# Patient Record
Sex: MALE | Race: WHITE | NOT HISPANIC OR LATINO | Employment: OTHER | URBAN - METROPOLITAN AREA
[De-identification: names, ages, dates, MRNs, and addresses within clinical notes are randomized per-mention and may not be internally consistent; named-entity substitution may affect disease eponyms.]

---

## 2017-03-21 DIAGNOSIS — G40.909 SEIZURE DISORDER: ICD-10-CM

## 2017-03-21 RX ORDER — DIVALPROEX SODIUM 500 MG/1
TABLET, DELAYED RELEASE ORAL
Qty: 60 TABLET | Refills: 0 | Status: SHIPPED | OUTPATIENT
Start: 2017-03-21 | End: 2017-03-30 | Stop reason: SDUPTHER

## 2017-03-25 DIAGNOSIS — D53.9 ANEMIA ASSOCIATED WITH NUTRITIONAL DEFICIENCY: Primary | ICD-10-CM

## 2017-03-25 DIAGNOSIS — Z13.6 ENCOUNTER FOR LIPID SCREENING FOR CARDIOVASCULAR DISEASE: ICD-10-CM

## 2017-03-25 DIAGNOSIS — Z13.220 ENCOUNTER FOR LIPID SCREENING FOR CARDIOVASCULAR DISEASE: ICD-10-CM

## 2017-03-25 DIAGNOSIS — M19.90 ARTHRITIS: ICD-10-CM

## 2017-03-25 RX ORDER — CELECOXIB 200 MG/1
CAPSULE ORAL
Qty: 56 CAPSULE | OUTPATIENT
Start: 2017-03-25

## 2017-03-27 ENCOUNTER — TELEPHONE (OUTPATIENT)
Dept: FAMILY MEDICINE | Facility: CLINIC | Age: 37
End: 2017-03-27

## 2017-03-27 NOTE — TELEPHONE ENCOUNTER
Spoke with Shalonda Dorman patient's caregiver. Informed Dr. Foley does not have any time available today but can book with extended hours this evening. Shalonda refused appt for this evening and requested appt for tomorrow with Dr. Foley. Appt scheduled for 11am tomorrow. Shalonda voices understanding.

## 2017-03-27 NOTE — TELEPHONE ENCOUNTER
----- Message from Enma Mar sent at 3/27/2017 12:00 PM CDT -----  Patient's guardian, Shalonda Dorman, called.   No. [phone]   Patient has a rash on his left side at his waist.  It could be shingles.   He needs an appointment today.    Please call.

## 2017-03-28 ENCOUNTER — OFFICE VISIT (OUTPATIENT)
Dept: INTERNAL MEDICINE | Facility: CLINIC | Age: 37
End: 2017-03-28
Payer: MEDICARE

## 2017-03-28 ENCOUNTER — LAB VISIT (OUTPATIENT)
Dept: LAB | Facility: HOSPITAL | Age: 37
End: 2017-03-28
Attending: INTERNAL MEDICINE
Payer: MEDICARE

## 2017-03-28 VITALS — DIASTOLIC BLOOD PRESSURE: 78 MMHG | HEIGHT: 73 IN | SYSTOLIC BLOOD PRESSURE: 122 MMHG | HEART RATE: 80 BPM

## 2017-03-28 DIAGNOSIS — G40.909 SEIZURE DISORDER: ICD-10-CM

## 2017-03-28 DIAGNOSIS — Z23 NEED FOR VARICELLA VACCINE: ICD-10-CM

## 2017-03-28 DIAGNOSIS — R21 RASH OF BACK: ICD-10-CM

## 2017-03-28 DIAGNOSIS — G80.9 CEREBRAL PALSY, UNSPECIFIED TYPE: ICD-10-CM

## 2017-03-28 DIAGNOSIS — Z13.6 ENCOUNTER FOR LIPID SCREENING FOR CARDIOVASCULAR DISEASE: ICD-10-CM

## 2017-03-28 DIAGNOSIS — L73.9 FOLLICULITIS: Primary | ICD-10-CM

## 2017-03-28 DIAGNOSIS — Z13.220 ENCOUNTER FOR LIPID SCREENING FOR CARDIOVASCULAR DISEASE: ICD-10-CM

## 2017-03-28 LAB
ALBUMIN SERPL BCP-MCNC: 3.8 G/DL
ALP SERPL-CCNC: 57 U/L
ALT SERPL W/O P-5'-P-CCNC: 45 U/L
ANION GAP SERPL CALC-SCNC: 8 MMOL/L
AST SERPL-CCNC: 25 U/L
BASOPHILS # BLD AUTO: 0.03 K/UL
BASOPHILS NFR BLD: 0.5 %
BILIRUB SERPL-MCNC: 0.4 MG/DL
BUN SERPL-MCNC: 12 MG/DL
CALCIUM SERPL-MCNC: 9.8 MG/DL
CHLORIDE SERPL-SCNC: 105 MMOL/L
CO2 SERPL-SCNC: 26 MMOL/L
CREAT SERPL-MCNC: 0.8 MG/DL
DIFFERENTIAL METHOD: ABNORMAL
EOSINOPHIL # BLD AUTO: 0.3 K/UL
EOSINOPHIL NFR BLD: 4 %
ERYTHROCYTE [DISTWIDTH] IN BLOOD BY AUTOMATED COUNT: 13.2 %
EST. GFR  (AFRICAN AMERICAN): >60 ML/MIN/1.73 M^2
EST. GFR  (NON AFRICAN AMERICAN): >60 ML/MIN/1.73 M^2
GLUCOSE SERPL-MCNC: 76 MG/DL
HCT VFR BLD AUTO: 43.6 %
HDLC SERPL-MCNC: 164 MG/DL
HDLC SERPL-MCNC: 41 MG/DL
HGB BLD-MCNC: 15.6 G/DL
LYMPHOCYTES # BLD AUTO: 2.7 K/UL
LYMPHOCYTES NFR BLD: 43.6 %
MCH RBC QN AUTO: 30.2 PG
MCHC RBC AUTO-ENTMCNC: 35.8 %
MCV RBC AUTO: 85 FL
MONOCYTES # BLD AUTO: 0.6 K/UL
MONOCYTES NFR BLD: 9.2 %
NEUTROPHILS # BLD AUTO: 2.6 K/UL
NEUTROPHILS NFR BLD: 42.2 %
PLATELET # BLD AUTO: 122 K/UL
PMV BLD AUTO: 10.8 FL
POTASSIUM SERPL-SCNC: 4.8 MMOL/L
PROT SERPL-MCNC: 7.1 G/DL
RBC # BLD AUTO: 5.16 M/UL
SODIUM SERPL-SCNC: 139 MMOL/L
WBC # BLD AUTO: 6.22 K/UL

## 2017-03-28 PROCEDURE — 36415 COLL VENOUS BLD VENIPUNCTURE: CPT | Mod: PO

## 2017-03-28 PROCEDURE — 83718 ASSAY OF LIPOPROTEIN: CPT

## 2017-03-28 PROCEDURE — 86787 VARICELLA-ZOSTER ANTIBODY: CPT

## 2017-03-28 PROCEDURE — 82465 ASSAY BLD/SERUM CHOLESTEROL: CPT

## 2017-03-28 PROCEDURE — 85025 COMPLETE CBC W/AUTO DIFF WBC: CPT

## 2017-03-28 PROCEDURE — 99999 PR PBB SHADOW E&M-EST. PATIENT-LVL III: CPT | Mod: PBBFAC,,, | Performed by: INTERNAL MEDICINE

## 2017-03-28 PROCEDURE — 99499 UNLISTED E&M SERVICE: CPT | Mod: S$PBB,,, | Performed by: INTERNAL MEDICINE

## 2017-03-28 PROCEDURE — 80053 COMPREHEN METABOLIC PANEL: CPT

## 2017-03-28 PROCEDURE — 3078F DIAST BP <80 MM HG: CPT | Mod: S$GLB,,, | Performed by: INTERNAL MEDICINE

## 2017-03-28 PROCEDURE — 3074F SYST BP LT 130 MM HG: CPT | Mod: S$GLB,,, | Performed by: INTERNAL MEDICINE

## 2017-03-28 PROCEDURE — 99395 PREV VISIT EST AGE 18-39: CPT | Mod: S$GLB,,, | Performed by: INTERNAL MEDICINE

## 2017-03-28 PROCEDURE — 83701 LIPOPROTEIN BLD HR FRACTION: CPT

## 2017-03-28 RX ORDER — SULFAMETHOXAZOLE AND TRIMETHOPRIM 800; 160 MG/1; MG/1
1 TABLET ORAL 2 TIMES DAILY
Qty: 10 TABLET | Refills: 0 | Status: SHIPPED | OUTPATIENT
Start: 2017-03-28 | End: 2017-04-02

## 2017-03-28 NOTE — MR AVS SNAPSHOT
[facility] Internal Medicine   [place]  [place] [address]  Phone: [phone]  Fax: [phone]                  Leonid Cox   3/28/2017 11:00 AM   Office Visit    Description:  Male : 1980   Provider:  Jamel Foley MD   [facility]           Reason for Visit     Rash           Diagnoses this Visit        Comments    Seizure disorder    -  Primary     Cerebral palsy, unspecified type         Encounter for lipid screening for cardiovascular disease         Rash of back         Folliculitis                To Do List           Future Appointments        Provider Department Dept Phone    3/28/2017 11:45 AM [facility] [phone]    3/30/2017 11:20 AM Jimi Piña III, MD [facility] - Neurology [phone]    2017 10:00 AM Jamel Foley MD [facility] [phone]      Goals (5 Years of Data)     None       These Medications        Disp Refills Start End    sulfamethoxazole-trimethoprim 800-160mg (BACTRIM DS) 800-160 mg Tab 10 tablet 0 3/28/2017 2017    Take 1 tablet by mouth 2 (two) times daily. - Oral    Pharmacy: [facility] [place] Ph #: [phone]         [facility]     [facility] -  Assistance  Registered nurses in the [facility] provide clinical advisement, health education, appointment booking, and other advisory services.  Call for this free service at [phone].             Medications           Message regarding Medications     Verify the changes and/or additions to your medication regime listed below are the same as discussed with your clinician today.  If any of these changes or additions are incorrect, please notify your healthcare provider.        START taking these NEW medications        Refills    sulfamethoxazole-trimethoprim 800-160mg (BACTRIM DS) 800-160 mg  "Tab 0    Sig: Take 1 tablet by mouth 2 (two) times daily.    Class: Normal    Route: Oral           Verify that the below list of medications is an accurate representation of the medications you are currently taking.  If none reported, the list may be blank. If incorrect, please contact your healthcare provider. Carry this list with you in case of emergency.           Current Medications     celecoxib (CELEBREX) 200 MG capsule TAKE 1 CAPSULE BY MOUTH 2 TIMES DAILY    diazepam (VALIUM) 5 MG tablet Take 1 tablet (5 mg total) by mouth every 6 (six) hours as needed (muscle spasm).    divalproex (DEPAKOTE) 250 MG EC tablet Take 1 tablet (250 mg total) by mouth every evening.    divalproex (DEPAKOTE) 500 MG TbEC TAKE 1 TABLET BY MOUTH EVERY 12 HOURS    hydrocodone-acetaminophen 5-325mg (NORCO) 5-325 mg per tablet Take 1 tablet by mouth every 6 (six) hours as needed for Pain.    lisinopril (PRINIVIL,ZESTRIL) 5 MG tablet TAKE 1 TABLET BY MOUTH 2 TIMES DAILY    multivitamin (THERAGRAN) per tablet Take 1 tablet by mouth once daily.    sulfamethoxazole-trimethoprim 800-160mg (BACTRIM DS) 800-160 mg Tab Take 1 tablet by mouth 2 (two) times daily.           Clinical Reference Information           Your Vitals Were     BP Pulse Height             122/78 (BP Location: Right arm, Patient Position: Sitting, BP Method: Manual) 80 6' 1" (1.854 m)         Blood Pressure          Most Recent Value    BP  122/78      Allergies as of 3/28/2017     Adhesive Tape-silicones    Codeine    Morphine      Immunizations Administered on Date of Encounter - 3/28/2017     None      Orders Placed During Today's Visit     Future Labs/Procedures Expected by Expires    CBC auto differential  3/28/2017 5/27/2018    Cholesterol, total  3/28/2017 5/27/2018    Comprehensive metabolic panel  3/28/2017 5/27/2018    HDL CHOLESTEROL  3/28/2017 5/27/2018    LDL CHOLESTEROL, DIRECT  3/28/2017 5/27/2018    Varicella zoster antibody, IgG  3/28/2017 5/27/2018    "   Instructions    Recommendations for today     Rash on the skin seems more likely to be folliculitis and does not seem compatible with shingles.  Please follow the recommendations listed below regarding management of folliculitis.  In addition we recommend anti-biotic Bactrim.  If mild side effects develop on Bactrim simply continued the medication.  If significant side effects develop stop the medication and contact the clinic.      You should also contact the clinic if symptoms recur or simply fail to improve despite anti-biotic and home care measures.        Sulfamethoxazole; Trimethoprim, SMX-TMP tablets  What is this medicine?  SULFAMETHOXAZOLE; TRIMETHOPRIM or SMX-TMP (suhl fuh meth OK radha zohl; trye METH oh prim) is a combination of a sulfonamide antibiotic and a second antibiotic, trimethoprim. It is used to treat or prevent certain kinds of bacterial infections. It will not work for colds, flu, or other viral infections.  How should I use this medicine?  Take this medicine by mouth with a full glass of water. Follow the directions on the prescription label. Take your medicine at regular intervals. Do not take it more often than directed. Do not skip doses or stop your medicine early.  Talk to your pediatrician regarding the use of this medicine in children. Special care may be needed. This medicine has been used in children as young as 2 months of age.  What side effects may I notice from receiving this medicine?  Side effects that you should report to your doctor or health care professional as soon as possible:  · allergic reactions like skin rash or hives, swelling of the face, lips, or tongue  · breathing problems  · fever or chills, sore throat  · irregular heartbeat, chest pain  · joint or muscle pain  · pain or difficulty passing urine  · red pinpoint spots on skin  · redness, blistering, peeling or loosening of the skin, including inside the mouth  · unusual bleeding or bruising  · unusually weak or  tired  · yellowing of the eyes or skin  Side effects that usually do not require medical attention (report to your doctor or health care professional if they continue or are bothersome):  · diarrhea  · dizziness  · headache  · loss of appetite  · nausea, vomiting  · nervousness  What may interact with this medicine?  Do not take this medicine with any of the following medications:  · aminobenzoate potassium  · dofetilide  · metronidazole  This medicine may also interact with the following medications:  · ACE inhibitors like benazepril, enalapril, lisinopril, and ramipril  · birth control pills  · cyclosporine  · digoxin  · diuretics  · indomethacin  · medicines for diabetes  · methenamine  · methotrexate  · phenytoin  · potassium supplements  · pyrimethamine  · sulfinpyrazone  · tricyclic antidepressants  · warfarin  What if I miss a dose?  If you miss a dose, take it as soon as you can. If it is almost time for your next dose, take only that dose. Do not take double or extra doses.  Where should I keep my medicine?  Keep out of the reach of children.  Store at room temperature between 20 to 25 degrees C (68 to 77 degrees F). Protect from light. Throw away any unused medicine after the expiration date.  What should I tell my health care provider before I take this medicine?  They need to know if you have any of these conditions:  · anemia  · asthma  · being treated with anticonvulsants  · if you frequently drink alcohol containing drinks  · kidney disease  · liver disease  · low level of folic acid or glucose-6-phosphate dehydrogenase  · poor nutrition or malabsorption  · porphyria  · severe allergies  · thyroid disorder  · an unusual or allergic reaction to sulfamethoxazole, trimethoprim, sulfa drugs, other medicines, foods, dyes, or preservatives  · pregnant or trying to get pregnant  · breast-feeding  What should I watch for while using this medicine?  Tell your doctor or health care professional if your symptoms  do not improve. Drink several glasses of water a day to reduce the risk of kidney problems.  Do not treat diarrhea with over the counter products. Contact your doctor if you have diarrhea that lasts more than 2 days or if it is severe and watery.  This medicine can make you more sensitive to the sun. Keep out of the sun. If you cannot avoid being in the sun, wear protective clothing and use a sunscreen. Do not use sun lamps or tanning beds/booths.  Date Last Reviewed:   NOTE:This sheet is a summary. It may not cover all possible information. If you have questions about this medicine, talk to your doctor, pharmacist, or health care provider. Copyright© 2016 [employer]        Understanding Folliculitis  Folliculitis is when hair follicles become inflamed. Follicles are the tiny holes from which hair grows out of your skin. This skin condition can occur any place on the body where hair grows. But its often found on the neck, face, and scalp.  How to say it  [id number]   What causes folliculitis?  An infection or irritation can cause this skin condition. It may be from bacteria or a fungus. The condition can also happen from a wound or irritation of the skin. Shaving is a common cause. Some cases may come from taking certain medicines, such as those that treat acne.  Symptoms of folliculitis  This skin condition tends to develop quickly. It looks like little pimples on a base of a red, inflamed hair follicles. These bumps may ooze pus. They may also be:  · Itchy  · Painful  · Red  · Swollen  Treatment for folliculitis  Treatment depends on the cause of the inflammation. In some cases, this skin condition will go away on its own in a few days. But it may return. Treatment options include:  · Warm compress. Putting a warm, wet washcloth on the inflamed skin may help.  · Medicine. Many skin (topical) and oral medicines are available. Antibiotics are used for bacterial infections. Antifungal medicines are best  for fungal infections.  · Good hygiene. Keeping the skin clean can help. Use a clean razor when shaving. Prevent ingrown hairs after shaving. This can reduce folliculitis in the beard area. Avoid any substances that bother your skin.  When to call your healthcare provider  Call your healthcare provider right away if you have any of these:  · Fever of 100.4°F (38°C) or higher, or as directed  · Pain that gets worse  · Symptoms that dont get better, or get worse  · New symptoms   Date Last Reviewed: 5/1/2016  © 1971-4917 [employer]. [address] [place] [address]. All rights reserved. This information is not intended as a substitute for professional medical care. Always follow your healthcare professional's instructions.             Language Assistance Services     ATTENTION: Language assistance services are available, free of charge. Please call [phone].      ATENCIÓN: Si habla ellyn, tiene a daniels disposición servicios gratuitos de asistencia lingüística. Llame al [phone].     ANNE MARIE Ý: N?u b?n nói Ti?ng Vi?t, có các d?ch v? h? tr? ngôn ng? mi?n phí dành cho b?n. G?i s? [phone].         [facility] complies with applicable Federal civil rights laws and does not discriminate on the basis of race, color, national origin, age, disability, or sex.

## 2017-03-28 NOTE — PATIENT INSTRUCTIONS
Recommendations for today     Rash on the skin seems more likely to be folliculitis and does not seem compatible with shingles.  Please follow the recommendations listed below regarding management of folliculitis.  In addition we recommend anti-biotic Bactrim.  If mild side effects develop on Bactrim simply continued the medication.  If significant side effects develop stop the medication and contact the clinic.      You should also contact the clinic if symptoms recur or simply fail to improve despite anti-biotic and home care measures.        Sulfamethoxazole; Trimethoprim, SMX-TMP tablets  What is this medicine?  SULFAMETHOXAZOLE; TRIMETHOPRIM or SMX-TMP (suhl fuh meth OK radha zohl; trye METH oh prim) is a combination of a sulfonamide antibiotic and a second antibiotic, trimethoprim. It is used to treat or prevent certain kinds of bacterial infections. It will not work for colds, flu, or other viral infections.  How should I use this medicine?  Take this medicine by mouth with a full glass of water. Follow the directions on the prescription label. Take your medicine at regular intervals. Do not take it more often than directed. Do not skip doses or stop your medicine early.  Talk to your pediatrician regarding the use of this medicine in children. Special care may be needed. This medicine has been used in children as young as 2 months of age.  What side effects may I notice from receiving this medicine?  Side effects that you should report to your doctor or health care professional as soon as possible:  · allergic reactions like skin rash or hives, swelling of the face, lips, or tongue  · breathing problems  · fever or chills, sore throat  · irregular heartbeat, chest pain  · joint or muscle pain  · pain or difficulty passing urine  · red pinpoint spots on skin  · redness, blistering, peeling or loosening of the skin, including inside the mouth  · unusual bleeding or bruising  · unusually weak or  tired  · yellowing of the eyes or skin  Side effects that usually do not require medical attention (report to your doctor or health care professional if they continue or are bothersome):  · diarrhea  · dizziness  · headache  · loss of appetite  · nausea, vomiting  · nervousness  What may interact with this medicine?  Do not take this medicine with any of the following medications:  · aminobenzoate potassium  · dofetilide  · metronidazole  This medicine may also interact with the following medications:  · ACE inhibitors like benazepril, enalapril, lisinopril, and ramipril  · birth control pills  · cyclosporine  · digoxin  · diuretics  · indomethacin  · medicines for diabetes  · methenamine  · methotrexate  · phenytoin  · potassium supplements  · pyrimethamine  · sulfinpyrazone  · tricyclic antidepressants  · warfarin  What if I miss a dose?  If you miss a dose, take it as soon as you can. If it is almost time for your next dose, take only that dose. Do not take double or extra doses.  Where should I keep my medicine?  Keep out of the reach of children.  Store at room temperature between 20 to 25 degrees C (68 to 77 degrees F). Protect from light. Throw away any unused medicine after the expiration date.  What should I tell my health care provider before I take this medicine?  They need to know if you have any of these conditions:  · anemia  · asthma  · being treated with anticonvulsants  · if you frequently drink alcohol containing drinks  · kidney disease  · liver disease  · low level of folic acid or glucose-6-phosphate dehydrogenase  · poor nutrition or malabsorption  · porphyria  · severe allergies  · thyroid disorder  · an unusual or allergic reaction to sulfamethoxazole, trimethoprim, sulfa drugs, other medicines, foods, dyes, or preservatives  · pregnant or trying to get pregnant  · breast-feeding  What should I watch for while using this medicine?  Tell your doctor or health care professional if your symptoms  do not improve. Drink several glasses of water a day to reduce the risk of kidney problems.  Do not treat diarrhea with over the counter products. Contact your doctor if you have diarrhea that lasts more than 2 days or if it is severe and watery.  This medicine can make you more sensitive to the sun. Keep out of the sun. If you cannot avoid being in the sun, wear protective clothing and use a sunscreen. Do not use sun lamps or tanning beds/booths.  Date Last Reviewed:   NOTE:This sheet is a summary. It may not cover all possible information. If you have questions about this medicine, talk to your doctor, pharmacist, or health care provider. Copyright© 2016 [employer] Standard        Understanding Folliculitis  Folliculitis is when hair follicles become inflamed. Follicles are the tiny holes from which hair grows out of your skin. This skin condition can occur any place on the body where hair grows. But its often found on the neck, face, and scalp.  How to say it  [id number]   What causes folliculitis?  An infection or irritation can cause this skin condition. It may be from bacteria or a fungus. The condition can also happen from a wound or irritation of the skin. Shaving is a common cause. Some cases may come from taking certain medicines, such as those that treat acne.  Symptoms of folliculitis  This skin condition tends to develop quickly. It looks like little pimples on a base of a red, inflamed hair follicles. These bumps may ooze pus. They may also be:  · Itchy  · Painful  · Red  · Swollen  Treatment for folliculitis  Treatment depends on the cause of the inflammation. In some cases, this skin condition will go away on its own in a few days. But it may return. Treatment options include:  · Warm compress. Putting a warm, wet washcloth on the inflamed skin may help.  · Medicine. Many skin (topical) and oral medicines are available. Antibiotics are used for bacterial infections. Antifungal medicines are best  for fungal infections.  · Good hygiene. Keeping the skin clean can help. Use a clean razor when shaving. Prevent ingrown hairs after shaving. This can reduce folliculitis in the beard area. Avoid any substances that bother your skin.  When to call your healthcare provider  Call your healthcare provider right away if you have any of these:  · Fever of 100.4°F (38°C) or higher, or as directed  · Pain that gets worse  · Symptoms that dont get better, or get worse  · New symptoms   Date Last Reviewed: 5/1/2016  © 1336-4549 [employer]. [address] [place] [address]. All rights reserved. This information is not intended as a substitute for professional medical care. Always follow your healthcare professional's instructions.

## 2017-03-28 NOTE — PROGRESS NOTES
Portions of this note are generated with voice recognition software. Typographical errors may exist.     SUBJECTIVE:    This is a/an 37 y.o. male here for primary care visit for  Chief Complaint   Patient presents with    Rash     Poss. Shingles      Rash developed over the last 2 weeks.  Caretaker today states that the rash was papular in nature.  Unclear if the rash cause significant pain or itching.  Patient with minimal verbal communication.  Patient did not seem to have problems with excoriation.  He did not seem to be in significant distress relating to the rash.  No obvious constitutional symptoms.  Mar[place] vaccination status assumed by the caretaker.  No documented cases of shingles in this patient.  No immunosuppressive medication.    Seizure disorder.  Patient continues to take anti-epileptic medication.  Followed by subspecialist.      Medications Reviewed and Updated    Past medical, family, and social histories were reviewed and updated.    Review of Systems negative unless noted otherwise in history of present illness-  History obtained from unobtainable from patient due to language barrier    Allergic:    Review of patient's allergies indicates:   Allergen Reactions    Adhesive tape-silicones      Other reaction(s): blisters    Codeine      Other reaction(s): Nausea    Morphine Itching       OBJECTIVE:  BP: 122/78 Pulse: 80    Wt Readings from Last 3 Encounters:   09/22/16 94.6 kg (208 lb 8.9 oz)   06/23/16 95.9 kg (211 lb 6.7 oz)   02/16/16 95.5 kg (210 lb 8.6 oz)    There is no height or weight on file to calculate BMI.  Previous Blood Pressure Readings :   BP Readings from Last 3 Encounters:   03/28/17 122/78   09/22/16 124/88   06/23/16 130/60       GEN: No apparent distress  HEENT: sclera non-icteric, conjunctiva clear  CV: no peripheral edema  PULM: breathing non-labored  ABD: Obese, protuberant abdomen.  PSYCH: appropriate affect  MSK: able to rise from chair without assistance  SKIN:  normal skin turgor.    Pertinent Labs Reviewed       ASSESSMENT/PLAN:    Folliculitis  -     sulfamethoxazole-trimethoprim 800-160mg (BACTRIM DS) 800-160 mg Tab; Take 1 tablet by mouth 2 (two) times daily.  Dispense: 10 tablet; Refill: 0    Seizure disorder  -     CBC auto differential; Future; Expected date: 3/28/17    Cerebral palsy, unspecified type  -     Comprehensive metabolic panel; Future; Expected date: 3/28/17    Encounter for lipid screening for cardiovascular disease  -     Cholesterol, total; Future; Expected date: 3/28/17  -     HDL CHOLESTEROL; Future; Expected date: 3/28/17  -     LDL CHOLESTEROL, DIRECT; Future; Expected date: 3/28/17    Rash of back  -     Varicella zoster antibody, IgG; Future; Expected date: 3/28/17          Future Appointments  Date Time Provider Department Center   3/30/2017 11:20 AM Jimi Piña III, MD [facility] NEURO [facility]   6/28/2017 10:00 AM Jamel Foley MD [facility]       Jamel Foley  3/28/2017  12:20 PM

## 2017-03-30 ENCOUNTER — OFFICE VISIT (OUTPATIENT)
Dept: NEUROLOGY | Facility: CLINIC | Age: 37
End: 2017-03-30
Payer: MEDICARE

## 2017-03-30 VITALS
SYSTOLIC BLOOD PRESSURE: 131 MMHG | DIASTOLIC BLOOD PRESSURE: 85 MMHG | BODY MASS INDEX: 27.49 KG/M2 | WEIGHT: 207.44 LBS | HEART RATE: 83 BPM | HEIGHT: 73 IN

## 2017-03-30 DIAGNOSIS — D69.6 THROMBOCYTOPENIA: ICD-10-CM

## 2017-03-30 DIAGNOSIS — G91.9 HYDROCEPHALUS: ICD-10-CM

## 2017-03-30 DIAGNOSIS — G40.909 SEIZURE DISORDER: Primary | ICD-10-CM

## 2017-03-30 DIAGNOSIS — G80.0 SPASTIC QUADRIPLEGIC CEREBRAL PALSY: ICD-10-CM

## 2017-03-30 PROCEDURE — 1160F RVW MEDS BY RX/DR IN RCRD: CPT | Mod: S$GLB,,,

## 2017-03-30 PROCEDURE — 99214 OFFICE O/P EST MOD 30 MIN: CPT | Mod: S$GLB,,,

## 2017-03-30 PROCEDURE — 3079F DIAST BP 80-89 MM HG: CPT | Mod: S$GLB,,,

## 2017-03-30 PROCEDURE — 3075F SYST BP GE 130 - 139MM HG: CPT | Mod: S$GLB,,,

## 2017-03-30 PROCEDURE — 99499 UNLISTED E&M SERVICE: CPT | Mod: S$PBB,,,

## 2017-03-30 PROCEDURE — 99999 PR PBB SHADOW E&M-EST. PATIENT-LVL III: CPT | Mod: PBBFAC,,,

## 2017-03-30 RX ORDER — DIVALPROEX SODIUM 250 MG/1
250 TABLET, DELAYED RELEASE ORAL NIGHTLY
Qty: 30 TABLET | Refills: 5 | Status: SHIPPED | OUTPATIENT
Start: 2017-03-30 | End: 2017-04-18 | Stop reason: SDUPTHER

## 2017-03-30 RX ORDER — DIVALPROEX SODIUM 500 MG/1
500 TABLET, DELAYED RELEASE ORAL EVERY 12 HOURS
Qty: 60 TABLET | Refills: 5 | Status: SHIPPED | OUTPATIENT
Start: 2017-03-30 | End: 2017-04-18 | Stop reason: SDUPTHER

## 2017-03-30 NOTE — PATIENT INSTRUCTIONS
See the copy of this report that has been scanned into patient's Epic Chart.     MARYJANE MURPHY III, MD

## 2017-03-30 NOTE — LETTER
March 30, 2017      Jamel Foley MD  [address] [place] [address]           [facility] Neurology  [address]  [facility] [address]  Phone: [phone]  Fax: [phone]          Patient: Leonid Cox   MR Number: [id number]   YOB: 1980   Date of Visit: 3/30/2017       Dear Dr. Jamel Foley:    Thank you for referring Leonid Cox to me for evaluation. Attached you will find relevant portions of my assessment and plan of care.    If you have questions, please do not hesitate to call me. I look forward to following Leonid Cox along with you.    Sincerely,    Jimi Piña III, MD    Enclosure  CC:  No Recipients    If you would like to receive this communication electronically, please contact [email or web address] or [phone] to request more information on [employer] access.    For providers and/or their staff who would like to refer a patient to [facility], please contact us through our one-stop-shop provider referral line, [facility], at [phone].    If you feel you have received this communication in error or would no longer like to receive these types of communications, please e-mail [email or web address]

## 2017-03-30 NOTE — PROGRESS NOTES
This office note has been dictated.  HISTORY OF PRESENT ILLNESS:  [facility] for   medical management of his seizure disorder.  He is accompanied by his morning   sitter.  No recent seizure activity is reported.  I am prescribing Depakote 500   mg twice a day and 250 mg at bedtime.  The medication has been well tolerated.    Leonid does not report any new problems today.  He has hydrocephalus and a   shunt for which he is followed by Dr. Das in Neurosurgery.    NEUROLOGIC REVIEW OF SYSTEMS:  He has recently developed a rash on his hip for   which he saw his primary care and treatment has been initiated.  He has the old   vision problem and is confined to the wheelchair because of back pain.  He has   been evaluated in Orthopedics and is treated conservatively with analgesic   medication.  He denies fever, chills, sweats, headaches, dizziness, speech or   swallowing difficulty, chest pain, cough, shortness breath, nausea, vomiting,   diarrhea and incontinence.    SOCIAL HISTORY:  Abi is his morning sitter for Monday through Friday, and it   is she who accompanies him today.      I reviewed his list of medicines and edited   the electronic record.    PHYSICAL EXAMINATION:  GENERAL:  Leonid is alert and attentive, but poorly oriented.  He has   spontaneous occasionally appropriate and somewhat nonsensical speech.  This is   typical for him.  VITAL SIGNS:  Reviewed and included in this note.  He is casually dressed and   seated in a wheelchair and in no acute distress.  NEUROLOGIC:  On cranial nerve examination, they are equal, reactive pupils and   spontaneous horizontal nystagmus.  He is able to find my hand in space.  He is   again noted to have left facial weakness without sensory deficit.  The tongue   and palate are in the midline.  Again, noted is gingival hypertrophy.  He hears   approximately equally in the two ears.  The neck is supple, the pulses are equal   and no bruits  are heard.  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  There are no gross sensory deficits.  The deep tendon reflexes may   be a bit brisker on the left upper extremity than the right.  His left knee   jerk appears to be depressed compared to the right and the ankle jerks are   somewhat diminished as well.    I reviewed the clinic record including his recent laboratory on the computer   console.  I discussed the situation with Leonid and his attendant.  He is doing   well from the standpoint of his seizure disorder and his medication will be   continued.  His platelet count, however, is now only 122.  It has been normal in   the past.  My concern is that that may be related to his Depakote.  He has been   doing well on this for a long time and his anticonvulsant levels have been in   the mid therapeutic range.  I will continue the medication for now, but also   schedule him to have another CBC performed in approximately one month at the   [facility].  I will forward the results to the patient when the testing   is completed.  If his platelet count is persistently low, I will likely refer   him to Hematology for further evaluation prior to making any changes in his   anticonvulsant medication.  His attendants may call if there are any questions   in the interim.      KELLI/JUAN CARLOS  dd: 03/30/2017 12:12:37 (CDT)  td: 03/31/2017 02:25:58 (CDT)  Doc ID   #[id number]  Job ID #[id number]    CC:

## 2017-03-31 LAB
LDLC SERPL-MCNC: 98 MG/DL
VARICELLA INTERPRETATION: NEGATIVE
VARICELLA ZOSTER IGG: 0.37 ISR

## 2017-04-03 ENCOUNTER — TELEPHONE (OUTPATIENT)
Dept: INTERNAL MEDICINE | Facility: CLINIC | Age: 37
End: 2017-04-03

## 2017-04-03 NOTE — TELEPHONE ENCOUNTER
Spoke with Mrs. Dorman. She will call me back when she can get the CNA to bring patient in for chicken pox vaccine.

## 2017-04-03 NOTE — TELEPHONE ENCOUNTER
----- Message from Jamel Foley MD sent at 4/2/2017  9:20 AM CDT -----  Contact mother regarding need to start chickenpox vaccination.  Blood work indicates patient does not have adequate immunity against chickenpox.  Rash that has developed is not chickenpox.  Schedule vaccination first dose as soon as possible and second dose should be within 4-8 weeks of first injection.

## 2017-04-04 ENCOUNTER — CLINICAL SUPPORT (OUTPATIENT)
Dept: FAMILY MEDICINE | Facility: CLINIC | Age: 37
End: 2017-04-04
Payer: MEDICARE

## 2017-04-04 DIAGNOSIS — Z23 VARICELLA VACCINE: Primary | ICD-10-CM

## 2017-04-04 PROCEDURE — 90471 IMMUNIZATION ADMIN: CPT | Mod: S$GLB,,, | Performed by: INTERNAL MEDICINE

## 2017-04-04 PROCEDURE — 90716 VAR VACCINE LIVE SUBQ: CPT | Mod: S$GLB,,, | Performed by: INTERNAL MEDICINE

## 2017-04-04 NOTE — TELEPHONE ENCOUNTER
----- Message from Seamus Bhat sent at 4/3/2017  5:04 PM CDT -----  Contact: Vin Askew//[phone]  You received a letter concerning celebrex and to his knowledge he is not on celebrex; he is concerned one of his caretakers may be filling this medication on his name.

## 2017-04-04 NOTE — TELEPHONE ENCOUNTER
Informed Kyle Azar Celebrex was prescribe for jamison only when needed for arthritic pain but has not been refilled since 10/2016.

## 2017-04-18 DIAGNOSIS — G40.909 SEIZURE DISORDER: ICD-10-CM

## 2017-04-18 RX ORDER — DIVALPROEX SODIUM 500 MG/1
TABLET, DELAYED RELEASE ORAL
Qty: 56 TABLET | OUTPATIENT
Start: 2017-04-18

## 2017-04-18 RX ORDER — DIVALPROEX SODIUM 250 MG/1
250 TABLET, DELAYED RELEASE ORAL NIGHTLY
Qty: 30 TABLET | Refills: 5 | Status: SHIPPED | OUTPATIENT
Start: 2017-04-18 | End: 2018-07-11 | Stop reason: SDUPTHER

## 2017-04-18 RX ORDER — DIVALPROEX SODIUM 500 MG/1
500 TABLET, DELAYED RELEASE ORAL EVERY 12 HOURS
Qty: 60 TABLET | Refills: 5 | Status: SHIPPED | OUTPATIENT
Start: 2017-04-18 | End: 2018-07-11 | Stop reason: SDUPTHER

## 2017-04-18 NOTE — TELEPHONE ENCOUNTER
----- Message from Sharon Mayes MA sent at 4/18/2017  4:28 PM CDT -----  Contact: [facility]    [phone]      ----- Message -----     From: Madelin Kerns     Sent: 4/18/2017   4:21 PM       To: Wang Krause S III Staff    Calling to request pts prescription for divalproex (DEPAKOTE) 500 MG TbEC be faxed to us at [phone].  (she does not think the e-scribe is working)

## 2017-04-19 ENCOUNTER — TELEPHONE (OUTPATIENT)
Dept: NEUROLOGY | Facility: CLINIC | Age: 37
End: 2017-04-19

## 2017-04-19 NOTE — TELEPHONE ENCOUNTER
----- Message from Madelin Kerns sent at 4/18/2017  4:21 PM CDT -----  Contact: [facility]    [phone]  Calling to request pts prescription for divalproex (DEPAKOTE) 500 MG TbEC be faxed to us at [phone].  (she does not think the e-scribe is working)

## 2017-05-01 ENCOUNTER — LAB VISIT (OUTPATIENT)
Dept: LAB | Facility: HOSPITAL | Age: 37
End: 2017-05-01
Payer: MEDICARE

## 2017-05-01 DIAGNOSIS — D69.6 THROMBOCYTOPENIA: ICD-10-CM

## 2017-05-01 DIAGNOSIS — G40.909 SEIZURE DISORDER: ICD-10-CM

## 2017-05-01 DIAGNOSIS — G91.9 HYDROCEPHALUS: ICD-10-CM

## 2017-05-01 DIAGNOSIS — G80.0 SPASTIC QUADRIPLEGIC CEREBRAL PALSY: ICD-10-CM

## 2017-05-01 LAB
BASOPHILS # BLD AUTO: 0.02 K/UL
BASOPHILS NFR BLD: 0.3 %
DIFFERENTIAL METHOD: NORMAL
EOSINOPHIL # BLD AUTO: 0.1 K/UL
EOSINOPHIL NFR BLD: 2.1 %
ERYTHROCYTE [DISTWIDTH] IN BLOOD BY AUTOMATED COUNT: 13.5 %
HCT VFR BLD AUTO: 43.3 %
HGB BLD-MCNC: 14.9 G/DL
LYMPHOCYTES # BLD AUTO: 2.5 K/UL
LYMPHOCYTES NFR BLD: 40.4 %
MCH RBC QN AUTO: 30.3 PG
MCHC RBC AUTO-ENTMCNC: 34.4 %
MCV RBC AUTO: 88 FL
MONOCYTES # BLD AUTO: 0.5 K/UL
MONOCYTES NFR BLD: 8.3 %
NEUTROPHILS # BLD AUTO: 3 K/UL
NEUTROPHILS NFR BLD: 48.4 %
PLATELET # BLD AUTO: 186 K/UL
PMV BLD AUTO: 9.8 FL
RBC # BLD AUTO: 4.92 M/UL
WBC # BLD AUTO: 6.27 K/UL

## 2017-05-01 PROCEDURE — 85025 COMPLETE CBC W/AUTO DIFF WBC: CPT

## 2017-05-01 PROCEDURE — 36415 COLL VENOUS BLD VENIPUNCTURE: CPT | Mod: PO

## 2017-05-02 RX ORDER — CELECOXIB 200 MG/1
CAPSULE ORAL
Qty: 56 CAPSULE | Refills: 1 | Status: SHIPPED | OUTPATIENT
Start: 2017-05-02 | End: 2017-08-02 | Stop reason: SDUPTHER

## 2017-05-02 RX ORDER — CELECOXIB 200 MG/1
CAPSULE ORAL
Qty: 56 CAPSULE | OUTPATIENT
Start: 2017-05-02

## 2017-06-06 ENCOUNTER — OFFICE VISIT (OUTPATIENT)
Dept: INTERNAL MEDICINE | Facility: CLINIC | Age: 37
End: 2017-06-06
Payer: MEDICARE

## 2017-06-06 ENCOUNTER — PATIENT MESSAGE (OUTPATIENT)
Dept: INTERNAL MEDICINE | Facility: CLINIC | Age: 37
End: 2017-06-06

## 2017-06-06 ENCOUNTER — TELEPHONE (OUTPATIENT)
Dept: INTERNAL MEDICINE | Facility: CLINIC | Age: 37
End: 2017-06-06

## 2017-06-06 VITALS
OXYGEN SATURATION: 98 % | SYSTOLIC BLOOD PRESSURE: 128 MMHG | WEIGHT: 207.44 LBS | HEART RATE: 65 BPM | BODY MASS INDEX: 27.49 KG/M2 | HEIGHT: 73 IN | DIASTOLIC BLOOD PRESSURE: 78 MMHG

## 2017-06-06 DIAGNOSIS — R46.89 SELF NEGLECT: Primary | ICD-10-CM

## 2017-06-06 DIAGNOSIS — L30.8 VESICULAR DERMATITIS: Primary | ICD-10-CM

## 2017-06-06 DIAGNOSIS — G80.0 SPASTIC QUADRIPLEGIC CEREBRAL PALSY: ICD-10-CM

## 2017-06-06 PROCEDURE — 99999 PR PBB SHADOW E&M-EST. PATIENT-LVL III: CPT | Mod: PBBFAC,,, | Performed by: INTERNAL MEDICINE

## 2017-06-06 PROCEDURE — 99212 OFFICE O/P EST SF 10 MIN: CPT | Mod: S$GLB,,, | Performed by: INTERNAL MEDICINE

## 2017-06-06 RX ORDER — TRIAMCINOLONE ACETONIDE 5 MG/G
CREAM TOPICAL 2 TIMES DAILY PRN
Qty: 15 G | Refills: 0 | Status: SHIPPED | OUTPATIENT
Start: 2017-06-06 | End: 2017-06-06 | Stop reason: SDUPTHER

## 2017-06-06 RX ORDER — TRIAMCINOLONE ACETONIDE 5 MG/G
CREAM TOPICAL
Qty: 15 G | Refills: 0 | Status: SHIPPED | OUTPATIENT
Start: 2017-06-06 | End: 2017-06-28 | Stop reason: SDUPTHER

## 2017-06-06 NOTE — TELEPHONE ENCOUNTER
Spoke with Shalonda who states that pt has a rash and would like for pt to be seen today. Shalonda was offered an urgent care appointment for 11:20am. Shalonda states that she will see if someone can bring pt in. Understanding voiced.

## 2017-06-06 NOTE — PROGRESS NOTES
Similar distribution.  Resolving vesicular appearing rash with surrounding erythema.    Portions of this note are generated with voice recognition software. Typographical errors may exist.     SUBJECTIVE:    This is a/an 37 y.o. male here for primary care visit for  Chief Complaint   Patient presents with    Rash     Patient has had recurrence of vesicular rash for the last week.  Caretaker states that rash completely resolved for the interval between his last visit and today.  The lesions are pruritic.  The patient itches one area of his body.  Caretaker has not sure that there has been an evaluation at his adult  program to see if there is an arthropod infestation.  Nobody else in the patient's home is complaining about pruritic rash or bug bites.  The patient lives in a house with at least 4 staff that come over to help with activities of daily living.  None have expressed having problems with bug bites or rashes.  Patient does not have a history of herpes simplex labialis.  Self-care measures have been limited.  There are no topical remedies that are being utilized for the recurrence of rash.          Medications Reviewed and Updated    Past medical, family, and social histories were reviewed and updated.    Review of Systems negative unless noted otherwise in history of present illness-  History obtained from personal aide    Allergic:    Review of patient's allergies indicates:   Allergen Reactions    Adhesive tape-silicones      Other reaction(s): blisters    Codeine      Other reaction(s): Nausea    Morphine Itching       OBJECTIVE:  BP: 128/78 Pulse: 65    Wt Readings from Last 3 Encounters:   06/06/17 94.1 kg (207 lb 7.3 oz)   03/30/17 94.1 kg (207 lb 7.3 oz)   09/22/16 94.6 kg (208 lb 8.9 oz)    Body mass index is 27.37 kg/m².  Previous Blood Pressure Readings :   BP Readings from Last 3 Encounters:   06/06/17 128/78   03/30/17 131/85   03/28/17 122/78       GEN: No apparent distress  HEENT:  sclera non-icteric, conjunctiva clear  CV: no peripheral edema  PULM: breathing non-labored  ABD: Obese, protuberant abdomen.  PSYCH: appropriate affect  MSK: able to rise from chair without assistance  SKIN: normal skin turgor    Pertinent Labs Reviewed       ASSESSMENT/PLAN:    Vesicular dermatitis.  Recurrent.  Etiology unclear.  Could represent arthropod bites.  Alternatively could be herpes simplex.  Counseling on self-care measures.  Return to clinic if fails to improve  -     triamcinolone acetonide 0.5% (KENALOG) 0.5 % Crea; Apply topically 2 (two) times daily as needed. For itching  Dispense: 15 g; Refill: 0          Future Appointments  Date Time Provider Department Center   6/28/2017 10:00 AM Jamel Foley MD [facility]       Jamel Foley  6/6/2017  11:25 AM

## 2017-06-06 NOTE — PATIENT INSTRUCTIONS
Recommendations for today    Rash symptoms may be related to bug bites.  We recommend that you pursue the following home care measures to prevent this from becoming a chronic problem.    Triamcinolone is a steroids cream that we are prescribing the help with itching symptoms.  This should not be used on Procan or open skin because this can prevent healing.  If there is broken or open skin you should use something like calamine lotion to help with itching until broken skin has healed over.    If rash comes back again you should contact pest-control service to evaluate the home environment to make sure that there is not an infestation of bedbugs that needs to be treated and removed.      Bedbug Bites  Bedbugs are tiny insects, about the size of an apple seed. They are reddish-brown and slightly flattened and oval. They feed on human blood, usually at night while people are sleeping. Bedbugs are attracted to the warmth of your body, and also to your breath. Unlike some other parasites, they can live up to a year without eating. They dont have wings and dont jump, but they are fast crawlers.  Bedbugs are not dangerous and dont usually spread disease.  Bite symptoms  The symptoms of bedbug bites can be different for different people. Bites can be found anywhere on your body, but they are more common on skin that is exposed. Look for these symptoms:  · Itching  · Red rash, which can start small and get larger  · Hives, red swollen marks (welts), or raised red itchy areas (wheals). These may be in spots or cover a large area.  · Small, firm, flat bumps  · Blisters  · Cluster of bites in a line, or in a curved or zigzag row  · Allergic reaction  · Skin infection from scratching the bites  Where bedbugs hide out  Bedbugs can be found in almost any place you spend time, both at home and away from home. This includes hotels, buses, trains, ships, nursing homes, and apartments.  Bedbugs can be in clean or dirty places.  Because of their size and shape, bedbugs can get into small places, where you wouldnt expect to look. They tend to be found mostly in furniture, furnishings around the home, clothing, and cracks and crevices. Here are specific areas where they can be found:  · Beds and mattresses, especially in the seams  · Joints of bed frames, or the headboard  · Sheets and blankets  · Couches, fabric-covered chairs, and other furniture with fabric  · Rugs, especially along the edges  · Luggage or boxes  · Clothing  · Behind wall decorations, pictures, mirrors, and smoke alarms, and in electric outlets  How to find them  Bedbugs are big enough to be seen. But they also may leave some traces:  · Black spots (feces) on a bed mattress, especially around the seams  · Blood spots on the sheets  · Shells they may have shed  Home care  · Bite symptoms usually go away on their own in 1 to 2 weeks.  · To help prevent infection, avoid scratching the bites as much as possible.  · To relieve itching and swelling, use an over-the-counter (OTC) hydrocortisone ointment or cream  · If you need more relief, put an ice pack on the bites. Use the ice pack for up to 20 minutes at a time. To make an ice pack, put ice cubes in a plastic bag that seals at the top. Wrap the bag in a clean, thin towel or cloth. Never put ice or an ice pack directly on the skin.  · If you have a large number of bites or severe itching, take an OTC oral antihistamine. Follow the directions on the package.  · If a bite becomes infected, your health care provider can prescribe an antibiotic. This may be a pill you take by mouth. Or it may be a cream you put on your skin.  · If you were bitten in your home, talk with a licensed pest-control company. Bedbugs do not live on you. They live in cracks in your house. The company can inspect your home and help you get rid of the bugs safely.  Follow-up care  Follow up with your healthcare provider, or as advised.  When to seek  medical advice  Call your healthcare provider right away if any of these occur:  · Fever of 100.4°F (38.0°C), or higher  · Signs of infection in the bites, such as swelling and pain that gets worse, warmth in the area, or drainage from the bites  · Signs of allergic reaction, such as hives or a spreading rash  Call 911  Call 911 if any of the following occur:  · Throat itching or swelling  · Wheezing  Date Last Reviewed: 8/1/2016 © 2000-2016 [employer]. [address] [place], [place] [address]. All rights reserved. This information is not intended as a substitute for professional medical care. Always follow your healthcare professional's instructions.

## 2017-06-06 NOTE — TELEPHONE ENCOUNTER
Spoke with Shalonda who states that pt will be able to come in on 6/6/2017 at 11:20am. Understanding voiced.

## 2017-06-09 ENCOUNTER — TELEPHONE (OUTPATIENT)
Dept: INTERNAL MEDICINE | Facility: CLINIC | Age: 37
End: 2017-06-09

## 2017-06-12 ENCOUNTER — TELEPHONE (OUTPATIENT)
Dept: INTERNAL MEDICINE | Facility: CLINIC | Age: 37
End: 2017-06-12

## 2017-06-12 NOTE — TELEPHONE ENCOUNTER
----- Message from Vanessa Sandoval sent at 6/12/2017  2:34 PM CDT -----  Contact: Cynthia with [employer]/[phone]  She would like to speak with Dr. Foley about a note he sent the patient with about staying staying away from a facility for possible bugs.  Please advise

## 2017-06-17 ENCOUNTER — OUTPATIENT CASE MANAGEMENT (OUTPATIENT)
Dept: ADMINISTRATIVE | Facility: OTHER | Age: 37
End: 2017-06-17

## 2017-06-17 NOTE — PROGRESS NOTES
Please note the following patient's information has been forwarded to [facility] for case mgmt or  by Outpatient Case Management.    Please see the media section of patient's chart for additional details.    Please contact Ext. [phone] with any questions.    Thank you,  Iris Julian

## 2017-06-26 ENCOUNTER — PATIENT MESSAGE (OUTPATIENT)
Dept: INTERNAL MEDICINE | Facility: CLINIC | Age: 37
End: 2017-06-26

## 2017-06-28 ENCOUNTER — OFFICE VISIT (OUTPATIENT)
Dept: INTERNAL MEDICINE | Facility: CLINIC | Age: 37
End: 2017-06-28
Payer: MEDICARE

## 2017-06-28 ENCOUNTER — LAB VISIT (OUTPATIENT)
Dept: LAB | Facility: HOSPITAL | Age: 37
End: 2017-06-28
Attending: INTERNAL MEDICINE
Payer: MEDICARE

## 2017-06-28 VITALS
SYSTOLIC BLOOD PRESSURE: 122 MMHG | OXYGEN SATURATION: 96 % | HEIGHT: 73 IN | DIASTOLIC BLOOD PRESSURE: 80 MMHG | BODY MASS INDEX: 27.35 KG/M2 | HEART RATE: 70 BPM | WEIGHT: 206.38 LBS

## 2017-06-28 DIAGNOSIS — L98.9 SKIN LESIONS: Primary | ICD-10-CM

## 2017-06-28 DIAGNOSIS — L98.9 SKIN LESIONS: ICD-10-CM

## 2017-06-28 DIAGNOSIS — Z23 NEED FOR VARICELLA VACCINE: ICD-10-CM

## 2017-06-28 PROCEDURE — 99213 OFFICE O/P EST LOW 20 MIN: CPT | Mod: 25,S$GLB,, | Performed by: INTERNAL MEDICINE

## 2017-06-28 PROCEDURE — 99499 UNLISTED E&M SERVICE: CPT | Mod: S$PBB,,, | Performed by: INTERNAL MEDICINE

## 2017-06-28 PROCEDURE — 90716 VAR VACCINE LIVE SUBQ: CPT | Mod: S$GLB,,, | Performed by: INTERNAL MEDICINE

## 2017-06-28 PROCEDURE — 36415 COLL VENOUS BLD VENIPUNCTURE: CPT | Mod: PO

## 2017-06-28 PROCEDURE — 99999 PR PBB SHADOW E&M-EST. PATIENT-LVL III: CPT | Mod: PBBFAC,,, | Performed by: INTERNAL MEDICINE

## 2017-06-28 PROCEDURE — 90471 IMMUNIZATION ADMIN: CPT | Mod: S$GLB,,, | Performed by: INTERNAL MEDICINE

## 2017-06-28 PROCEDURE — 86695 HERPES SIMPLEX TYPE 1 TEST: CPT

## 2017-06-28 RX ORDER — TRIAMCINOLONE ACETONIDE 5 MG/G
CREAM TOPICAL
Qty: 15 G | Refills: 0 | Status: CANCELLED | OUTPATIENT
Start: 2017-06-28

## 2017-06-28 RX ORDER — TRIAMCINOLONE ACETONIDE 5 MG/G
CREAM TOPICAL 3 TIMES DAILY PRN
Qty: 30 G | Refills: 1 | Status: SHIPPED | OUTPATIENT
Start: 2017-06-28 | End: 2017-08-30 | Stop reason: SDUPTHER

## 2017-06-28 NOTE — PATIENT INSTRUCTIONS
Recommendations for today    Based on additional information about the home environment in the school environment bedbug diagnosis is much less likely.  Alternative diagnosis is more likely.  Patient does appear to have recurring source of skin irritation.  It clearly seems to respond to anti-inflammatory steroids cream.  Therefore we recommend continuing that treatment as necessary to help resolve the most recent recurrence.    Should the rash failed to respond to the steroids cream for the rash return contact the clinic.  The next step would be consultation with dermatology.          Folliculitis  Folliculitis is an infection of a hair follicle. A hair follicle is the little pocket where a hair grows out of the skin. Bacteria normally live on the skin. But sometimes bacteria can get trapped in a follicle and cause inflammation. This causes a bumpy rash. The area over the follicles is red and raised. It may itch or be painful. The bumps may have fluid (pus) inside. The pus may leak and then form crusts. Sores can spread to other areas of the body. Once it goes away, folliculitis can come back at any time. Severe cases may cause permanent hair loss and scarring.  Folliculitis can happen anywhere on the body that hair grows. It can be caused by rubbing from tight clothing. Ingrown hairs can cause it. Soaking in a hot tub or swimming pool that has bacteria in the water can cause it. It may also occur if a hair follicle is blocked by a bandage.  Sores often go away in a few days with no treatment. In some cases, medication may be given. A small piece of skin or pus may be taken to find the type of bacteria causing the infection.  Home care  The health care provider may prescribe an antibiotic cream or ointment.  Oral antibiotics may also be prescribed. Or you may be told to use an over-the-counter antibiotic cream. Follow all instructions when using any of these medications.  General care:  · Apply warm, moist  compresses to the sores for 20 minutes up to 3 times a day. You can make a compress by soaking a cloth in warm water. Squeeze out excess water.  · Dont cut, poke, or squeeze the sores. This can be painful and spread infection.  · Dont scratch the affected area. Scratching can delay healing.  · Dont shave the areas affected by folliculitis.  · If the sores leak fluid, cover the area with a nonstick gauze bandage. Use as little tape as possible. Carefully discard all soiled bandages.  · Dress in loose cotton clothing.  · Change sheets and blankets if they are soiled by pus. Wash all clothes, towels, sheets, and cloth diapers in soap and hot water. Do not share clothes, towels, or sheets with other family members.  · Do not soak the sores in bath water. This can spread infection. Instead, keep the area clean by gently washing sores with soap and warm water.  · Wash your hands or use antibacterial gels often to prevent spreading the bacteria.  Follow-up care  Follow up with your health care provider.  When to seek medical advice  Call your health care provider right away if any of these occur:  · Fever of 100.4°F (38°C) or higher, rectal  · Spreading of the rash  · Rash does not get better with treatment  · Redness or swelling that gets worse  · Rash becomes more painful  · Foul-smelling fluid leaking from the skin  · Rash improves, but then comes back   Date Last Reviewed: 7/23/2014  © 2929-1757 [employer]. [address] [place] [address], [place] [address]. All rights reserved. This information is not intended as a substitute for professional medical care. Always follow your healthcare professional's instructions.

## 2017-06-28 NOTE — PROGRESS NOTES
Portions of this note are generated with voice recognition software. Typographical errors may exist.     SUBJECTIVE:    This is a/an 37 y.o. male here for primary care visit for  Chief Complaint   Patient presents with    Follow-up     3 mos  90 L    Abrasion     lower back       Recurrence of follicular rash started over the weekend.  Caretaker states that the rash did seem to improve to the Kenalog cream.  Recurrent seem to coincide with the completion of Kenalog.  Caretaker states that excoriation did not persist after the rash went away.  She does not feel that the rash is precipitated by neurotic excoriation.  Caretaker states that there is no definitive evidence of bedbug infestation at the patient's domicile.     Medications Reviewed and Updated    Past medical, family, and social histories were reviewed and updated.    Review of Systems negative unless noted otherwise in history of present illness-  History obtained from unobtainable from patient due to lack of cooperation    Allergic:    Review of patient's allergies indicates:   Allergen Reactions    Adhesive tape-silicones      Other reaction(s): blisters    Codeine      Other reaction(s): Nausea    Morphine Itching       OBJECTIVE:  BP: 122/80 Pulse: 70    Wt Readings from Last 3 Encounters:   06/28/17 93.6 kg (206 lb 5.6 oz)   06/06/17 94.1 kg (207 lb 7.3 oz)   03/30/17 94.1 kg (207 lb 7.3 oz)    Body mass index is 27.22 kg/m².  Previous Blood Pressure Readings :   BP Readings from Last 3 Encounters:   06/28/17 122/80   06/06/17 128/78   03/30/17 131/85       GEN: No apparent distress  HEENT: sclera non-icteric, conjunctiva clear  CV: no peripheral edema  PULM: breathing non-labored  ABD: Obese, protuberant abdomen.  PSYCH: appropriate affect  MSK: unable to rise from chair without assistance  SKIN: normal skin turgor.  Follicular rash at the height of the lumbosacral region.    Pertinent Labs Reviewed       ASSESSMENT/PLAN:    Skin lesions.   Etiology unclear.  Underlying irritant unclear.  Rule out herpetic lesions.  Treat empirically as ALLERGIC dermatitis for now  -     Herpes simplex type 1&2 IgG,Herpes titer; Future; Expected date: 06/28/2017  -     triamcinolone acetonide 0.5% (KENALOG) 0.5 % Crea; Apply topically 3 (three) times daily as needed. Use until rash resolves. Then as needed  Dispense: 30 g; Refill: 1    Need for varicella vaccine  -     (In Office Administered) Varicella Vaccine (SQ)    Other orders  -     Cancel: triamcinolone acetonide 0.5% (KENALOG) 0.5 % Crea; APPLY TO THE AFFECTED AREA TWICE DAILY FOR ITCHING  Dispense: 15 g; Refill: 0          Future Appointments  Date Time Provider Department Center   10/2/2017 10:20 AM Jamel Foley MD [facility]       Jamel Foley  6/28/2017  12:21 PM

## 2017-06-30 LAB
HSV1 IGG SERPL QL IA: POSITIVE
HSV2 IGG SERPL QL IA: NEGATIVE

## 2017-07-06 DIAGNOSIS — I10 ESSENTIAL HYPERTENSION: ICD-10-CM

## 2017-07-06 RX ORDER — LISINOPRIL 5 MG/1
TABLET ORAL
Qty: 180 TABLET | Refills: 0 | Status: SHIPPED | OUTPATIENT
Start: 2017-07-06 | End: 2017-09-26 | Stop reason: SDUPTHER

## 2017-07-10 ENCOUNTER — PATIENT MESSAGE (OUTPATIENT)
Dept: INTERNAL MEDICINE | Facility: CLINIC | Age: 37
End: 2017-07-10

## 2017-08-03 RX ORDER — CELECOXIB 200 MG/1
CAPSULE ORAL
Qty: 56 CAPSULE | Refills: 0 | Status: SHIPPED | OUTPATIENT
Start: 2017-08-03 | End: 2017-09-26 | Stop reason: SDUPTHER

## 2017-08-21 ENCOUNTER — PATIENT MESSAGE (OUTPATIENT)
Dept: INTERNAL MEDICINE | Facility: CLINIC | Age: 37
End: 2017-08-21

## 2017-08-22 ENCOUNTER — PATIENT MESSAGE (OUTPATIENT)
Dept: INTERNAL MEDICINE | Facility: CLINIC | Age: 37
End: 2017-08-22

## 2017-08-23 DIAGNOSIS — L30.9 DERMATITIS DUE TO UNKNOWN CAUSE: Primary | ICD-10-CM

## 2017-08-30 ENCOUNTER — PATIENT MESSAGE (OUTPATIENT)
Dept: INTERNAL MEDICINE | Facility: CLINIC | Age: 37
End: 2017-08-30

## 2017-08-30 ENCOUNTER — TELEPHONE (OUTPATIENT)
Dept: DERMATOLOGY | Facility: CLINIC | Age: 37
End: 2017-08-30

## 2017-08-30 RX ORDER — TRIAMCINOLONE ACETONIDE 5 MG/G
CREAM TOPICAL
Qty: 30 G | Refills: 1 | Status: SHIPPED | OUTPATIENT
Start: 2017-08-30 | End: 2017-08-31 | Stop reason: SDUPTHER

## 2017-08-30 NOTE — TELEPHONE ENCOUNTER
----- Message from Yuridia Monroe sent at 8/30/2017  8:22 AM CDT -----  Contact: Shalonda Roberts   JGD-pt- pts mom is calling to speak with the nurse Shalonda roberts  said that she has been communicating with Carlota on my [facility] pts mom said Carlota asked if the pt can come tomorrow to be seen at 10:50 tomorrow pts mom hasn't heard back from the nurse can you please call please call pt [phone].     CARLEE

## 2017-08-31 RX ORDER — TRIAMCINOLONE ACETONIDE 5 MG/G
CREAM TOPICAL 4 TIMES DAILY
Qty: 30 G | Refills: 1 | Status: SHIPPED | OUTPATIENT
Start: 2017-08-31 | End: 2018-02-10

## 2017-09-06 ENCOUNTER — OFFICE VISIT (OUTPATIENT)
Dept: DERMATOLOGY | Facility: CLINIC | Age: 37
End: 2017-09-06
Payer: MEDICARE

## 2017-09-06 DIAGNOSIS — L73.9 FOLLICULITIS: Primary | ICD-10-CM

## 2017-09-06 DIAGNOSIS — L30.9 DERMATITIS: ICD-10-CM

## 2017-09-06 DIAGNOSIS — L82.1 SK (SEBORRHEIC KERATOSIS): ICD-10-CM

## 2017-09-06 DIAGNOSIS — D18.00 ANGIOMA: ICD-10-CM

## 2017-09-06 DIAGNOSIS — D22.9 NEVUS: ICD-10-CM

## 2017-09-06 PROCEDURE — 99203 OFFICE O/P NEW LOW 30 MIN: CPT | Mod: S$GLB,,, | Performed by: DERMATOLOGY

## 2017-09-06 PROCEDURE — 99999 PR PBB SHADOW E&M-EST. PATIENT-LVL I: CPT | Mod: PBBFAC,,, | Performed by: DERMATOLOGY

## 2017-09-06 PROCEDURE — 3008F BODY MASS INDEX DOCD: CPT | Mod: S$GLB,,, | Performed by: DERMATOLOGY

## 2017-09-06 RX ORDER — CLINDAMYCIN PHOSPHATE 11.9 MG/ML
SOLUTION TOPICAL
Qty: 60 ML | Refills: 3 | Status: SHIPPED | OUTPATIENT
Start: 2017-09-06 | End: 2018-02-10

## 2017-09-06 NOTE — PROGRESS NOTES
Subjective:       Patient ID:  Leonid Cox is a 37 y.o. male who presents for   Chief Complaint   Patient presents with    Rash     Pt c/o rash on back, arms and hands x a few weeks. Tx with TAC 0.5% cream. Tx helps  And is now resolved. Was on lower back. Also had rash on hands and arms. This has resolved as well  Pt also concerned about lesion on face for a few years.  Not changing or symptomatic. Have been told in the past it was ok  Caretaker requests skin check of moles and lesions       Rash         Review of Systems   Constitutional: Negative for fever and chills.   Skin: Positive for rash and dry skin.   Hematologic/Lymphatic: Does not bruise/bleed easily.        Objective:    Physical Exam   Constitutional: He appears well-developed and well-nourished. No distress.   Neurological: He is alert. He is disoriented.   Psychiatric: He has a normal mood and affect.   Skin:   Areas Examined (abnormalities noted in diagram):   Scalp / Hair Palpated and Inspected  Head / Face Inspection Performed  Neck Inspection Performed  Chest / Axilla Inspection Performed  Abdomen Inspection Performed  Back Inspection Performed  RUE Inspected  LUE Inspection Performed  RLE Inspected  LLE Inspection Performed                   Diagram Legend     Erythematous scaling macule/papule c/w actinic keratosis       Vascular papule c/w angioma      Pigmented verrucoid papule/plaque c/w seborrheic keratosis      Yellow umbilicated papule c/w sebaceous hyperplasia      Irregularly shaped tan macule c/w lentigo     1-2 mm smooth white papules consistent with Milia      Movable subcutaneous cyst with punctum c/w epidermal inclusion cyst      Subcutaneous movable cyst c/w pilar cyst      Firm pink to brown papule c/w dermatofibroma      Pedunculated fleshy papule(s) c/w skin tag(s)      Evenly pigmented macule c/w junctional nevus     Mildly variegated pigmented, slightly irregular-bordered macule c/w mildly atypical nevus      Flesh  colored to evenly pigmented papule c/w intradermal nevus       Pink pearly papule/plaque c/w basal cell carcinoma      Erythematous hyperkeratotic cursted plaque c/w SCC      Surgical scar with no sign of skin cancer recurrence      Open and closed comedones      Inflammatory papules and pustules      Verrucoid papule consistent consistent with wart     Erythematous eczematous patches and plaques     Dystrophic onycholytic nail with subungual debris c/w onychomycosis     Umbilicated papule    Erythematous-base heme-crusted tan verrucoid plaque consistent with inflamed seborrheic keratosis     Erythematous Silvery Scaling Plaque c/w Psoriasis     See annotation      Assessment / Plan:        Folliculitis  -     clindamycin (CLEOCIN T) 1 % external solution; AAA bid for inflamed follicles , pustules  Dispense: 60 mL; Refill: 3    Dermatitis  Good skin care regimen discussed including limiting to one bath or shower/day, using lukewarm water with mild soap and moisturizing cream to skin 1 - 2x/day. Brochure was provided and reviewed with patient.  Tac 0.5% cream bid prn flare  Will see pt that week if new flare occurs  Pt with no rash today     SK (seborrheic keratosis)  These are benign inherited growths without a malignant potential. Reassurance given to patient. No treatment is necessary.     Angioma  These are benign vascular lesions that are inherited.  Treatment is not necessary.    Nevus  Discussed ABCDE's of nevi.  Monitor for new mole or moles that are becoming bigger, darker, irritated, or developing irregular borders. Brochure provided.        Total body skin examination performed today including at least 12 points as noted in physical examination. No lesions suspicious for malignancy noted.             Return if symptoms worsen or fail to improve.

## 2017-09-08 ENCOUNTER — PATIENT MESSAGE (OUTPATIENT)
Dept: DERMATOLOGY | Facility: CLINIC | Age: 37
End: 2017-09-08

## 2017-09-19 ENCOUNTER — PATIENT MESSAGE (OUTPATIENT)
Dept: INTERNAL MEDICINE | Facility: CLINIC | Age: 37
End: 2017-09-19

## 2017-09-19 ENCOUNTER — TELEPHONE (OUTPATIENT)
Dept: INTERNAL MEDICINE | Facility: CLINIC | Age: 37
End: 2017-09-19

## 2017-09-19 NOTE — TELEPHONE ENCOUNTER
----- Message from Seamus Bhat sent at 9/19/2017  3:19 PM CDT -----  Contact: Shalonda Dorman/demetrius/[phone]  She sent a message and a picture through [facility] that she wants Dr. Foley to take a look at.

## 2017-09-26 DIAGNOSIS — I10 ESSENTIAL HYPERTENSION: ICD-10-CM

## 2017-09-26 RX ORDER — CELECOXIB 200 MG/1
CAPSULE ORAL
Qty: 56 CAPSULE | Refills: 0 | Status: SHIPPED | OUTPATIENT
Start: 2017-09-26 | End: 2017-10-26 | Stop reason: SDUPTHER

## 2017-09-26 RX ORDER — LISINOPRIL 5 MG/1
TABLET ORAL
Qty: 56 TABLET | Refills: 0 | Status: SHIPPED | OUTPATIENT
Start: 2017-09-26 | End: 2017-10-26 | Stop reason: SDUPTHER

## 2017-10-02 ENCOUNTER — LAB VISIT (OUTPATIENT)
Dept: LAB | Facility: HOSPITAL | Age: 37
End: 2017-10-02
Attending: INTERNAL MEDICINE
Payer: MEDICARE

## 2017-10-02 ENCOUNTER — OFFICE VISIT (OUTPATIENT)
Dept: INTERNAL MEDICINE | Facility: CLINIC | Age: 37
End: 2017-10-02
Payer: MEDICARE

## 2017-10-02 VITALS
DIASTOLIC BLOOD PRESSURE: 80 MMHG | HEART RATE: 82 BPM | WEIGHT: 206.38 LBS | OXYGEN SATURATION: 99 % | HEIGHT: 73 IN | SYSTOLIC BLOOD PRESSURE: 136 MMHG | BODY MASS INDEX: 27.35 KG/M2

## 2017-10-02 DIAGNOSIS — Z23 NEED FOR IMMUNIZATION AGAINST INFLUENZA: ICD-10-CM

## 2017-10-02 DIAGNOSIS — Z78.9 VARICELLA VACCINATION STATUS UNKNOWN: ICD-10-CM

## 2017-10-02 DIAGNOSIS — L73.9 FOLLICULITIS: Primary | ICD-10-CM

## 2017-10-02 PROCEDURE — G0008 ADMIN INFLUENZA VIRUS VAC: HCPCS | Mod: S$GLB,,, | Performed by: INTERNAL MEDICINE

## 2017-10-02 PROCEDURE — 90688 IIV4 VACCINE SPLT 0.5 ML IM: CPT | Mod: S$GLB,,, | Performed by: INTERNAL MEDICINE

## 2017-10-02 PROCEDURE — 86787 VARICELLA-ZOSTER ANTIBODY: CPT

## 2017-10-02 PROCEDURE — 36415 COLL VENOUS BLD VENIPUNCTURE: CPT | Mod: PO

## 2017-10-02 PROCEDURE — 99999 PR PBB SHADOW E&M-EST. PATIENT-LVL III: CPT | Mod: PBBFAC,,, | Performed by: INTERNAL MEDICINE

## 2017-10-02 PROCEDURE — 99213 OFFICE O/P EST LOW 20 MIN: CPT | Mod: S$GLB,,, | Performed by: INTERNAL MEDICINE

## 2017-10-02 NOTE — PROGRESS NOTES
Portions of this note are generated with voice recognition software. Typographical errors may exist.     SUBJECTIVE:    This is a/an 37 y.o. male here for primary care visit for  Chief Complaint   Patient presents with    Follow-up     3 month      Patient comes with no attendant named Ekta.  States that his dermatologic condition has improved significantly since starting clindamycin topical lotion.  No orthopedic complaints.  No digestive complaints.  Patient is still able to transfer from wheelchair on his own but sometimes needs prompting.  Patient is amenable to vaccinations that are required today.    Medications Reviewed and Updated    Past medical, family, and social histories were reviewed and updated.    Review of Systems negative unless otherwise noted in history of present illness-  Review of Systems   HENT: Negative for hearing loss.    Eyes: Negative for discharge.   Respiratory: Negative for wheezing.    Cardiovascular: Negative for chest pain and palpitations.   Gastrointestinal: Negative for blood in stool, constipation, diarrhea and vomiting.   Genitourinary: Negative for hematuria and urgency.   Musculoskeletal: Negative for neck pain.   Neurological: Negative for weakness and headaches.   Endo/Heme/Allergies: Negative for polydipsia.       Answers for HPI/ROS submitted by the patient on 9/30/2017   activity change: No  unexpected weight change: No  rhinorrhea: No  trouble swallowing: No  visual disturbance: No  chest tightness: No  polyuria: No  difficulty urinating: No  joint swelling: No  arthralgias: No  confusion: No  dysphoric mood: No      Allergic:    Review of patient's allergies indicates:   Allergen Reactions    Adhesive tape-silicones      Other reaction(s): blisters    Codeine      Other reaction(s): Nausea    Morphine Itching       OBJECTIVE:  BP: 136/80 Pulse: 82    Wt Readings from Last 3 Encounters:   10/02/17 93.6 kg (206 lb 5.6 oz)   06/28/17 93.6 kg (206 lb 5.6 oz)    06/06/17 94.1 kg (207 lb 7.3 oz)    Body mass index is 27.22 kg/m².  Previous Blood Pressure Readings :   BP Readings from Last 3 Encounters:   10/02/17 136/80   06/28/17 122/80   06/06/17 128/78       Physical Exam    GEN: No apparent distress  HEENT: sclera non-icteric, conjunctiva clear  CV: no peripheral edema, RRR   PULM: breathing non-labored  ABD: Obese, protuberant abdomen.  PSYCH: appropriate affect  MSK: able to rise from chair without assistance  SKIN: normal skin turgor    Pertinent Labs Reviewed       ASSESSMENT/PLAN:    Folliculitis.Condition stable.  Counseling on self-care measures. Plan to monitor clinically. Continue current medical plan.     Need for immunization against influenza  -     Influenza - Quadrivalent (3 years & older)    Varicella vaccination status unknown  -     Varicella zoster antibody, IgG; Future; Expected date: 10/02/2017          Future Appointments  Date Time Provider Department Center   2/2/2018 8:40 AM Jamel Foley MD [facility]       Jamel Foley  10/2/2017  10:42 AM

## 2017-10-03 DIAGNOSIS — G40.909 SEIZURE DISORDER: ICD-10-CM

## 2017-10-03 RX ORDER — DIVALPROEX SODIUM 500 MG/1
500 TABLET, DELAYED RELEASE ORAL EVERY 12 HOURS
Qty: 60 TABLET | Refills: 5 | Status: CANCELLED | OUTPATIENT
Start: 2017-10-03

## 2017-10-03 NOTE — TELEPHONE ENCOUNTER
Trevor Lomeli ask me to send it to the provider the patient will be seeing. This is one of Dr. Piña's patients.

## 2017-10-04 LAB
VARICELLA INTERPRETATION: NEGATIVE
VARICELLA ZOSTER IGG: 0.5 ISR

## 2017-10-05 ENCOUNTER — TELEPHONE (OUTPATIENT)
Dept: NEUROLOGY | Facility: CLINIC | Age: 37
End: 2017-10-05

## 2017-10-05 NOTE — TELEPHONE ENCOUNTER
----- Message from Shira Ortiz sent at 10/4/2017  3:35 PM CDT -----  Contact: Shalonda (Guardian) [phone]  Shalonda called to speak to someone regarding the patient's Depakote prescriptions. Please contact Shalonda to discuss further.

## 2017-10-06 ENCOUNTER — TELEPHONE (OUTPATIENT)
Dept: NEUROLOGY | Facility: CLINIC | Age: 37
End: 2017-10-06

## 2017-10-06 NOTE — TELEPHONE ENCOUNTER
I called in depakote 500mg BID w/no refills per request of family. Pt is coming in this month and will get refills for all his seizure meds

## 2017-10-12 ENCOUNTER — HOSPITAL ENCOUNTER (EMERGENCY)
Facility: HOSPITAL | Age: 37
Discharge: HOME | End: 2017-10-12
Attending: EMERGENCY MEDICINE
Payer: MEDICARE

## 2017-10-12 ENCOUNTER — OFFICE VISIT (OUTPATIENT)
Dept: NEUROLOGY | Facility: CLINIC | Age: 37
End: 2017-10-12
Payer: MEDICARE

## 2017-10-12 VITALS
HEIGHT: 73 IN | HEART RATE: 98 BPM | SYSTOLIC BLOOD PRESSURE: 128 MMHG | DIASTOLIC BLOOD PRESSURE: 88 MMHG | WEIGHT: 206 LBS | BODY MASS INDEX: 27.3 KG/M2

## 2017-10-12 VITALS
OXYGEN SATURATION: 98 % | DIASTOLIC BLOOD PRESSURE: 93 MMHG | RESPIRATION RATE: 16 BRPM | TEMPERATURE: 97 F | BODY MASS INDEX: 27.18 KG/M2 | HEART RATE: 98 BPM | WEIGHT: 206 LBS | SYSTOLIC BLOOD PRESSURE: 141 MMHG

## 2017-10-12 DIAGNOSIS — M79.672 LEFT FOOT PAIN: Primary | ICD-10-CM

## 2017-10-12 DIAGNOSIS — G80.9 CEREBRAL PALSY, UNSPECIFIED TYPE: ICD-10-CM

## 2017-10-12 DIAGNOSIS — H54.7 BLIND: ICD-10-CM

## 2017-10-12 DIAGNOSIS — S99.192A OTHER PHYSEAL FRACTURE OF LEFT METATARSAL, INITIAL ENCOUNTER FOR CLOSED FRACTURE: ICD-10-CM

## 2017-10-12 DIAGNOSIS — R46.89 SELF NEGLECT: ICD-10-CM

## 2017-10-12 DIAGNOSIS — G80.0 SPASTIC QUADRIPLEGIC CEREBRAL PALSY: ICD-10-CM

## 2017-10-12 DIAGNOSIS — G40.909 SEIZURE DISORDER: Primary | ICD-10-CM

## 2017-10-12 DIAGNOSIS — G91.9 HYDROCEPHALUS: ICD-10-CM

## 2017-10-12 PROCEDURE — 99283 EMERGENCY DEPT VISIT LOW MDM: CPT | Mod: ,,, | Performed by: PHYSICIAN ASSISTANT

## 2017-10-12 PROCEDURE — 99999 PR PBB SHADOW E&M-EST. PATIENT-LVL III: CPT | Mod: PBBFAC,,, | Performed by: PSYCHIATRY & NEUROLOGY

## 2017-10-12 PROCEDURE — 3008F BODY MASS INDEX DOCD: CPT | Mod: S$GLB,,, | Performed by: PSYCHIATRY & NEUROLOGY

## 2017-10-12 PROCEDURE — 99215 OFFICE O/P EST HI 40 MIN: CPT | Mod: S$GLB,,, | Performed by: PSYCHIATRY & NEUROLOGY

## 2017-10-12 PROCEDURE — 99499 UNLISTED E&M SERVICE: CPT | Mod: S$PBB,,, | Performed by: PSYCHIATRY & NEUROLOGY

## 2017-10-12 PROCEDURE — 99283 EMERGENCY DEPT VISIT LOW MDM: CPT

## 2017-10-12 NOTE — ED TRIAGE NOTES
Leonid Cox, a 37 y.o. male presents to the ED c/o L foot swellig and bruising since Wednesday. Pt unable to tell us how it happenend due to CP.       Chief Complaint   Patient presents with    Foot Injury     sent from neurology clinic for swelling and bruising of left foot     Review of patient's allergies indicates:   Allergen Reactions    Adhesive tape-silicones      Other reaction(s): blisters    Codeine      Other reaction(s): Nausea    Morphine Itching     Past Medical History:   Diagnosis Date    Blind     Cerebral palsy     Hydrocephalus     Hypertension     Seizure disorder     Urinary reflux      LOC: Patient name and date of birth verified. The patient is awake, alert and aware of environment with an appropriate affect, the patient is oriented x 3 and speaking appropriately.   APPEARANCE: Patient resting comfortably, patient is clean and well groomed, patient's clothing is properly fastened.  SKIN: The skin is warm and dry, color consistent with ethnicity, patient has normal skin turgor and moist mucus membranes, skin intact, no breakdown or bruising noted.  MUSCULOSKELETAL: Patient moving all extremities well, no obvious swelling or deformities noted. L foot swollen and purple.  RESPIRATORY: Respirations are spontaneous, patient has a normal effort and rate, no accessory muscle use noted.  CARDIAC: Patient has a normal rate and rhythm, no periphreal edema noted, capillary refill < 3 seconds.  ABDOMEN: Soft and non tender to palpation, no distention noted. Bowel sounds present in all four quadrants.  NEUROLOGIC: Eyes open spontaneously, behavior appropriate to situation, follows commands, facial expression symmetrical, bilateral hand grasp equal and even, purposeful motor response noted, normal sensation in all extremities when touched with a finger.

## 2017-10-12 NOTE — LETTER
October 12, 2017      Jimi Piña III, MD  [address]  [facility] [address]           [facility] - Neurology Epilepsy  [address]  [facility] [address]  Phone: [phone]  Fax: [phone]          Patient: Leonid Cox   MR Number: [id number]   YOB: 1980   Date of Visit: 10/12/2017       Dear Dr. Jimi Piña III:    Thank you for referring Leonid Cox to me for evaluation. Attached you will find relevant portions of my assessment and plan of care.    If you have questions, please do not hesitate to call me. I look forward to following Leonid Cox along with you.    Sincerely,    Galilea Diehl MD    Enclosure  CC:  No Recipients    If you would like to receive this communication electronically, please contact [email or web address] or [phone] to request more information on [employer] access.    For providers and/or their staff who would like to refer a patient to [facility], please contact us through our one-stop-shop provider referral line, [facility], at [phone].    If you feel you have received this communication in error or would no longer like to receive these types of communications, please e-mail [email or web address]

## 2017-10-12 NOTE — ED PROVIDER NOTES
Encounter Date: 10/12/2017       History     Chief Complaint   Patient presents with    Foot Injury     sent from neurology clinic for swelling and bruising of left foot     Patient is 37 year old male with PMHx of cerebral palsy, hydrocephalus, seizures, and HTN. He presents to the ED for left foot pain. He was sent from neurology clinic this AM for imaging. His guardian and caregiver report unknown trauma to left foot. Reports bruising and swelling over third metatarsal since yesterday evening. Patient lives alone in his own apartment with 24 hour care. They deny any fever,chills, nausea, vomiting, sob,chest pain, abd pain, dysuria, diarrhea, or constipation. He is a nonsmoker and denies alcohol use.       The history is provided by a parent and a caregiver. The history is limited by the condition of the patient. No  was used.     Review of patient's allergies indicates:   Allergen Reactions    Adhesive tape-silicones      Other reaction(s): blisters    Codeine      Other reaction(s): Nausea    Morphine Itching     Past Medical History:   Diagnosis Date    Blind     Cerebral palsy     Hydrocephalus     Hypertension     Seizure disorder     Urinary reflux      Past Surgical History:   Procedure Laterality Date    FOOT SURGERY      SHUNT REVISION      TENDON RELEASE      TOTAL HIP ARTHROPLASTY       Family History   Problem Relation Age of Onset    Cancer Mother     Cancer Father      Social History   Substance Use Topics    Smoking status: Never Smoker    Smokeless tobacco: Never Used    Alcohol use No     Review of Systems   Constitutional: Negative for fever.   HENT: Negative for sore throat.    Respiratory: Negative for shortness of breath.    Cardiovascular: Negative for chest pain.   Gastrointestinal: Negative for nausea.   Genitourinary: Negative for dysuria.   Musculoskeletal: Negative for back pain.        Foot pain.    Skin: Negative for rash.   Neurological: Negative  for weakness.   Hematological: Does not bruise/bleed easily.       Physical Exam     Initial Vitals [10/12/17 1122]   BP Pulse Resp Temp SpO2   (!) 141/93 98 16 97.2 °F (36.2 °C) 98 %      MAP       109         Physical Exam    Vitals reviewed.  Constitutional: He appears well-developed and well-nourished.   Patient resting comfortably in NAD on RA.   HENT:   Head: Normocephalic and atraumatic.   Nose: Nose normal.   Eyes: Conjunctivae and EOM are normal.   Blind since birth   Neck: Normal range of motion.   Cardiovascular: Normal rate and regular rhythm. Exam reveals no friction rub.    No murmur heard.  Pulses:       Dorsalis pedis pulses are 2+ on the right side, and 2+ on the left side.   Pulmonary/Chest: Breath sounds normal. No respiratory distress. He has no wheezes. He has no rales.   Abdominal: Soft. Bowel sounds are normal. He exhibits no distension. There is no tenderness.   Musculoskeletal: Normal range of motion.        Left ankle: He exhibits swelling and ecchymosis.   Swelling and ecchymosis noted over third metatarsal.    Neurological: He is alert. No sensory deficit.   Motor strength of R UE/LE 4/5; L UE/LE 3/5  Contracted on left side-deficit of cerebral palsy.    Skin: Skin is warm and dry. No erythema.   Psychiatric: He has a normal mood and affect. Thought content normal.         ED Course   Procedures  Labs Reviewed - No data to display     Imaging Results          X-Ray Foot Complete Left (Final result)  Result time 10/12/17 13:53:45    Final result by Unique Farooq MD (10/12/17 13:53:45)                 Impression:        Dorsal right foot nonspecific soft tissue swelling and prior hind foot ORIF, without acute displaced fracture or dislocation identified.      Electronically signed by: UNIQUE FAROOQ MD, MD  Date:     10/12/17  Time:    13:53              Narrative:    COMPARISON: Bilateral foot series 4/3/07    FINDINGS: 3 views left foot.        Prior ORIF again noted at the left hindfoot  without evidence of failure or loosening. Hallux valgus configuration of the great toe with interval slight increase mild degenerative change of the first MTP joint.  No acute displaced fracture, dislocation, or destructive osseous process identified.  Prominence of the dorsal soft tissues overlying the midfoot and forefoot suggesting nonspecific swelling from contusion/localized edema in the setting of trauma versus cellulitis. No subcutaneous emphysema or radiodense retained foreign body.                                       APC / Resident Notes:   Patient is 37 year old male with PMHx of cerebral palsy, hydrocephalus, seizures, and HTN. He presents to the ED for left foot pain. Patient is in no acute distress. Vitals stable. Alert and responsive. RRR. Lungs CTA. Abdomen is soft, non tender, non distended. Skin is normal appearance without rashes. Swelling and ecchymosis noted over third metatarsal.      Will order imaging. Pain medication offered, but guardian declined.      Differential diagnoses include, but are not limited to: fracture, dislocation, contusion, arthritis, and hay's neuroma. I considered but do not suspect acute compartment syndrome.     Patient reassessed, informed guardian of no evidence of fractures or dislocations on xray.   Will discharge patient home with F/U with PCP as needed.     I have discussed and reviewed with my supervising physician.             Attending Attestation:     Physician Attestation Statement for NP/PA:   I discussed this assessment and plan of this patient with the NP/PA, but I did not personally examine the patient. The face to face encounter was performed by the NP/PA.                  ED Course      Clinical Impression:   The encounter diagnosis was Left foot pain.                           Shaylee Adrian PA-C  10/12/17 2200       Camacho Harkins MD  10/13/17 [id number]

## 2017-10-12 NOTE — ASSESSMENT & PLAN NOTE
38yo M with hx of CP and hydrocephalus, with seizures since birth  - maintained on -641-265by with no seizures > 20 years    Plan:  - continue same regimen  - check CBC, CMP, VPA levels yearly prior clinic visit  - DEXA scan - to be done and reviewed by PCP    Seizure/fall precautions    Plan of care was discussed with guardian and caregiver.

## 2017-10-12 NOTE — PROGRESS NOTES
[facility]:   FOLLOW UP VISIT    Name: Leonid Cox  MRN:[id number]   [facility]: [id number]  Date of service: 10/12/2017    Last clinic visit: 3/3017 ()    Age:37 y.o.   Gender:male     The patient is here today with his guardian, Shalonda, and caregiver, Ekta  History obtained from the patient's guardian and caregiver    CHIEF COMPLAINT:  - follow-up visit for seizures    INTERVAL HISTORY (Since last visit):      This is a 37 y.o. right handed male with hx of seizures since birth  - maintained on [phone] x 20 years, with no seizures since that time     No hx of prior admissions due to seizures or SE  Requires assistance for almost all ADL; has 24/7 care - lives in his own home  Mother passed away 12/2013    * injured his 3rd-4th toes L foot ?yesterday - denies any pain but appears swollen with discoloration     SEIZURE HISTORY: Last clinic note, 3/30/17 ():  Leonid Cox returns to Neurology Clinic for medical management of his seizure disorder.  He is accompanied by his morning sitter.    No recent seizure activity is reported.  I am prescribing Depakote 500mg twice a day and 250 mg at bedtime.  The medication has been well tolerated.    Leonid does not report any new problems today.  He has hydrocephalus and a  shunt for which he is followed by Dr. Das in Neurosurgery.      PHYSICAL EXAMINATION:  GENERAL:  Leonid is alert and attentive, but poorly oriented.  He has spontaneous occasionally appropriate and somewhat nonsensical speech.  This is typical for him.  VITAL SIGNS:  Reviewed and included in this note.  He is casually dressed and seated in a wheelchair and in no acute distress.  NEUROLOGIC:  On cranial nerve examination, they are equal, reactive pupils and spontaneous horizontal nystagmus.  He is able to find my hand in space.    He is again noted to have left facial weakness without sensory deficit.    The tongue and palate are in the midline.  Again, noted is gingival hypertrophy.     He hears approximately equally in the two ears.    The neck is supple, the pulses are equal and no bruits are heard.    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    There are no gross sensory deficits.    The deep tendon reflexes may be a bit brisker on the left upper extremity than the right.  His left knee jerk appears to be depressed compared to the right and the ankle jerks are somewhat diminished as well.     I reviewed the clinic record including his recent laboratory on the computer console.  I discussed the situation with Juan and his attendant.    He is doing well from the standpoint of his seizure disorder and his medication will be continued.  His platelet count, however, is now only 122.  It has been normal in the past.  My concern is that that may be related to his Depakote.    He has been doing well on this for a long time and his anticonvulsant levels have been in the mid therapeutic range.  I will continue the medication for now, but also schedule him to have another CBC performed in approximately one month at the [facility].  I will forward the results to the patient when the testing is completed.  If his platelet count is persistently low, I will likely refer him to Hematology for further evaluation prior to making any changes in his anticonvulsant medication.    His attendants may call if there are any questions in the interim.     RELEVANT LABS AND TESTS SINCE LAST VISIT:   Results for JUAN MARTÍNEZ (MRN [id number]) as of 10/12/2017 09:59   Ref. Range 2/6/2013 05:22 2/7/2013 04:00 6/2/2014 11:36 2/16/2016 14:35 3/28/2017 12:03 5/1/2017 10:08   Platelets Latest Ref Range: 150 - 350 K/uL 216 210 192 185 122 (L) 186       Results for JUAN MARTÍNEZ (MRN [id number]) as of 10/12/2017 09:59   Ref. Range 2/20/2013 14:45 6/2/2014 11:36 12/14/2015 14:33 3/28/2017 12:03   AST Latest Ref Range: 10 - 40 U/L 20 15 19 25   ALT Latest Ref Range: 10 - 44 U/L 24 24 31 45 (H)       CT  Head:  Results for orders placed or performed during the hospital encounter of 06/26/13   CT Head Without Contrast    Narrative    CT brain without contrast.    Comparison: 3/19/13     Technique: Multiple 5 mm axial images of the head were obtained without intravenous contrast.    Findings: Postoperative change with bilateral parietal ventriculostomy catheters coursing configuration of which are stable the right of which extends into the region of the right temporal horn of the lateral ventricle possibly extraventricular the rest   crest is midline the tip of which is likely embedded in the parenchyma and unchanged.  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  The degree of ventricular dilatation/ventriculomegaly is stable.  No evidence for acute intracranial hemorrhage or new abnormal parenchymal attenuation.  The study is limited by motion with probable rotation of C1 on C2   partially included.  Small opacity left maxillary antra, remainder of the Visualized paranasal sinuses and mastoid air cells are clear.    Impression     No significant interval change from prior.  Stable postoperative change with biparietal ventriculostomy catheters.  Essentially stable configuration of the ventricles which are moderately dilated with superimposed porencephaly with resulting enlargement   of bilateral parietal and right frontal and temporal lobes also unchanged.    No evidence for acute intracranial hemorrhage or definite new abnormal parenchymal attenuation allowing for motion limitation.  Clinical correlation and continued follow-up advised.      Electronically signed by: AVA MORALES DO  Date:     06/26/13  Time:    14:59    Results for orders placed or performed during the hospital encounter of 10/15/12   MRI Brain Without Contrast    Narrative    DATE OF EXAM: Oct 22 2012       MRI   0002  -  MRI BRAIN WO CONTRAST:   \  [id number]     CLINICAL HISTORY:   \AMS WITH SEDATION     PROCEDURE COMMENT:   \     ICD 9 CODE(S):   (\)     CPT 4 CODE(S)/MODIFIER(S):   (\)     Procedure: MRI the brain without contrast.     Technique: Sagittal and axial T1, axial T2, axial FLAIR, axial gradient,   and axial diffusion imaging of the whole brain.     Comparison: CT 10/20/12     Results: Continued postoperative change a prior ventriculostomy catheter   placement.  Compared to CT the catheter tips are unchanged and in the   region of the left posterior fossa and right lateral aspect of the middle   cranial fossa unchanged.     There is continuing encephalomalacia bilateral parietal, occipital and   right temporal and frontal lobes with porencephalic cyst formation.     Septated fluid signal focus extending from the right posterior aspect of   the temporal horn of the right lateral ventricle medially inferiorly into   the right quadrigeminal plate cistern.  There is mass effect on the   underlying brain stem and distortion of the cerebral aqueduct which may   be in part resulting in component the obstructive hydrocephalus with   dilatation of the lateral and third ventricles.     No diffusion signal abnormality to suggest acute infarction.     Inferior extension of the cerebral tonsils approximately 5 mm below the   foramen magnum concern for component of cerebellar tonsillar herniation.   Flair hyperintensity subarachnoid space at the vertex likely artifactual   from pulsation and crowding of the sulci related to the prominent   ventricles this limits evaluation for pernicious mature hemorrhage in the   subarachnoid space.  Clinical correlation and further evaluation advised  partial fluid opacification mastoid air cells bilaterally greater on the   right.        Impression: Encephalomalacia bilateral parietal occipital and right   frontal lobes with additional involvement of the right temporal lobe.     There is resultant porencephalic cyst formation in these regions with   septated cystic focus within the right quadrigeminal plate cistern likely   extending from the right posterior medial aspect of the dilated temporal   horn of the right lateral ventricle.  There is mass effect on the   underlying brain stem and cerebral aqueduct and cannot exclude component   of obstructive hydrocephalus.     5 mm of inferior extension of the cerebral tonsils concern for component   of tonsillar herniation.     Subtle flair hyperintensity subarachnoid space at the vertex likely   artifactual from sulcal crowding and pulsation artifact, this limits   evaluation for proteinaceous material or hemorrhage.     Compared to CT there are stable bilateral parietal ventriculostomy   catheters with stable moderate prominence of the lateral and third   ventricles          No evidence for acute infarction, otherwise limited study by lack of   contrast.  ______________________________________      Electronically signed by: AVA MORALES DO  Date:     10/22/12  Time:    14:56            : JOSE  Transcribe Date/Time: Oct 22 2012  2:56P  Dictated by : AVA MORALES DO  Read On:   \  Images were reviewed, findings were verified and document was   electronically  SIGNED BY: AVA MORALES DO On: Oct 22 2012  2:56P         Results for orders placed or performed during the hospital encounter of 06/26/13   CT Head Without Contrast    Narrative    CT brain without contrast.    Comparison: 3/19/13     Technique: Multiple 5 mm axial images of the head were obtained without intravenous contrast.    Findings: Postoperative change with bilateral parietal ventriculostomy catheters coursing configuration of which are stable the right of which extends into the region of the right temporal horn of the lateral ventricle possibly extraventricular the rest   crest is midline the tip of which is likely embedded in the parenchyma and unchanged.   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  The degree of ventricular dilatation/ventriculomegaly is stable.  No evidence for acute intracranial hemorrhage or new abnormal parenchymal attenuation.  The study is limited by motion with probable rotation of C1 on C2   partially included.  Small opacity left maxillary antra, remainder of the Visualized paranasal sinuses and mastoid air cells are clear.    Impression     No significant interval change from prior.  Stable postoperative change with biparietal ventriculostomy catheters.  Essentially stable configuration of the ventricles which are moderately dilated with superimposed porencephaly with resulting enlargement   of bilateral parietal and right frontal and temporal lobes also unchanged.    No evidence for acute intracranial hemorrhage or definite new abnormal parenchymal attenuation allowing for motion limitation.  Clinical correlation and continued follow-up advised.      Electronically signed by: AVA MORALES DO  Date:     06/26/13  Time:    14:59    Results for orders placed or performed during the hospital encounter of 10/15/12   MRI Brain Without Contrast    Narrative    DATE OF EXAM: Oct 22 2012      MRI   0002  -  MRI BRAIN WO CONTRAST:   \  [id number]     CLINICAL HISTORY:   \AMS WITH SEDATION     PROCEDURE COMMENT:   \     ICD 9 CODE(S):   (\)     CPT 4 CODE(S)/MODIFIER(S):   (\)     Procedure: MRI the brain without contrast.     Technique: Sagittal and axial T1, axial T2, axial FLAIR, axial gradient,   and axial diffusion imaging of the whole brain.     Comparison: CT 10/20/12     Results: Continued postoperative change a prior ventriculostomy catheter   placement.  Compared to CT the catheter tips are unchanged and in the   region of the left posterior fossa and right lateral aspect of the middle   cranial  fossa unchanged.     There is continuing encephalomalacia bilateral parietal, occipital and   right temporal and frontal lobes with porencephalic cyst formation.     Septated fluid signal focus extending from the right posterior aspect of   the temporal horn of the right lateral ventricle medially inferiorly into   the right quadrigeminal plate cistern.  There is mass effect on the   underlying brain stem and distortion of the cerebral aqueduct which may   be in part resulting in component the obstructive hydrocephalus with   dilatation of the lateral and third ventricles.     No diffusion signal abnormality to suggest acute infarction.     Inferior extension of the cerebral tonsils approximately 5 mm below the   foramen magnum concern for component of cerebellar tonsillar herniation.   Flair hyperintensity subarachnoid space at the vertex likely artifactual   from pulsation and crowding of the sulci related to the prominent   ventricles this limits evaluation for pernicious mature hemorrhage in the   subarachnoid space.  Clinical correlation and further evaluation advised  partial fluid opacification mastoid air cells bilaterally greater on the   right.        Impression: Encephalomalacia bilateral parietal occipital and right   frontal lobes with additional involvement of the right temporal lobe.    There is resultant porencephalic cyst formation in these regions with   septated cystic focus within the right quadrigeminal plate cistern likely   extending from the right posterior medial aspect of the dilated temporal   horn of the right lateral ventricle.  There is mass effect on the   underlying brain stem and cerebral aqueduct and cannot exclude component   of obstructive hydrocephalus.     5 mm of inferior extension of the cerebral tonsils concern for component   of tonsillar herniation.     Subtle flair hyperintensity subarachnoid space at the vertex likely   artifactual from sulcal crowding and pulsation  artifact, this limits   evaluation for proteinaceous material or hemorrhage.     Compared to CT there are stable bilateral parietal ventriculostomy   catheters with stable moderate prominence of the lateral and third   ventricles          No evidence for acute infarction, otherwise limited study by lack of   contrast.  ______________________________________      Electronically signed by: AVA MORALES DO  Date:     10/22/12  Time:    14:56            : JOSE  Transcribe Date/Time: Oct 22 2012  2:56P  Dictated by : AVA MORALES DO  Read On:   \  Images were reviewed, findings were verified and document was   electronically  SIGNED BY: AVA MORALES DO On: Oct 22 2012  2:56P           Additional tests:  1)CT Scan: yes  2) EEG\Video Monitoring: oo  3) PET Scan: no  4) Neuropsychological evaluation: no  5) DEXA Scan: no  6) Others: no    RISK FACTORS FOR SEIZURES:    1. Head Trauma: n/a  2. CNS Infections: n/a  3. CNS Tumors:  n/a     4. CNS Vascular Disease: n/a  5. Febrile Seizures: n/a   6. Developmental Delay: Yes       7. Family History of Seizures: No    8. Birth history: maternal strep infection prior delivery; hydrocephalus at birth and CP diagnosed at birth    Pregnancy/Labor/Delivery: n/a    CURRENT MEDICATIONS:   Current Outpatient Prescriptions   Medication Sig Dispense Refill    celecoxib (CELEBREX) 200 MG capsule TAKE 1 CAPSULE BY MOUTH 2 TIMES DAILY 56 capsule 0    clindamycin (CLEOCIN T) 1 % external solution AAA bid for inflamed follicles , pustules 60 mL 3    diazepam (VALIUM) 5 MG tablet Take 1 tablet (5 mg total) by mouth every 6 (six) hours as needed (muscle spasm). 15 tablet 0    divalproex (DEPAKOTE) 250 MG EC tablet Take 1 tablet (250 mg total) by mouth every evening. 30 tablet 5    divalproex (DEPAKOTE) 500 MG TbEC Take 1 tablet (500 mg total) by mouth every 12 (twelve) hours. 60 tablet 5    hydrocodone-acetaminophen 5-325mg (NORCO) 5-325 mg per tablet Take 1 tablet by mouth  every 6 (six) hours as needed for Pain. 60 tablet 0    lisinopril (PRINIVIL,ZESTRIL) 5 MG tablet TAKE 1 TABLET BY MOUTH 2 TIMES DAILY 56 tablet 0    multivitamin (THERAGRAN) per tablet Take 1 tablet by mouth once daily.      triamcinolone acetonide 0.5% (KENALOG) 0.5 % Crea Apply topically 4 (four) times daily. 30 g 1     No current facility-administered medications for this visit.         CURRENT ANTI EPILEPTIC MEDICATIONS:   Valproate 500-500-250 mg x at least 20 years - no side-effects    Results for JUAN MARTÍNEZ (MRN [id number]) as of 10/12/2017 09:59   Ref. Range 1/25/2007 14:15 9/16/2009 14:23 5/12/2010 17:08 11/11/2010 16:05 8/15/2011 11:29 12/1/2011 18:58   Valproic Acid Lvl Latest Ref Range: 50.0 - 100.0 ug/ml 106.3 (H) 90.6 80.3 70.1 72.5 74.8       VAGAL NERVE STIMULATOR: n/a    PRIOR ANTICONVULSANT HISTORY: information not available      PAST MEDICAL HISTORY:   Active Ambulatory Problems     Diagnosis Date Noted    Cerebral palsy     Hydrocephalus     Blind     Seizure disorder     HTN (hypertension) 12/31/2012    Shunt malfunction 01/26/2013    Bilateral impacted cerumen 10/09/2013    Back pain 06/04/2014    Constipation 09/11/2014    Urinary reflux     Peripheral edema 12/11/2015    Obesity (BMI 35.0-39.9 without comorbidity) 12/11/2015    Pigmented skin lesion 12/11/2015    Blood glucose elevated 12/11/2015    Bilateral knee contractures 03/08/2016    Acquired pes planovalgus of left foot 03/08/2016    Chronic prescription opiate use 06/26/2016    Dependence for activities of daily living 06/26/2016     Resolved Ambulatory Problems     Diagnosis Date Noted    Health care maintenance 12/11/2015     Past Medical History:   Diagnosis Date    Blind     Cerebral palsy     Hydrocephalus     Hypertension     Seizure disorder     Urinary reflux       PAST PSYCHIATRIC HISTORY:  no    PAST SURGICAL HISTORY including Epilepsy surgery:   Past Surgical History:   Procedure Laterality  Date    FOOT SURGERY      SHUNT REVISION      TENDON RELEASE      TOTAL HIP ARTHROPLASTY          FAMILY HISTORY:   Family History   Problem Relation Age of Onset    Cancer Mother     Cancer Father          SOCIAL HISTORY:   Social History     Social History    Marital status: Single     Spouse name: N/A    Number of children: N/A    Years of education: N/A     Occupational History    Not on file.     Social History Main Topics    Smoking status: Never Smoker    Smokeless tobacco: Never Used    Alcohol use No    Drug use: No    Sexual activity: Not on file     Other Topics Concern    Not on file     Social History Narrative    Lives in property owned by grandmother. Disabled with [employer] caretakers 24hrs to patient. Primary caretaker Sherri takes care of him. Shalonda is the active caretaker, but there is no official legal power of .       a) Marital status: Single                                                    b) Living situation: patient lives alone  c) Employed/Unemployed/Other: Disabled    DRIVING HISTORY:  Currently driving: No      LEVEL OF EDUCATION: n/a    SUBSTANCE USE: no  ALLERGIES: Adhesive tape-silicones; Codeine; and Morphine     REVIEW OF SYSTEMS:  Review of Systems   Constitutional: Negative for appetite change and fatigue.   HENT: Negative for dental problem and sore throat.    Eyes: Negative for photophobia and visual disturbance.   Respiratory: Negative for cough and shortness of breath.    Cardiovascular: Negative for chest pain and palpitations.   Gastrointestinal: Negative for nausea and vomiting.   Endocrine: Negative for polydipsia and polyuria.   Genitourinary: Negative for frequency and urgency.   Musculoskeletal: Negative for arthralgias and joint swelling.   Skin: Negative for color change and rash.   Allergic/Immunologic: Negative for immunocompromised state.   All other systems reviewed and are negative.       GENERAL EXAMINATION:  There were no vitals  taken for this visit.     GENERAL EXAMINATION: limited, as patient is in a wheel-chair  General Appearance:    This is an average built male who appears well.  HEENT: There are dysmorphic features  Skin: There are no obvious stigmata for neurocutaneous disorders.  Neck: supple   Cardiovascular: regular rate and rhythm  Lungs: clear  Abdomen: deferred  The spine is non-tender.  Kyphosis:  NoScoliosis: No   Extremities: Warm and well perfused    NEUROLOGICAL EXAMINATION: limited, as patient is in a wheel-chair  Mental status: Alert and responsive; pleasant and cooperative with exam  Memory: able to recall remote factors  Dysarthria: yes   Eyes: PERRL; EOM intact; No nystagmus.The visual pursuits were not smooth   Fundoscopic Exam: deferred at this time  L facial asymmetry.   Hearing was intact bilaterally to finger rub  Tongue, palate, SCM and trapezii - not assessed     Motor examination:  Generalized decreased bulk   Power: R UE/LE 4/5; L UE/LE 3/5  Abnormal movements: none  Deep tendon reflexes: 2+ bilaterally symmetric UE/LE with flexor plantars  Dysmetria: No     Sensory examination:   Unable to assess reliably    Gait:  - in wheel-chair    IMPRESSION:  The patient's history is consistent with   Seizure disorder  36yo M with hx of CP and hydrocephalus, with seizures since birth  - maintained on -[phone] with no seizures > 20 years    Plan:  - continue same regimen  - check CBC, CMP, VPA levels yearly prior clinic visit  - DEXA scan - to be done and reviewed by PCP    Seizure/fall precautions    Plan of care was discussed with guardian and caregiver.    Other physeal fracture of left metatarsal, initial encounter for closed fracture  - new onset of likely fracture of 3rd metatarsal L foot  - advised to be evaluated in ED stat    Blind  - since birth    Dependence for activities of daily living  - requires assistance for almost all ADLs: able to feed himself if prepared food is made available  - has 24/7  care    Hydrocephalus  - since birth  -  shunt in-situ: followed by NSG ()    The patient was asked to call me/the clinic 1 week after the test(s) are done to obtain results.    More than 50% of the time with the patient (as well as family/caregiver(s) was spent on face-to-face counseling about:  1. Diagnosis, plans, prognosis, medications and possible side-effects, risks and benefits of treatment, other alternatives to AEDs.  2. Risks related to continued seizures, status epilepticus, SUDEP, driving restrictions and seizure precautions ( no baths but showers are ok, no swimming unsupervised, no use of heavy machinery, no use of sharp moving objects, avoid heights).   3. Issues related to pregnancy, OCP and breast feeding as it relates to epilepsy (in female patients).  4. The potential of teratogenicity (for female patients) and suicidal risks of anti-epileptic medications.  5.Avoid any activity that compromise patient safety related to seizures.    Questions and concerns raised by the patient and family/care-giver(s) were addressed and they indicated understanding of everything discussed and agreed to plans as above.    Return in 1 year or earlier neil Diehl MD, MO(), FACNS.  Neurology-Epilepsy.

## 2017-10-12 NOTE — ASSESSMENT & PLAN NOTE
- requires assistance for almost all ADLs: able to feed himself if prepared food is made available  - has 24/7 care

## 2017-10-13 DIAGNOSIS — G40.909 SEIZURE DISORDER: ICD-10-CM

## 2017-10-13 RX ORDER — DIVALPROEX SODIUM 500 MG/1
500 TABLET, DELAYED RELEASE ORAL EVERY 12 HOURS
Qty: 60 TABLET | Refills: 5 | OUTPATIENT
Start: 2017-10-13

## 2017-10-13 RX ORDER — DIVALPROEX SODIUM 250 MG/1
250 TABLET, DELAYED RELEASE ORAL NIGHTLY
Qty: 30 TABLET | Refills: 5 | OUTPATIENT
Start: 2017-10-13

## 2017-10-13 NOTE — TELEPHONE ENCOUNTER
----- Message from Deena Hurtado sent at 10/12/2017  4:02 PM CDT -----  Contact: Shalonda Guardian [phone]  Shalonda is calling to request a refill on his divalproex (DEPAKOTE) 250 MG EC tablet and divalproex (DEPAKOTE) 500 MG TbEC.        [facility] - [address]  [address]  [facility]  [place] [address]  Phone: [phone] Fax: [phone]

## 2017-10-13 NOTE — TELEPHONE ENCOUNTER
Spoke to caregiver and verified dosages of his Depakote.They requested [facility] and to have medication delivered to Patient's home.Called in medication per clinic notes of Dr Diehl.

## 2017-10-26 DIAGNOSIS — I10 ESSENTIAL HYPERTENSION: ICD-10-CM

## 2017-10-27 RX ORDER — CELECOXIB 200 MG/1
CAPSULE ORAL
Qty: 56 CAPSULE | Refills: 2 | Status: SHIPPED | OUTPATIENT
Start: 2017-10-27 | End: 2018-01-18 | Stop reason: SDUPTHER

## 2017-10-27 RX ORDER — LISINOPRIL 5 MG/1
TABLET ORAL
Qty: 56 TABLET | Refills: 0 | Status: SHIPPED | OUTPATIENT
Start: 2017-10-27 | End: 2017-11-20 | Stop reason: SDUPTHER

## 2017-11-02 ENCOUNTER — TELEPHONE (OUTPATIENT)
Dept: NEUROLOGY | Facility: CLINIC | Age: 37
End: 2017-11-02

## 2017-11-20 DIAGNOSIS — I10 ESSENTIAL HYPERTENSION: ICD-10-CM

## 2017-11-20 RX ORDER — LISINOPRIL 5 MG/1
TABLET ORAL
Qty: 56 TABLET | Refills: 1 | Status: SHIPPED | OUTPATIENT
Start: 2017-11-20 | End: 2018-02-15 | Stop reason: SDUPTHER

## 2018-01-19 RX ORDER — CELECOXIB 200 MG/1
CAPSULE ORAL
Qty: 56 CAPSULE | Refills: 1 | Status: SHIPPED | OUTPATIENT
Start: 2018-01-19 | End: 2018-04-11 | Stop reason: SDUPTHER

## 2018-02-10 ENCOUNTER — HOSPITAL ENCOUNTER (EMERGENCY)
Facility: HOSPITAL | Age: 38
Discharge: HOME | End: 2018-02-10
Attending: EMERGENCY MEDICINE
Payer: MEDICARE

## 2018-02-10 VITALS
RESPIRATION RATE: 20 BRPM | TEMPERATURE: 98 F | WEIGHT: 200 LBS | BODY MASS INDEX: 26.39 KG/M2 | OXYGEN SATURATION: 95 % | SYSTOLIC BLOOD PRESSURE: 142 MMHG | HEART RATE: 93 BPM | DIASTOLIC BLOOD PRESSURE: 96 MMHG

## 2018-02-10 DIAGNOSIS — M79.89 FOOT SWELLING: Primary | ICD-10-CM

## 2018-02-10 PROCEDURE — 99283 EMERGENCY DEPT VISIT LOW MDM: CPT

## 2018-02-11 NOTE — ED PROVIDER NOTES
Encounter Date: 2/10/2018    SCRIBE #1 NOTE: I, Jess Welsh, am scribing for, and in the presence of,  Channing Kenny III, MD. I have scribed the following portions of the note - Other sections scribed: HPI/ROS.       History     Chief Complaint   Patient presents with    Foot Pain     left foot pain and swelling x 1wk     CC: Foot Swelling     HPI: This 37 y.o. male with a medical history of blindness, cerebral palsy, hydrocephalus, HTN, seizure disorder, and urinary reflux presents to the ED accompanied by a caregiver for an evaluation of constant L foot swelling for the past 1 week or so. Patient states he does not endorse any foot pain. Caregiver reports a prior episode of foot swelling (unsure which foot) a few months ago noting his foot was blueish/purple. No modifying factors. Otherwise, patient denies fever, chills, numbness, weakness, N/V/D, and recent traumas/falls/injuries.      The history is provided by the patient and a caregiver. No  was used.     Review of patient's allergies indicates:   Allergen Reactions    Adhesive tape-silicones      Other reaction(s): blisters    Codeine      Other reaction(s): Nausea    Morphine Itching     Past Medical History:   Diagnosis Date    Blind     Cerebral palsy     Hydrocephalus     Hypertension     Seizure disorder     Urinary reflux      Past Surgical History:   Procedure Laterality Date    FOOT SURGERY      SHUNT REVISION      TENDON RELEASE      TOTAL HIP ARTHROPLASTY       Family History   Problem Relation Age of Onset    Cancer Mother     Cancer Father      Social History   Substance Use Topics    Smoking status: Never Smoker    Smokeless tobacco: Never Used    Alcohol use No     Review of Systems   Constitutional: Negative for chills and fever.   HENT: Negative for congestion, ear pain, rhinorrhea and sore throat.    Eyes: Negative for pain and visual disturbance.   Respiratory: Negative for cough and shortness of  breath.    Cardiovascular: Negative for chest pain.   Gastrointestinal: Negative for abdominal pain, diarrhea, nausea and vomiting.   Genitourinary: Negative for dysuria.   Musculoskeletal: Negative for back pain and neck pain.        (+) L foot swelling   Skin: Negative for rash.   Neurological: Negative for weakness, numbness and headaches.       Physical Exam     Initial Vitals [02/10/18 1655]   BP Pulse Resp Temp SpO2   (!) 142/96 93 20 98.1 °F (36.7 °C) 95 %      MAP       111.33         Physical Exam    ED Course   Procedures  Labs Reviewed - No data to display          Medical Decision Making:   Initial Assessment:   37-year-old male history of cerebral palsy presents for evaluation of swelling of the left foot.  The patient has no complaints, stating that he has no pain in the foot.  On exam he does have bilateral foot swelling, left slightly greater than right.  There is no pitting.  The patient is also noted to have chronic deformity and muscle atrophy.  There is no erythema, warmth, drainage, skin defect, foot tenderness, calf tenderness, or other notable acute abnormality.  Pulses could not be palpated but were confirmed with Doppler and biphasic bilaterally.  No evidence of trauma, infection, acute neurovascular compromise.  Also low likelihood of DVT.  Strongly suspect patient's bilateral lower extremity swelling as a result of chronic disability.  Will recommend follow-up with primary care.             Scribe Attestation:   Scribe #1: I performed the above scribed service and the documentation accurately describes the services I performed. I attest to the accuracy of the note.    Attending Attestation:           Physician Attestation for Scribe:  Physician Attestation Statement for Scribe #1: I, Channing Kenny III, MD, reviewed documentation, as scribed by Jess Welsh in my presence, and it is both accurate and complete.                 ED Course      Clinical Impression:   The encounter diagnosis  was Foot swelling.                           Channing Kenny III, MD  02/10/18 9062

## 2018-02-11 NOTE — DISCHARGE INSTRUCTIONS
Return to the emergency department if you develop severe pain in your foot, numbness of your foot, redness of the foot traveling up the leg, or for any new or worsening medical concerns.

## 2018-02-15 DIAGNOSIS — I10 ESSENTIAL HYPERTENSION: ICD-10-CM

## 2018-02-16 RX ORDER — LISINOPRIL 5 MG/1
5 TABLET ORAL 2 TIMES DAILY
Qty: 56 TABLET | Refills: 0 | Status: SHIPPED | OUTPATIENT
Start: 2018-02-16 | End: 2018-03-15 | Stop reason: SDUPTHER

## 2018-03-15 DIAGNOSIS — I10 ESSENTIAL HYPERTENSION: ICD-10-CM

## 2018-03-16 DIAGNOSIS — I10 ESSENTIAL HYPERTENSION: ICD-10-CM

## 2018-03-16 DIAGNOSIS — Z51.81 MEDICATION MONITORING ENCOUNTER: ICD-10-CM

## 2018-03-16 DIAGNOSIS — R73.9 BLOOD GLUCOSE ELEVATED: Primary | ICD-10-CM

## 2018-03-16 DIAGNOSIS — R60.0 PEDAL EDEMA: ICD-10-CM

## 2018-03-16 RX ORDER — LISINOPRIL 5 MG/1
TABLET ORAL
Qty: 56 TABLET | Refills: 1 | Status: SHIPPED | OUTPATIENT
Start: 2018-03-16 | End: 2018-05-09 | Stop reason: SDUPTHER

## 2018-03-20 ENCOUNTER — TELEPHONE (OUTPATIENT)
Dept: ADMINISTRATIVE | Facility: HOSPITAL | Age: 38
End: 2018-03-20

## 2018-03-20 ENCOUNTER — PATIENT MESSAGE (OUTPATIENT)
Dept: INTERNAL MEDICINE | Facility: CLINIC | Age: 38
End: 2018-03-20

## 2018-03-20 NOTE — TELEPHONE ENCOUNTER
----- Message from Jamel Foley MD sent at 3/16/2018  3:13 PM CDT -----  Patient overdue for hypertension follow-up appointment and also follow up after ER visit.  Please contact patient to schedule labs and follow-up appointment as soon as possible. Thanks

## 2018-03-28 ENCOUNTER — LAB VISIT (OUTPATIENT)
Dept: LAB | Facility: HOSPITAL | Age: 38
End: 2018-03-28
Attending: PSYCHIATRY & NEUROLOGY
Payer: MEDICARE

## 2018-03-28 ENCOUNTER — PATIENT MESSAGE (OUTPATIENT)
Dept: INTERNAL MEDICINE | Facility: CLINIC | Age: 38
End: 2018-03-28

## 2018-03-28 ENCOUNTER — PATIENT MESSAGE (OUTPATIENT)
Dept: FAMILY MEDICINE | Facility: CLINIC | Age: 38
End: 2018-03-28

## 2018-03-28 ENCOUNTER — OFFICE VISIT (OUTPATIENT)
Dept: INTERNAL MEDICINE | Facility: CLINIC | Age: 38
End: 2018-03-28
Payer: MEDICARE

## 2018-03-28 VITALS
HEART RATE: 78 BPM | BODY MASS INDEX: 27.08 KG/M2 | WEIGHT: 199.94 LBS | HEIGHT: 72 IN | OXYGEN SATURATION: 96 % | TEMPERATURE: 98 F | DIASTOLIC BLOOD PRESSURE: 74 MMHG | SYSTOLIC BLOOD PRESSURE: 116 MMHG

## 2018-03-28 DIAGNOSIS — R73.9 BLOOD GLUCOSE ELEVATED: ICD-10-CM

## 2018-03-28 DIAGNOSIS — Z51.81 MEDICATION MONITORING ENCOUNTER: ICD-10-CM

## 2018-03-28 DIAGNOSIS — R60.0 PEDAL EDEMA: ICD-10-CM

## 2018-03-28 DIAGNOSIS — G40.909 SEIZURE DISORDER: ICD-10-CM

## 2018-03-28 DIAGNOSIS — G80.0 SPASTIC QUADRIPLEGIC CEREBRAL PALSY: ICD-10-CM

## 2018-03-28 DIAGNOSIS — R60.0 PEDAL EDEMA: Primary | ICD-10-CM

## 2018-03-28 DIAGNOSIS — I10 ESSENTIAL HYPERTENSION: ICD-10-CM

## 2018-03-28 DIAGNOSIS — R23.3 EASY BRUISING: ICD-10-CM

## 2018-03-28 LAB
ALBUMIN SERPL BCP-MCNC: 3.5 G/DL
ALP SERPL-CCNC: 68 U/L
ALT SERPL W/O P-5'-P-CCNC: 34 U/L
ANION GAP SERPL CALC-SCNC: 5 MMOL/L
AST SERPL-CCNC: 21 U/L
BASOPHILS # BLD AUTO: 0.03 K/UL
BASOPHILS NFR BLD: 0.5 %
BILIRUB SERPL-MCNC: 0.4 MG/DL
BNP SERPL-MCNC: 16 PG/ML
BUN SERPL-MCNC: 13 MG/DL
CALCIUM SERPL-MCNC: 9.1 MG/DL
CHLORIDE SERPL-SCNC: 107 MMOL/L
CHOLEST SERPL-MCNC: 141 MG/DL
CHOLEST/HDLC SERPL: 3.6 {RATIO}
CO2 SERPL-SCNC: 28 MMOL/L
CREAT SERPL-MCNC: 0.9 MG/DL
D DIMER PPP IA.FEU-MCNC: 0.27 MG/L FEU
DIFFERENTIAL METHOD: ABNORMAL
EOSINOPHIL # BLD AUTO: 0.1 K/UL
EOSINOPHIL NFR BLD: 1.8 %
ERYTHROCYTE [DISTWIDTH] IN BLOOD BY AUTOMATED COUNT: 13.1 %
EST. GFR  (AFRICAN AMERICAN): >60 ML/MIN/1.73 M^2
EST. GFR  (NON AFRICAN AMERICAN): >60 ML/MIN/1.73 M^2
GLUCOSE SERPL-MCNC: 84 MG/DL
HCT VFR BLD AUTO: 44 %
HDLC SERPL-MCNC: 39 MG/DL
HDLC SERPL: 27.7 %
HGB BLD-MCNC: 14.4 G/DL
IMM GRANULOCYTES # BLD AUTO: 0.07 K/UL
IMM GRANULOCYTES NFR BLD AUTO: 1.2 %
LDLC SERPL CALC-MCNC: 84.6 MG/DL
LYMPHOCYTES # BLD AUTO: 2.6 K/UL
LYMPHOCYTES NFR BLD: 42.8 %
MCH RBC QN AUTO: 29.3 PG
MCHC RBC AUTO-ENTMCNC: 32.7 G/DL
MCV RBC AUTO: 90 FL
MONOCYTES # BLD AUTO: 0.5 K/UL
MONOCYTES NFR BLD: 8.8 %
NEUTROPHILS # BLD AUTO: 2.7 K/UL
NEUTROPHILS NFR BLD: 44.9 %
NONHDLC SERPL-MCNC: 102 MG/DL
NRBC BLD-RTO: 0 /100 WBC
PLATELET # BLD AUTO: 234 K/UL
PMV BLD AUTO: 10.4 FL
POTASSIUM SERPL-SCNC: 4.4 MMOL/L
PROT SERPL-MCNC: 6.6 G/DL
RBC # BLD AUTO: 4.91 M/UL
SODIUM SERPL-SCNC: 140 MMOL/L
TRIGL SERPL-MCNC: 87 MG/DL
VALPROATE SERPL-MCNC: 76.8 UG/ML
WBC # BLD AUTO: 6.01 K/UL

## 2018-03-28 PROCEDURE — 80053 COMPREHEN METABOLIC PANEL: CPT

## 2018-03-28 PROCEDURE — 85025 COMPLETE CBC W/AUTO DIFF WBC: CPT

## 2018-03-28 PROCEDURE — 83880 ASSAY OF NATRIURETIC PEPTIDE: CPT

## 2018-03-28 PROCEDURE — 85379 FIBRIN DEGRADATION QUANT: CPT

## 2018-03-28 PROCEDURE — 80061 LIPID PANEL: CPT

## 2018-03-28 PROCEDURE — 99499 UNLISTED E&M SERVICE: CPT | Mod: S$GLB,,, | Performed by: INTERNAL MEDICINE

## 2018-03-28 PROCEDURE — 99214 OFFICE O/P EST MOD 30 MIN: CPT | Mod: S$GLB,,, | Performed by: INTERNAL MEDICINE

## 2018-03-28 PROCEDURE — 36415 COLL VENOUS BLD VENIPUNCTURE: CPT | Mod: PO

## 2018-03-28 PROCEDURE — 80164 ASSAY DIPROPYLACETIC ACD TOT: CPT

## 2018-03-28 PROCEDURE — 99999 PR PBB SHADOW E&M-EST. PATIENT-LVL IV: CPT | Mod: PBBFAC,,, | Performed by: INTERNAL MEDICINE

## 2018-03-28 PROCEDURE — 3074F SYST BP LT 130 MM HG: CPT | Mod: CPTII,S$GLB,, | Performed by: INTERNAL MEDICINE

## 2018-03-28 PROCEDURE — 3078F DIAST BP <80 MM HG: CPT | Mod: CPTII,S$GLB,, | Performed by: INTERNAL MEDICINE

## 2018-03-28 NOTE — PROGRESS NOTES
Portions of this note are generated with voice recognition software. Typographical errors may exist.     SUBJECTIVE:    This is a/an 38 y.o. male here for primary care visit for  Chief Complaint   Patient presents with    Follow-up     Patient has been having painless pedal edema.  This is a new issue for the patient.  Mother also notes that there has been easy bruising without obvious trauma to precipitate bruising.  She has photographed a bruise from the 17th in the right groin region and then others on the hands.  All of them resolving spontaneously.  Patient without other signs of bleeding such as spontaneous epistaxis.    Pedal edema began around February 10.  Left ankle and foot more prominent than the right side.  Patient has not been complaining about uncharacteristic dyspnea on exertion.  No new problems with coughing.  Patient has been using Celebrex long-term and has never experienced edema associated with this.    Patient has never had a history of kidney dysfunction.  Patient chronically has low platelets.  Has never been diagnosed with a significant diagnosis of platelet dysfunction.  He does not have a diagnosis of cirrhosis.      Medications Reviewed and Updated    Past medical, family, and social histories were reviewed and updated.    Review of Systems negative unless otherwise noted in history of present illness-  Review of Systems   Unable to perform ROS: Medical condition       Allergic:    Review of patient's allergies indicates:   Allergen Reactions    Adhesive tape-silicones      Other reaction(s): blisters    Codeine      Other reaction(s): Nausea    Morphine Itching       OBJECTIVE:  BP: 116/74 Pulse: 78 Temp: 97.6 °F (36.4 °C)  Wt Readings from Last 3 Encounters:   03/28/18 90.7 kg (199 lb 15.3 oz)   02/10/18 90.7 kg (200 lb)   10/12/17 93.4 kg (206 lb)    Body mass index is 27.12 kg/m².  Previous Blood Pressure Readings :   BP Readings from Last 3 Encounters:   03/28/18 116/74    02/10/18 (!) 142/96   10/12/17 (!) 141/93       Physical Exam    GEN: No apparent distress  HEENT: sclera non-icteric, conjunctiva clear  CV: 1+ ankle edema left lower extremity.  Regular rate and rhythm.  PULM: breathing non-labored  ABD: Obese, protuberant abdomen.  PSYCH: appropriate affect  MSK: able to rise from chair without assistance  SKIN: normal skin turgor    Pertinent Labs Reviewed       ASSESSMENT/PLAN:    Pedal edema.This is a New problem. The etiology is unknown. The problem is not adequately controlled. The risk of medical complications is moderate. Treatment/diagnostic recommendations are to modify the diagnostic/treatment plan as follows in addition to instructions noted on the After Visit Summary. The patient advised if symptoms change or intensify to seek medical care.   -     COMPRESSION STOCKINGS  -     Sedimentation rate, manual; Future; Expected date: 03/31/2018  -     C-reactive protein; Future; Expected date: 03/31/2018  -     US Abdomen Complete; Future; Expected date: 03/31/2018    Seizure disorder.Condition stable.  Counseling on self-care measures. Plan to monitor clinically. Continue current medical plan.     Spastic quadriplegic cerebral palsy.Condition stable.  Counseling on self-care measures. Plan to monitor clinically. Continue current medical plan.     Easy bruising.Further evaluation warranted.  Recommendations as above       No future appointments.    Jamel Foley  3/31/2018  12:46 PM

## 2018-03-28 NOTE — PATIENT INSTRUCTIONS
Recommendations for today    Cause of lower extremity swelling is unclear at this time.  For the time being we recommend stopping the medication Celebrex until we have results back and better understand the cause of swelling.    We recommend using compression stockings during the daytime and removing them during the nighttime.  This will help to limit skin complications caused by persistent lower extremity swelling.  If any difficulties occur with applying the compression stockings please contact my clinic for additional recommendations.

## 2018-04-03 ENCOUNTER — TELEPHONE (OUTPATIENT)
Dept: FAMILY MEDICINE | Facility: CLINIC | Age: 38
End: 2018-04-03

## 2018-04-03 NOTE — TELEPHONE ENCOUNTER
----- Message from Jamel Foley MD sent at 3/31/2018 11:31 AM CDT -----  Please contact mother regarding need to do additional studies to understand swelling in the legs.  Initial studies inconclusive.  We recommend liver ultrasound and blood work done together and then an appointment in clinic to discuss the results.

## 2018-04-11 ENCOUNTER — HOSPITAL ENCOUNTER (OUTPATIENT)
Dept: RADIOLOGY | Facility: HOSPITAL | Age: 38
Discharge: HOME | End: 2018-04-11
Attending: INTERNAL MEDICINE
Payer: MEDICARE

## 2018-04-11 DIAGNOSIS — R60.0 PEDAL EDEMA: ICD-10-CM

## 2018-04-11 DIAGNOSIS — G40.909 SEIZURE DISORDER: ICD-10-CM

## 2018-04-11 PROCEDURE — 76700 US EXAM ABDOM COMPLETE: CPT | Mod: TC

## 2018-04-11 PROCEDURE — 76700 US EXAM ABDOM COMPLETE: CPT | Mod: 26,,, | Performed by: RADIOLOGY

## 2018-04-11 RX ORDER — CELECOXIB 200 MG/1
200 CAPSULE ORAL 2 TIMES DAILY PRN
Qty: 56 CAPSULE | Refills: 1 | Status: SHIPPED | OUTPATIENT
Start: 2018-04-11 | End: 2018-04-12 | Stop reason: SDUPTHER

## 2018-04-11 RX ORDER — DIVALPROEX SODIUM 500 MG/1
TABLET, DELAYED RELEASE ORAL
Qty: 56 TABLET | OUTPATIENT
Start: 2018-04-11

## 2018-04-13 RX ORDER — CELECOXIB 200 MG/1
CAPSULE ORAL
Qty: 56 CAPSULE | Refills: 1 | Status: SHIPPED | OUTPATIENT
Start: 2018-04-13 | End: 2018-07-19 | Stop reason: SDUPTHER

## 2018-04-18 ENCOUNTER — OFFICE VISIT (OUTPATIENT)
Dept: INTERNAL MEDICINE | Facility: CLINIC | Age: 38
End: 2018-04-18
Payer: MEDICARE

## 2018-04-18 VITALS
SYSTOLIC BLOOD PRESSURE: 124 MMHG | DIASTOLIC BLOOD PRESSURE: 78 MMHG | HEIGHT: 72 IN | OXYGEN SATURATION: 97 % | HEART RATE: 84 BPM

## 2018-04-18 DIAGNOSIS — G91.9 HYDROCEPHALUS: ICD-10-CM

## 2018-04-18 DIAGNOSIS — R60.0 EDEMA, PERIPHERAL: Primary | ICD-10-CM

## 2018-04-18 PROCEDURE — 3074F SYST BP LT 130 MM HG: CPT | Mod: CPTII,S$GLB,, | Performed by: INTERNAL MEDICINE

## 2018-04-18 PROCEDURE — 99999 PR PBB SHADOW E&M-EST. PATIENT-LVL III: CPT | Mod: PBBFAC,,, | Performed by: INTERNAL MEDICINE

## 2018-04-18 PROCEDURE — 3078F DIAST BP <80 MM HG: CPT | Mod: CPTII,S$GLB,, | Performed by: INTERNAL MEDICINE

## 2018-04-18 PROCEDURE — 99499 UNLISTED E&M SERVICE: CPT | Mod: S$GLB,,, | Performed by: INTERNAL MEDICINE

## 2018-04-18 PROCEDURE — 99214 OFFICE O/P EST MOD 30 MIN: CPT | Mod: S$GLB,,, | Performed by: INTERNAL MEDICINE

## 2018-04-18 NOTE — PROGRESS NOTES
Portions of this note are generated with voice recognition software. Typographical errors may exist.     SUBJECTIVE:    This is a/an 38 y.o. male here for primary care visit for  Chief Complaint   Patient presents with    Results     Bipedal edema remains stable.  Left side continues to be more prominent than the right side.  No new symptoms.  Patient has refrained from getting Celebrex but edema has not improved despite suspending this medication.  Patient has been on NSAID medication on a recurring basis for many years.  Now documented previous intolerance.    Insurance recently approved compression stockings.  Patient should be able to get assistance to have these applied every day.    There has been some difficulty getting urine specimen from the patient.  He does not have documented problems in the past with proteinuria.  There've been no documented instances of gross hematuria.    Patient has not been complaining of headaches or uncharacteristic dyspnea.  Patient had revision of  shunt around 2013 and it drains into the pleural space.      Medications Reviewed and Updated    Past medical, family, and social histories were reviewed and updated.    Review of Systems negative unless otherwise noted in history of present illness-  Review of Systems   Unable to perform ROS: Psychiatric disorder         Allergic:    Review of patient's allergies indicates:   Allergen Reactions    Adhesive tape-silicones      Other reaction(s): blisters    Codeine      Other reaction(s): Nausea    Morphine Itching       OBJECTIVE:  BP: 124/78 Pulse: 84    Wt Readings from Last 3 Encounters:   03/28/18 90.7 kg (199 lb 15.3 oz)   02/10/18 90.7 kg (200 lb)   10/12/17 93.4 kg (206 lb)    There is no height or weight on file to calculate BMI.  Previous Blood Pressure Readings :   BP Readings from Last 3 Encounters:   04/18/18 124/78   03/28/18 116/74   02/10/18 (!) 142/96       Physical Exam    GEN: No apparent distress  HEENT:  sclera non-icteric, conjunctiva clear  CV: 1+ pitting edema to the level of the ankle left lower extremity.  Regular rate and rhythm no murmurs.  PULM: breathing non-labored  ABD: Obese, protuberant abdomen.  PSYCH: appropriate affect  MSK: able to rise from chair without assistance  SKIN: normal skin turgor    Pertinent Labs Reviewed       ASSESSMENT/PLAN:    Edema, peripheral.Condition stable.  Counseling on self-care measures. Plan to monitor clinically. Continue current medical plan.   -     URINALYSIS; Future; Expected date: 04/18/2018  -     MICROALBUMIN / CREATININE RATIO URINE; Future; Expected date: 04/18/2018    Hydrocephalus.Condition stable.  Counseling on self-care measures. Plan to monitor clinically. Continue current medical plan.         Future Appointments  Date Time Provider Department Center   7/19/2018 10:20 AM Jamel Foley MD [facility]       Jamel Foley  4/22/2018  9:50 AM

## 2018-04-19 ENCOUNTER — LAB VISIT (OUTPATIENT)
Dept: LAB | Facility: HOSPITAL | Age: 38
End: 2018-04-19
Attending: INTERNAL MEDICINE
Payer: MEDICARE

## 2018-04-19 DIAGNOSIS — R60.0 EDEMA, PERIPHERAL: ICD-10-CM

## 2018-04-19 LAB
BILIRUB UR QL STRIP: NEGATIVE
CLARITY UR REFRACT.AUTO: CLEAR
COLOR UR AUTO: YELLOW
CREAT UR-MCNC: 119 MG/DL
GLUCOSE UR QL STRIP: NEGATIVE
HGB UR QL STRIP: NEGATIVE
KETONES UR QL STRIP: NEGATIVE
LEUKOCYTE ESTERASE UR QL STRIP: NEGATIVE
MICROALBUMIN UR DL<=1MG/L-MCNC: 39 UG/ML
MICROALBUMIN/CREATININE RATIO: 32.8 UG/MG
NITRITE UR QL STRIP: NEGATIVE
PH UR STRIP: 6 [PH] (ref 5–8)
PROT UR QL STRIP: NEGATIVE
SP GR UR STRIP: 1.01 (ref 1–1.03)
URN SPEC COLLECT METH UR: NORMAL
UROBILINOGEN UR STRIP-ACNC: NEGATIVE EU/DL

## 2018-04-19 PROCEDURE — 81003 URINALYSIS AUTO W/O SCOPE: CPT

## 2018-04-19 PROCEDURE — 82043 UR ALBUMIN QUANTITATIVE: CPT

## 2018-04-23 ENCOUNTER — TELEPHONE (OUTPATIENT)
Dept: INTERNAL MEDICINE | Facility: CLINIC | Age: 38
End: 2018-04-23

## 2018-04-23 NOTE — TELEPHONE ENCOUNTER
Spoke with Shalonda to inform that papers are ready for  at the 2nd floor . Understanding voiced.

## 2018-05-09 DIAGNOSIS — I10 ESSENTIAL HYPERTENSION: ICD-10-CM

## 2018-05-11 RX ORDER — LISINOPRIL 5 MG/1
TABLET ORAL
Qty: 56 TABLET | Refills: 3 | Status: SHIPPED | OUTPATIENT
Start: 2018-05-11 | End: 2018-08-13 | Stop reason: SDUPTHER

## 2018-05-15 DIAGNOSIS — G40.909 SEIZURE DISORDER: ICD-10-CM

## 2018-05-15 RX ORDER — DIVALPROEX SODIUM 500 MG/1
TABLET, DELAYED RELEASE ORAL
Qty: 56 TABLET | OUTPATIENT
Start: 2018-05-15

## 2018-05-16 ENCOUNTER — PATIENT MESSAGE (OUTPATIENT)
Dept: NEUROLOGY | Facility: CLINIC | Age: 38
End: 2018-05-16

## 2018-07-09 DIAGNOSIS — G40.909 SEIZURE DISORDER: ICD-10-CM

## 2018-07-09 RX ORDER — DIVALPROEX SODIUM 250 MG/1
TABLET, DELAYED RELEASE ORAL
Qty: 28 TABLET | OUTPATIENT
Start: 2018-07-09

## 2018-07-09 RX ORDER — DIVALPROEX SODIUM 500 MG/1
TABLET, DELAYED RELEASE ORAL
Qty: 56 TABLET | OUTPATIENT
Start: 2018-07-09

## 2018-07-11 DIAGNOSIS — G40.909 SEIZURE DISORDER: ICD-10-CM

## 2018-07-11 NOTE — TELEPHONE ENCOUNTER
----- Message from Sandhya Newman sent at 7/11/2018 12:17 PM CDT -----  Contact: Nubia/ from [facility] at [phone]  Patient is requesting a refill on his medication(s).      divalproex (DEPAKOTE) 250 MG EC tablet    [facility] - [place], [place] - [address]

## 2018-07-13 RX ORDER — DIVALPROEX SODIUM 500 MG/1
500 TABLET, DELAYED RELEASE ORAL EVERY 12 HOURS
Qty: 60 TABLET | Refills: 0 | Status: SHIPPED | OUTPATIENT
Start: 2018-07-13 | End: 2018-09-17 | Stop reason: SDUPTHER

## 2018-07-13 RX ORDER — DIVALPROEX SODIUM 250 MG/1
250 TABLET, DELAYED RELEASE ORAL NIGHTLY
Qty: 30 TABLET | Refills: 0 | Status: SHIPPED | OUTPATIENT
Start: 2018-07-13 | End: 2018-08-13 | Stop reason: SDUPTHER

## 2018-07-19 RX ORDER — CELECOXIB 200 MG/1
CAPSULE ORAL
Qty: 56 CAPSULE | Refills: 1 | Status: SHIPPED | OUTPATIENT
Start: 2018-07-19 | End: 2020-06-29 | Stop reason: SDUPTHER

## 2018-08-13 DIAGNOSIS — G40.909 SEIZURE DISORDER: ICD-10-CM

## 2018-08-13 DIAGNOSIS — I10 ESSENTIAL HYPERTENSION: ICD-10-CM

## 2018-08-13 RX ORDER — LISINOPRIL 5 MG/1
TABLET ORAL
Qty: 58 TABLET | Refills: 0 | Status: SHIPPED | OUTPATIENT
Start: 2018-08-13 | End: 2018-09-24 | Stop reason: SDUPTHER

## 2018-08-13 RX ORDER — DIVALPROEX SODIUM 250 MG/1
TABLET, DELAYED RELEASE ORAL
Qty: 28 TABLET | Refills: 0 | Status: SHIPPED | OUTPATIENT
Start: 2018-08-13 | End: 2018-09-17 | Stop reason: SDUPTHER

## 2018-09-10 ENCOUNTER — PATIENT MESSAGE (OUTPATIENT)
Dept: INTERNAL MEDICINE | Facility: CLINIC | Age: 38
End: 2018-09-10

## 2018-09-10 DIAGNOSIS — G40.909 SEIZURE DISORDER: ICD-10-CM

## 2018-09-10 RX ORDER — DIVALPROEX SODIUM 250 MG/1
TABLET, DELAYED RELEASE ORAL
Qty: 30 TABLET | Status: CANCELLED | OUTPATIENT
Start: 2018-09-10

## 2018-09-11 ENCOUNTER — PATIENT MESSAGE (OUTPATIENT)
Dept: INTERNAL MEDICINE | Facility: CLINIC | Age: 38
End: 2018-09-11

## 2018-09-11 ENCOUNTER — HOSPITAL ENCOUNTER (EMERGENCY)
Facility: HOSPITAL | Age: 38
Discharge: HOME | End: 2018-09-11
Attending: EMERGENCY MEDICINE
Payer: MEDICARE

## 2018-09-11 VITALS
RESPIRATION RATE: 16 BRPM | WEIGHT: 200.63 LBS | TEMPERATURE: 98 F | DIASTOLIC BLOOD PRESSURE: 89 MMHG | SYSTOLIC BLOOD PRESSURE: 140 MMHG | BODY MASS INDEX: 27.17 KG/M2 | OXYGEN SATURATION: 97 % | HEIGHT: 72 IN

## 2018-09-11 DIAGNOSIS — S42.035A CLOSED NONDISPLACED FRACTURE OF ACROMIAL END OF LEFT CLAVICLE, INITIAL ENCOUNTER: Primary | ICD-10-CM

## 2018-09-11 DIAGNOSIS — R93.89 ABNORMAL CXR: ICD-10-CM
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
## 2018-09-11 PROCEDURE — 99284 EMERGENCY DEPT VISIT MOD MDM: CPT | Mod: 25

## 2018-09-11 PROCEDURE — 99284 EMERGENCY DEPT VISIT MOD MDM: CPT | Mod: ,,, | Performed by: EMERGENCY MEDICINE

## 2018-09-11 PROCEDURE — 29240 STRAPPING OF SHOULDER: CPT

## 2018-09-11 NOTE — ED TRIAGE NOTES
Pt has cerebral palsy and lives at home with 24/7 care givers . Pt  Was noted 3 days ago  to have bruises on left shoulder and left axillary left chest wall bruise and bruise under left eye. Referred to ER  for eval. Pt is a poor historian and  Pt has mental retardation and is unable state how he fell.  Normally is confused.

## 2018-09-11 NOTE — ED PROVIDER NOTES
Encounter Date: 9/11/2018       History     Chief Complaint   Patient presents with    Shoulder Pain     Left shoulder pain since awakening this morning, denies fall or other trauma     38-year-old male with cerebral palsy and seizures presents for bruising to the left shoulder x2 days.  Patient's caretaker noted yesterday that he had bruising on his shoulder and under his left eye.  Patient states that he fell, he is unable to provide details of the fall.  Patient has limited motion and sensation in the arm at baseline.  Patient denies headache, neck pain or pain at other sites.  Per caretaker, patient is acting normally at his baseline.          Review of patient's allergies indicates:   Allergen Reactions    Adhesive tape-silicones      Other reaction(s): blisters    Codeine      Other reaction(s): Nausea    Morphine Itching     Past Medical History:   Diagnosis Date    Blind     Cerebral palsy     Hydrocephalus     Hypertension     Seizure disorder     Seizures     Urinary reflux      Past Surgical History:   Procedure Laterality Date    EXPLORATION, SHUNT, VENTRICULOPERITONEAL Bilateral 1/28/2013    Performed by [facility] at [facility] 2ND [facility]    FOOT SURGERY      INSERTION, SHUNT, VENTRICULOPERITONEAL, ENDOSCOPIC Left 2/5/2013    Performed by Hung [facility] at [facility]ND [facility]    SHUNT REVISION      TENDON RELEASE      TOTAL HIP ARTHROPLASTY       Family History   Problem Relation Age of Onset    Cancer Mother     Cancer Father      Social History     Tobacco Use    Smoking status: Never Smoker    Smokeless tobacco: Never Used   Substance Use Topics    Alcohol use: No    Drug use: No     Review of Systems   Constitutional: Negative for fatigue and fever.   HENT: Positive for facial swelling.    Respiratory: Negative for cough and shortness of breath.    Cardiovascular: Negative for chest pain and palpitations.   Gastrointestinal: Negative for abdominal pain, nausea and vomiting.    Endocrine: Negative for polyuria.   Genitourinary: Negative for difficulty urinating, dysuria, frequency and hematuria.   Musculoskeletal: Positive for arthralgias and joint swelling. Negative for back pain, neck pain and neck stiffness.   Skin: Positive for color change. Negative for wound.   Neurological: Negative for weakness, light-headedness, numbness and headaches.       Physical Exam     Initial Vitals [09/11/18 1357]   BP Pulse Resp Temp SpO2   (!) 140/89 -- 16 98.3 °F (36.8 °C) 97 %      MAP       --         Physical Exam    Nursing note and vitals reviewed.  Constitutional: He appears well-developed and well-nourished. He is not diaphoretic. No distress.   In wheelchair.  Family and caretaker at bedside   HENT:   Head: Normocephalic. Head is with contusion. Head is without raccoon's eyes and without Walter's sign.       Nose: No sinus tenderness or nasal deformity.   Mouth/Throat: Oropharynx is clear and moist.   Ecchymosis under the left eye.  No tenderness, crepitus or bony abnormalities appreciated.   Eyes: Pupils are equal, round, and reactive to light.   Patient blind, unable to follow directions for EOMs   Neck: Normal range of motion. Neck supple.   No posterior midline tenderness. No step-offs or bony abnormalities.   Cardiovascular: Normal rate, regular rhythm, normal heart sounds and intact distal pulses.   Pulmonary/Chest: Breath sounds normal. No respiratory distress. He has no wheezes. He has no rhonchi. He has no rales. He exhibits no tenderness.   Abdominal: Soft. Bowel sounds are normal. He exhibits no distension. There is no tenderness. There is no rebound and no guarding.   Musculoskeletal: He exhibits no edema or tenderness.   Left arm contracted, patient only able to make spastic movements.   Neurological: He is alert and oriented to person, place, and time. A sensory deficit is present.   Skin: Skin is warm and dry.   Psychiatric: He has a normal mood and affect.         ED Course    Procedures  Labs Reviewed - No data to display       Imaging Results          X-Ray Chest PA And Lateral (In process)  Result time 09/11/18 17:53:04               X-Ray Clavicle Left (Final result)     Abnormal  Result time 09/11/18 16:31:25    Final result by Rohit Mcdonald Jr., MD (09/11/18 16:31:25)                 Impression:      Fractured distal clavicle with minimal displacement.  Fracture line appears to extend into the AC joint.    This report was flagged in Epic as abnormal.      Electronically signed by: Rohit Mcdonald MD  Date:    09/11/2018  Time:    16:31             Narrative:    EXAMINATION:  XR CLAVICLE LEFT    TECHNIQUE:  Left clavicle two views.    COMPARISON:  None    FINDINGS:  There is a fracture of the distal aspect of the clavicle with minimal displacement.  Fracture line appears to extend into the AC joint.                               X-Ray Shoulder 2 or More Views Left (Final result)  Result time 09/11/18 16:29:38   Procedure changed from X-Ray Shoulder Trauma Left     Final result by Rohit Mcdonald Jr., MD (09/11/18 16:29:38)                 Impression:      See above      Electronically signed by: Rohit Mcdonald MD  Date:    09/11/2018  Time:    16:29             Narrative:    EXAMINATION:  XR SHOULDER COMPLETE 2 OR MORE VIEWS LEFT    CLINICAL HISTORY:  fall;    TECHNIQUE:  Two or three views of the left shoulder were performed.    COMPARISON:  None    FINDINGS:   shunt tubing noted.  No fracture or subluxation on these two views.                               X-Ray Humerus 2 View Left (Final result)  Result time 09/11/18 16:32:44    Final result by Rohit Mcdonald Jr., MD (09/11/18 16:32:44)                 Impression:      Left humerus is intact.  Fracture distal clavicle noted.      Electronically signed by: Rohit Mcdonald MD  Date:    09/11/2018  Time:    16:32             Narrative:    EXAMINATION:  XR HUMERUS 2 VIEW LEFT    TECHNIQUE:  AP lateral left  humerus.    COMPARISON:  None    FINDINGS:  Left humerus as visualized is intact.  Fracture distal and a left clavicle noted.                               X-Ray Chest 1 View (Final result)  Result time 09/11/18 16:35:23   Procedure changed from X-Ray Chest PA And Lateral     Final result by Rohit Mcdonald Jr., MD (09/11/18 16:35:23)                 Impression:      There is some vague increased ground-glass opacity overlying the left hemithorax.  This may represent some layering of pleural fluid.  Erect or lateral decubitus radiograph may be of benefit.      Electronically signed by: Rohit Mcdonald MD  Date:    09/11/2018  Time:    16:35             Narrative:    EXAMINATION:  XR CHEST 1 VIEW    CLINICAL HISTORY:  fall; Unspecified fall, initial encounter    TECHNIQUE:  Single frontal view of the chest was performed.    COMPARISON:  February 2013.    FINDINGS:  Multiple  shunt tubings noted.  Heart size and pulmonary vessels are similar.  There is some vague increased opacity overlying the left hemithorax of uncertain significance.  Right lung is clear.                               CT Head Without Contrast (Final result)  Result time 09/11/18 15:59:22    Final result by Bi Posadas MD (09/11/18 15:59:22)                 Impression:      Patient has 2 ventriculostomy catheters as detailed above.  Extensive zones of encephalomalacia are seen involving much of the right hemisphere and the left occipital lobe.  The ventricular system is largely decompressed, significantly improved from the prior remote study available for comparison      Electronically signed by: Bi Posadas MD  Date:    09/11/2018  Time:    15:59             Narrative:    EXAMINATION:  CT HEAD WITHOUT CONTRAST    CLINICAL HISTORY:  fall, poor historian;    TECHNIQUE:  Low dose axial images were obtained through the head.  Coronal and sagittal reformations were also performed. Contrast was not  administered.    COMPARISON:  06/26/2013    FINDINGS:  CT of the head once again demonstrates postoperative changes bilateral shunt catheter placements, one entering from left temporoparietal approach extending past the midline across posterior aspects of the lateral ventricles to terminate within right hemisphere parenchyma, as before.  The other catheter enters from a posterior approach in the right occipital lobe extending into extra-axial space lateral to the right hemisphere.  Position of the catheters is unchanged.    The ventricular size has decreased significantly since the prior study.  They frontal horns of the lateral ventricles are decompressed.  There is enlargement of posterior body and atrium of the right lateral ventricle, but much improved since the prior study.  The left atrium enlarges into a massively dilated left occipital horn with surrounding volume loss.  There is encephalomalacia in the right hemisphere with cystic areas involving right temporal, frontal, parietal and occipital lobes.  No acute hemorrhage is seen.  No definite mass effect is identified.  Posterior fossa brain volume appears preserved, and there is some extension of tonsils below the foramen magnum identified, as before.                                 Medical Decision Making:   History:   Old Medical Records: I decided to obtain old medical records.  Clinical Tests:   Radiological Study: Ordered and Reviewed       APC / Resident Notes:   38 year old male presenting with ecchymosis to left shoulder and left eye after a fall. Patient unable to give clear history of events surrounds fall. DDx includes shoulder fracture or dislocation, clavicular fracture, intracranial hemmorhage. Will do x-rays and CT head an re-assess.    X-rays show fractured distal clavicle with minimal displacement. Left hemithorax appears opacified on one view chest, cannot rule out pleural effusion. Patient not reporting and pulmonary complaints, no  respiratory signs on exam. With repeat x-ray with AP and lateral views.    Repeat x-ray shows potential pleural thickening but no effusion. Discussed this result with patient and caretaker and instructed to follow up with primary care physician regarding this result. Caretaker voiced understanding. Will place patient in sling and give ortho follow up in clinic. Discussed the importance of follow up, strict ED return precautions given. Care taker and patient voice understanding and are comfortable with discharge. I discussed this patient with my supervising physician.    Donna Walton PA-C                    Clinical Impression:   The primary encounter diagnosis was Closed nondisplaced fracture of acromial end of left clavicle, initial encounter. Diagnoses of Fall and Abnormal CXR were also pertinent to this visit.      Disposition:   Disposition: Discharged  Condition: Stable                        Donna Walton PA-C  09/12/18 0290

## 2018-09-11 NOTE — ED NOTES
LOC: The patient is awake and alert; oriented x person but has mental retardation and speaking appropriately.  APPEARANCE: Patient resting comfortably, patient is clean and well groomed  SKIN: warm and dry, normal skin turgor & moist mucus membranes, skin intact, no breakdown noted.Bruising to  left shoulder/upper left chest and under left eye.   MUSCULOSKELETAL: Patient moving all extremities well, no obvious swelling or deformities noted. Lmited use to left arm and leg  Chronically due to cerebal palsy  RESPIRATORY: Airway is open and patent, ; respirations are spontaneous, normal effort and rate  CARDIAC: Patient has a normal rate, no peripheral edema noted, capillary refill < 3 seconds; No complaints of chest pain   ABDOMEN: Soft and non tender to palpation, no distention noted. Bowel sounds present x 4

## 2018-09-12 ENCOUNTER — PATIENT MESSAGE (OUTPATIENT)
Dept: INTERNAL MEDICINE | Facility: CLINIC | Age: 38
End: 2018-09-12

## 2018-09-12 ENCOUNTER — TELEPHONE (OUTPATIENT)
Dept: INTERNAL MEDICINE | Facility: CLINIC | Age: 38
End: 2018-09-12

## 2018-09-12 DIAGNOSIS — I10 ESSENTIAL HYPERTENSION: ICD-10-CM

## 2018-09-12 DIAGNOSIS — G80.0 SPASTIC QUADRIPLEGIC CEREBRAL PALSY: ICD-10-CM

## 2018-09-12 DIAGNOSIS — M89.9 DISORDER OF BONE: ICD-10-CM

## 2018-09-12 DIAGNOSIS — S42.035A CLOSED NONDISPLACED FRACTURE OF ACROMIAL END OF LEFT CLAVICLE, INITIAL ENCOUNTER: Primary | ICD-10-CM

## 2018-09-12 DIAGNOSIS — G40.909 SEIZURE DISORDER: ICD-10-CM

## 2018-09-12 DIAGNOSIS — R60.0 PERIPHERAL EDEMA: Primary | ICD-10-CM

## 2018-09-12 DIAGNOSIS — R73.9 BLOOD GLUCOSE ELEVATED: ICD-10-CM

## 2018-09-12 NOTE — TELEPHONE ENCOUNTER
----- Message from Jamel Foley MD sent at 9/12/2018 12:46 PM CDT -----  This patient was recently discharged from the emergency room and told to get orthopedic follow-up as soon as possible.  Unfortunately I do not see that anything is scheduled with Orthopedics for his clavicle fracture.  I have signed orders for Orthopedics.  Please help to get this individual scheduled the soon as possible.

## 2018-09-13 ENCOUNTER — PATIENT MESSAGE (OUTPATIENT)
Dept: INTERNAL MEDICINE | Facility: CLINIC | Age: 38
End: 2018-09-13

## 2018-09-17 ENCOUNTER — TELEPHONE (OUTPATIENT)
Dept: INTERNAL MEDICINE | Facility: CLINIC | Age: 38
End: 2018-09-17

## 2018-09-17 DIAGNOSIS — G40.909 SEIZURE DISORDER: ICD-10-CM

## 2018-09-17 RX ORDER — DIVALPROEX SODIUM 250 MG/1
TABLET, DELAYED RELEASE ORAL
Qty: 30 TABLET | Refills: 0 | OUTPATIENT
Start: 2018-09-17

## 2018-09-17 RX ORDER — DIVALPROEX SODIUM 500 MG/1
500 TABLET, DELAYED RELEASE ORAL EVERY 12 HOURS
Qty: 180 TABLET | Refills: 0 | Status: SHIPPED | OUTPATIENT
Start: 2018-09-17 | End: 2018-11-19 | Stop reason: SDUPTHER

## 2018-09-17 RX ORDER — DIVALPROEX SODIUM 250 MG/1
250 TABLET, DELAYED RELEASE ORAL NIGHTLY
Qty: 90 TABLET | Refills: 0 | Status: SHIPPED | OUTPATIENT
Start: 2018-09-17 | End: 2018-11-19 | Stop reason: SDUPTHER

## 2018-09-17 NOTE — TELEPHONE ENCOUNTER
----- Message from Jamel Foley MD sent at 9/17/2018  6:18 PM CDT -----  Contact: franklin Liz,[phone]  The is a unfortunate.  I am trying to encourage the Neurology Department to take control of this medication because this is a medication that is outside of the scope of practice of a typical general practice office.  I will prescribe 90 day supply of the medication but ongoing prescriptions need to come from the neurology department and I would encourage the family to anticipate communication with Neurology now so that when the 90 day supply is done additional refills and supervision can happen from the Neurology office.    ----- Message -----  From: Jolly Dias  Sent: 9/17/2018  10:08 AM  To: [facility] Staff    Requests to speak with you regarding patient's divalproex prescription refill. States he has not been taking it dur no refills. Please advise.

## 2018-09-17 NOTE — TELEPHONE ENCOUNTER
Spoke with Shalonda caregiver and informed a 90 day supply was sent to the pharmacy on the generic Depakote. Instructed to make sure she communicates with the Neurologist so when next refill  Is needed it will have to come from the Neurologist. Shalonda voices understanding.

## 2018-09-24 DIAGNOSIS — I10 ESSENTIAL HYPERTENSION: ICD-10-CM

## 2018-09-24 RX ORDER — LISINOPRIL 5 MG/1
TABLET ORAL
Qty: 56 TABLET | Refills: 1 | Status: SHIPPED | OUTPATIENT
Start: 2018-09-24 | End: 2018-12-18 | Stop reason: SDUPTHER

## 2018-09-25 ENCOUNTER — HOSPITAL ENCOUNTER (OUTPATIENT)
Dept: RADIOLOGY | Facility: HOSPITAL | Age: 38
Discharge: HOME | End: 2018-09-25
Attending: PHYSICIAN ASSISTANT
Payer: MEDICARE

## 2018-09-25 ENCOUNTER — OFFICE VISIT (OUTPATIENT)
Dept: ORTHOPEDICS | Facility: CLINIC | Age: 38
End: 2018-09-25
Payer: MEDICARE

## 2018-09-25 VITALS — SYSTOLIC BLOOD PRESSURE: 109 MMHG | DIASTOLIC BLOOD PRESSURE: 77 MMHG | HEART RATE: 66 BPM

## 2018-09-25 DIAGNOSIS — S42.002A CLOSED NONDISPLACED FRACTURE OF LEFT CLAVICLE, UNSPECIFIED PART OF CLAVICLE, INITIAL ENCOUNTER: ICD-10-CM

## 2018-09-25 DIAGNOSIS — S42.002A CLOSED NONDISPLACED FRACTURE OF LEFT CLAVICLE, UNSPECIFIED PART OF CLAVICLE, INITIAL ENCOUNTER: Primary | ICD-10-CM

## 2018-09-25 PROCEDURE — 99214 OFFICE O/P EST MOD 30 MIN: CPT | Mod: S$PBB,,, | Performed by: PHYSICIAN ASSISTANT

## 2018-09-25 PROCEDURE — 73000 X-RAY EXAM OF COLLAR BONE: CPT | Mod: TC,LT

## 2018-09-25 PROCEDURE — 3078F DIAST BP <80 MM HG: CPT | Mod: CPTII,,, | Performed by: PHYSICIAN ASSISTANT

## 2018-09-25 PROCEDURE — 99213 OFFICE O/P EST LOW 20 MIN: CPT | Mod: PBBFAC,25 | Performed by: PHYSICIAN ASSISTANT

## 2018-09-25 PROCEDURE — 99999 PR PBB SHADOW E&M-EST. PATIENT-LVL III: CPT | Mod: PBBFAC,,, | Performed by: PHYSICIAN ASSISTANT

## 2018-09-25 PROCEDURE — 73000 X-RAY EXAM OF COLLAR BONE: CPT | Mod: 26,LT,, | Performed by: RADIOLOGY

## 2018-09-25 PROCEDURE — 3074F SYST BP LT 130 MM HG: CPT | Mod: CPTII,,, | Performed by: PHYSICIAN ASSISTANT

## 2018-09-25 NOTE — LETTER
September 25, 2018      Jamel Foley MD  [address] [facility] [address]           [facility] - Orthopedics  [address], 5th [address]  [facility] [address]  Phone: [phone]          Patient: Leonid Cox   MR Number: [id number]   YOB: 1980   Date of Visit: 9/25/2018       Dear Dr. Jamel Foley:    Thank you for referring Leonid Cox to me for evaluation. Attached you will find relevant portions of my assessment and plan of care.    If you have questions, please do not hesitate to call me. I look forward to following Leonid Cox along with you.    Sincerely,    Frances Clifford PA-C    Enclosure  CC:  No Recipients    If you would like to receive this communication electronically, please contact [email or web address] or [phone] to request more information on [facility] access.    For providers and/or their staff who would like to refer a patient to [facility], please contact us through our one-stop-shop provider referral line, [facility] , at [phone].    If you feel you have received this communication in error or would no longer like to receive these types of communications, please e-mail [email or web address]

## 2018-09-26 ENCOUNTER — PATIENT MESSAGE (OUTPATIENT)
Dept: INTERNAL MEDICINE | Facility: CLINIC | Age: 38
End: 2018-09-26

## 2018-09-26 NOTE — PROGRESS NOTES
Subjective:      Patient ID: Leonid Cox is a 38 y.o. male.    Chief Complaint: No chief complaint on file.    HPI  38 year old male presents for evaluation of his left shoulder. He has h/o cerebral palsy. Family member provides the history. Patient spends full time in a wheelchair. He fell on 9/8/18. It is unclear how he fell. He was under someone else's care at that time. He was taken to the ED on 9/11. He had bruising. X-ray showed a clavicle fracture. He wore a sling for about a week. He has not been complaining of pain. He has not taken pain medicine. At baseline he does not have use of the left UE.   Review of Systems   Constitution: Negative for chills, fever and night sweats.   Cardiovascular: Negative for chest pain.   Respiratory: Negative for cough and shortness of breath.    Hematologic/Lymphatic: Does not bruise/bleed easily.   Gastrointestinal: Negative for heartburn.   Genitourinary: Negative for dysuria.   Neurological: Negative for numbness and paresthesias.   Psychiatric/Behavioral: Negative for altered mental status.   Allergic/Immunologic: Negative for persistent infections.         Objective:            General    Vitals reviewed.  Constitutional: He is oriented to person, place, and time. He appears well-developed and well-nourished.   Cardiovascular: Normal rate.    Neurological: He is alert and oriented to person, place, and time.             Left Shoulder Exam     Inspection/Observation   Bruising: present (mild)  Deformity: absent    Tenderness   The patient is experiencing no tenderness.     Range of Motion   Active abduction: abnormal   Forward Flexion: abnormal   External Rotation 0 degrees: abnormal   Internal rotation 0 degrees: abnormal     Comments:  Limited ROM at baseline.         X-ray: ordered and reviewed by myself. Of there is a fracture at the distal end of the clavicle in good position and alignment.        Assessment:       Encounter Diagnosis   Name Primary?    Closed  nondisplaced fracture of left clavicle, unspecified part of clavicle, initial encounter Yes          Plan:       Patient seems to be doing well according to family member and not complaining of shoulder pain. They do not feel that he needs PT due to his pmhx. Will schedule a f/u appt in 4 weeks for repeat x-ray in case this is needed.

## 2018-09-27 DIAGNOSIS — T14.8XXA BRUISING: ICD-10-CM

## 2018-09-27 DIAGNOSIS — R79.89 PLATELET COUNT LESS 140,000 PER CUBIC MILLIMETER: Primary | ICD-10-CM

## 2018-09-28 ENCOUNTER — TELEPHONE (OUTPATIENT)
Dept: INTERNAL MEDICINE | Facility: CLINIC | Age: 38
End: 2018-09-28

## 2018-09-28 NOTE — TELEPHONE ENCOUNTER
----- Message from Jamel Foley MD sent at 9/27/2018  5:23 PM CDT -----  Please contact the patient's caretaker to schedule a repeat blood test appointment for CBC, INR, PTT.  Once the results are back I will contact them regarding the next steps.

## 2018-10-03 ENCOUNTER — LAB VISIT (OUTPATIENT)
Dept: LAB | Facility: HOSPITAL | Age: 38
End: 2018-10-03
Attending: INTERNAL MEDICINE
Payer: MEDICARE

## 2018-10-03 DIAGNOSIS — G40.909 SEIZURE DISORDER: ICD-10-CM

## 2018-10-03 DIAGNOSIS — R79.89 PLATELET COUNT LESS 140,000 PER CUBIC MILLIMETER: ICD-10-CM

## 2018-10-03 DIAGNOSIS — T14.8XXA BRUISING: ICD-10-CM

## 2018-10-03 DIAGNOSIS — R73.9 BLOOD GLUCOSE ELEVATED: ICD-10-CM

## 2018-10-03 LAB
APTT BLDCRRT: 31.3 SEC
BASOPHILS # BLD AUTO: 0.01 K/UL
BASOPHILS NFR BLD: 0.2 %
DIFFERENTIAL METHOD: NORMAL
EOSINOPHIL # BLD AUTO: 0.1 K/UL
EOSINOPHIL NFR BLD: 0.9 %
ERYTHROCYTE [DISTWIDTH] IN BLOOD BY AUTOMATED COUNT: 13.3 %
HCT VFR BLD AUTO: 43.2 %
HGB BLD-MCNC: 14.3 G/DL
INR PPP: 0.9
LYMPHOCYTES # BLD AUTO: 2.6 K/UL
LYMPHOCYTES NFR BLD: 39 %
MCH RBC QN AUTO: 29.3 PG
MCHC RBC AUTO-ENTMCNC: 33.1 G/DL
MCV RBC AUTO: 89 FL
MONOCYTES # BLD AUTO: 0.6 K/UL
MONOCYTES NFR BLD: 8.6 %
NEUTROPHILS # BLD AUTO: 3.3 K/UL
NEUTROPHILS NFR BLD: 51 %
PLATELET # BLD AUTO: 215 K/UL
PMV BLD AUTO: 9.8 FL
PROTHROMBIN TIME: 9.9 SEC
RBC # BLD AUTO: 4.88 M/UL
WBC # BLD AUTO: 6.54 K/UL

## 2018-10-03 PROCEDURE — 85610 PROTHROMBIN TIME: CPT

## 2018-10-03 PROCEDURE — 36415 COLL VENOUS BLD VENIPUNCTURE: CPT

## 2018-10-03 PROCEDURE — 85025 COMPLETE CBC W/AUTO DIFF WBC: CPT

## 2018-10-03 PROCEDURE — 85730 THROMBOPLASTIN TIME PARTIAL: CPT

## 2018-10-23 DIAGNOSIS — G40.909 SEIZURE DISORDER: ICD-10-CM

## 2018-10-23 RX ORDER — DIVALPROEX SODIUM 500 MG/1
500 TABLET, DELAYED RELEASE ORAL EVERY 12 HOURS
Qty: 180 TABLET | Refills: 0 | Status: SHIPPED | OUTPATIENT
Start: 2018-10-23 | End: 2019-04-19 | Stop reason: SDUPTHER

## 2018-11-19 DIAGNOSIS — G40.909 SEIZURE DISORDER: ICD-10-CM

## 2018-11-19 RX ORDER — DIVALPROEX SODIUM 250 MG/1
TABLET, DELAYED RELEASE ORAL
Qty: 90 TABLET | Refills: 0 | Status: SHIPPED | OUTPATIENT
Start: 2018-11-19 | End: 2019-02-11 | Stop reason: SDUPTHER

## 2018-12-18 DIAGNOSIS — I10 ESSENTIAL HYPERTENSION: ICD-10-CM

## 2018-12-18 RX ORDER — LISINOPRIL 5 MG/1
5 TABLET ORAL 2 TIMES DAILY
Qty: 180 TABLET | Refills: 1 | Status: SHIPPED | OUTPATIENT
Start: 2018-12-18 | End: 2019-06-03 | Stop reason: SDUPTHER

## 2019-02-11 DIAGNOSIS — G40.909 SEIZURE DISORDER: ICD-10-CM

## 2019-02-11 RX ORDER — DIVALPROEX SODIUM 250 MG/1
TABLET, DELAYED RELEASE ORAL
Qty: 30 TABLET | Refills: 0 | Status: SHIPPED | OUTPATIENT
Start: 2019-02-11 | End: 2019-03-11 | Stop reason: SDUPTHER

## 2019-03-11 DIAGNOSIS — G40.909 SEIZURE DISORDER: ICD-10-CM

## 2019-03-11 RX ORDER — DIVALPROEX SODIUM 250 MG/1
250 TABLET, DELAYED RELEASE ORAL NIGHTLY
Qty: 30 TABLET | Refills: 0 | Status: SHIPPED | OUTPATIENT
Start: 2019-03-11 | End: 2019-04-19 | Stop reason: SDUPTHER

## 2019-04-19 DIAGNOSIS — G40.909 SEIZURE DISORDER: ICD-10-CM

## 2019-04-19 DIAGNOSIS — G80.0 SPASTIC QUADRIPLEGIC CEREBRAL PALSY: ICD-10-CM

## 2019-04-19 DIAGNOSIS — G40.909 SEIZURE DISORDER: Primary | ICD-10-CM

## 2019-04-19 DIAGNOSIS — R60.0 PERIPHERAL EDEMA: Primary | ICD-10-CM

## 2019-04-19 RX ORDER — DIVALPROEX SODIUM 500 MG/1
500 TABLET, DELAYED RELEASE ORAL EVERY 12 HOURS
Qty: 30 TABLET | Refills: 0 | Status: SHIPPED | OUTPATIENT
Start: 2019-04-19 | End: 2019-04-29 | Stop reason: SDUPTHER

## 2019-04-19 RX ORDER — DIVALPROEX SODIUM 250 MG/1
TABLET, DELAYED RELEASE ORAL
Qty: 14 TABLET | Refills: 0 | Status: SHIPPED | OUTPATIENT
Start: 2019-04-19 | End: 2019-04-29 | Stop reason: SDUPTHER

## 2019-04-22 ENCOUNTER — TELEPHONE (OUTPATIENT)
Dept: INTERNAL MEDICINE | Facility: CLINIC | Age: 39
End: 2019-04-22

## 2019-04-22 NOTE — TELEPHONE ENCOUNTER
----- Message from Jamel Foley MD sent at 4/19/2019 10:14 AM CDT -----  Please contact caretaker that patient is long overdue to follow up labs and visit. We must have this done to ensure the health and safety of the patient .     Order all standing labs prior to visit

## 2019-04-22 NOTE — TELEPHONE ENCOUNTER
Spoke with Ms. Dorman and she reports she has multiple appts coming up for dental work. Ms. Dorman reports Josh will call office to schedule appts.

## 2019-04-24 ENCOUNTER — TELEPHONE (OUTPATIENT)
Dept: INTERNAL MEDICINE | Facility: CLINIC | Age: 39
End: 2019-04-24

## 2019-04-24 NOTE — TELEPHONE ENCOUNTER
----- Message from Seamus Bhat sent at 4/24/2019 11:50 AM CDT -----  Contact: Eligio/ OPtions [phone]  He has a cold and needs an appt on Friday..

## 2019-04-24 NOTE — TELEPHONE ENCOUNTER
Spoke with pt's caregiver to schedule sam day appointment for pt. Pt will be seen on 4/25/19. Understanding voiced.

## 2019-04-25 ENCOUNTER — LAB VISIT (OUTPATIENT)
Dept: LAB | Facility: HOSPITAL | Age: 39
End: 2019-04-25
Attending: INTERNAL MEDICINE
Payer: MEDICARE

## 2019-04-25 ENCOUNTER — TELEPHONE (OUTPATIENT)
Dept: INTERNAL MEDICINE | Facility: CLINIC | Age: 39
End: 2019-04-25

## 2019-04-25 ENCOUNTER — PATIENT MESSAGE (OUTPATIENT)
Dept: INTERNAL MEDICINE | Facility: CLINIC | Age: 39
End: 2019-04-25

## 2019-04-25 ENCOUNTER — OFFICE VISIT (OUTPATIENT)
Dept: INTERNAL MEDICINE | Facility: CLINIC | Age: 39
End: 2019-04-25
Payer: MEDICARE

## 2019-04-25 VITALS
OXYGEN SATURATION: 98 % | BODY MASS INDEX: 27.21 KG/M2 | DIASTOLIC BLOOD PRESSURE: 80 MMHG | HEIGHT: 72 IN | HEART RATE: 102 BPM | SYSTOLIC BLOOD PRESSURE: 118 MMHG

## 2019-04-25 DIAGNOSIS — G80.0 SPASTIC QUADRIPLEGIC CEREBRAL PALSY: ICD-10-CM

## 2019-04-25 DIAGNOSIS — R60.0 PERIPHERAL EDEMA: ICD-10-CM

## 2019-04-25 DIAGNOSIS — J01.90 ACUTE BACTERIAL RHINOSINUSITIS: Primary | ICD-10-CM

## 2019-04-25 DIAGNOSIS — G40.909 SEIZURE DISORDER: ICD-10-CM

## 2019-04-25 DIAGNOSIS — B96.89 ACUTE BACTERIAL RHINOSINUSITIS: ICD-10-CM

## 2019-04-25 DIAGNOSIS — B96.89 ACUTE BACTERIAL RHINOSINUSITIS: Primary | ICD-10-CM

## 2019-04-25 DIAGNOSIS — J01.90 ACUTE BACTERIAL RHINOSINUSITIS: ICD-10-CM

## 2019-04-25 DIAGNOSIS — R73.9 BLOOD GLUCOSE ELEVATED: ICD-10-CM

## 2019-04-25 LAB
ALBUMIN SERPL BCP-MCNC: 3.2 G/DL (ref 3.5–5.2)
ALP SERPL-CCNC: 55 U/L (ref 55–135)
ALT SERPL W/O P-5'-P-CCNC: 10 U/L (ref 10–44)
ANION GAP SERPL CALC-SCNC: 10 MMOL/L (ref 8–16)
AST SERPL-CCNC: 10 U/L (ref 10–40)
BASOPHILS # BLD AUTO: 0.03 K/UL (ref 0–0.2)
BASOPHILS NFR BLD: 0.5 % (ref 0–1.9)
BILIRUB SERPL-MCNC: 0.3 MG/DL (ref 0.1–1)
BUN SERPL-MCNC: 12 MG/DL (ref 6–20)
CALCIUM SERPL-MCNC: 9.9 MG/DL (ref 8.7–10.5)
CHLORIDE SERPL-SCNC: 105 MMOL/L (ref 95–110)
CO2 SERPL-SCNC: 27 MMOL/L (ref 23–29)
CREAT SERPL-MCNC: 0.7 MG/DL (ref 0.5–1.4)
D DIMER PPP IA.FEU-MCNC: 0.26 MG/L FEU
DIFFERENTIAL METHOD: ABNORMAL
EOSINOPHIL # BLD AUTO: 0.2 K/UL (ref 0–0.5)
EOSINOPHIL NFR BLD: 2.4 % (ref 0–8)
ERYTHROCYTE [DISTWIDTH] IN BLOOD BY AUTOMATED COUNT: 12.8 % (ref 11.5–14.5)
EST. GFR  (AFRICAN AMERICAN): >60 ML/MIN/1.73 M^2
EST. GFR  (NON AFRICAN AMERICAN): >60 ML/MIN/1.73 M^2
GLUCOSE SERPL-MCNC: 71 MG/DL (ref 70–110)
HCT VFR BLD AUTO: 45.5 % (ref 40–54)
HGB BLD-MCNC: 15.2 G/DL (ref 14–18)
IMM GRANULOCYTES # BLD AUTO: 0.05 K/UL (ref 0–0.04)
IMM GRANULOCYTES NFR BLD AUTO: 0.8 % (ref 0–0.5)
LYMPHOCYTES # BLD AUTO: 1.9 K/UL (ref 1–4.8)
LYMPHOCYTES NFR BLD: 30.8 % (ref 18–48)
MCH RBC QN AUTO: 30.1 PG (ref 27–31)
MCHC RBC AUTO-ENTMCNC: 33.4 G/DL (ref 32–36)
MCV RBC AUTO: 90 FL (ref 82–98)
MONOCYTES # BLD AUTO: 0.6 K/UL (ref 0.3–1)
MONOCYTES NFR BLD: 9.9 % (ref 4–15)
NEUTROPHILS # BLD AUTO: 3.4 K/UL (ref 1.8–7.7)
NEUTROPHILS NFR BLD: 55.6 % (ref 38–73)
NRBC BLD-RTO: 0 /100 WBC
PLATELET # BLD AUTO: 227 K/UL (ref 150–350)
PMV BLD AUTO: 9.6 FL (ref 9.2–12.9)
POTASSIUM SERPL-SCNC: 3.9 MMOL/L (ref 3.5–5.1)
PROT SERPL-MCNC: 7.4 G/DL (ref 6–8.4)
RBC # BLD AUTO: 5.05 M/UL (ref 4.6–6.2)
SODIUM SERPL-SCNC: 142 MMOL/L (ref 136–145)
VALPROATE SERPL-MCNC: 120 UG/ML (ref 50–100)
WBC # BLD AUTO: 6.17 K/UL (ref 3.9–12.7)

## 2019-04-25 PROCEDURE — 3074F SYST BP LT 130 MM HG: CPT | Mod: CPTII,S$GLB,, | Performed by: INTERNAL MEDICINE

## 2019-04-25 PROCEDURE — 3074F PR MOST RECENT SYSTOLIC BLOOD PRESSURE < 130 MM HG: ICD-10-PCS | Mod: CPTII,S$GLB,, | Performed by: INTERNAL MEDICINE

## 2019-04-25 PROCEDURE — 85025 COMPLETE CBC W/AUTO DIFF WBC: CPT

## 2019-04-25 PROCEDURE — 99499 UNLISTED E&M SERVICE: CPT | Mod: S$GLB,,, | Performed by: INTERNAL MEDICINE

## 2019-04-25 PROCEDURE — 80053 COMPREHEN METABOLIC PANEL: CPT

## 2019-04-25 PROCEDURE — 36415 COLL VENOUS BLD VENIPUNCTURE: CPT | Mod: PO

## 2019-04-25 PROCEDURE — 80164 ASSAY DIPROPYLACETIC ACD TOT: CPT

## 2019-04-25 PROCEDURE — 99214 PR OFFICE/OUTPT VISIT, EST, LEVL IV, 30-39 MIN: ICD-10-PCS | Mod: S$GLB,,, | Performed by: INTERNAL MEDICINE

## 2019-04-25 PROCEDURE — 99214 OFFICE O/P EST MOD 30 MIN: CPT | Mod: S$GLB,,, | Performed by: INTERNAL MEDICINE

## 2019-04-25 PROCEDURE — 99999 PR PBB SHADOW E&M-EST. PATIENT-LVL V: ICD-10-PCS | Mod: PBBFAC,,, | Performed by: INTERNAL MEDICINE

## 2019-04-25 PROCEDURE — 3079F DIAST BP 80-89 MM HG: CPT | Mod: CPTII,S$GLB,, | Performed by: INTERNAL MEDICINE

## 2019-04-25 PROCEDURE — 99499 RISK ADDL DX/OHS AUDIT: ICD-10-PCS | Mod: S$GLB,,, | Performed by: INTERNAL MEDICINE

## 2019-04-25 PROCEDURE — 99999 PR PBB SHADOW E&M-EST. PATIENT-LVL V: CPT | Mod: PBBFAC,,, | Performed by: INTERNAL MEDICINE

## 2019-04-25 PROCEDURE — 85379 FIBRIN DEGRADATION QUANT: CPT

## 2019-04-25 PROCEDURE — 3079F PR MOST RECENT DIASTOLIC BLOOD PRESSURE 80-89 MM HG: ICD-10-PCS | Mod: CPTII,S$GLB,, | Performed by: INTERNAL MEDICINE

## 2019-04-25 RX ORDER — FLUTICASONE PROPIONATE 50 MCG
2 SPRAY, SUSPENSION (ML) NASAL DAILY
Qty: 1 BOTTLE | Refills: 0 | Status: SHIPPED | OUTPATIENT
Start: 2019-04-25 | End: 2019-04-26 | Stop reason: SDUPTHER

## 2019-04-25 RX ORDER — AMOXICILLIN AND CLAVULANATE POTASSIUM 875; 125 MG/1; MG/1
1 TABLET, FILM COATED ORAL EVERY 12 HOURS
Qty: 14 TABLET | Refills: 0 | Status: SHIPPED | OUTPATIENT
Start: 2019-04-25 | End: 2019-04-26 | Stop reason: SDUPTHER

## 2019-04-25 NOTE — TELEPHONE ENCOUNTER
Spoke with pt's caregiver who states that she has questions about the pt's medication and the reason the pt has been scheduled with a different doctor. Ms. Liz was informed that she should send any questions she to Dr. Foley on [facility]. Ms. Liz voiced understanding.

## 2019-04-25 NOTE — PROGRESS NOTES
Portions of this note are generated with voice recognition software. Typographical errors may exist.     SUBJECTIVE:    This is a/an 39 y.o. male here for primary care visit for  Chief Complaint   Patient presents with    Cold Symtoms    Nasal Congestion    Cough     Starting around last Sunday the patient was noted by staff to be having purulent nasal discharge coughing and demonstrating signs of frontal headache.  Symptoms have not improved.  Patient has not been more lethargic than usual.  Still maintaining strength to help with transferring from wheelchair.  No reports of nausea vomiting or diarrhea.  No reports of abdominal pain. No bladder or bowel accidents.    Patient complains about initial during ever since clavicle fracture.  It is not absolutely certain if his ground level fall that resulted in the clavicle fracture was due to breakthrough seizure activity.  The patient has been on antiepileptic medication for years but has not many years seen a neurologist to assess the adequacy of anti epileptic therapy.    Patient also has not been seen by physical medicine doctor to help with contractures.  He is almost completely dependent on others for activities of daily living    Medications Reviewed and Updated    Past medical, family, and social histories were reviewed and updated.    Review of Systems negative unless otherwise noted in history of present illness-  ROS    General ROS: negative  Psychological ROS: negative  ENT ROS: negative  Endocrine ROS: Negative  Allergy and Immunology ROS: negative  Cardiovascular ROS: negative  Pulmonary ROS: Negative  Gastrointestinal ROS: negative  Genito-Urinary ROS: negative  Musculoskeletal ROS: negative  Neurological ROS: negative  Dermatological ROS: negative        Allergic:    Review of patient's allergies indicates:   Allergen Reactions    Adhesive tape-silicones      Other reaction(s): blisters    Codeine      Other reaction(s): Nausea    Morphine Itching        OBJECTIVE:  BP: 118/80 Pulse: 102    Wt Readings from Last 3 Encounters:   09/11/18 91 kg (200 lb 9.9 oz)   03/28/18 90.7 kg (199 lb 15.3 oz)   02/10/18 90.7 kg (200 lb)    Body mass index is 27.21 kg/m².  Previous Blood Pressure Readings :   BP Readings from Last 3 Encounters:   04/25/19 118/80   09/25/18 109/77   09/11/18 (!) 140/89       Physical Exam    GEN: No apparent distress  HEENT: sclera non-icteric, conjunctiva clear edematous turbinates.  Purulent nasal discharge  CV: no peripheral edema tachycardic.  No murmurs  PULM: breathing non-labored  ABD: non, protuberant abdomen.  No focal tenderness  PSYCH: appropriate affect  MSK: able to rise from chair without assistance  SKIN: normal skin turgor no skin lesions    Pertinent Labs Reviewed       ASSESSMENT/PLAN:    Acute bacterial rhinosinusitis.Condition not optimally controlled. Detailed counseling on self care measures. Plan to monitor clinically in addition to plan below or as listed on After Visit Summary.   -     Discontinue: amoxicillin-clavulanate 875-125mg (AUGMENTIN) 875-125 mg per tablet; Take 1 tablet by mouth every 12 (twelve) hours. 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 Detailed counseling on self care measures. Plan to monitor clinically in addition to plan below or as listed on After Visit Summary.   -     Ambulatory referral to Physical Medicine Rehab  -     Cancel: Ambulatory referral to Neurology  -     Ambulatory referral to Neurology    Seizure disorder.Condition stable.  Counseling given today on self-care measures. Plan to monitor clinically. Continue current medical plan.   -     Cancel: Ambulatory referral to Neurology  -     Ambulatory referral to Neurology    No future appointments.    Jamel Foley  4/27/2019  11:28 AM

## 2019-04-25 NOTE — TELEPHONE ENCOUNTER
----- Message from Ioana Aquino sent at 4/25/2019  1:08 PM CDT -----  Contact: Shalonda  [phone]  Patients Guardian is calling to talk to nurse in regards to the office visit.

## 2019-04-25 NOTE — PATIENT INSTRUCTIONS
Recommendations for today    Recent symptoms likely represent a bacterial sinus infection.  For this reason we recommend the antibiotic Augmentin in addition to nasal steroid spray Flonase.  Flonase should be continued for up to a month.  Within 7-10 days symptoms of nasal congestion should improve.  The patient should no longer be demonstrating behavior indicating a sinus headache which would include closing eyes and rubbing forehead.  Coughing should improve.  When blowing his nose the mucous should look clear in color and no longer green or yellow.    Diarrhea is common during and in the 2 weeks after completing an antibiotic.  However severe diarrhea is not common.  If diarrhea 4-5 times per day associated with abdominal pain occurs we must be notified as soon as possible as this would be an uncommon complication of broad-spectrum antibiotics    It is expected that the patient will establish care with physical medicine physician to help with managing long-term complications of cerebral palsy.  It is also expected that the patient will establish care with a neurologist and make routine appointments to make sure that chronic seizure disorder is properly managed.  Simply refilling medication in the primary care office is not sufficient to help determine the adequacy of seizure control with current medication.

## 2019-04-26 ENCOUNTER — TELEPHONE (OUTPATIENT)
Dept: ADMINISTRATIVE | Facility: OTHER | Age: 39
End: 2019-04-26

## 2019-04-26 ENCOUNTER — PATIENT MESSAGE (OUTPATIENT)
Dept: INTERNAL MEDICINE | Facility: CLINIC | Age: 39
End: 2019-04-26

## 2019-04-26 RX ORDER — FLUTICASONE PROPIONATE 50 MCG
2 SPRAY, SUSPENSION (ML) NASAL DAILY
Qty: 1 BOTTLE | Refills: 0 | Status: SHIPPED | OUTPATIENT
Start: 2019-04-26 | End: 2019-05-26

## 2019-04-26 RX ORDER — AMOXICILLIN AND CLAVULANATE POTASSIUM 875; 125 MG/1; MG/1
1 TABLET, FILM COATED ORAL EVERY 12 HOURS
Qty: 14 TABLET | Refills: 0 | Status: SHIPPED | OUTPATIENT
Start: 2019-04-26 | End: 2019-05-03

## 2019-04-29 DIAGNOSIS — G40.909 SEIZURE DISORDER: ICD-10-CM

## 2019-04-29 RX ORDER — DIVALPROEX SODIUM 500 MG/1
500 TABLET, DELAYED RELEASE ORAL EVERY 12 HOURS
Qty: 180 TABLET | Refills: 0 | Status: SHIPPED | OUTPATIENT
Start: 2019-04-29 | End: 2019-07-03 | Stop reason: SDUPTHER

## 2019-04-29 RX ORDER — DIVALPROEX SODIUM 250 MG/1
250 TABLET, DELAYED RELEASE ORAL NIGHTLY
Qty: 90 TABLET | Refills: 0 | Status: SHIPPED | OUTPATIENT
Start: 2019-04-29 | End: 2019-07-03 | Stop reason: SDUPTHER

## 2019-05-02 ENCOUNTER — TELEPHONE (OUTPATIENT)
Dept: PHYSICAL MEDICINE AND REHAB | Facility: CLINIC | Age: 39
End: 2019-05-02

## 2019-05-02 NOTE — TELEPHONE ENCOUNTER
----- Message from Cassandra Puente sent at 5/1/2019 11:34 AM CDT -----  Contact: Shalonda Dorman [phone]  Patient has a referral for Spastic quadriplegic cerebral palsy from Dr. Jamel Foley; please advise.

## 2019-05-06 ENCOUNTER — TELEPHONE (OUTPATIENT)
Dept: INTERNAL MEDICINE | Facility: CLINIC | Age: 39
End: 2019-05-06

## 2019-05-06 NOTE — TELEPHONE ENCOUNTER
Spoke with Shalonda to inform that paper work is ready to be picked up on the 2nd floor at the . Understanding voiced.

## 2019-05-08 ENCOUNTER — TELEPHONE (OUTPATIENT)
Dept: PHYSICAL MEDICINE AND REHAB | Facility: CLINIC | Age: 39
End: 2019-05-08

## 2019-05-08 NOTE — TELEPHONE ENCOUNTER
LM for the mother to call back and make an appointment with Dr. Alva.  The appointment can be made with phone staff on Thursday afternoon.

## 2019-05-28 ENCOUNTER — PATIENT MESSAGE (OUTPATIENT)
Dept: INTERNAL MEDICINE | Facility: CLINIC | Age: 39
End: 2019-05-28

## 2019-05-28 RX ORDER — TRIAMCINOLONE ACETONIDE 1 MG/G
CREAM TOPICAL 2 TIMES DAILY PRN
Qty: 28.4 G | Refills: 1 | Status: SHIPPED | OUTPATIENT
Start: 2019-05-28 | End: 2020-06-29 | Stop reason: SDUPTHER

## 2019-05-28 RX ORDER — TRIAMCINOLONE ACETONIDE 5 MG/G
CREAM TOPICAL
Qty: 30 G | Refills: 1 | OUTPATIENT
Start: 2019-05-28

## 2019-05-29 ENCOUNTER — PATIENT MESSAGE (OUTPATIENT)
Dept: INTERNAL MEDICINE | Facility: CLINIC | Age: 39
End: 2019-05-29

## 2019-06-03 DIAGNOSIS — I10 ESSENTIAL HYPERTENSION: ICD-10-CM

## 2019-06-03 RX ORDER — LISINOPRIL 5 MG/1
TABLET ORAL
Qty: 180 TABLET | Refills: 0 | Status: SHIPPED | OUTPATIENT
Start: 2019-06-03 | End: 2019-08-22 | Stop reason: SDUPTHER

## 2019-07-03 DIAGNOSIS — G40.909 SEIZURE DISORDER: ICD-10-CM

## 2019-07-03 RX ORDER — DIVALPROEX SODIUM 500 MG/1
500 TABLET, DELAYED RELEASE ORAL EVERY 12 HOURS
Qty: 180 TABLET | Refills: 0 | Status: SHIPPED | OUTPATIENT
Start: 2019-07-03 | End: 2019-09-23 | Stop reason: SDUPTHER

## 2019-07-03 RX ORDER — DIVALPROEX SODIUM 250 MG/1
250 TABLET, DELAYED RELEASE ORAL NIGHTLY
Qty: 90 TABLET | Refills: 0 | Status: SHIPPED | OUTPATIENT
Start: 2019-07-03 | End: 2019-09-23 | Stop reason: SDUPTHER

## 2019-08-22 DIAGNOSIS — I10 ESSENTIAL HYPERTENSION: ICD-10-CM

## 2019-08-24 RX ORDER — LISINOPRIL 5 MG/1
5 TABLET ORAL 2 TIMES DAILY
Qty: 60 TABLET | Refills: 1 | Status: SHIPPED | OUTPATIENT
Start: 2019-08-24 | End: 2019-10-21 | Stop reason: SDUPTHER

## 2019-08-25 ENCOUNTER — OFFICE VISIT (OUTPATIENT)
Dept: URGENT CARE | Facility: CLINIC | Age: 39
End: 2019-08-25
Payer: MEDICARE

## 2019-08-25 VITALS
BODY MASS INDEX: 27.09 KG/M2 | DIASTOLIC BLOOD PRESSURE: 90 MMHG | TEMPERATURE: 99 F | WEIGHT: 200 LBS | HEIGHT: 72 IN | OXYGEN SATURATION: 99 % | RESPIRATION RATE: 18 BRPM | HEART RATE: 112 BPM | SYSTOLIC BLOOD PRESSURE: 155 MMHG

## 2019-08-25 DIAGNOSIS — R05.9 COUGH: Primary | ICD-10-CM

## 2019-08-25 DIAGNOSIS — J20.9 ACUTE BRONCHITIS, UNSPECIFIED ORGANISM: ICD-10-CM

## 2019-08-25 PROCEDURE — 3008F PR BODY MASS INDEX (BMI) DOCUMENTED: ICD-10-PCS | Mod: CPTII,S$GLB,, | Performed by: NURSE PRACTITIONER

## 2019-08-25 PROCEDURE — 3080F PR MOST RECENT DIASTOLIC BLOOD PRESSURE >= 90 MM HG: ICD-10-PCS | Mod: CPTII,S$GLB,, | Performed by: NURSE PRACTITIONER

## 2019-08-25 PROCEDURE — 3077F PR MOST RECENT SYSTOLIC BLOOD PRESSURE >= 140 MM HG: ICD-10-PCS | Mod: CPTII,S$GLB,, | Performed by: NURSE PRACTITIONER

## 2019-08-25 PROCEDURE — 71046 XR CHEST PA AND LATERAL: ICD-10-PCS | Mod: FY,S$GLB,, | Performed by: RADIOLOGY

## 2019-08-25 PROCEDURE — 3008F BODY MASS INDEX DOCD: CPT | Mod: CPTII,S$GLB,, | Performed by: NURSE PRACTITIONER

## 2019-08-25 PROCEDURE — 71046 X-RAY EXAM CHEST 2 VIEWS: CPT | Mod: FY,S$GLB,, | Performed by: RADIOLOGY

## 2019-08-25 PROCEDURE — 99499 UNLISTED E&M SERVICE: CPT | Mod: S$GLB,,, | Performed by: NURSE PRACTITIONER

## 2019-08-25 PROCEDURE — 99214 OFFICE O/P EST MOD 30 MIN: CPT | Mod: S$GLB,,, | Performed by: NURSE PRACTITIONER

## 2019-08-25 PROCEDURE — 99214 PR OFFICE/OUTPT VISIT, EST, LEVL IV, 30-39 MIN: ICD-10-PCS | Mod: S$GLB,,, | Performed by: NURSE PRACTITIONER

## 2019-08-25 PROCEDURE — 3080F DIAST BP >= 90 MM HG: CPT | Mod: CPTII,S$GLB,, | Performed by: NURSE PRACTITIONER

## 2019-08-25 PROCEDURE — 3077F SYST BP >= 140 MM HG: CPT | Mod: CPTII,S$GLB,, | Performed by: NURSE PRACTITIONER

## 2019-08-25 PROCEDURE — 99499 RISK ADDL DX/OHS AUDIT: ICD-10-PCS | Mod: S$GLB,,, | Performed by: NURSE PRACTITIONER

## 2019-08-25 RX ORDER — AZITHROMYCIN 250 MG/1
TABLET, FILM COATED ORAL
Qty: 6 TABLET | Refills: 0 | Status: SHIPPED | OUTPATIENT
Start: 2019-08-25 | End: 2019-08-30

## 2019-08-25 NOTE — PATIENT INSTRUCTIONS
Bronchitis, Antibiotic Treatment (Adult)    Bronchitis is an infection of the air passages (bronchial tubes) in your lungs. It often occurs when you have a cold. This illness is contagious during the first few days and is spread through the air by coughing and sneezing, or by direct contact (touching the sick person and then touching your own eyes, nose, or mouth).  Symptoms of bronchitis include cough with mucus (phlegm) and low-grade fever. Bronchitis usually lasts 7 to 14 days. Mild cases can be treated with simple home remedies. More severe infection is treated with an antibiotic.  Home care  Follow these guidelines when caring for yourself at home:  · If your symptoms are severe, rest at home for the first 2 to 3 days. When you go back to your usual activities, don't let yourself get too tired.  · Do not smoke. Also avoid being exposed to secondhand smoke.  · You may use over-the-counter medicines to control fever or pain, unless another medicine was prescribed. (Note: If you have chronic liver or kidney disease or have ever had a stomach ulcer or gastrointestinal bleeding, talk with your healthcare provider before using these medicines. Also talk to your provider if you are taking medicine to prevent blood clots.) Aspirin should never be given to anyone younger than 18 years of age who is ill with a viral infection or fever. It may cause severe liver or brain damage.  · Your appetite may be poor, so a light diet is fine. Avoid dehydration by drinking 6 to 8 glasses of fluids per day (such as water, soft drinks, sports drinks, juices, tea, or soup). Extra fluids will help loosen secretions in the nose and lungs.  · Over-the-counter cough, cold, and sore-throat medicines will not shorten the length of the illness, but they may be helpful to reduce symptoms. (Note: Do not use decongestants if you have high blood pressure.)  · Finish all antibiotic medicine. Do this even if you are feeling better after only a  few days.  Follow-up care  Follow up with your healthcare provider, or as advised. If you had an X-ray or ECG (electrocardiogram), a specialist will review it. You will be notified of any new findings that may affect your care.  Note: If you are age 65 or older, or if you have a chronic lung disease or condition that affects your immune system, or you smoke, talk to your healthcare provider about having pneumococcal vaccinations and a yearly influenza vaccination (flu shot).  When to seek medical advice  Call your healthcare provider right away if any of these occur:  · Fever of 100.4°F (38°C) or higher  · Coughing up increased amounts of colored sputum  · Weakness, drowsiness, headache, facial pain, ear pain, or a stiff neck  Call 911, or get immediate medical care  Contact emergency services right away if any of these occur.  · Coughing up blood  · Worsening weakness, drowsiness, headache, or stiff neck  · Trouble breathing, wheezing, or pain with breathing  Date Last Reviewed: 9/13/2015  © 7191-6508 [employer], [employer]. [address] [place] [address], [place] [address]. All rights reserved. This information is not intended as a substitute for professional medical care. Always follow your healthcare professional's instructions.

## 2019-08-25 NOTE — PROGRESS NOTES
Subjective:       Patient ID: Leonid Cox is a 39 y.o. male.    Vitals:  height is 6' (1.829 m) and weight is 90.7 kg (200 lb). His tympanic temperature is 99 °F (37.2 °C). His blood pressure is 155/90 (abnormal) and his pulse is 112 (abnormal). His respiration is 18 and oxygen saturation is 99%.     Chief Complaint: Sinus Problem (1 week)    39 year old male with a history of cerebral palsy presents today with caregiver. She reports that he has been experiencing coughing for one week. She states that he is unable to produce sputum. He denies congestion, nasal discharge, fever, chills, nausea, vomiting, and diarrhea. Caregiver has given Mucinex OTC once since symptoms started.     Sinus Problem   This is a new problem. The current episode started in the past 7 days. He is experiencing no pain. Associated symptoms include congestion, coughing, headaches, sinus pressure and sneezing. Pertinent negatives include no chills, diaphoresis, ear pain, shortness of breath or sore throat. Past treatments include nothing.       Constitution: Positive for fever. Negative for chills, sweating and fatigue.   HENT: Positive for congestion, postnasal drip and sinus pressure. Negative for ear pain, sinus pain, sore throat and voice change.    Neck: Negative for painful lymph nodes.   Eyes: Negative for eye redness.   Respiratory: Positive for cough. Negative for chest tightness, sputum production, bloody sputum, COPD, shortness of breath, stridor, wheezing and asthma.    Gastrointestinal: Negative for nausea and vomiting.   Musculoskeletal: Negative for muscle ache.   Skin: Negative for rash.   Allergic/Immunologic: Positive for sneezing. Negative for seasonal allergies and asthma.   Neurological: Positive for headaches.   Hematologic/Lymphatic: Negative for swollen lymph nodes.       Objective:      Physical Exam   Constitutional: He is oriented to person, place, and time. Vital signs are normal. He appears well-developed and  well-nourished.  Non-toxic appearance. He does not have a sickly appearance. He does not appear ill.   HENT:   Head: Normocephalic.   Right Ear: Hearing, tympanic membrane, external ear and ear canal normal.   Left Ear: Hearing, tympanic membrane, external ear and ear canal normal.   Nose: Nose normal.   Mouth/Throat: Uvula is midline, oropharynx is clear and moist and mucous membranes are normal.   Eyes: Conjunctivae are normal.   Neck: Trachea normal.   Cardiovascular: Normal rate, regular rhythm, normal heart sounds and normal pulses.   Pulmonary/Chest: Effort normal and breath sounds normal. No accessory muscle usage or stridor. No respiratory distress. He has no wheezes. He has no rhonchi. He has no rales.   Abdominal: Normal appearance.   Musculoskeletal:   Patient has diagnosis of Cerebral palsy + for decreased ROM   Lymphadenopathy:        Head (right side): No submental, no submandibular, no tonsillar, no preauricular, no posterior auricular and no occipital adenopathy present.        Head (left side): No submental, no submandibular, no tonsillar, no preauricular, no posterior auricular and no occipital adenopathy present.   Neurological: He is alert and oriented to person, place, and time.   Skin: Skin is warm.   Nursing note and vitals reviewed.      Assessment:       1. Cough    2. Acute bronchitis, unspecified organism        Plan:     Continue OTC Mucinex for cough  Take Antibiotics as prescribed  Follow up with PCP  ER warning signs    Will contact patient with final CXR read    Patient Instructions     Bronchitis, Antibiotic Treatment (Adult)    Bronchitis is an infection of the air passages (bronchial tubes) in your lungs. It often occurs when you have a cold. This illness is contagious during the first few days and is spread through the air by coughing and sneezing, or by direct contact (touching the sick person and then touching your own eyes, nose, or mouth).  Symptoms of bronchitis include cough  with mucus (phlegm) and low-grade fever. Bronchitis usually lasts 7 to 14 days. Mild cases can be treated with simple home remedies. More severe infection is treated with an antibiotic.  Home care  Follow these guidelines when caring for yourself at home:  · If your symptoms are severe, rest at home for the first 2 to 3 days. When you go back to your usual activities, don't let yourself get too tired.  · Do not smoke. Also avoid being exposed to secondhand smoke.  · You may use over-the-counter medicines to control fever or pain, unless another medicine was prescribed. (Note: If you have chronic liver or kidney disease or have ever had a stomach ulcer or gastrointestinal bleeding, talk with your healthcare provider before using these medicines. Also talk to your provider if you are taking medicine to prevent blood clots.) Aspirin should never be given to anyone younger than 18 years of age who is ill with a viral infection or fever. It may cause severe liver or brain damage.  · Your appetite may be poor, so a light diet is fine. Avoid dehydration by drinking 6 to 8 glasses of fluids per day (such as water, soft drinks, sports drinks, juices, tea, or soup). Extra fluids will help loosen secretions in the nose and lungs.  · Over-the-counter cough, cold, and sore-throat medicines will not shorten the length of the illness, but they may be helpful to reduce symptoms. (Note: Do not use decongestants if you have high blood pressure.)  · Finish all antibiotic medicine. Do this even if you are feeling better after only a few days.  Follow-up care  Follow up with your healthcare provider, or as advised. If you had an X-ray or ECG (electrocardiogram), a specialist will review it. You will be notified of any new findings that may affect your care.  Note: If you are age 65 or older, or if you have a chronic lung disease or condition that affects your immune system, or you smoke, talk to your healthcare provider about having  pneumococcal vaccinations and a yearly influenza vaccination (flu shot).  When to seek medical advice  Call your healthcare provider right away if any of these occur:  · Fever of 100.4°F (38°C) or higher  · Coughing up increased amounts of colored sputum  · Weakness, drowsiness, headache, facial pain, ear pain, or a stiff neck  Call 911, or get immediate medical care  Contact emergency services right away if any of these occur.  · Coughing up blood  · Worsening weakness, drowsiness, headache, or stiff neck  · Trouble breathing, wheezing, or pain with breathing  Date Last Reviewed: 9/13/2015  © 1428-5383 [employer]. [address] [place] [address]. All rights reserved. This information is not intended as a substitute for professional medical care. Always follow your healthcare professional's instructions.            Cough  -     XR CHEST PA AND LATERAL; Future; Expected date: 08/25/2019    Acute bronchitis, unspecified organism  -     azithromycin (Z-PARESH) 250 MG tablet; Take 2 tablets by mouth on day 1; Take 1 tablet by mouth on days 2-5  Dispense: 6 tablet; Refill: 0

## 2019-08-27 ENCOUNTER — TELEPHONE (OUTPATIENT)
Dept: INTERNAL MEDICINE | Facility: CLINIC | Age: 39
End: 2019-08-27

## 2019-08-27 NOTE — TELEPHONE ENCOUNTER
----- Message from Jamel Foley MD sent at 8/24/2019 11:08 AM CDT -----  Please call caretaker and inform that I am concerned that they did no establish care with neurology or PM&R clinic. I do not feel comfortable being the only medical provider in this patient's life and establishing with these providers is my expectation. If they need scheduling assistance please let me know and loop in our .

## 2019-08-27 NOTE — TELEPHONE ENCOUNTER
Spoke with Shalonda and informed pt has no follow up appts with PM&R or Neurology. Shalonda reports she spoke with the Supervisor at the facility and instructed her to schedule these appt. Informed Shalonda no appts have been made with either dept. Shalonda reports she will work on getting this taken care of.

## 2019-09-23 DIAGNOSIS — G40.909 SEIZURE DISORDER: ICD-10-CM

## 2019-09-24 RX ORDER — DIVALPROEX SODIUM 250 MG/1
250 TABLET, DELAYED RELEASE ORAL NIGHTLY
Qty: 90 TABLET | Refills: 0 | Status: SHIPPED | OUTPATIENT
Start: 2019-09-24 | End: 2019-12-11 | Stop reason: SDUPTHER

## 2019-09-24 RX ORDER — DIVALPROEX SODIUM 500 MG/1
500 TABLET, DELAYED RELEASE ORAL EVERY 12 HOURS
Qty: 180 TABLET | Refills: 0 | Status: SHIPPED | OUTPATIENT
Start: 2019-09-24 | End: 2019-12-11 | Stop reason: SDUPTHER

## 2019-10-21 DIAGNOSIS — I10 ESSENTIAL HYPERTENSION: ICD-10-CM

## 2019-10-21 RX ORDER — LISINOPRIL 5 MG/1
TABLET ORAL
Qty: 180 TABLET | Refills: 1 | Status: SHIPPED | OUTPATIENT
Start: 2019-10-21 | End: 2020-01-29

## 2019-10-22 ENCOUNTER — PATIENT OUTREACH (OUTPATIENT)
Dept: ADMINISTRATIVE | Facility: OTHER | Age: 39
End: 2019-10-22

## 2019-10-24 ENCOUNTER — OFFICE VISIT (OUTPATIENT)
Dept: NEUROLOGY | Facility: CLINIC | Age: 39
End: 2019-10-24
Payer: MEDICARE

## 2019-10-24 ENCOUNTER — LAB VISIT (OUTPATIENT)
Dept: LAB | Facility: HOSPITAL | Age: 39
End: 2019-10-24
Attending: PSYCHIATRY & NEUROLOGY
Payer: MEDICARE

## 2019-10-24 VITALS
HEART RATE: 73 BPM | BODY MASS INDEX: 27.08 KG/M2 | SYSTOLIC BLOOD PRESSURE: 107 MMHG | HEIGHT: 72 IN | WEIGHT: 199.94 LBS | DIASTOLIC BLOOD PRESSURE: 72 MMHG

## 2019-10-24 DIAGNOSIS — G40.909 SEIZURE DISORDER: ICD-10-CM

## 2019-10-24 DIAGNOSIS — G40.909 SEIZURE DISORDER: Primary | ICD-10-CM

## 2019-10-24 LAB — VALPROATE SERPL-MCNC: 95.5 UG/ML (ref 50–100)

## 2019-10-24 PROCEDURE — 36415 COLL VENOUS BLD VENIPUNCTURE: CPT

## 2019-10-24 PROCEDURE — 99213 PR OFFICE/OUTPT VISIT, EST, LEVL III, 20-29 MIN: ICD-10-PCS | Mod: S$GLB,,, | Performed by: PSYCHIATRY & NEUROLOGY

## 2019-10-24 PROCEDURE — 99499 UNLISTED E&M SERVICE: CPT | Mod: S$GLB,,, | Performed by: PSYCHIATRY & NEUROLOGY

## 2019-10-24 PROCEDURE — 80164 ASSAY DIPROPYLACETIC ACD TOT: CPT

## 2019-10-24 PROCEDURE — 99999 PR PBB SHADOW E&M-EST. PATIENT-LVL III: CPT | Mod: PBBFAC,,, | Performed by: PSYCHIATRY & NEUROLOGY

## 2019-10-24 PROCEDURE — 3078F PR MOST RECENT DIASTOLIC BLOOD PRESSURE < 80 MM HG: ICD-10-PCS | Mod: CPTII,S$GLB,, | Performed by: PSYCHIATRY & NEUROLOGY

## 2019-10-24 PROCEDURE — 99499 RISK ADDL DX/OHS AUDIT: ICD-10-PCS | Mod: S$GLB,,, | Performed by: PSYCHIATRY & NEUROLOGY

## 2019-10-24 PROCEDURE — 3074F SYST BP LT 130 MM HG: CPT | Mod: CPTII,S$GLB,, | Performed by: PSYCHIATRY & NEUROLOGY

## 2019-10-24 PROCEDURE — 3078F DIAST BP <80 MM HG: CPT | Mod: CPTII,S$GLB,, | Performed by: PSYCHIATRY & NEUROLOGY

## 2019-10-24 PROCEDURE — 3008F BODY MASS INDEX DOCD: CPT | Mod: CPTII,S$GLB,, | Performed by: PSYCHIATRY & NEUROLOGY

## 2019-10-24 PROCEDURE — 99213 OFFICE O/P EST LOW 20 MIN: CPT | Mod: S$GLB,,, | Performed by: PSYCHIATRY & NEUROLOGY

## 2019-10-24 PROCEDURE — 99999 PR PBB SHADOW E&M-EST. PATIENT-LVL III: ICD-10-PCS | Mod: PBBFAC,,, | Performed by: PSYCHIATRY & NEUROLOGY

## 2019-10-24 PROCEDURE — 3074F PR MOST RECENT SYSTOLIC BLOOD PRESSURE < 130 MM HG: ICD-10-PCS | Mod: CPTII,S$GLB,, | Performed by: PSYCHIATRY & NEUROLOGY

## 2019-10-24 PROCEDURE — 3008F PR BODY MASS INDEX (BMI) DOCUMENTED: ICD-10-PCS | Mod: CPTII,S$GLB,, | Performed by: PSYCHIATRY & NEUROLOGY

## 2019-10-24 NOTE — LETTER
October 25, 2019      Jamel Foley MD  [address]  [place] [address]           [facility]  [address] [place] [address], [address]  [place] [address]  Phone: [phone]  Fax: [phone]          Patient: Leonid Cox   MR Number: [id number]   YOB: 1980   Date of Visit: 10/24/2019       Dear Dr. Jamel Foley:    Thank you for referring Leonid Cox to me for evaluation. Attached you will find relevant portions of my assessment and plan of care.    If you have questions, please do not hesitate to call me. I look forward to following Leonid Cox along with you.    Sincerely,    Radames Hicks MD    Enclosure  CC:  No Recipients    If you would like to receive this communication electronically, please contact [email or web address] or [phone] to request more information on [employer] access.    For providers and/or their staff who would like to refer a patient to [facility], please contact us through our one-stop-shop provider referral line, [facility], at [phone].    If you feel you have received this communication in error or would no longer like to receive these types of communications, please e-mail [email or web address]

## 2019-10-25 NOTE — PROGRESS NOTES
Neurology Clinic Visit  Primary Care Provider: Jamel Foley MD   Referring Provider: Jamel Foley.*   Date of Visit: 10/24/2019     chief complaint: seizure    History of Present Illness  Leonid Cox is a 39 y.o.  male I have been asked to consult for evaluation of epilepsy.  The patient has a history of spastic quadriparesis and cerebral palsy.  He was last seen by Neurology in 2017 for his epilepsy.  He is accompanied by 2 of his caregivers.  According to the caregivers, Iqra has not had any seizures in the past 3 years.  According to the previous neurology note in 2017 there have been no seizures for at least the past 20 years.  He is currently on Depakote 500 in the morning and 750 at night.  He is tolerating the medication well without any side effects.      Patient Active Problem List    Diagnosis Date Noted    Other physeal fracture of left metatarsal, initial encounter for closed fracture 10/12/2017    Chronic prescription opiate use 06/26/2016    Dependence for activities of daily living 06/26/2016    Bilateral knee contractures 03/08/2016    Acquired pes planovalgus of left foot 03/08/2016    Peripheral edema 12/11/2015    Obesity (BMI 35.0-39.9 without comorbidity) 12/11/2015    Pigmented skin lesion 12/11/2015    Blood glucose elevated 12/11/2015    Urinary reflux     Constipation 09/11/2014    Back pain 06/04/2014    Bilateral impacted cerumen 10/09/2013    Shunt malfunction 01/26/2013    HTN (hypertension) 12/31/2012    Cerebral palsy     Hydrocephalus     Blind     Seizure disorder      Past Medical History:   Diagnosis Date    Blind     Cerebral palsy     Hydrocephalus     Hypertension     Seizure disorder     Seizures     Urinary reflux      Past Surgical History:   Procedure Laterality Date    FOOT SURGERY      SHUNT REVISION      TENDON RELEASE      TOTAL HIP ARTHROPLASTY       Family History   Problem Relation Age of Onset    Cancer Mother      Cancer Father          Current Outpatient Medications   Medication Sig    celecoxib (CELEBREX) 200 MG capsule TAKE 1 CAPSULE BY MOUTH 2 TIMES DAILY AS NEEDED FOR PAIN    divalproex (DEPAKOTE) 250 MG EC tablet Take 1 tablet (250 mg total) by mouth every evening. Further refills require visit with Nerology    divalproex (DEPAKOTE) 500 MG TbEC Take 1 tablet (500 mg total) by mouth every 12 (twelve) hours. Further refills require visit with Nerology    lisinopril (PRINIVIL,ZESTRIL) 5 MG tablet TAKE 1 TABLET BY MOUTH 2 TIMES DAILY    triamcinolone acetonide 0.1% (KENALOG) 0.1 % cream Apply topically 2 (two) times daily as needed. to reduce redness and itching     No current facility-administered medications for this visit.        Review of patient's allergies indicates:   Allergen Reactions    Adhesive tape-silicones      Other reaction(s): blisters    Codeine      Other reaction(s): Nausea    Morphine Itching     Social History     Socioeconomic History    Marital status: Single     Spouse name: Not on file    Number of children: Not on file    Years of education: Not on file    Highest education level: Not on file   Occupational History    Not on file   Social Needs    Financial resource strain: Not on file    Food insecurity:     Worry: Not on file     Inability: Not on file    Transportation needs:     Medical: Not on file     Non-medical: Not on file   Tobacco Use    Smoking status: Never Smoker    Smokeless tobacco: Never Used   Substance and Sexual Activity    Alcohol use: No    Drug use: No    Sexual activity: Not on file   Lifestyle    Physical activity:     Days per week: Not on file     Minutes per session: Not on file    Stress: Not on file   Relationships    Social connections:     Talks on phone: Not on file     Gets together: Not on file     Attends Congregation service: Not on file     Active member of club or organization: Not on file     Attends meetings of clubs or  organizations: Not on file     Relationship status: Not on file   Other Topics Concern    Not on file   Social History Narrative    Lives in property owned by grandmother. Disabled with [employer] caretakers 24hrs to patient. Primary caretaker Sherri takes care of him. Shalonda is the active caretaker, but there is no official legal power of .         Mother passed away 12/2013       Review of Systems      Constitutional: negative  Eyes: negative  Ears, nose, mouth, throat, and face: negative  Respiratory: negative  Cardiovascular: negative  Gastrointestinal: negative  Genitourinary:negative  Integument/breast: negative  Hematologic/lymphatic: negative  Musculoskeletal:negative  Neurological: negative  Behavioral/Psych: negative  Endocrine: negative  Allergic/Immunologic: negative    Objective:  Vital signs in last 24 hours:    Vitals:    10/24/19 1517   BP: 107/72   Pulse: 73   Weight: 90.7 kg (199 lb 15.3 oz)   Height: 6' (1.829 m)       Body mass index is 27.12 kg/m².     General: no acute distress, well nourished, well-groomed  CVS: RRR, no murmur, gallops or rubs  Respiratory: Clear to ausculation  Extremities: no edema    Neurological Examination:    HIGHER INTEGRATIVE FUNCTIONS:  -he response to questions in commands immediately  -Oriented to person and place.  -no dysarthria.    CN:  -PERRLA, visual fields full, unable to visualize optic discs due to small pupils on fundus exam   -extraocular movements appear to be intact but he does have spontaneous horizontal nystagmus.  -Facial sensation intact bilaterally  -?  Left facial weakness  -Hearing intact to voice    MOTOR: (left/right graded 1-5)  He is able to move all 4 extremities against gravity right more than the left and does note to have increased tone in all 4 extremities but much more on the left upper extremity.    SENSORY:  -light touch was intact in all 4 extremities.    REFLEXES:  -reflexes appear to be brisk in the upper extremities  and 2+ in the bilateral patellar and unable to obtain reflexes in the ankles bilaterally    COORDINATION:  -he was able to perform finger-to-nose on the right upper extremity but not on the left due to his weakness.    GAIT:  -in wheelchair      Assessment/Plan:    1.  Seizure disorder, controlled and no seizures since last visit.  2.  Cerebral palsy    Plan:  Continue Depakote 500mg in the morning and 750mg at night.  He can follow up in 1 year or sooner if needed.    I discussed assessment and plan with patient and answered the questions that he had.     Part of patient note might have been created using Dragon Dictation.  Any errors in syntax or even information may not have been identified and edited on initial review prior to signing this note.

## 2019-12-11 DIAGNOSIS — G40.909 SEIZURE DISORDER: ICD-10-CM

## 2019-12-11 RX ORDER — DIVALPROEX SODIUM 250 MG/1
TABLET, DELAYED RELEASE ORAL
Qty: 30 TABLET | Refills: 0 | Status: SHIPPED | OUTPATIENT
Start: 2019-12-11 | End: 2019-12-11 | Stop reason: SDUPTHER

## 2019-12-11 RX ORDER — DIVALPROEX SODIUM 250 MG/1
250 TABLET, DELAYED RELEASE ORAL NIGHTLY
Qty: 90 TABLET | Refills: 1 | Status: SHIPPED | OUTPATIENT
Start: 2019-12-11 | End: 2020-06-09

## 2019-12-11 RX ORDER — DIVALPROEX SODIUM 500 MG/1
TABLET, DELAYED RELEASE ORAL
Qty: 60 TABLET | Refills: 0 | Status: SHIPPED | OUTPATIENT
Start: 2019-12-11 | End: 2019-12-11 | Stop reason: SDUPTHER

## 2019-12-11 RX ORDER — DIVALPROEX SODIUM 500 MG/1
500 TABLET, DELAYED RELEASE ORAL EVERY 12 HOURS
Qty: 180 TABLET | Refills: 1 | Status: SHIPPED | OUTPATIENT
Start: 2019-12-11 | End: 2020-06-09

## 2019-12-12 ENCOUNTER — TELEPHONE (OUTPATIENT)
Dept: INTERNAL MEDICINE | Facility: CLINIC | Age: 39
End: 2019-12-12

## 2019-12-12 NOTE — TELEPHONE ENCOUNTER
----- Message from Breanna Parada sent at 12/12/2019  8:16 AM CST -----  Dr. Foley, I called Shalonda Jaquez's caretaker to schedule the referral to PM&R she stated that Leonid has 24 hour care. Shalonda said that Yasmin with [employer] who is the supervisor for the care team is suppose to be taking care of this. I gave her my name and number if they needed further assistance  ----- Message -----  From: Jamel Foley MD  Sent: 12/11/2019   5:26 PM CST  To: Breanna Parada    Please contact Shalonda the patient's primary caretaker.  The patient is overdue to establish with a physical medicine and rehabilitation specialist necessary to help prevent further physical deterioration and falls.  Please assist the patient in getting set up with a PM&R  new provider

## 2019-12-12 NOTE — TELEPHONE ENCOUNTER
Despite evidence of significant self injury due (bone fracture due to trauma) to a fall under current caretaking arrangements the primary caretaker refuses further assistance to prevent further mechanical injuries.

## 2020-01-24 ENCOUNTER — TELEPHONE (OUTPATIENT)
Dept: INTERNAL MEDICINE | Facility: CLINIC | Age: 40
End: 2020-01-24

## 2020-01-24 NOTE — TELEPHONE ENCOUNTER
----- Message from [employer] sent at 1/24/2020 12:02 PM CST -----  Contact: mother/PTA agency Yasmin  [phone]  Type:  Sooner Apoointment Request    Caller is requesting a sooner appointment.  Caller declined first available appointment listed below.  Caller will not accept being placed on the waitlist and is requesting a message be sent to doctor  Name of Caller:patient PTA Agent  Yasmin  When is the first available appointment? 3-4-20  Symptoms:cold sumptoms  Would the patient rather a call back or a response via [facility]? callback  Best Call Back Number:[phone]  Additional Information: none

## 2020-01-29 ENCOUNTER — LAB VISIT (OUTPATIENT)
Dept: LAB | Facility: HOSPITAL | Age: 40
End: 2020-01-29
Attending: INTERNAL MEDICINE
Payer: MEDICARE

## 2020-01-29 ENCOUNTER — TELEPHONE (OUTPATIENT)
Dept: INTERNAL MEDICINE | Facility: CLINIC | Age: 40
End: 2020-01-29

## 2020-01-29 ENCOUNTER — OFFICE VISIT (OUTPATIENT)
Dept: INTERNAL MEDICINE | Facility: CLINIC | Age: 40
End: 2020-01-29
Payer: MEDICARE

## 2020-01-29 VITALS
DIASTOLIC BLOOD PRESSURE: 60 MMHG | HEIGHT: 72 IN | BODY MASS INDEX: 25.14 KG/M2 | SYSTOLIC BLOOD PRESSURE: 100 MMHG | WEIGHT: 185.63 LBS

## 2020-01-29 DIAGNOSIS — G80.0 SPASTIC QUADRIPLEGIC CEREBRAL PALSY: ICD-10-CM

## 2020-01-29 DIAGNOSIS — R73.9 BLOOD GLUCOSE ELEVATED: ICD-10-CM

## 2020-01-29 DIAGNOSIS — B88.8 INFESTATION BY BED BUG: ICD-10-CM

## 2020-01-29 DIAGNOSIS — G40.909 SEIZURE DISORDER: ICD-10-CM

## 2020-01-29 DIAGNOSIS — Z11.4 SCREENING FOR HIV WITHOUT PRESENCE OF RISK FACTORS: ICD-10-CM

## 2020-01-29 DIAGNOSIS — J30.9 ALLERGIC SINUSITIS: ICD-10-CM

## 2020-01-29 DIAGNOSIS — Z23 NEED FOR IMMUNIZATION AGAINST INFLUENZA: ICD-10-CM

## 2020-01-29 DIAGNOSIS — G80.9 CEREBRAL PALSY, UNSPECIFIED TYPE: Primary | ICD-10-CM

## 2020-01-29 LAB
ALBUMIN SERPL BCP-MCNC: 3.3 G/DL (ref 3.5–5.2)
ALP SERPL-CCNC: 63 U/L (ref 55–135)
ALT SERPL W/O P-5'-P-CCNC: 12 U/L (ref 10–44)
ANION GAP SERPL CALC-SCNC: 8 MMOL/L (ref 8–16)
AST SERPL-CCNC: 13 U/L (ref 10–40)
BASOPHILS # BLD AUTO: 0.04 K/UL (ref 0–0.2)
BASOPHILS NFR BLD: 0.6 % (ref 0–1.9)
BILIRUB SERPL-MCNC: 0.2 MG/DL (ref 0.1–1)
BUN SERPL-MCNC: 13 MG/DL (ref 6–20)
CALCIUM SERPL-MCNC: 9.3 MG/DL (ref 8.7–10.5)
CHLORIDE SERPL-SCNC: 107 MMOL/L (ref 95–110)
CO2 SERPL-SCNC: 29 MMOL/L (ref 23–29)
CREAT SERPL-MCNC: 0.8 MG/DL (ref 0.5–1.4)
DIFFERENTIAL METHOD: ABNORMAL
EOSINOPHIL # BLD AUTO: 0.1 K/UL (ref 0–0.5)
EOSINOPHIL NFR BLD: 1.1 % (ref 0–8)
ERYTHROCYTE [DISTWIDTH] IN BLOOD BY AUTOMATED COUNT: 13.2 % (ref 11.5–14.5)
EST. GFR  (AFRICAN AMERICAN): >60 ML/MIN/1.73 M^2
EST. GFR  (NON AFRICAN AMERICAN): >60 ML/MIN/1.73 M^2
GLUCOSE SERPL-MCNC: 74 MG/DL (ref 70–110)
HCT VFR BLD AUTO: 42.7 % (ref 40–54)
HGB BLD-MCNC: 13.4 G/DL (ref 14–18)
IMM GRANULOCYTES # BLD AUTO: 0.14 K/UL (ref 0–0.04)
IMM GRANULOCYTES NFR BLD AUTO: 2.2 % (ref 0–0.5)
LYMPHOCYTES # BLD AUTO: 2 K/UL (ref 1–4.8)
LYMPHOCYTES NFR BLD: 31.7 % (ref 18–48)
MCH RBC QN AUTO: 28.5 PG (ref 27–31)
MCHC RBC AUTO-ENTMCNC: 31.4 G/DL (ref 32–36)
MCV RBC AUTO: 91 FL (ref 82–98)
MONOCYTES # BLD AUTO: 0.7 K/UL (ref 0.3–1)
MONOCYTES NFR BLD: 10.4 % (ref 4–15)
NEUTROPHILS # BLD AUTO: 3.4 K/UL (ref 1.8–7.7)
NEUTROPHILS NFR BLD: 54 % (ref 38–73)
NRBC BLD-RTO: 0 /100 WBC
PLATELET # BLD AUTO: 265 K/UL (ref 150–350)
PMV BLD AUTO: 9 FL (ref 9.2–12.9)
POTASSIUM SERPL-SCNC: 4.3 MMOL/L (ref 3.5–5.1)
PROT SERPL-MCNC: 7.4 G/DL (ref 6–8.4)
RBC # BLD AUTO: 4.71 M/UL (ref 4.6–6.2)
SODIUM SERPL-SCNC: 144 MMOL/L (ref 136–145)
VALPROATE SERPL-MCNC: 75.7 UG/ML (ref 50–100)
WBC # BLD AUTO: 6.35 K/UL (ref 3.9–12.7)

## 2020-01-29 PROCEDURE — 3074F SYST BP LT 130 MM HG: CPT | Mod: CPTII,S$GLB,, | Performed by: INTERNAL MEDICINE

## 2020-01-29 PROCEDURE — 99499 UNLISTED E&M SERVICE: CPT | Mod: S$GLB,,, | Performed by: INTERNAL MEDICINE

## 2020-01-29 PROCEDURE — 90686 IIV4 VACC NO PRSV 0.5 ML IM: CPT | Mod: S$GLB,,, | Performed by: INTERNAL MEDICINE

## 2020-01-29 PROCEDURE — 80164 ASSAY DIPROPYLACETIC ACD TOT: CPT

## 2020-01-29 PROCEDURE — 3078F PR MOST RECENT DIASTOLIC BLOOD PRESSURE < 80 MM HG: ICD-10-PCS | Mod: CPTII,S$GLB,, | Performed by: INTERNAL MEDICINE

## 2020-01-29 PROCEDURE — G0008 FLU VACCINE (QUAD) GREATER THAN OR EQUAL TO 3YO PRESERVATIVE FREE IM: ICD-10-PCS | Mod: S$GLB,,, | Performed by: INTERNAL MEDICINE

## 2020-01-29 PROCEDURE — 80053 COMPREHEN METABOLIC PANEL: CPT

## 2020-01-29 PROCEDURE — 3074F PR MOST RECENT SYSTOLIC BLOOD PRESSURE < 130 MM HG: ICD-10-PCS | Mod: CPTII,S$GLB,, | Performed by: INTERNAL MEDICINE

## 2020-01-29 PROCEDURE — 3078F DIAST BP <80 MM HG: CPT | Mod: CPTII,S$GLB,, | Performed by: INTERNAL MEDICINE

## 2020-01-29 PROCEDURE — 36415 COLL VENOUS BLD VENIPUNCTURE: CPT | Mod: PO

## 2020-01-29 PROCEDURE — 99213 OFFICE O/P EST LOW 20 MIN: CPT | Mod: 25,S$GLB,, | Performed by: INTERNAL MEDICINE

## 2020-01-29 PROCEDURE — 3008F PR BODY MASS INDEX (BMI) DOCUMENTED: ICD-10-PCS | Mod: CPTII,S$GLB,, | Performed by: INTERNAL MEDICINE

## 2020-01-29 PROCEDURE — 3008F BODY MASS INDEX DOCD: CPT | Mod: CPTII,S$GLB,, | Performed by: INTERNAL MEDICINE

## 2020-01-29 PROCEDURE — 99999 PR PBB SHADOW E&M-EST. PATIENT-LVL III: CPT | Mod: PBBFAC,,, | Performed by: INTERNAL MEDICINE

## 2020-01-29 PROCEDURE — 99999 PR PBB SHADOW E&M-EST. PATIENT-LVL III: ICD-10-PCS | Mod: PBBFAC,,, | Performed by: INTERNAL MEDICINE

## 2020-01-29 PROCEDURE — 85025 COMPLETE CBC W/AUTO DIFF WBC: CPT

## 2020-01-29 PROCEDURE — 99499 RISK ADDL DX/OHS AUDIT: ICD-10-PCS | Mod: S$GLB,,, | Performed by: INTERNAL MEDICINE

## 2020-01-29 PROCEDURE — G0008 ADMIN INFLUENZA VIRUS VAC: HCPCS | Mod: S$GLB,,, | Performed by: INTERNAL MEDICINE

## 2020-01-29 PROCEDURE — 90686 FLU VACCINE (QUAD) GREATER THAN OR EQUAL TO 3YO PRESERVATIVE FREE IM: ICD-10-PCS | Mod: S$GLB,,, | Performed by: INTERNAL MEDICINE

## 2020-01-29 PROCEDURE — 99213 PR OFFICE/OUTPT VISIT, EST, LEVL III, 20-29 MIN: ICD-10-PCS | Mod: 25,S$GLB,, | Performed by: INTERNAL MEDICINE

## 2020-01-29 PROCEDURE — 86703 HIV-1/HIV-2 1 RESULT ANTBDY: CPT

## 2020-01-29 RX ORDER — LEVOCETIRIZINE DIHYDROCHLORIDE 5 MG/1
5 TABLET, FILM COATED ORAL NIGHTLY PRN
Qty: 90 TABLET | Refills: 0 | Status: SHIPPED | OUTPATIENT
Start: 2020-01-29 | End: 2020-06-29 | Stop reason: SDUPTHER

## 2020-01-29 RX ORDER — LEVOCETIRIZINE DIHYDROCHLORIDE 5 MG/1
5 TABLET, FILM COATED ORAL NIGHTLY PRN
Qty: 90 TABLET | Refills: 0 | Status: SHIPPED | OUTPATIENT
Start: 2020-01-29 | End: 2020-01-29 | Stop reason: SDUPTHER

## 2020-01-29 NOTE — PROGRESS NOTES
Portions of this note are generated with voice recognition software. Typographical errors may exist.     SUBJECTIVE:    This is a/an 39 y.o. male here for primary care visit for  Chief Complaint   Patient presents with    Sinus Problem       History comes from caretaker Tabby and mother over the phone interview.     Antonniette with him today has been excellent with him. But changes in staff have been difficult as of late  Agency has taken over with the medical stuff. They let Shalonda know about visits.   Supervisor, Yasmin [phone].  [employer], Over her care home.   Has his private room in grandmother's house. Shalonda lives 10 min from   Bites. Insect. Has bed bugs in the house now.  Didi, tries to help with beg bug situation  Does day school      Medications Reviewed and Updated    Past medical, family, and social histories were reviewed and updated.    Review of Systems negative unless otherwise noted in history of present illness-  Review of Systems   Unable to perform ROS: Psychiatric disorder         Allergic:    Review of patient's allergies indicates:   Allergen Reactions    Adhesive tape-silicones      Other reaction(s): blisters    Codeine      Other reaction(s): Nausea    Morphine Itching       OBJECTIVE:  BP: 100/60      Wt Readings from Last 3 Encounters:   01/29/20 84.2 kg (185 lb 10 oz)   10/24/19 90.7 kg (199 lb 15.3 oz)   08/25/19 90.7 kg (200 lb)    Body mass index is 25.18 kg/m².  Previous Blood Pressure Readings :   BP Readings from Last 3 Encounters:   01/29/20 100/60   10/24/19 107/72   08/25/19 (!) 155/90       Physical Exam    GEN: No apparent distress  HEENT: sclera non-icteric, conjunctiva clear  CV: no peripheral edema  PULM: breathing non-labored  ABD:  protuberant abdomen.  PSYCH: appropriate affect  MSK: unable to rise from chair without assistance  SKIN: normal skin turgor    Pertinent Labs Reviewed       ASSESSMENT/PLAN:    Cerebral palsy, unspecified  type.Condition stable.  Counseling given today on self-care measures. Plan to monitor clinically. Continue current medical plan.     Seizure disorder.Condition stable.  Counseling given today on self-care measures. Plan to monitor clinically. Continue current medical plan.     Need for immunization against influenza  -     Influenza - Quadrivalent (PF)    Screening for HIV without presence of risk factors  -     HIV 1/2 Ag/Ab (4th Gen); Future; Expected date: 01/29/2020    Infestation by bed bug.Condition not optimally controlled. Detailed counseling on self care measures. Plan to monitor clinically in addition to plan below or as listed on After Visit Summary.    - expert home treatment recommended    Allergic sinusitis  -     Discontinue: levocetirizine (XYZAL) 5 MG tablet; Take 1 tablet (5 mg total) by mouth nightly as needed for Allergies.  Dispense: 90 tablet; Refill: 0          Future Appointments   Date Time Provider Department Center   4/29/2020 10:00 AM Jamel Foley MD [facility]       Jamel Foley  1/30/2020  10:16 AM

## 2020-01-29 NOTE — TELEPHONE ENCOUNTER
----- Message from Maya Tierney sent at 1/29/2020  9:42 AM CST -----  Contact: Ms. Didi Sigala [phone]  Ms. Tolbert  is calling to ask if pt can still be seen today. She states she drove pt to [facility] not knowing appointment was at [facility]. Please advise.

## 2020-01-29 NOTE — TELEPHONE ENCOUNTER
Spoke with Mrs Agosto @ [facility] to D/C Lisinopril for patient.  Mrs Agosto states will deleted form his profile.

## 2020-01-29 NOTE — TELEPHONE ENCOUNTER
----- Message from Jamel Foley MD sent at 1/29/2020 10:45 AM CST -----    Please contact pharmacy to discontinue lisinopril    [facility] [address] [facility] [address]  [phone]

## 2020-01-30 LAB — HIV 1+2 AB+HIV1 P24 AG SERPL QL IA: NEGATIVE

## 2020-04-02 ENCOUNTER — TELEPHONE (OUTPATIENT)
Dept: INTERNAL MEDICINE | Facility: CLINIC | Age: 40
End: 2020-04-02

## 2020-04-02 NOTE — TELEPHONE ENCOUNTER
----- Message from Ace Azar sent at 4/2/2020  3:41 PM CDT -----  Contact: [phone]/ Emile with [email or web address]   Emile with [email or web address] would like to speak with you in regards to patient medication lisinopril. She says they have been trying to reach the patient but have not been able to and would like to know if there is any way your office could contact him. Please advise.

## 2020-04-03 ENCOUNTER — TELEPHONE (OUTPATIENT)
Dept: INTERNAL MEDICINE | Facility: CLINIC | Age: 40
End: 2020-04-03

## 2020-04-03 NOTE — TELEPHONE ENCOUNTER
----- Message from Lisa Garza sent at 4/3/2020 11:00 AM CDT -----  Contact: Emile from [facility]  Type:  Patient Returning Call    Who Called: Emile   Who Left Message for Patient: Nirmala  Does the patient know what this is regarding?: medication   Would the patient rather a call back or a response via [facility]?  Call back   Best Call Back Number: [phone]  Additional Information:  No

## 2020-04-03 NOTE — TELEPHONE ENCOUNTER
Spoke with Emile from [facility] ,who was requesting information on patient taking lisinopril . I informed her patient is not taking medication

## 2020-04-23 ENCOUNTER — TELEPHONE (OUTPATIENT)
Dept: INTERNAL MEDICINE | Facility: CLINIC | Age: 40
End: 2020-04-23

## 2020-06-26 ENCOUNTER — TELEPHONE (OUTPATIENT)
Dept: INTERNAL MEDICINE | Facility: CLINIC | Age: 40
End: 2020-06-26

## 2020-06-29 ENCOUNTER — OFFICE VISIT (OUTPATIENT)
Dept: INTERNAL MEDICINE | Facility: CLINIC | Age: 40
End: 2020-06-29
Payer: MEDICARE

## 2020-06-29 ENCOUNTER — LAB VISIT (OUTPATIENT)
Dept: LAB | Facility: HOSPITAL | Age: 40
End: 2020-06-29
Attending: INTERNAL MEDICINE
Payer: MEDICARE

## 2020-06-29 VITALS
HEART RATE: 73 BPM | HEIGHT: 72 IN | WEIGHT: 186.31 LBS | OXYGEN SATURATION: 98 % | BODY MASS INDEX: 25.24 KG/M2 | DIASTOLIC BLOOD PRESSURE: 80 MMHG | SYSTOLIC BLOOD PRESSURE: 110 MMHG | TEMPERATURE: 98 F

## 2020-06-29 DIAGNOSIS — E55.9 VITAMIN D DEFICIENCY: ICD-10-CM

## 2020-06-29 DIAGNOSIS — G40.909 SEIZURE DISORDER: ICD-10-CM

## 2020-06-29 DIAGNOSIS — R60.0 PERIPHERAL EDEMA: ICD-10-CM

## 2020-06-29 DIAGNOSIS — R73.9 BLOOD GLUCOSE ELEVATED: ICD-10-CM

## 2020-06-29 DIAGNOSIS — M89.9 DISORDER OF BONE: ICD-10-CM

## 2020-06-29 DIAGNOSIS — J30.9 ALLERGIC SINUSITIS: ICD-10-CM

## 2020-06-29 DIAGNOSIS — G80.0 SPASTIC QUADRIPLEGIC CEREBRAL PALSY: ICD-10-CM

## 2020-06-29 DIAGNOSIS — G80.9 CEREBRAL PALSY, UNSPECIFIED TYPE: Primary | ICD-10-CM

## 2020-06-29 LAB
25(OH)D3+25(OH)D2 SERPL-MCNC: 14 NG/ML (ref 30–96)
ALBUMIN SERPL BCP-MCNC: 3.4 G/DL (ref 3.5–5.2)
ALP SERPL-CCNC: 61 U/L (ref 55–135)
ALT SERPL W/O P-5'-P-CCNC: 14 U/L (ref 10–44)
ANION GAP SERPL CALC-SCNC: 6 MMOL/L (ref 8–16)
AST SERPL-CCNC: 10 U/L (ref 10–40)
BASOPHILS # BLD AUTO: 0.02 K/UL (ref 0–0.2)
BASOPHILS NFR BLD: 0.3 % (ref 0–1.9)
BILIRUB SERPL-MCNC: 0.3 MG/DL (ref 0.1–1)
BNP SERPL-MCNC: 25 PG/ML (ref 0–99)
BUN SERPL-MCNC: 14 MG/DL (ref 6–20)
CALCIUM SERPL-MCNC: 9.2 MG/DL (ref 8.7–10.5)
CHLORIDE SERPL-SCNC: 106 MMOL/L (ref 95–110)
CO2 SERPL-SCNC: 30 MMOL/L (ref 23–29)
CREAT SERPL-MCNC: 0.8 MG/DL (ref 0.5–1.4)
D DIMER PPP IA.FEU-MCNC: 0.22 MG/L FEU
DIFFERENTIAL METHOD: ABNORMAL
EOSINOPHIL # BLD AUTO: 0.1 K/UL (ref 0–0.5)
EOSINOPHIL NFR BLD: 1.3 % (ref 0–8)
ERYTHROCYTE [DISTWIDTH] IN BLOOD BY AUTOMATED COUNT: 13.8 % (ref 11.5–14.5)
EST. GFR  (AFRICAN AMERICAN): >60 ML/MIN/1.73 M^2
EST. GFR  (NON AFRICAN AMERICAN): >60 ML/MIN/1.73 M^2
GLUCOSE SERPL-MCNC: 72 MG/DL (ref 70–110)
HCT VFR BLD AUTO: 45.8 % (ref 40–54)
HGB BLD-MCNC: 14.5 G/DL (ref 14–18)
IMM GRANULOCYTES # BLD AUTO: 0.03 K/UL (ref 0–0.04)
IMM GRANULOCYTES NFR BLD AUTO: 0.5 % (ref 0–0.5)
LYMPHOCYTES # BLD AUTO: 2.4 K/UL (ref 1–4.8)
LYMPHOCYTES NFR BLD: 39.9 % (ref 18–48)
MCH RBC QN AUTO: 28.4 PG (ref 27–31)
MCHC RBC AUTO-ENTMCNC: 31.7 G/DL (ref 32–36)
MCV RBC AUTO: 90 FL (ref 82–98)
MONOCYTES # BLD AUTO: 0.5 K/UL (ref 0.3–1)
MONOCYTES NFR BLD: 8.8 % (ref 4–15)
NEUTROPHILS # BLD AUTO: 3 K/UL (ref 1.8–7.7)
NEUTROPHILS NFR BLD: 49.2 % (ref 38–73)
NRBC BLD-RTO: 0 /100 WBC
PLATELET # BLD AUTO: 200 K/UL (ref 150–350)
PMV BLD AUTO: 10.5 FL (ref 9.2–12.9)
POTASSIUM SERPL-SCNC: 4 MMOL/L (ref 3.5–5.1)
PROT SERPL-MCNC: 6.8 G/DL (ref 6–8.4)
RBC # BLD AUTO: 5.1 M/UL (ref 4.6–6.2)
SODIUM SERPL-SCNC: 142 MMOL/L (ref 136–145)
VALPROATE SERPL-MCNC: 73.2 UG/ML (ref 50–100)
WBC # BLD AUTO: 6.04 K/UL (ref 3.9–12.7)

## 2020-06-29 PROCEDURE — 80164 ASSAY DIPROPYLACETIC ACD TOT: CPT

## 2020-06-29 PROCEDURE — 99999 PR PBB SHADOW E&M-EST. PATIENT-LVL III: CPT | Mod: PBBFAC,,, | Performed by: INTERNAL MEDICINE

## 2020-06-29 PROCEDURE — 3008F BODY MASS INDEX DOCD: CPT | Mod: CPTII,S$GLB,, | Performed by: INTERNAL MEDICINE

## 2020-06-29 PROCEDURE — 80053 COMPREHEN METABOLIC PANEL: CPT

## 2020-06-29 PROCEDURE — 3079F DIAST BP 80-89 MM HG: CPT | Mod: CPTII,S$GLB,, | Performed by: INTERNAL MEDICINE

## 2020-06-29 PROCEDURE — 85379 FIBRIN DEGRADATION QUANT: CPT

## 2020-06-29 PROCEDURE — 36415 COLL VENOUS BLD VENIPUNCTURE: CPT | Mod: PO

## 2020-06-29 PROCEDURE — 3074F SYST BP LT 130 MM HG: CPT | Mod: CPTII,S$GLB,, | Performed by: INTERNAL MEDICINE

## 2020-06-29 PROCEDURE — 83880 ASSAY OF NATRIURETIC PEPTIDE: CPT

## 2020-06-29 PROCEDURE — 3074F PR MOST RECENT SYSTOLIC BLOOD PRESSURE < 130 MM HG: ICD-10-PCS | Mod: CPTII,S$GLB,, | Performed by: INTERNAL MEDICINE

## 2020-06-29 PROCEDURE — 99214 PR OFFICE/OUTPT VISIT, EST, LEVL IV, 30-39 MIN: ICD-10-PCS | Mod: S$GLB,,, | Performed by: INTERNAL MEDICINE

## 2020-06-29 PROCEDURE — 82306 VITAMIN D 25 HYDROXY: CPT

## 2020-06-29 PROCEDURE — 3008F PR BODY MASS INDEX (BMI) DOCUMENTED: ICD-10-PCS | Mod: CPTII,S$GLB,, | Performed by: INTERNAL MEDICINE

## 2020-06-29 PROCEDURE — 85025 COMPLETE CBC W/AUTO DIFF WBC: CPT

## 2020-06-29 PROCEDURE — 3079F PR MOST RECENT DIASTOLIC BLOOD PRESSURE 80-89 MM HG: ICD-10-PCS | Mod: CPTII,S$GLB,, | Performed by: INTERNAL MEDICINE

## 2020-06-29 PROCEDURE — 99214 OFFICE O/P EST MOD 30 MIN: CPT | Mod: S$GLB,,, | Performed by: INTERNAL MEDICINE

## 2020-06-29 PROCEDURE — 99999 PR PBB SHADOW E&M-EST. PATIENT-LVL III: ICD-10-PCS | Mod: PBBFAC,,, | Performed by: INTERNAL MEDICINE

## 2020-06-29 RX ORDER — DIVALPROEX SODIUM 250 MG/1
250 TABLET, DELAYED RELEASE ORAL NIGHTLY
Qty: 60 TABLET | Refills: 0 | Status: SHIPPED | OUTPATIENT
Start: 2020-06-29 | End: 2020-09-30 | Stop reason: SDUPTHER

## 2020-06-29 RX ORDER — VIT C/E/ZN/COPPR/LUTEIN/ZEAXAN 250MG-90MG
1000 CAPSULE ORAL DAILY
Qty: 90 CAPSULE | Refills: 0 | Status: SHIPPED | OUTPATIENT
Start: 2020-06-29 | End: 2020-11-30 | Stop reason: SDUPTHER

## 2020-06-29 RX ORDER — DIVALPROEX SODIUM 500 MG/1
500 TABLET, DELAYED RELEASE ORAL EVERY 12 HOURS
Qty: 180 TABLET | Refills: 0 | Status: SHIPPED | OUTPATIENT
Start: 2020-06-29 | End: 2020-09-30 | Stop reason: SDUPTHER

## 2020-06-29 RX ORDER — TRIAMCINOLONE ACETONIDE 1 MG/G
CREAM TOPICAL 2 TIMES DAILY PRN
Qty: 28.4 G | Refills: 1 | Status: SHIPPED | OUTPATIENT
Start: 2020-06-29 | End: 2021-11-02 | Stop reason: SDUPTHER

## 2020-06-29 RX ORDER — LEVOCETIRIZINE DIHYDROCHLORIDE 5 MG/1
5 TABLET, FILM COATED ORAL NIGHTLY PRN
Qty: 90 TABLET | Refills: 0 | Status: SHIPPED | OUTPATIENT
Start: 2020-06-29 | End: 2020-11-30 | Stop reason: SDUPTHER

## 2020-06-29 RX ORDER — CELECOXIB 200 MG/1
200 CAPSULE ORAL DAILY PRN
Qty: 56 CAPSULE | Refills: 1 | Status: SHIPPED | OUTPATIENT
Start: 2020-06-29 | End: 2020-11-30

## 2020-06-29 NOTE — PROGRESS NOTES
Portions of this note are generated with voice recognition software. Typographical errors may exist.     SUBJECTIVE:    This is a/an 40 y.o. male here for primary care visit for  Chief Complaint   Patient presents with    Seizures     3 MO     Patient comes to the office with attendant from [employer], Tranise. She has not worked with the patient previously. She is only here as a temporary aid for transport to the office. Didi, his previous attendant is no longer with the company. He is now with a new attendant Channing. According to Yue he gets along well with Channing.     Continues to live a in a private home with frequent contact from [employer] attendant. He has set patterns most days. TV for recreation at home. Trips out of the home nearly every day. He is trying to wear a mask but often takes it off due to frustration. Visits the park with attendant and goes to local restaurants for a meal outside of the home.     No known COVID contacts. No fever or new respiratory symptoms    Mom passed away many years ago and his affairs seem to be managed by Shalonda Dorman who we believe was close to his mom prior to her passing. She has HCPOA but has struggling to be more involved in medical decision making for the patient. She relies heavily on [employer] to coordinate his care     Supervisor, Yasmin Miguel [phone] is the contact given to resolve any problems with missed appointments. The patient has been referred by us to follow with neurology or PM&R given his CP. [facility] has struggled to help the patient keep these appointments.     Medications Reviewed and Updated    Past medical, family, and social histories were reviewed and updated.    Review of Systems negative unless otherwise noted in history of present illness-  ROS    General ROS: negative  Psychological ROS: negative  ENT ROS: negative  Endocrine ROS: Negative  Allergy and Immunology ROS:  negative  Cardiovascular ROS: negative  Pulmonary ROS: Negative  Gastrointestinal ROS: negative  Genito-Urinary ROS: negative  Musculoskeletal ROS: negative  Neurological ROS: negative  Dermatological ROS: negative        Allergic:    Review of patient's allergies indicates:   Allergen Reactions    Adhesive tape-silicones      Other reaction(s): blisters    Codeine      Other reaction(s): Nausea    Morphine Itching       OBJECTIVE:  BP: 110/80 Pulse: 73 Temp: 98.4 °F (36.9 °C)  Wt Readings from Last 3 Encounters:   06/29/20 84.5 kg (186 lb 4.6 oz)   01/29/20 84.2 kg (185 lb 10 oz)   10/24/19 90.7 kg (199 lb 15.3 oz)    Body mass index is 25.27 kg/m².  Previous Blood Pressure Readings :   BP Readings from Last 3 Encounters:   06/29/20 110/80   01/29/20 100/60   10/24/19 107/72       Physical Exam    GEN: No apparent distress  HEENT: sclera non-icteric, conjunctiva clear  CV: no peripheral edema, RRR, no murmurs   PULM: breathing non-labored  ABD:  protuberant abdomen.  PSYCH: appropriate affect  MSK: able to rise from chair without assistance  SKIN: normal skin turgor, no bruises or rashes    Pertinent Labs Reviewed       ASSESSMENT/PLAN:    Cerebral palsy, unspecified type..Condition stable.  Counseling given today on self-care measures. Plan to monitor clinically. Continue current medical plan.    - awaiting 90 L to continue home services    Seizure disorder.Condition stable.  Counseling given today on self-care measures. Plan to monitor clinically. Continue current medical plan.   -     divalproex (DEPAKOTE) 500 MG TbEC; Take 1 tablet (500 mg total) by mouth every 12 (twelve) hours.  Dispense: 180 tablet; Refill: 0  -     divalproex (DEPAKOTE) 250 MG EC tablet; Take 1 tablet (250 mg total) by mouth every evening.  Dispense: 60 tablet; Refill: 0      Future Appointments   Date Time Provider Department Center   9/30/2020  3:00 PM Hoang Smith DO [facility]       Jamel FRASER  Og  7/2/2020  12:14 PM

## 2020-09-30 DIAGNOSIS — G40.909 SEIZURE DISORDER: ICD-10-CM

## 2020-09-30 RX ORDER — DIVALPROEX SODIUM 500 MG/1
500 TABLET, DELAYED RELEASE ORAL EVERY 12 HOURS
Qty: 180 TABLET | Refills: 0 | Status: SHIPPED | OUTPATIENT
Start: 2020-09-30 | End: 2020-10-02

## 2020-09-30 RX ORDER — DIVALPROEX SODIUM 250 MG/1
250 TABLET, DELAYED RELEASE ORAL NIGHTLY
Qty: 60 TABLET | Refills: 0 | Status: SHIPPED | OUTPATIENT
Start: 2020-09-30 | End: 2020-09-30

## 2020-09-30 NOTE — TELEPHONE ENCOUNTER
----- Message from Wicho Valencia sent at 9/30/2020 10:12 AM CDT -----  Type:  RX Refill Request    Who Called: Janine from [facility]  Refill or New Rx: refill  RX Name and Strength: divalproex (DEPAKOTE) 250 MG EC tablet  How is the patient currently taking it? (ex. 1XDay): 1XDay  Is this a 30 day or 90 day RX: 60  Preferred Pharmacy with phone number: [place], [place] - [address] [phone] (Phone)  [phone] (Fax)  Local or Mail Order: local  Ordering Provider: Dr. Smith  Would the patient rather a call back or a response via [facility]? Call back  Best Call Back Number: [phone]  Additional Information: none      Type:  RX Refill Request    Who Called: Janine from [facility]  Refill or New Rx: refill  RX Name and Strength: divalproex (DEPAKOTE) 500 MG TbEC  How is the patient currently taking it? (ex. 1XDay): 1XDay every 12 hours  Is this a 30 day or 90 day RX: 180  Preferred Pharmacy with phone number: [facility], [place] - [address] [phone] (Phone)  [phone] (Fax)  Local or Mail Order: local  Ordering Provider: Dr. Smith  Would the patient rather a call back or a response via [facility]? Call back  Best Call Back Number: [phone]  Additional Information: none

## 2020-10-19 ENCOUNTER — OFFICE VISIT (OUTPATIENT)
Dept: FAMILY MEDICINE | Facility: CLINIC | Age: 40
End: 2020-10-19
Payer: MEDICARE

## 2020-10-19 VITALS
OXYGEN SATURATION: 98 % | HEIGHT: 72 IN | HEART RATE: 95 BPM | TEMPERATURE: 97 F | DIASTOLIC BLOOD PRESSURE: 62 MMHG | SYSTOLIC BLOOD PRESSURE: 118 MMHG | BODY MASS INDEX: 26.57 KG/M2 | WEIGHT: 196.19 LBS

## 2020-10-19 DIAGNOSIS — G40.909 SEIZURE DISORDER: ICD-10-CM

## 2020-10-19 DIAGNOSIS — Z76.89 ENCOUNTER TO ESTABLISH CARE WITH NEW DOCTOR: Primary | ICD-10-CM

## 2020-10-19 DIAGNOSIS — R46.89 SELF NEGLECT: ICD-10-CM

## 2020-10-19 DIAGNOSIS — G80.0 SPASTIC QUADRIPLEGIC CEREBRAL PALSY: ICD-10-CM

## 2020-10-19 DIAGNOSIS — I10 ESSENTIAL HYPERTENSION: ICD-10-CM

## 2020-10-19 DIAGNOSIS — G91.9 HYDROCEPHALUS, UNSPECIFIED TYPE: ICD-10-CM

## 2020-10-19 PROCEDURE — 99999 PR PBB SHADOW E&M-EST. PATIENT-LVL IV: ICD-10-PCS | Mod: PBBFAC,,, | Performed by: FAMILY MEDICINE

## 2020-10-19 PROCEDURE — 99204 PR OFFICE/OUTPT VISIT, NEW, LEVL IV, 45-59 MIN: ICD-10-PCS | Mod: S$GLB,,, | Performed by: FAMILY MEDICINE

## 2020-10-19 PROCEDURE — 99499 UNLISTED E&M SERVICE: CPT | Mod: S$GLB,,, | Performed by: FAMILY MEDICINE

## 2020-10-19 PROCEDURE — 3078F PR MOST RECENT DIASTOLIC BLOOD PRESSURE < 80 MM HG: ICD-10-PCS | Mod: CPTII,S$GLB,, | Performed by: FAMILY MEDICINE

## 2020-10-19 PROCEDURE — 3008F BODY MASS INDEX DOCD: CPT | Mod: CPTII,S$GLB,, | Performed by: FAMILY MEDICINE

## 2020-10-19 PROCEDURE — 99499 RISK ADDL DX/OHS AUDIT: ICD-10-PCS | Mod: S$GLB,,, | Performed by: FAMILY MEDICINE

## 2020-10-19 PROCEDURE — 99204 OFFICE O/P NEW MOD 45 MIN: CPT | Mod: S$GLB,,, | Performed by: FAMILY MEDICINE

## 2020-10-19 PROCEDURE — 99999 PR PBB SHADOW E&M-EST. PATIENT-LVL IV: CPT | Mod: PBBFAC,,, | Performed by: FAMILY MEDICINE

## 2020-10-19 PROCEDURE — 3074F SYST BP LT 130 MM HG: CPT | Mod: CPTII,S$GLB,, | Performed by: FAMILY MEDICINE

## 2020-10-19 PROCEDURE — 3008F PR BODY MASS INDEX (BMI) DOCUMENTED: ICD-10-PCS | Mod: CPTII,S$GLB,, | Performed by: FAMILY MEDICINE

## 2020-10-19 PROCEDURE — 3074F PR MOST RECENT SYSTOLIC BLOOD PRESSURE < 130 MM HG: ICD-10-PCS | Mod: CPTII,S$GLB,, | Performed by: FAMILY MEDICINE

## 2020-10-19 PROCEDURE — 3078F DIAST BP <80 MM HG: CPT | Mod: CPTII,S$GLB,, | Performed by: FAMILY MEDICINE

## 2020-10-19 NOTE — PATIENT INSTRUCTIONS
May qualify for [facility]?  Message sent to Dr. Telles  Patient agreeable  Referral placed  Waiting for feedback from that clinic    Refer to neurology    Defer labs for today    Wheelchair ordered    Will make f/u apt for 4 months    If switched to [facility] clinic, cancel that apt    Will fill depakote until f/u apt with neurology

## 2020-10-19 NOTE — PROGRESS NOTES
"Subjective:       Patient ID: Leonid Cox is a 40 y.o. male.    Chief Complaint: Establish Care    Leonid is a 40 y.o. male who presents today to establish care.   Per prior PCP's notes:    "Mom passed away many years ago and his affairs seem to be managed by Shalondarocio Dorman who we believe was close to his mom prior to her passing. She has HCPOA but has struggling to be more involved in medical decision making for the patient. She relies heavily on [employer] to coordinate his care"     Supervisor, [employer], Yasmin is no longer with the company. Here with Belén today. [phone] is the contact given to resolve any problems with missed appointments.     Brenden is his the . She is here today as well.     He lives alone. He was 24 hour caregiver support. Brenden is there 4 days a week, she has been with him for 3 weeks. 8 hours a time.     No nausea or vomiting. No chest pain or shortness of breath. No pain with urination. No cough. No abdominal pain or hematochezia.     His last seizure was "20 years ago" per patient. His caregivers do not believe that this is correct.     Currently, no acute complaints.     Seizure: saw neuro one year ago, per that note, "Continue Depakote 500mg in the morning and 750mg at night. He can follow up in 1 year or sooner if needed.      Review of Systems   Constitutional: Negative for chills and fever.   Gastrointestinal: Negative for abdominal pain, diarrhea, nausea and vomiting.   Genitourinary: Negative for difficulty urinating and dysuria.   Skin: Negative for rash and wound.   Neurological: Negative for dizziness and headaches.                 Objective:     Vitals:    10/19/20 1448   BP: 118/62   Pulse: 95   Temp: 97.2 °F (36.2 °C)        Physical Exam  Constitutional:       Comments: In a wheelchair   Eyes:      Comments: Appears to have spontaneous horizontal nystagmus, chronic per caregivers   Cardiovascular:      Rate and Rhythm: " Normal rate and regular rhythm.   Pulmonary:      Effort: Pulmonary effort is normal.      Breath sounds: Normal breath sounds.   Abdominal:      Palpations: Abdomen is soft.      Tenderness: There is no abdominal tenderness.   Skin:     Findings: No rash.   Neurological:      Mental Status: He is alert.      Comments: Verbal, able to answer questions  Some answers are intermittently incorrect     Per caregivers, does not walk    Gait not examined today         Assessment:       1. Encounter to establish care with new doctor    2. Essential hypertension    3. Spastic quadriplegic cerebral palsy    4. Hydrocephalus, unspecified type    5. Seizure disorder    6. Dependence for activities of daily living        Plan:       May qualify for [facility]?  Message sent to Dr. Telles  Patient agreeable  Referral placed  Waiting for feedback from that clinic    Refer to neurology    Defer labs for today    Wheelchair ordered    Will make f/u apt for 4 months    If switched to [facility] clinic, cancel that apt    Will fill depakote until f/u apt with neurology     Encounter to establish care with new doctor    Essential hypertension    Spastic quadriplegic cerebral palsy  -     Ambulatory referral/consult to Neurology; Future; Expected date: 10/26/2020  -     WHEELCHAIR FOR HOME USE  -     Ambulatory referral/consult to [facility]; Future; Expected date: 10/26/2020    Hydrocephalus, unspecified type  -     Ambulatory referral/consult to Neurology; Future; Expected date: 10/26/2020  -     WHEELCHAIR FOR HOME USE  -     Ambulatory referral/consult to [facility]; Future; Expected date: 10/26/2020    Seizure disorder  -     Ambulatory referral/consult to Neurology; Future; Expected date: 10/26/2020  -     WHEELCHAIR FOR HOME USE    Dependence for activities of daily living  -     WHEELCHAIR FOR HOME USE  -     Ambulatory referral/consult to [facility]; Future; Expected date: 10/26/2020        Warning  signs discussed, patient to call with any further issues or worsening of symptoms.

## 2020-10-19 NOTE — Clinical Note
Hi,   Can you see if patient qualifies? I think he'd be a great candidate. Seems to have good support, very pleasant. Has issues with mobility given CP, seizure disorder, other issues  [facility] referral placed, if he qualifies, can you set up f/u?    Thanks!    SMITH

## 2020-10-21 ENCOUNTER — TELEPHONE (OUTPATIENT)
Dept: NEUROLOGY | Facility: CLINIC | Age: 40
End: 2020-10-21

## 2020-10-21 NOTE — TELEPHONE ENCOUNTER
----- Message from Aracelis Wolff sent at 10/21/2020  3:10 PM CDT -----  Good afternoon,  The patient has a new referral from Dr. Hoang Smith, the diagnosis is Seizure disorder / Hydrocephalus, unspecified type / Spastic quadriplegic cerebral palsy. Patient was last seen by Dr. Diehl in 2017. Can you assist with scheduling?    Thank you

## 2020-10-26 ENCOUNTER — TELEPHONE (OUTPATIENT)
Dept: INTERNAL MEDICINE | Facility: CLINIC | Age: 40
End: 2020-10-26

## 2020-11-10 ENCOUNTER — TELEPHONE (OUTPATIENT)
Dept: PRIMARY CARE CLINIC | Facility: CLINIC | Age: 40
End: 2020-11-10

## 2020-11-10 NOTE — TELEPHONE ENCOUNTER
Hi Dr Smith- can you please place a referral for this patient. He has been scheduled for 11/20.    Thanks,  Anushka Telles MD/MPH  [facility]  [phone] [facility]

## 2020-11-30 ENCOUNTER — OFFICE VISIT (OUTPATIENT)
Dept: PRIMARY CARE CLINIC | Facility: CLINIC | Age: 40
End: 2020-11-30
Payer: MEDICARE

## 2020-11-30 VITALS
BODY MASS INDEX: 26.61 KG/M2 | SYSTOLIC BLOOD PRESSURE: 124 MMHG | HEIGHT: 72 IN | OXYGEN SATURATION: 99 % | HEART RATE: 78 BPM | DIASTOLIC BLOOD PRESSURE: 82 MMHG

## 2020-11-30 DIAGNOSIS — R46.89 SELF NEGLECT: ICD-10-CM

## 2020-11-30 DIAGNOSIS — J30.9 ALLERGIC SINUSITIS: ICD-10-CM

## 2020-11-30 DIAGNOSIS — G40.909 SEIZURE DISORDER: ICD-10-CM

## 2020-11-30 DIAGNOSIS — E55.9 VITAMIN D DEFICIENCY: ICD-10-CM

## 2020-11-30 DIAGNOSIS — Z66 DNR (DO NOT RESUSCITATE): ICD-10-CM

## 2020-11-30 DIAGNOSIS — R53.2 FUNCTIONAL QUADRIPLEGIA: ICD-10-CM

## 2020-11-30 DIAGNOSIS — G91.9 HYDROCEPHALUS, UNSPECIFIED TYPE: ICD-10-CM

## 2020-11-30 DIAGNOSIS — G80.0 SPASTIC QUADRIPLEGIC CEREBRAL PALSY: ICD-10-CM

## 2020-11-30 DIAGNOSIS — T85.618D SHUNT MALFUNCTION, SUBSEQUENT ENCOUNTER: ICD-10-CM

## 2020-11-30 PROCEDURE — 99215 PR OFFICE/OUTPT VISIT, EST, LEVL V, 40-54 MIN: ICD-10-PCS | Mod: S$GLB,,, | Performed by: INTERNAL MEDICINE

## 2020-11-30 PROCEDURE — 3079F PR MOST RECENT DIASTOLIC BLOOD PRESSURE 80-89 MM HG: ICD-10-PCS | Mod: CPTII,S$GLB,, | Performed by: INTERNAL MEDICINE

## 2020-11-30 PROCEDURE — 99499 UNLISTED E&M SERVICE: CPT | Mod: S$GLB,,, | Performed by: INTERNAL MEDICINE

## 2020-11-30 PROCEDURE — 3008F PR BODY MASS INDEX (BMI) DOCUMENTED: ICD-10-PCS | Mod: CPTII,S$GLB,, | Performed by: INTERNAL MEDICINE

## 2020-11-30 PROCEDURE — 3074F SYST BP LT 130 MM HG: CPT | Mod: CPTII,S$GLB,, | Performed by: INTERNAL MEDICINE

## 2020-11-30 PROCEDURE — 3079F DIAST BP 80-89 MM HG: CPT | Mod: CPTII,S$GLB,, | Performed by: INTERNAL MEDICINE

## 2020-11-30 PROCEDURE — 99497 PR ADVNCD CARE PLAN 30 MIN: ICD-10-PCS | Mod: S$GLB,,, | Performed by: INTERNAL MEDICINE

## 2020-11-30 PROCEDURE — 1126F AMNT PAIN NOTED NONE PRSNT: CPT | Mod: S$GLB,,, | Performed by: INTERNAL MEDICINE

## 2020-11-30 PROCEDURE — 99215 OFFICE O/P EST HI 40 MIN: CPT | Mod: S$GLB,,, | Performed by: INTERNAL MEDICINE

## 2020-11-30 PROCEDURE — 99499 RISK ADDL DX/OHS AUDIT: ICD-10-PCS | Mod: S$GLB,,, | Performed by: INTERNAL MEDICINE

## 2020-11-30 PROCEDURE — 1126F PR PAIN SEVERITY QUANTIFIED, NO PAIN PRESENT: ICD-10-PCS | Mod: S$GLB,,, | Performed by: INTERNAL MEDICINE

## 2020-11-30 PROCEDURE — 3008F BODY MASS INDEX DOCD: CPT | Mod: CPTII,S$GLB,, | Performed by: INTERNAL MEDICINE

## 2020-11-30 PROCEDURE — 3074F PR MOST RECENT SYSTOLIC BLOOD PRESSURE < 130 MM HG: ICD-10-PCS | Mod: CPTII,S$GLB,, | Performed by: INTERNAL MEDICINE

## 2020-11-30 PROCEDURE — 99497 ADVNCD CARE PLAN 30 MIN: CPT | Mod: S$GLB,,, | Performed by: INTERNAL MEDICINE

## 2020-11-30 RX ORDER — LEVOCETIRIZINE DIHYDROCHLORIDE 5 MG/1
5 TABLET, FILM COATED ORAL NIGHTLY PRN
Qty: 90 TABLET | Refills: 0 | Status: SHIPPED | OUTPATIENT
Start: 2020-11-30 | End: 2022-10-18

## 2020-11-30 RX ORDER — VIT C/E/ZN/COPPR/LUTEIN/ZEAXAN 250MG-90MG
1000 CAPSULE ORAL DAILY
Qty: 90 CAPSULE | Refills: 3 | Status: SHIPPED | OUTPATIENT
Start: 2020-11-30 | End: 2022-10-18 | Stop reason: SDUPTHER

## 2020-11-30 RX ORDER — DIVALPROEX SODIUM 250 MG/1
250 TABLET, DELAYED RELEASE ORAL NIGHTLY
Qty: 90 TABLET | Refills: 3 | Status: SHIPPED | OUTPATIENT
Start: 2020-11-30 | End: 2021-03-01 | Stop reason: SDUPTHER

## 2020-11-30 RX ORDER — DIVALPROEX SODIUM 500 MG/1
500 TABLET, DELAYED RELEASE ORAL EVERY 12 HOURS
Qty: 180 TABLET | Refills: 3 | Status: SHIPPED | OUTPATIENT
Start: 2020-11-30 | End: 2021-03-01 | Stop reason: SDUPTHER

## 2020-11-30 NOTE — ASSESSMENT & PLAN NOTE
"Revised in 2013 with Dr Das. Had "CSF collection in the abdomen that may be a pseudocyst"  · Monitor  · Unclear benefit of NS follow-up at this time  · Will readdress with HCPoA if any acute symptomatic change  · Discussed with PoA DNR status  "

## 2020-11-30 NOTE — ASSESSMENT & PLAN NOTE
24hr care in the home, has PoA   · Continue Neuro follow-up  · Unclear benefit of NS follow-up at this time

## 2020-11-30 NOTE — ASSESSMENT & PLAN NOTE
LaPOST completed with HCPoA and on 11/30/20 with comfort care focus. Patient also has Ad Directive and HCPoA forms from 2015.  · Chart updated

## 2020-11-30 NOTE — PROGRESS NOTES
"Primary Care Provider Appointment- [facility]    Subjective:      Patient ID: Leonid Cox is a 40 y.o. male with CP, hydrocephalus, HTN, seizure, congenital hydrocephalus     Chief Complaint: Establish Care    New patient to [facility], was referred by Dr Smith. Patient has developmental delays. Is dependent on sitters. Has 24-hr care in the home with [employer].     Last seen by Neuro in 10/2019 for spastic quadriparesis and cerebral palsy. Advised annual follow-up. Has f/u on 3/1/2021 with Dr Gorman.    Has extensive contractures. Has never established care with PMR. Per his previous PCP this was recommended, but it is unclear to this provider whether this will impact patient's quality of life.    He has no complaints today. Is unable to answer any questions about his health. Does not remember clinical events (from chart or recent occurrences) accurately.    Had complications with his  shunt in 2012, this was revised. In 2013 with Dr Das. Was a complicated procedure with abdominal pseudocyst of CSF fluid collection. Seems to have no complications at this time.    Advance Care Planning   Mom passed away many years ago and his affairs seem to be managed by Shalondarocio Dorman who we believe was close to his mom prior to her passing. She has HCPOA but has struggling to be more involved in medical decision making for the patient. She relies heavily on [employer] to coordinate his care"     Supervisor, Yasmin Miguel is no longer with the company. Here with Belén today. [phone] is the contact given to resolve any problems with missed appointments. Brenden Omer is his the .    Ms Dorman was called by PCP today and reviewed LaPOST by phone. She reiterated comfort-focused care. DNR status was reviewed and Ms Dorman was in agreement with this. Astrid compelted with DNR status and comfort focus goal. Astrid signed bty [employer] Representative, Ms Linda Omer " (his the ) with Ms Dorman on the phone.           Past Surgical History:   Procedure Laterality Date    FOOT SURGERY      SHUNT REVISION      TENDON RELEASE      TOTAL HIP ARTHROPLASTY         Past Medical History:   Diagnosis Date    Blind     Cerebral palsy     Hydrocephalus     Hypertension     Seizure disorder     Seizures     Urinary reflux        Social History     Socioeconomic History    Marital status: Single     Spouse name: Not on file    Number of children: Not on file    Years of education: Not on file    Highest education level: Not on file   Occupational History    Not on file   Social Needs    Financial resource strain: Not on file    Food insecurity     Worry: Not on file     Inability: Not on file    Transportation needs     Medical: Not on file     Non-medical: Not on file   Tobacco Use    Smoking status: Never Smoker    Smokeless tobacco: Never Used   Substance and Sexual Activity    Alcohol use: No    Drug use: No    Sexual activity: Not on file   Lifestyle    Physical activity     Days per week: Not on file     Minutes per session: Not on file    Stress: Not on file   Relationships    Social connections     Talks on phone: Not on file     Gets together: Not on file     Attends Yarsani service: Not on file     Active member of club or organization: Not on file     Attends meetings of clubs or organizations: Not on file     Relationship status: Not on file   Other Topics Concern    Not on file   Social History Narrative    Lives in property owned by grandmother. Disabled with [employer] caretakers 24hrs to patient. Primary caretaker Sherri takes care of him. Shalonda is the active caretaker, but there is no official legal power of .         Mother passed away 12/2013       Review of Systems   Constitutional: Positive for activity change and appetite change.   Musculoskeletal: Positive for gait problem.   Neurological: Positive for  facial asymmetry.   Psychiatric/Behavioral: Positive for confusion, decreased concentration and dysphoric mood.       Objective:   /82 (BP Location: Left arm, Patient Position: Sitting, BP Method: Medium (Manual))   Pulse 78   Ht 6' (1.829 m)   SpO2 99%   BMI 26.61 kg/m²     Physical Exam  Constitutional:       Comments: Wheelchair bound   HENT:      Head:      Comments: Swings head from side to side repeatedly  Stares at ceiling  Musculoskeletal:         General: Deformity present.   Psychiatric:      Comments: No insight, no capacity             Lab Results   Component Value Date    WBC 6.04 06/29/2020    HGB 14.5 06/29/2020    HCT 45.8 06/29/2020     06/29/2020    CHOL 141 03/28/2018    TRIG 87 03/28/2018    HDL 39 (L) 03/28/2018    ALT 14 06/29/2020    AST 10 06/29/2020     06/29/2020    K 4.0 06/29/2020     06/29/2020    CREATININE 0.8 06/29/2020    BUN 14 06/29/2020    CO2 30 (H) 06/29/2020    TSH 1.748 06/02/2014    INR 0.9 10/03/2018    HGBA1C 5.3 12/14/2015       Current Outpatient Medications on File Prior to Visit   Medication Sig Dispense Refill    triamcinolone acetonide 0.1% (KENALOG) 0.1 % cream Apply topically 2 (two) times daily as needed. to reduce redness and itching 28.4 g 1    [DISCONTINUED] celecoxib (CELEBREX) 200 MG capsule Take 1 capsule (200 mg total) by mouth daily as needed for Pain. 56 capsule 1    [DISCONTINUED] cholecalciferol, vitamin D3, (VITAMIN D3) 25 mcg (1,000 unit) capsule Take 1 capsule (1,000 Units total) by mouth once daily. 90 capsule 0    [DISCONTINUED] divalproex (DEPAKOTE) 250 MG EC tablet TAKE 1 TABLET BY MOUTH EVERY EVENING 30 tablet 0    [DISCONTINUED] divalproex (DEPAKOTE) 500 MG TbEC TAKE 1 TABLET BY MOUTH EVERY 12 HOURS 60 tablet 0    [DISCONTINUED] levocetirizine (XYZAL) 5 MG tablet Take 1 tablet (5 mg total) by mouth nightly as needed for Allergies. 90 tablet 0     No current facility-administered medications on file prior to  "visit.          Assessment:   40 y.o. male with multiple co-morbid illnesses here to establish care with new PCP and continue work-up of chronic issues notably with CP, hydrocephalus, HTN, seizure, congenital hydrocephalus .     Plan:     Problem List Items Addressed This Visit        Neuro    Cerebral palsy     24hr care in the home, has PoA   · Continue Neuro follow-up  · Unclear benefit of NS follow-up at this time         Hydrocephalus     Since birth, shunt revised by Dr Das in 2013  · copntinu Neuro f/u annually         Seizure disorder     Controlled on depakote. Has not had a seizure "in years".  · Continue depakote  · Has neuro appt upcoming         Relevant Medications    divalproex (DEPAKOTE) 250 MG EC tablet    divalproex (DEPAKOTE) 500 MG TbEC       Psychiatric    Dependence for activities of daily living       Palliative Care    DNR (do not resuscitate)     LaPOST completed with HCPoA and on 11/30/20 with comfort care focus. Patient also has Ad Directive and HCPoA forms from 2015.  · Chart updated             Other    Shunt malfunction     Revised in 2013 with Dr Das. Had "CSF collection in the abdomen that may be a pseudocyst"  · Monitor  · Unclear benefit of NS follow-up at this time  · Will readdress with HCPoA if any acute symptomatic change  · Discussed with PoA DNR status         Functional quadriplegia     Related to CP, wheelchair bound, dependent for all ADLs  · Continue advise for 24 hour sitters           Other Visit Diagnoses     Vitamin D deficiency        Relevant Medications    cholecalciferol, vitamin D3, (VITAMIN D3) 25 mcg (1,000 unit) capsule    Allergic sinusitis        Relevant Medications    levocetirizine (XYZAL) 5 MG tablet          Health Maintenance       Date Due Completion Date    Influenza Vaccine (1) 06/30/2021 (Originally 8/1/2020) 1/29/2020    TETANUS VACCINE 09/24/2022 9/24/2012    Lipid Panel 03/28/2023 3/28/2018          Follow up in about 2 months (around 1/30/2021). " Total face-to-face time was 90 min, >50% of this was spent on counseling and coordination of care. The following issues were discussed: with CP, hydrocephalus, HTN, seizure, congenital hydrocephalus. 30min spent on advanced care planning. LaPOST completed, and patient is DNR.     Anushka Telles MD/MPH  [facility]  [phone]

## 2020-11-30 NOTE — LETTER
November 30, 2020      Hoang Smith, DO  [address]  [place] [address]           [facility]  [address]  [facility] [address]  Phone: [phone]  Fax: [phone]          Patient: Leonid Cox   MR Number: [id number]   YOB: 1980   Date of Visit: 11/30/2020       Dear Dr. Hoang Smith:    Thank you for referring Leonid Cox to me for evaluation. Attached you will find relevant portions of my assessment and plan of care.    If you have questions, please do not hesitate to call me. I look forward to following Leonid Cox along with you.    Sincerely,    Anushka Telles MD    Enclosure  CC:  No Recipients    If you would like to receive this communication electronically, please contact [email or web address] or [phone] to request more information on EpicCare Link access.    For providers and/or their staff who would like to refer a patient to [facility], please contact us through our one-stop-shop provider referral line, [facility], at [phone].    If you feel you have received this communication in error or would no longer like to receive these types of communications, please e-mail [email or web address]

## 2020-11-30 NOTE — ASSESSMENT & PLAN NOTE
"Controlled on depakote. Has not had a seizure "in years".  · Continue depakote  · Has neuro appt upcoming  "

## 2020-12-11 ENCOUNTER — PATIENT MESSAGE (OUTPATIENT)
Dept: OTHER | Facility: OTHER | Age: 40
End: 2020-12-11

## 2020-12-16 ENCOUNTER — NURSE TRIAGE (OUTPATIENT)
Dept: ADMINISTRATIVE | Facility: CLINIC | Age: 40
End: 2020-12-16

## 2020-12-16 NOTE — TELEPHONE ENCOUNTER
's , Belén Hou, is calling with a question about COVID. A staff member who works at the home where the patient lives is getting tested for COVID.The staff member's daughter tested positive for COVID. The  is wanting to know if the patient will need to quarantine at this time. Notified social work that the patient will not need to quarantine, as he was not in direct contact with the staff member's daughter. If the staff member, however, test positive then a quarantine will be necessary.    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 who was NOT using all recommended personal protective equipment (i.e., a respirator-N95 mask, eye protection, gloves, and gown) AND [3] NO symptoms   Negative: [1] COVID-19 EXPOSURE (Close Contact) AND [2] within last 14 days BUT [2] NO symptoms   Negative: [1] COVID-19 EXPOSURE AND [2] 15 or more days ago AND [3] NO symptoms   Negative: [1] Travel from area with community spread (identified by CDC) AND [2] within last 14 days BUT [3] NO symptoms   Negative: [1] Living in area with community spread (identified by local PHD) BUT [2] NO symptoms    Protocols used: CORONAVIRUS (COVID-19) EXPOSURE-A-[place]

## 2021-02-19 ENCOUNTER — TELEPHONE (OUTPATIENT)
Dept: PRIMARY CARE CLINIC | Facility: CLINIC | Age: 41
End: 2021-02-19

## 2021-02-19 DIAGNOSIS — G80.0 SPASTIC QUADRIPLEGIC CEREBRAL PALSY: Primary | ICD-10-CM

## 2021-02-26 ENCOUNTER — OFFICE VISIT (OUTPATIENT)
Dept: PRIMARY CARE CLINIC | Facility: CLINIC | Age: 41
End: 2021-02-26
Payer: MEDICARE

## 2021-02-26 DIAGNOSIS — M25.562 ACUTE PAIN OF BOTH KNEES: ICD-10-CM

## 2021-02-26 DIAGNOSIS — M25.561 ACUTE PAIN OF BOTH KNEES: ICD-10-CM

## 2021-02-26 DIAGNOSIS — R45.1 AGITATION: ICD-10-CM

## 2021-02-26 DIAGNOSIS — R53.2 FUNCTIONAL QUADRIPLEGIA: ICD-10-CM

## 2021-02-26 PROCEDURE — 99443 PR PHYSICIAN TELEPHONE EVALUATION 21-30 MIN: CPT | Mod: 95,,, | Performed by: INTERNAL MEDICINE

## 2021-02-26 PROCEDURE — 99443 PR PHYSICIAN TELEPHONE EVALUATION 21-30 MIN: ICD-10-PCS | Mod: 95,,, | Performed by: INTERNAL MEDICINE

## 2021-03-01 ENCOUNTER — OFFICE VISIT (OUTPATIENT)
Dept: NEUROLOGY | Facility: CLINIC | Age: 41
End: 2021-03-01
Payer: MEDICARE

## 2021-03-01 VITALS
SYSTOLIC BLOOD PRESSURE: 145 MMHG | BODY MASS INDEX: 26.61 KG/M2 | HEART RATE: 68 BPM | HEIGHT: 72 IN | DIASTOLIC BLOOD PRESSURE: 95 MMHG

## 2021-03-01 DIAGNOSIS — G40.909 SEIZURE DISORDER: ICD-10-CM

## 2021-03-01 DIAGNOSIS — G80.0 SPASTIC QUADRIPLEGIC CEREBRAL PALSY: ICD-10-CM

## 2021-03-01 DIAGNOSIS — G91.9 HYDROCEPHALUS, UNSPECIFIED TYPE: ICD-10-CM

## 2021-03-01 PROCEDURE — 3080F PR MOST RECENT DIASTOLIC BLOOD PRESSURE >= 90 MM HG: ICD-10-PCS | Mod: CPTII,S$GLB,, | Performed by: PSYCHIATRY & NEUROLOGY

## 2021-03-01 PROCEDURE — 3077F SYST BP >= 140 MM HG: CPT | Mod: CPTII,S$GLB,, | Performed by: PSYCHIATRY & NEUROLOGY

## 2021-03-01 PROCEDURE — 99214 PR OFFICE/OUTPT VISIT, EST, LEVL IV, 30-39 MIN: ICD-10-PCS | Mod: S$GLB,,, | Performed by: PSYCHIATRY & NEUROLOGY

## 2021-03-01 PROCEDURE — 99999 PR PBB SHADOW E&M-EST. PATIENT-LVL III: ICD-10-PCS | Mod: PBBFAC,,, | Performed by: PSYCHIATRY & NEUROLOGY

## 2021-03-01 PROCEDURE — 3008F PR BODY MASS INDEX (BMI) DOCUMENTED: ICD-10-PCS | Mod: CPTII,S$GLB,, | Performed by: PSYCHIATRY & NEUROLOGY

## 2021-03-01 PROCEDURE — 99214 OFFICE O/P EST MOD 30 MIN: CPT | Mod: S$GLB,,, | Performed by: PSYCHIATRY & NEUROLOGY

## 2021-03-01 PROCEDURE — 99999 PR PBB SHADOW E&M-EST. PATIENT-LVL III: CPT | Mod: PBBFAC,,, | Performed by: PSYCHIATRY & NEUROLOGY

## 2021-03-01 PROCEDURE — 3080F DIAST BP >= 90 MM HG: CPT | Mod: CPTII,S$GLB,, | Performed by: PSYCHIATRY & NEUROLOGY

## 2021-03-01 PROCEDURE — 99499 UNLISTED E&M SERVICE: CPT | Mod: S$GLB,,, | Performed by: PSYCHIATRY & NEUROLOGY

## 2021-03-01 PROCEDURE — 99499 RISK ADDL DX/OHS AUDIT: ICD-10-PCS | Mod: S$GLB,,, | Performed by: PSYCHIATRY & NEUROLOGY

## 2021-03-01 PROCEDURE — 3008F BODY MASS INDEX DOCD: CPT | Mod: CPTII,S$GLB,, | Performed by: PSYCHIATRY & NEUROLOGY

## 2021-03-01 PROCEDURE — 3077F PR MOST RECENT SYSTOLIC BLOOD PRESSURE >= 140 MM HG: ICD-10-PCS | Mod: CPTII,S$GLB,, | Performed by: PSYCHIATRY & NEUROLOGY

## 2021-03-01 RX ORDER — DIVALPROEX SODIUM 250 MG/1
250 TABLET, DELAYED RELEASE ORAL NIGHTLY
Qty: 90 TABLET | Refills: 3 | Status: SHIPPED | OUTPATIENT
Start: 2021-03-01 | End: 2021-11-19 | Stop reason: SDUPTHER

## 2021-03-01 RX ORDER — DIVALPROEX SODIUM 500 MG/1
500 TABLET, DELAYED RELEASE ORAL EVERY 12 HOURS
Qty: 180 TABLET | Refills: 3 | Status: SHIPPED | OUTPATIENT
Start: 2021-03-01 | End: 2021-12-20 | Stop reason: SDUPTHER

## 2021-03-26 ENCOUNTER — OFFICE VISIT (OUTPATIENT)
Dept: PRIMARY CARE CLINIC | Facility: CLINIC | Age: 41
End: 2021-03-26
Payer: MEDICARE

## 2021-03-26 DIAGNOSIS — R45.1 AGITATION: ICD-10-CM

## 2021-03-26 DIAGNOSIS — R53.2 FUNCTIONAL QUADRIPLEGIA: ICD-10-CM

## 2021-03-26 PROCEDURE — 99443 PR PHYSICIAN TELEPHONE EVALUATION 21-30 MIN: ICD-10-PCS | Mod: 95,,, | Performed by: INTERNAL MEDICINE

## 2021-03-26 PROCEDURE — 99443 PR PHYSICIAN TELEPHONE EVALUATION 21-30 MIN: CPT | Mod: 95,,, | Performed by: INTERNAL MEDICINE

## 2021-04-26 ENCOUNTER — TELEPHONE (OUTPATIENT)
Dept: PRIMARY CARE CLINIC | Facility: CLINIC | Age: 41
End: 2021-04-26

## 2021-05-03 ENCOUNTER — TELEPHONE (OUTPATIENT)
Dept: PRIMARY CARE CLINIC | Facility: CLINIC | Age: 41
End: 2021-05-03

## 2021-05-05 ENCOUNTER — PATIENT MESSAGE (OUTPATIENT)
Dept: PRIMARY CARE CLINIC | Facility: CLINIC | Age: 41
End: 2021-05-05

## 2021-05-07 ENCOUNTER — OFFICE VISIT (OUTPATIENT)
Dept: PRIMARY CARE CLINIC | Facility: CLINIC | Age: 41
End: 2021-05-07
Payer: MEDICARE

## 2021-05-07 DIAGNOSIS — R60.0 PERIPHERAL EDEMA: ICD-10-CM

## 2021-05-07 PROCEDURE — 99443 PR PHYSICIAN TELEPHONE EVALUATION 21-30 MIN: CPT | Mod: 95,,, | Performed by: INTERNAL MEDICINE

## 2021-05-07 PROCEDURE — 99443 PR PHYSICIAN TELEPHONE EVALUATION 21-30 MIN: ICD-10-PCS | Mod: 95,,, | Performed by: INTERNAL MEDICINE

## 2021-05-10 ENCOUNTER — TELEPHONE (OUTPATIENT)
Dept: PRIMARY CARE CLINIC | Facility: CLINIC | Age: 41
End: 2021-05-10

## 2021-06-25 ENCOUNTER — HOSPITAL ENCOUNTER (EMERGENCY)
Facility: HOSPITAL | Age: 41
Discharge: HOME | End: 2021-06-25
Attending: EMERGENCY MEDICINE
Payer: MEDICARE

## 2021-06-25 VITALS
SYSTOLIC BLOOD PRESSURE: 149 MMHG | BODY MASS INDEX: 25.87 KG/M2 | WEIGHT: 191 LBS | OXYGEN SATURATION: 98 % | HEART RATE: 84 BPM | TEMPERATURE: 98 F | RESPIRATION RATE: 18 BRPM | DIASTOLIC BLOOD PRESSURE: 77 MMHG | HEIGHT: 72 IN

## 2021-06-25 DIAGNOSIS — S30.1XXA CONTUSION OF ABDOMINAL WALL, INITIAL ENCOUNTER: Primary | ICD-10-CM

## 2021-06-25 LAB
ALBUMIN SERPL-MCNC: 3.6 G/DL (ref 3.3–5.5)
ALP SERPL-CCNC: 63 U/L (ref 42–141)
BILIRUB SERPL-MCNC: 0.5 MG/DL (ref 0.2–1.6)
BUN SERPL-MCNC: 14 MG/DL (ref 7–22)
CALCIUM SERPL-MCNC: 10 MG/DL (ref 8–10.3)
CHLORIDE SERPL-SCNC: 105 MMOL/L (ref 98–108)
CREAT SERPL-MCNC: 0.8 MG/DL (ref 0.6–1.2)
GLUCOSE SERPL-MCNC: 136 MG/DL (ref 73–118)
POC ALT (SGPT): 28 U/L (ref 10–47)
POC AST (SGOT): 25 U/L (ref 11–38)
POC PTINR: 1.2 (ref 0.9–1.2)
POC PTWBT: 14 SEC (ref 9.7–14.3)
POC TCO2: 31 MMOL/L (ref 18–33)
POTASSIUM BLD-SCNC: 3.7 MMOL/L (ref 3.6–5.1)
PROTEIN, POC: 7.5 G/DL (ref 6.4–8.1)
SAMPLE: NORMAL
SODIUM BLD-SCNC: 145 MMOL/L (ref 128–145)
VALPROATE SERPL-MCNC: 78.2 UG/ML (ref 50–100)

## 2021-06-25 PROCEDURE — 80053 COMPREHEN METABOLIC PANEL: CPT | Mod: ER

## 2021-06-25 PROCEDURE — 85610 PROTHROMBIN TIME: CPT | Mod: ER

## 2021-06-25 PROCEDURE — 99284 EMERGENCY DEPT VISIT MOD MDM: CPT | Mod: 25,ER

## 2021-06-25 PROCEDURE — 80164 ASSAY DIPROPYLACETIC ACD TOT: CPT | Performed by: EMERGENCY MEDICINE

## 2021-06-25 PROCEDURE — 85025 COMPLETE CBC W/AUTO DIFF WBC: CPT | Mod: ER

## 2021-06-26 ENCOUNTER — HOSPITAL ENCOUNTER (EMERGENCY)
Facility: HOSPITAL | Age: 41
Discharge: HOME | End: 2021-06-26
Attending: EMERGENCY MEDICINE
Payer: MEDICARE

## 2021-06-26 ENCOUNTER — PATIENT MESSAGE (OUTPATIENT)
Dept: PRIMARY CARE CLINIC | Facility: CLINIC | Age: 41
End: 2021-06-26

## 2021-06-26 VITALS
DIASTOLIC BLOOD PRESSURE: 116 MMHG | TEMPERATURE: 99 F | HEART RATE: 100 BPM | OXYGEN SATURATION: 94 % | SYSTOLIC BLOOD PRESSURE: 148 MMHG | BODY MASS INDEX: 25.9 KG/M2 | RESPIRATION RATE: 20 BRPM | WEIGHT: 191 LBS

## 2021-06-26 DIAGNOSIS — S30.1XXD CONTUSION OF ABDOMINAL WALL, SUBSEQUENT ENCOUNTER: Primary | ICD-10-CM

## 2021-06-26 LAB
BUN SERPL-MCNC: 14 MG/DL (ref 6–30)
CHLORIDE SERPL-SCNC: 103 MMOL/L (ref 95–110)
CREAT SERPL-MCNC: 0.8 MG/DL (ref 0.5–1.4)
GLUCOSE SERPL-MCNC: 97 MG/DL (ref 70–110)
HCT VFR BLD CALC: 44 %PCV (ref 36–54)
POC IONIZED CALCIUM: 1.14 MMOL/L (ref 1.06–1.42)
POC TCO2 (MEASURED): 24 MMOL/L (ref 23–29)
POTASSIUM BLD-SCNC: 4.3 MMOL/L (ref 3.5–5.1)
SAMPLE: NORMAL
SODIUM BLD-SCNC: 141 MMOL/L (ref 136–145)

## 2021-06-26 PROCEDURE — 99284 PR EMERGENCY DEPT VISIT,LEVEL IV: ICD-10-PCS | Mod: ,,, | Performed by: EMERGENCY MEDICINE

## 2021-06-26 PROCEDURE — 99281 EMR DPT VST MAYX REQ PHY/QHP: CPT

## 2021-06-26 PROCEDURE — 99284 EMERGENCY DEPT VISIT MOD MDM: CPT | Mod: ,,, | Performed by: EMERGENCY MEDICINE

## 2021-06-28 ENCOUNTER — PATIENT MESSAGE (OUTPATIENT)
Dept: PRIMARY CARE CLINIC | Facility: CLINIC | Age: 41
End: 2021-06-28

## 2021-06-29 ENCOUNTER — TELEPHONE (OUTPATIENT)
Dept: INTERNAL MEDICINE | Facility: CLINIC | Age: 41
End: 2021-06-29

## 2021-07-02 ENCOUNTER — TELEPHONE (OUTPATIENT)
Dept: INTERNAL MEDICINE | Facility: CLINIC | Age: 41
End: 2021-07-02

## 2021-07-02 ENCOUNTER — OFFICE VISIT (OUTPATIENT)
Dept: PRIMARY CARE CLINIC | Facility: CLINIC | Age: 41
End: 2021-07-02
Payer: MEDICARE

## 2021-07-02 DIAGNOSIS — S30.1XXA CONTUSION OF ABDOMINAL WALL, INITIAL ENCOUNTER: ICD-10-CM

## 2021-07-02 PROCEDURE — 99443 PR PHYSICIAN TELEPHONE EVALUATION 21-30 MIN: CPT | Mod: 95,,, | Performed by: INTERNAL MEDICINE

## 2021-07-02 PROCEDURE — 99443 PR PHYSICIAN TELEPHONE EVALUATION 21-30 MIN: ICD-10-PCS | Mod: 95,,, | Performed by: INTERNAL MEDICINE

## 2021-07-13 ENCOUNTER — TELEPHONE (OUTPATIENT)
Dept: PRIMARY CARE CLINIC | Facility: CLINIC | Age: 41
End: 2021-07-13

## 2021-09-09 ENCOUNTER — TELEPHONE (OUTPATIENT)
Dept: PRIMARY CARE CLINIC | Facility: CLINIC | Age: 41
End: 2021-09-09

## 2021-09-10 ENCOUNTER — OFFICE VISIT (OUTPATIENT)
Dept: PRIMARY CARE CLINIC | Facility: CLINIC | Age: 41
End: 2021-09-10
Payer: MEDICARE

## 2021-09-10 ENCOUNTER — TELEPHONE (OUTPATIENT)
Dept: PRIMARY CARE CLINIC | Facility: CLINIC | Age: 41
End: 2021-09-10

## 2021-09-10 DIAGNOSIS — R46.89 SELF NEGLECT: ICD-10-CM

## 2021-09-10 DIAGNOSIS — R53.2 FUNCTIONAL QUADRIPLEGIA: ICD-10-CM

## 2021-09-10 PROCEDURE — 99442 PR PHYSICIAN TELEPHONE EVALUATION 11-20 MIN: ICD-10-PCS | Mod: 95,,, | Performed by: INTERNAL MEDICINE

## 2021-09-10 PROCEDURE — 99442 PR PHYSICIAN TELEPHONE EVALUATION 11-20 MIN: CPT | Mod: 95,,, | Performed by: INTERNAL MEDICINE

## 2021-09-22 ENCOUNTER — TELEPHONE (OUTPATIENT)
Dept: PRIMARY CARE CLINIC | Facility: CLINIC | Age: 41
End: 2021-09-22

## 2021-09-28 ENCOUNTER — TELEPHONE (OUTPATIENT)
Dept: PRIMARY CARE CLINIC | Facility: CLINIC | Age: 41
End: 2021-09-28

## 2021-09-28 DIAGNOSIS — R53.2 FUNCTIONAL QUADRIPLEGIA: Primary | ICD-10-CM

## 2021-09-30 ENCOUNTER — TELEPHONE (OUTPATIENT)
Dept: PRIMARY CARE CLINIC | Facility: CLINIC | Age: 41
End: 2021-09-30

## 2021-10-01 ENCOUNTER — TELEPHONE (OUTPATIENT)
Dept: PRIMARY CARE CLINIC | Facility: CLINIC | Age: 41
End: 2021-10-01

## 2021-10-04 ENCOUNTER — TELEPHONE (OUTPATIENT)
Dept: PRIMARY CARE CLINIC | Facility: CLINIC | Age: 41
End: 2021-10-04

## 2021-10-28 ENCOUNTER — PATIENT MESSAGE (OUTPATIENT)
Dept: PRIMARY CARE CLINIC | Facility: CLINIC | Age: 41
End: 2021-10-28
Payer: MEDICARE

## 2021-11-02 RX ORDER — TRIAMCINOLONE ACETONIDE 1 MG/G
CREAM TOPICAL 2 TIMES DAILY PRN
Qty: 28.4 G | Refills: 1 | Status: SHIPPED | OUTPATIENT
Start: 2021-11-02 | End: 2023-04-17

## 2021-11-02 RX ORDER — CELECOXIB 200 MG/1
200 CAPSULE ORAL DAILY
Qty: 90 CAPSULE | Refills: 3 | Status: SHIPPED | OUTPATIENT
Start: 2021-11-02 | End: 2021-11-24 | Stop reason: SDUPTHER

## 2021-11-24 RX ORDER — CELECOXIB 200 MG/1
200 CAPSULE ORAL DAILY PRN
Qty: 90 CAPSULE | Refills: 3 | Status: SHIPPED | OUTPATIENT
Start: 2021-11-24 | End: 2022-10-18

## 2021-11-26 ENCOUNTER — HOSPITAL ENCOUNTER (EMERGENCY)
Facility: HOSPITAL | Age: 41
Discharge: HOME | End: 2021-11-26
Attending: EMERGENCY MEDICINE
Payer: MEDICARE

## 2021-11-26 VITALS
HEIGHT: 72 IN | OXYGEN SATURATION: 100 % | TEMPERATURE: 98 F | SYSTOLIC BLOOD PRESSURE: 189 MMHG | BODY MASS INDEX: 23.7 KG/M2 | HEART RATE: 86 BPM | RESPIRATION RATE: 18 BRPM | WEIGHT: 175 LBS | DIASTOLIC BLOOD PRESSURE: 93 MMHG

## 2021-11-26 DIAGNOSIS — S30.1XXA CONTUSION OF ABDOMINAL WALL, INITIAL ENCOUNTER: Primary | ICD-10-CM

## 2021-11-26 LAB
ALBUMIN SERPL BCP-MCNC: 3.9 G/DL (ref 3.5–5.2)
ALP SERPL-CCNC: 69 U/L (ref 55–135)
ALT SERPL W/O P-5'-P-CCNC: 39 U/L (ref 10–44)
ANION GAP SERPL CALC-SCNC: 12 MMOL/L (ref 8–16)
AST SERPL-CCNC: 22 U/L (ref 10–40)
BASOPHILS # BLD AUTO: 0.05 K/UL (ref 0–0.2)
BASOPHILS NFR BLD: 0.8 % (ref 0–1.9)
BILIRUB SERPL-MCNC: 0.4 MG/DL (ref 0.1–1)
BUN SERPL-MCNC: 14 MG/DL (ref 6–20)
CALCIUM SERPL-MCNC: 10.4 MG/DL (ref 8.7–10.5)
CHLORIDE SERPL-SCNC: 106 MMOL/L (ref 95–110)
CO2 SERPL-SCNC: 24 MMOL/L (ref 23–29)
CREAT SERPL-MCNC: 0.8 MG/DL (ref 0.5–1.4)
DIFFERENTIAL METHOD: ABNORMAL
EOSINOPHIL # BLD AUTO: 0.1 K/UL (ref 0–0.5)
EOSINOPHIL NFR BLD: 1.7 % (ref 0–8)
ERYTHROCYTE [DISTWIDTH] IN BLOOD BY AUTOMATED COUNT: 13.8 % (ref 11.5–14.5)
EST. GFR  (AFRICAN AMERICAN): >60 ML/MIN/1.73 M^2
EST. GFR  (NON AFRICAN AMERICAN): >60 ML/MIN/1.73 M^2
GLUCOSE SERPL-MCNC: 79 MG/DL (ref 70–110)
HCT VFR BLD AUTO: 47.1 % (ref 40–54)
HGB BLD-MCNC: 15.7 G/DL (ref 14–18)
IMM GRANULOCYTES # BLD AUTO: 0.04 K/UL (ref 0–0.04)
IMM GRANULOCYTES NFR BLD AUTO: 0.6 % (ref 0–0.5)
LYMPHOCYTES # BLD AUTO: 2.3 K/UL (ref 1–4.8)
LYMPHOCYTES NFR BLD: 35.1 % (ref 18–48)
MCH RBC QN AUTO: 29.3 PG (ref 27–31)
MCHC RBC AUTO-ENTMCNC: 33.3 G/DL (ref 32–36)
MCV RBC AUTO: 88 FL (ref 82–98)
MONOCYTES # BLD AUTO: 0.8 K/UL (ref 0.3–1)
MONOCYTES NFR BLD: 12.4 % (ref 4–15)
NEUTROPHILS # BLD AUTO: 3.3 K/UL (ref 1.8–7.7)
NEUTROPHILS NFR BLD: 49.4 % (ref 38–73)
NRBC BLD-RTO: 0 /100 WBC
PLATELET # BLD AUTO: 216 K/UL (ref 150–450)
PMV BLD AUTO: 9.2 FL (ref 9.2–12.9)
POTASSIUM SERPL-SCNC: 4.4 MMOL/L (ref 3.5–5.1)
PROT SERPL-MCNC: 7.3 G/DL (ref 6–8.4)
RBC # BLD AUTO: 5.35 M/UL (ref 4.6–6.2)
SODIUM SERPL-SCNC: 142 MMOL/L (ref 136–145)
WBC # BLD AUTO: 6.59 K/UL (ref 3.9–12.7)

## 2021-11-26 PROCEDURE — 63600175 PHARM REV CODE 636 W HCPCS: Performed by: EMERGENCY MEDICINE

## 2021-11-26 PROCEDURE — 99284 EMERGENCY DEPT VISIT MOD MDM: CPT | Mod: ,,, | Performed by: EMERGENCY MEDICINE

## 2021-11-26 PROCEDURE — 99285 EMERGENCY DEPT VISIT HI MDM: CPT | Mod: 25

## 2021-11-26 PROCEDURE — 85025 COMPLETE CBC W/AUTO DIFF WBC: CPT | Performed by: EMERGENCY MEDICINE

## 2021-11-26 PROCEDURE — 96374 THER/PROPH/DIAG INJ IV PUSH: CPT | Mod: 59

## 2021-11-26 PROCEDURE — 25500020 PHARM REV CODE 255: Performed by: EMERGENCY MEDICINE

## 2021-11-26 PROCEDURE — 99284 PR EMERGENCY DEPT VISIT,LEVEL IV: ICD-10-PCS | Mod: ,,, | Performed by: EMERGENCY MEDICINE

## 2021-11-26 PROCEDURE — 80053 COMPREHEN METABOLIC PANEL: CPT | Performed by: EMERGENCY MEDICINE

## 2021-11-26 RX ORDER — LORAZEPAM 2 MG/ML
0.5 INJECTION INTRAMUSCULAR
Status: COMPLETED | OUTPATIENT
Start: 2021-11-26 | End: 2021-11-26

## 2021-11-26 RX ADMIN — LORAZEPAM 0.5 MG: 2 INJECTION INTRAMUSCULAR; INTRAVENOUS at 07:11

## 2021-11-26 RX ADMIN — IOHEXOL 100 ML: 350 INJECTION, SOLUTION INTRAVENOUS at 07:11

## 2021-11-27 ENCOUNTER — PATIENT MESSAGE (OUTPATIENT)
Dept: PRIMARY CARE CLINIC | Facility: CLINIC | Age: 41
End: 2021-11-27
Payer: MEDICARE

## 2021-11-29 ENCOUNTER — TELEPHONE (OUTPATIENT)
Dept: PRIMARY CARE CLINIC | Facility: CLINIC | Age: 41
End: 2021-11-29
Payer: MEDICARE

## 2021-12-10 ENCOUNTER — OFFICE VISIT (OUTPATIENT)
Dept: PRIMARY CARE CLINIC | Facility: CLINIC | Age: 41
End: 2021-12-10
Payer: MEDICARE

## 2021-12-10 DIAGNOSIS — E55.9 VITAMIN D DEFICIENCY: ICD-10-CM

## 2021-12-10 DIAGNOSIS — G40.909 SEIZURE DISORDER: ICD-10-CM

## 2021-12-10 DIAGNOSIS — S30.1XXA CONTUSION OF ABDOMINAL WALL, INITIAL ENCOUNTER: ICD-10-CM

## 2021-12-10 PROCEDURE — 99443 PR PHYSICIAN TELEPHONE EVALUATION 21-30 MIN: CPT | Mod: 95,,, | Performed by: INTERNAL MEDICINE

## 2021-12-10 PROCEDURE — 99443 PR PHYSICIAN TELEPHONE EVALUATION 21-30 MIN: ICD-10-PCS | Mod: 95,,, | Performed by: INTERNAL MEDICINE

## 2021-12-20 DIAGNOSIS — G40.909 SEIZURE DISORDER: ICD-10-CM

## 2021-12-20 DIAGNOSIS — G80.0 SPASTIC QUADRIPLEGIC CEREBRAL PALSY: ICD-10-CM

## 2021-12-20 RX ORDER — DIVALPROEX SODIUM 500 MG/1
TABLET, FILM COATED, EXTENDED RELEASE ORAL
Qty: 180 TABLET | Refills: 3 | Status: SHIPPED | OUTPATIENT
Start: 2021-12-20 | End: 2021-12-20 | Stop reason: SDUPTHER

## 2021-12-20 RX ORDER — DIVALPROEX SODIUM 250 MG/1
250 TABLET, FILM COATED, EXTENDED RELEASE ORAL NIGHTLY
Qty: 90 TABLET | Refills: 3 | Status: SHIPPED | OUTPATIENT
Start: 2021-12-20 | End: 2022-10-18 | Stop reason: SDUPTHER

## 2021-12-20 RX ORDER — DIVALPROEX SODIUM 500 MG/1
500 TABLET, DELAYED RELEASE ORAL EVERY 12 HOURS
Qty: 180 TABLET | Refills: 3 | Status: SHIPPED | OUTPATIENT
Start: 2021-12-20 | End: 2022-10-18 | Stop reason: SDUPTHER

## 2022-01-19 ENCOUNTER — PATIENT MESSAGE (OUTPATIENT)
Dept: PRIMARY CARE CLINIC | Facility: CLINIC | Age: 42
End: 2022-01-19
Payer: MEDICARE

## 2022-01-19 DIAGNOSIS — J30.9 ALLERGIC SINUSITIS: Primary | ICD-10-CM

## 2022-01-19 RX ORDER — BENZONATATE 100 MG/1
100 CAPSULE ORAL 3 TIMES DAILY PRN
Qty: 60 CAPSULE | Refills: 0 | Status: SHIPPED | OUTPATIENT
Start: 2022-01-19 | End: 2022-01-29

## 2022-01-19 RX ORDER — FLUTICASONE PROPIONATE 50 MCG
1 SPRAY, SUSPENSION (ML) NASAL DAILY
Qty: 16 G | Refills: 0 | Status: SHIPPED | OUTPATIENT
Start: 2022-01-19 | End: 2022-10-18 | Stop reason: SDUPTHER

## 2022-01-19 RX ORDER — CETIRIZINE HYDROCHLORIDE 10 MG/1
10 TABLET ORAL DAILY
Qty: 30 TABLET | Refills: 0 | Status: SHIPPED | OUTPATIENT
Start: 2022-01-19 | End: 2022-10-18 | Stop reason: SDUPTHER

## 2022-01-19 RX ORDER — BENZONATATE 100 MG/1
100 CAPSULE ORAL 3 TIMES DAILY PRN
Qty: 60 CAPSULE | Refills: 0 | Status: SHIPPED | OUTPATIENT
Start: 2022-01-19 | End: 2022-01-19 | Stop reason: SDUPTHER

## 2022-01-19 RX ORDER — FLUTICASONE PROPIONATE 50 MCG
1 SPRAY, SUSPENSION (ML) NASAL DAILY
Qty: 16 G | Refills: 0 | Status: SHIPPED | OUTPATIENT
Start: 2022-01-19 | End: 2022-01-19 | Stop reason: SDUPTHER

## 2022-01-19 RX ORDER — CETIRIZINE HYDROCHLORIDE 10 MG/1
10 TABLET ORAL DAILY
Qty: 30 TABLET | Refills: 0 | Status: SHIPPED | OUTPATIENT
Start: 2022-01-19 | End: 2022-01-19 | Stop reason: SDUPTHER

## 2022-01-19 NOTE — TELEPHONE ENCOUNTER
Patient's caretaker called office regarding upper respiratory symptoms.    Flonase, ceterizine and benzonatate.    Thanks,  KJ

## 2022-02-24 ENCOUNTER — PATIENT MESSAGE (OUTPATIENT)
Dept: PRIMARY CARE CLINIC | Facility: CLINIC | Age: 42
End: 2022-02-24
Payer: MEDICARE

## 2022-02-25 ENCOUNTER — TELEPHONE (OUTPATIENT)
Dept: PRIMARY CARE CLINIC | Facility: CLINIC | Age: 42
End: 2022-02-25
Payer: MEDICARE

## 2022-02-25 ENCOUNTER — PATIENT MESSAGE (OUTPATIENT)
Dept: PRIMARY CARE CLINIC | Facility: CLINIC | Age: 42
End: 2022-02-25
Payer: MEDICARE

## 2022-02-25 DIAGNOSIS — G91.9 HYDROCEPHALUS, UNSPECIFIED TYPE: Primary | ICD-10-CM

## 2022-02-25 NOTE — TELEPHONE ENCOUNTER
Mrs. Liz feel someone needs to go out to see Leonid changes his legs and teeth please review the pictures that she sent she feels the reason is, because it seems that his needs are not being mets PLEASE ADVISE

## 2022-03-02 NOTE — TELEPHONE ENCOUNTER
Referral placed to [facility] mobile NP program. This patient needs a home visit.    Mrs Liz is the legal guardian and has concerns over his sitter care in the home.    Thanks,  KJ

## 2022-03-03 ENCOUNTER — PATIENT MESSAGE (OUTPATIENT)
Dept: PRIMARY CARE CLINIC | Facility: CLINIC | Age: 42
End: 2022-03-03
Payer: MEDICARE

## 2022-03-08 ENCOUNTER — PES CALL (OUTPATIENT)
Dept: ADMINISTRATIVE | Facility: CLINIC | Age: 42
End: 2022-03-08
Payer: MEDICARE

## 2022-03-09 ENCOUNTER — PES CALL (OUTPATIENT)
Dept: ADMINISTRATIVE | Facility: CLINIC | Age: 42
End: 2022-03-09
Payer: MEDICARE

## 2022-03-11 ENCOUNTER — PATIENT MESSAGE (OUTPATIENT)
Dept: PRIMARY CARE CLINIC | Facility: CLINIC | Age: 42
End: 2022-03-11
Payer: MEDICARE

## 2022-03-16 ENCOUNTER — OFFICE VISIT (OUTPATIENT)
Dept: PRIMARY CARE CLINIC | Facility: CLINIC | Age: 42
End: 2022-03-16
Payer: MEDICARE

## 2022-03-16 ENCOUNTER — TELEPHONE (OUTPATIENT)
Dept: PRIMARY CARE CLINIC | Facility: CLINIC | Age: 42
End: 2022-03-16

## 2022-03-16 DIAGNOSIS — G80.0 SPASTIC QUADRIPLEGIC CEREBRAL PALSY: ICD-10-CM

## 2022-03-16 DIAGNOSIS — Z66 DNR (DO NOT RESUSCITATE): ICD-10-CM

## 2022-03-16 PROCEDURE — 99443 PR PHYSICIAN TELEPHONE EVALUATION 21-30 MIN: CPT | Mod: 95,,, | Performed by: INTERNAL MEDICINE

## 2022-03-16 PROCEDURE — 99443 PR PHYSICIAN TELEPHONE EVALUATION 21-30 MIN: ICD-10-PCS | Mod: 95,,, | Performed by: INTERNAL MEDICINE

## 2022-03-16 NOTE — ASSESSMENT & PLAN NOTE
LaPOST completed with HCPoA and on 11/30/20 with comfort care focus. Patient also has Ad Directive and HCPoA forms from 2015.  · There is conflict at this time regarding next steps in work-up  · One guardian (Katherine) wants aggressive care  · PoA wants to preserve comfort care focus
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
## 2022-04-01 ENCOUNTER — CARE AT HOME (OUTPATIENT)
Dept: HOME HEALTH SERVICES | Facility: CLINIC | Age: 42
End: 2022-04-01
Payer: MEDICARE

## 2022-04-01 DIAGNOSIS — G91.9 HYDROCEPHALUS, UNSPECIFIED TYPE: ICD-10-CM

## 2022-04-01 DIAGNOSIS — R53.2 FUNCTIONAL QUADRIPLEGIA: ICD-10-CM

## 2022-04-01 DIAGNOSIS — G40.909 SEIZURE DISORDER: Primary | ICD-10-CM

## 2022-04-01 PROCEDURE — 99499 UNLISTED E&M SERVICE: CPT | Mod: S$GLB,,, | Performed by: NURSE PRACTITIONER

## 2022-04-01 PROCEDURE — 99350 HOME/RES VST EST HIGH MDM 60: CPT | Mod: S$GLB,,, | Performed by: NURSE PRACTITIONER

## 2022-04-01 PROCEDURE — 99350 PR HOME VISIT,ESTAB PATIENT,LEVEL IV: ICD-10-PCS | Mod: S$GLB,,, | Performed by: NURSE PRACTITIONER

## 2022-04-04 NOTE — PROGRESS NOTES
[facility]  Medical Home Visit    Visit Date: 2022  Encounter Provider: Nancy Addison  PCP:  Anushka Telles MD    Subjective:      Patient ID: Leonid Cox is a 42 y.o. male.    Consult Requested By:  Dr. Anushka Telles  Reason for Consult:  Establish Care    Leonid is being seen at home due to being seen at home due to physical debility that presents a taxing effort to leave the home, to mitigate high risk of hospital readmission and/or due to the limited availability of reliable or safe options for transportation to the point of access to the provider. Prior to treatment on this visit the chart was reviewed and patient verbal consent was obtained.    Chief Complaint: Establish Care      HPI  Leonid Cox is a  43 y/o M with a past medical history of Seizure disorder, Cerebral palsy, functional quadriplegia. Currently is bed bound 2/2 CP.      With this visit today patient is found lying in bed. Patient is AAO to self. Most of patients other history was received from his caregiver and his medical record. He currently has sitters who sit with him around the clock. His mother  in  and left 3 women assigned as his guardians. Shalonda Dorman is the POA and it appears they have disagreements about Amador's care. He sees Dr. Telles as his PCP. I will continue seeing the patient in the home as it is difficult for him to physically make multiple visits. He endorses a good appetite and normal bowel movements. His caregiver feels all his needs are being met at this time.       He has caregivers around the clock and no unmet needs at this time. Fall precautions reinforced. Education completed ~ 15 minutes. Plan to follow up.     VSS. Denies fever, chest pain, shortness of breath, nausea, vomiting, diarrhea. Risks of environmental exposure to coronavirus discussed including: social distancing, hand hygiene, and limiting departures from the home for necessities only.  Reports understanding and  willingness to comply.  All hospital discharge orders reviewed and being followed, all medications reconciled and reviewed, patient and family verbalized understanding. No other needs identified at this time.     NO updates to ACP which are on file at this time. Mariaa Schneider is POA, we spoke over the phone.    We spoke about ACP for 20 minutes.    Attestation: Screening criteria to assess the level of the patient's risk for infection with COVID-19 as recommended by the CDC at the time of the above documented home visit concluded appropriateness to proceed. Universal precautions were maintained at all times, including provider use of 60% alcohol gel hand  immediately prior to entry and upon departing the patient's home.                 Review of Systems   Unable to perform ROS: Acuity of condition       Objective:   Physical Exam  HENT:      Head: Atraumatic.      Nose: Nose normal.      Mouth/Throat:      Mouth: Mucous membranes are moist.   Eyes:      Pupils: Pupils are equal, round, and reactive to light.   Cardiovascular:      Rate and Rhythm: Normal rate.      Pulses: Normal pulses.   Pulmonary:      Effort: Pulmonary effort is normal.   Abdominal:      General: Abdomen is flat. Bowel sounds are normal.      Palpations: Abdomen is soft.   Skin:     General: Skin is warm and dry.      Capillary Refill: Capillary refill takes less than 2 seconds.   Neurological:      Mental Status: He is alert. Mental status is at baseline.   Psychiatric:         Mood and Affect: Mood normal.         Vitals:    04/01/22 1429   BP: 139/82   Pulse: 89   Resp: 18   Temp: 97.3 °F (36.3 °C)   SpO2: 97%   Weight: 79.4 kg (175 lb)   Height: 6' (1.829 m)   PainSc: 0-No pain     Body mass index is 23.73 kg/m².    Assessment:     1. Seizure disorder    2. Hydrocephalus, unspecified type    3. Functional quadriplegia        Plan:     Ethical / Legal: Advance Care Planning   · Surrogate decision maker:  Name brandon Dorman  "Relationship:POA  · Code Status: DNR  · [place]: on file  · Other advance directive: on file     Problem List Items Addressed This Visit        Neuro    Hydrocephalus    Seizure disorder - Primary    Overview     Controlled on depakote. Has not had a seizure "in years".              Other    Functional quadriplegia    Overview     Related to CP, wheelchair bound, dependent for all ADLs                  Were controlled substances prescribed?  No    Follow Up Appointments:   No future appointments.    Signature: Nancy Addison NP     "

## 2022-04-08 ENCOUNTER — OFFICE VISIT (OUTPATIENT)
Dept: PRIMARY CARE CLINIC | Facility: CLINIC | Age: 42
End: 2022-04-08
Payer: MEDICARE

## 2022-04-08 DIAGNOSIS — G80.0 SPASTIC QUADRIPLEGIC CEREBRAL PALSY: Primary | ICD-10-CM

## 2022-04-08 PROCEDURE — 99443 PR PHYSICIAN TELEPHONE EVALUATION 21-30 MIN: ICD-10-PCS | Mod: 95,,, | Performed by: INTERNAL MEDICINE

## 2022-04-08 PROCEDURE — 99443 PR PHYSICIAN TELEPHONE EVALUATION 21-30 MIN: CPT | Mod: 95,,, | Performed by: INTERNAL MEDICINE

## 2022-04-08 NOTE — PROGRESS NOTES
[facility]  SHARED NOTE: Cristóbal Walton (MS4), Dr Telles (Attending)    Subjective:      Patient ID: Leonid Cox is a 42 y.o. male with ***      Chief Complaint: No chief complaint on file.    Prior to this visit, patient's last encounter with PCP was 3/16/2022.          Past Surgical History:   Procedure Laterality Date    FOOT SURGERY      SHUNT REVISION      TENDON RELEASE      TOTAL HIP ARTHROPLASTY         Past Medical History:   Diagnosis Date    Blind     Cerebral palsy     Hydrocephalus     Hypertension     Seizure disorder     Seizures     Urinary reflux        Social History     Socioeconomic History    Marital status: Single   Tobacco Use    Smoking status: Never Smoker    Smokeless tobacco: Never Used   Substance and Sexual Activity    Alcohol use: No    Drug use: No   Social History Narrative    Lives in property owned by grandmother. Disabled with [employer] caretakers 24hrs to patient. Primary caretaker Sherri takes care of him. Shalonda is the active caretaker, but there is no official legal power of .         Mother passed away 12/2013       Review of Systems    Objective:   There were no vitals taken for this visit.    Physical Exam        Lab Results   Component Value Date    WBC 6.59 11/26/2021    HGB 15.7 11/26/2021    HCT 47.1 11/26/2021     11/26/2021    CHOL 141 03/28/2018    TRIG 87 03/28/2018    HDL 39 (L) 03/28/2018    ALT 39 11/26/2021    AST 22 11/26/2021     11/26/2021    K 4.4 11/26/2021     11/26/2021    CREATININE 0.8 11/26/2021    BUN 14 11/26/2021    CO2 24 11/26/2021    TSH 1.748 06/02/2014    INR 0.9 10/03/2018    HGBA1C 5.3 12/14/2015       Current Outpatient Medications on File Prior to Visit   Medication Sig Dispense Refill    celecoxib (CELEBREX) 200 MG capsule Take 1 capsule (200 mg total) by mouth daily as needed for Pain. 90 capsule 3    cetirizine (ZYRTEC) 10 MG tablet Take 1  tablet (10 mg total) by mouth once daily. 30 tablet 0    cholecalciferol, vitamin D3, (VITAMIN D3) 25 mcg (1,000 unit) capsule Take 1 capsule (1,000 Units total) by mouth once daily. 90 capsule 3    divalproex (DEPAKOTE) 500 MG TbEC Take 1 tablet (500 mg total) by mouth every 12 (twelve) hours. 180 tablet 3    divalproex ER (DEPAKOTE ER) 250 MG 24 hr tablet Take 1 tablet (250 mg total) by mouth every evening. 90 tablet 3    fluticasone propionate (FLONASE) 50 mcg/actuation nasal spray 1 spray (50 mcg total) by Each Nostril route once daily. 16 g 0    levocetirizine (XYZAL) 5 MG tablet Take 1 tablet (5 mg total) by mouth nightly as needed for Allergies. 90 tablet 0    triamcinolone acetonide 0.1% (KENALOG) 0.1 % cream Apply topically 2 (two) times daily as needed. to reduce redness and itching 28.4 g 1     No current facility-administered medications on file prior to visit.         Assessment:   42 y.o. male with multiple co-morbid illnesses here to follow-up with PCP and continue work-up of chronic issues notably ***.     Plan:     Problem List Items Addressed This Visit    None         Health Maintenance       Date Due Completion Date    COVID-19 Vaccine (1) Never done ---    Influenza Vaccine (1) 09/01/2021 1/29/2020    TETANUS VACCINE 09/24/2022 9/24/2012    Lipid Panel 03/28/2023 3/28/2018          Future Appointments   Date Time Provider Department Center   4/8/2022  9:00 AM Anushka Telles MD [facility]         No follow-ups on file. Total clinical care time was 60 min.    *** (student or resident)    Anushka Telles MD/MPH  [facility]  [phone] spectralink
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
## 2022-04-22 ENCOUNTER — PATIENT MESSAGE (OUTPATIENT)
Dept: PRIMARY CARE CLINIC | Facility: CLINIC | Age: 42
End: 2022-04-22
Payer: MEDICARE

## 2022-04-26 VITALS
RESPIRATION RATE: 18 BRPM | OXYGEN SATURATION: 97 % | HEART RATE: 89 BPM | TEMPERATURE: 97 F | BODY MASS INDEX: 23.7 KG/M2 | SYSTOLIC BLOOD PRESSURE: 139 MMHG | DIASTOLIC BLOOD PRESSURE: 82 MMHG | HEIGHT: 72 IN | WEIGHT: 175 LBS

## 2022-04-26 NOTE — PATIENT INSTRUCTIONS
Instructions:  - [facility] to schedule home follow-up visit with patient in 4-6 weeks or as needed.  - Continue all medications, treatments and therapies as ordered.   - Follow all instructions, recommendations as discussed.  - Maintain Safety Precautions at all times.  - Attend all medical appointments as scheduled.  - For worsening symptoms: call Primary Care Physician or Nurse Practitioner.  - For emergencies, call 911 or immediately report to the nearest emergency room.  - Limit Risks of environmental exposure to coronavirus/COVID-19 as discussed including: social distancing, hand hygiene, and limiting departures from the home for necessities only.

## 2022-05-03 ENCOUNTER — CARE AT HOME (OUTPATIENT)
Dept: HOME HEALTH SERVICES | Facility: CLINIC | Age: 42
End: 2022-05-03
Payer: MEDICARE

## 2022-05-03 DIAGNOSIS — G40.909 SEIZURE DISORDER: ICD-10-CM

## 2022-05-03 DIAGNOSIS — G91.9 HYDROCEPHALUS, UNSPECIFIED TYPE: ICD-10-CM

## 2022-05-03 DIAGNOSIS — R53.2 FUNCTIONAL QUADRIPLEGIA: ICD-10-CM

## 2022-05-03 PROCEDURE — 99349 HOME/RES VST EST MOD MDM 40: CPT | Mod: S$GLB,,, | Performed by: NURSE PRACTITIONER

## 2022-05-03 PROCEDURE — 99499 UNLISTED E&M SERVICE: CPT | Mod: S$GLB,,, | Performed by: NURSE PRACTITIONER

## 2022-05-03 PROCEDURE — 99349 PR HOME VISIT,ESTAB PATIENT,LEVEL III: ICD-10-PCS | Mod: S$GLB,,, | Performed by: NURSE PRACTITIONER

## 2022-05-09 NOTE — PROGRESS NOTES
[facility]  Medical Home Visit    Visit Date: 2022  Encounter Provider: Nancy Addison  PCP:  Anushka Telles MD    Subjective:      Patient ID: Leonid Cox is a 42 y.o. male.    Consult Requested By:  No ref. provider found  Reason for Consult:  Establish Care    Leonid is being seen at home due to being seen at home due to physical debility that presents a taxing effort to leave the home, to mitigate high risk of hospital readmission and/or due to the limited availability of reliable or safe options for transportation to the point of access to the provider. Prior to treatment on this visit the chart was reviewed and patient verbal consent was obtained.    Chief Complaint: Follow-up      HPI  Leonid Cox is a  41 y/o M with a past medical history of Seizure disorder, Cerebral palsy, functional quadriplegia. Currently is bed/chiar bound 2/2 CP.      With this visit today patient is found lying on the sofa, his caregiver is present. Patient is AAO to self. Most of patients other history was received from his caregiver and his medical record. He currently has sitters who sit with him around the clock. His mother  in  and left 3 women assigned as his guardians. Shalonda Dorman is the POA and it appears they have disagreements about Amador's care. He sees Dr. Telles as his PCP. He endorses a good appetite and normal bowel movements. His caregiver feels all his needs are being met at this time.  He does have some complaints of pain to his knee in which his caregiver denies falls. He is taking OTC meds and his caregiver reports it appears to help.     He has caregivers around the clock and no unmet needs at this time. Fall precautions reinforced. Education completed ~ 15 minutes. Plan to follow up.     VSS. Denies fever, chest pain, shortness of breath, nausea, vomiting, diarrhea. Risks of environmental exposure to coronavirus discussed including: social distancing, hand hygiene, and limiting  departures from the home for necessities only.  Reports understanding and willingness to comply.  All hospital discharge orders reviewed and being followed, all medications reconciled and reviewed, patient and family verbalized understanding. No other needs identified at this time.     NO updates to ACP which are on file at this time. Mariaa Schneider is POA, we spoke over the phone.      Attestation: Screening criteria to assess the level of the patient's risk for infection with COVID-19 as recommended by the CDC at the time of the above documented home visit concluded appropriateness to proceed. Universal precautions were maintained at all times, including provider use of 60% alcohol gel hand  immediately prior to entry and upon departing the patient's home.    Review of Systems   Unable to perform ROS: Acuity of condition       Objective:   Physical Exam  HENT:      Head: Atraumatic.      Nose: Nose normal.      Mouth/Throat:      Mouth: Mucous membranes are moist.   Eyes:      Pupils: Pupils are equal, round, and reactive to light.   Cardiovascular:      Rate and Rhythm: Normal rate.      Pulses: Normal pulses.   Pulmonary:      Effort: Pulmonary effort is normal.   Abdominal:      General: Abdomen is flat. Bowel sounds are normal.      Palpations: Abdomen is soft.   Skin:     General: Skin is warm and dry.      Capillary Refill: Capillary refill takes less than 2 seconds.   Neurological:      Mental Status: He is alert. Mental status is at baseline.   Psychiatric:         Mood and Affect: Mood normal.         Vitals:    05/03/22 1200   BP: 133/81   Pulse: 92   Resp: 18   Temp: 97.7 °F (36.5 °C)   SpO2: 98%   Weight: 79.4 kg (175 lb)   Height: 6' (1.829 m)   PainSc:   3     Body mass index is 23.73 kg/m².    Assessment:     1. Functional quadriplegia    2. Seizure disorder    3. Hydrocephalus, unspecified type        Plan:     Ethical / Legal: Advance Care Planning   · Surrogate decision maker:  Name  "brandon Dorman Relationship:POA  · Code Status: DNR  · LaPOST: on file  · Other advance directive: on file     Problem List Items Addressed This Visit        Neuro    Hydrocephalus    Seizure disorder - Primary    Overview     Controlled on depakote. Has not had a seizure "in years".              Other    Functional quadriplegia    Overview     Related to CP, wheelchair bound, dependent for all ADLs                 Were controlled substances prescribed?  No    Follow Up Appointments:   Future Appointments   Date Time Provider Department Center   5/27/2022 11:30 AM Anushka Telles MD [facility]       Signature: Nancy SMITH Chairs, NP       "

## 2022-05-19 VITALS
BODY MASS INDEX: 23.7 KG/M2 | HEART RATE: 92 BPM | TEMPERATURE: 98 F | DIASTOLIC BLOOD PRESSURE: 81 MMHG | RESPIRATION RATE: 18 BRPM | OXYGEN SATURATION: 98 % | SYSTOLIC BLOOD PRESSURE: 133 MMHG | WEIGHT: 175 LBS | HEIGHT: 72 IN

## 2022-05-20 NOTE — PATIENT INSTRUCTIONS
Instructions:  - [facility] to schedule home follow-up visit with patient  as needed.  - Continue all medications, treatments and therapies as ordered.   - Follow all instructions, recommendations as discussed.  - Maintain Safety Precautions at all times.  - Attend all medical appointments as scheduled.  - For worsening symptoms: call Primary Care Physician or Nurse Practitioner.  - For emergencies, call 911 or immediately report to the nearest emergency room.  - Limit Risks of environmental exposure to coronavirus/COVID-19 as discussed including: social distancing, hand hygiene, and limiting departures from the home for necessities only.

## 2022-05-27 ENCOUNTER — OFFICE VISIT (OUTPATIENT)
Dept: PRIMARY CARE CLINIC | Facility: CLINIC | Age: 42
End: 2022-05-27
Payer: MEDICARE

## 2022-05-27 ENCOUNTER — HOSPITAL ENCOUNTER (OUTPATIENT)
Dept: RADIOLOGY | Facility: HOSPITAL | Age: 42
Discharge: HOME | End: 2022-05-27
Attending: INTERNAL MEDICINE
Payer: MEDICARE

## 2022-05-27 VITALS
DIASTOLIC BLOOD PRESSURE: 88 MMHG | RESPIRATION RATE: 18 BRPM | OXYGEN SATURATION: 99 % | HEART RATE: 77 BPM | SYSTOLIC BLOOD PRESSURE: 146 MMHG

## 2022-05-27 DIAGNOSIS — G40.909 SEIZURE DISORDER: ICD-10-CM

## 2022-05-27 DIAGNOSIS — M24.561 BILATERAL KNEE CONTRACTURES: Primary | ICD-10-CM

## 2022-05-27 DIAGNOSIS — G80.0 SPASTIC QUADRIPLEGIC CEREBRAL PALSY: ICD-10-CM

## 2022-05-27 DIAGNOSIS — M24.562 BILATERAL KNEE CONTRACTURES: Primary | ICD-10-CM

## 2022-05-27 DIAGNOSIS — R93.89 ABNORMAL FINDINGS ON DIAGNOSTIC IMAGING OF OTHER SPECIFIED BODY STRUCTURES: ICD-10-CM

## 2022-05-27 DIAGNOSIS — M24.561 BILATERAL KNEE CONTRACTURES: ICD-10-CM

## 2022-05-27 DIAGNOSIS — G91.9 HYDROCEPHALUS, UNSPECIFIED TYPE: ICD-10-CM

## 2022-05-27 DIAGNOSIS — I10 ESSENTIAL HYPERTENSION: ICD-10-CM

## 2022-05-27 DIAGNOSIS — R53.2 FUNCTIONAL QUADRIPLEGIA: ICD-10-CM

## 2022-05-27 DIAGNOSIS — R79.9 ABNORMAL FINDING OF BLOOD CHEMISTRY: ICD-10-CM

## 2022-05-27 DIAGNOSIS — M24.562 BILATERAL KNEE CONTRACTURES: ICD-10-CM

## 2022-05-27 PROCEDURE — 1159F PR MEDICATION LIST DOCUMENTED IN MEDICAL RECORD: ICD-10-PCS | Mod: CPTII,S$GLB,, | Performed by: INTERNAL MEDICINE

## 2022-05-27 PROCEDURE — 3079F DIAST BP 80-89 MM HG: CPT | Mod: CPTII,S$GLB,, | Performed by: INTERNAL MEDICINE

## 2022-05-27 PROCEDURE — 3077F PR MOST RECENT SYSTOLIC BLOOD PRESSURE >= 140 MM HG: ICD-10-PCS | Mod: CPTII,S$GLB,, | Performed by: INTERNAL MEDICINE

## 2022-05-27 PROCEDURE — 3077F SYST BP >= 140 MM HG: CPT | Mod: CPTII,S$GLB,, | Performed by: INTERNAL MEDICINE

## 2022-05-27 PROCEDURE — 99499 RISK ADDL DX/OHS AUDIT: ICD-10-PCS | Mod: S$GLB,,, | Performed by: INTERNAL MEDICINE

## 2022-05-27 PROCEDURE — 99215 PR OFFICE/OUTPT VISIT, EST, LEVL V, 40-54 MIN: ICD-10-PCS | Mod: S$GLB,,, | Performed by: INTERNAL MEDICINE

## 2022-05-27 PROCEDURE — 3079F PR MOST RECENT DIASTOLIC BLOOD PRESSURE 80-89 MM HG: ICD-10-PCS | Mod: CPTII,S$GLB,, | Performed by: INTERNAL MEDICINE

## 2022-05-27 PROCEDURE — 99499 UNLISTED E&M SERVICE: CPT | Mod: S$GLB,,, | Performed by: INTERNAL MEDICINE

## 2022-05-27 PROCEDURE — 1159F MED LIST DOCD IN RCRD: CPT | Mod: CPTII,S$GLB,, | Performed by: INTERNAL MEDICINE

## 2022-05-27 PROCEDURE — 99215 OFFICE O/P EST HI 40 MIN: CPT | Mod: S$GLB,,, | Performed by: INTERNAL MEDICINE

## 2022-05-27 RX ORDER — LISINOPRIL 10 MG/1
10 TABLET ORAL DAILY
Qty: 90 TABLET | Refills: 3 | Status: SHIPPED | OUTPATIENT
Start: 2022-05-27 | End: 2022-06-09 | Stop reason: SDUPTHER

## 2022-05-27 NOTE — PATIENT INSTRUCTIONS
TODAY:  - paperwork for PCA  - labs  - knee XR (to be performed in the home)  - start lisinopril 10mg

## 2022-05-27 NOTE — ASSESSMENT & PLAN NOTE
Related to CP, wheelchair bound, dependent for all ADLs  · Continue 24 hour sitters  · PoA making all decisions  · Continue communication with caretakers

## 2022-05-27 NOTE — PROGRESS NOTES
Primary Care Provider Appointment - [facility]  SHARED NOTE: Jeaneth Finley (MS-4), Dr Telles (Attending)    Subjective:      Patient ID: Leonid Cox is a 42 y.o. male with PMHx of hydrocephalus, seizure disorder, cerebral palsy    Chief Complaint: Knee pain    Prior to this visit, patient's last encounter with PCP was 4/8/2022.    Patient with cognitive impairment is verbal. History is taken mostly from care givers with some help from the patient.   Patient has bilateral knee pain starting at least 4 months ago. Pain is intermittent, 4-5 days a week. Patient has been taking celebrex which does not help with the pain. No fever, or chills recently, no hx of gout. On exam of knee, not hot, tender, erythema, swelling. No pain with movement. Patient has chronic contracture of bilateral knees. Patient refused Xray knees in patient, will order home Xray  Patient also has swelling of the legs bilaterally with venous stasis, patient does not like to wear compression stockings due to the small size. Discuss with caretakers for going up on size of socks, leg elevation and starting home PT. Patient expresses agreement with home PT. There is red patch on shins bilaterally, probably secondary to venous stasis. Patient is trying kenalog 0.1 cream, recommend moisturizing and follow up.            Patient recent has sleeping problem, seems more tired during the day for care taker, will try over the counter melatonin before trying prescription sleep medications that has sedating side effect.     HTN          BP-146/88 in clinic  CBC, CMP wnl 11/2021  -Start lisinopril 10 mg     Seizure  Well controlled. No new seizure in years  Valproic acid 06/ 2021 wnl  -Depakote 500 mg qam, 750 qpm    Care gaps    -Guardian denies covid vaccine according to caretakers       Past Surgical History:   Procedure Laterality Date    FOOT SURGERY      SHUNT REVISION      TENDON RELEASE      TOTAL HIP ARTHROPLASTY         Past Medical History:    Diagnosis Date    Blind     Cerebral palsy     Hydrocephalus     Hypertension     Seizure disorder     Seizures     Urinary reflux        Social History     Socioeconomic History    Marital status: Single   Tobacco Use    Smoking status: Never Smoker    Smokeless tobacco: Never Used   Substance and Sexual Activity    Alcohol use: No    Drug use: No   Social History Narrative    Lives in property owned by grandmother. Disabled with [employer] caretakers 24hrs to patient. Primary caretaker Sherri takes care of him. Shalonda is the active caretaker, but there is no official legal power of .         Mother passed away 12/2013       Review of Systems   Constitutional: Negative for chills and fever.   Respiratory: Negative for shortness of breath.    Cardiovascular: Negative for chest pain.   Gastrointestinal: Negative for abdominal pain, constipation and diarrhea.   Genitourinary: Negative for dysuria.   Musculoskeletal: Positive for arthralgias.   Neurological: Negative for headaches.       Objective:   BP (!) 146/88 (BP Location: Right arm, Patient Position: Sitting, BP Method: Medium (Manual))   Pulse 77   Resp 18   SpO2 99%     Physical Exam  Cardiovascular:      Rate and Rhythm: Normal rate and regular rhythm.      Heart sounds: No murmur heard.  Pulmonary:      Effort: Pulmonary effort is normal.      Breath sounds: No wheezing or rales.   Musculoskeletal:      Right lower leg: Edema present.      Left lower leg: Edema present.      Comments: Venous stasis, +2 bilateral edema. Difficulty assessing peripheral pulses due to swelling, but normal color and temp.   Contracture of bilateral lower extremities. Wheelchair bound.   Neurological:      Mental Status: He is alert and oriented to person, place, and time. Mental status is at baseline.      Motor: Weakness present.             Lab Results   Component Value Date    WBC 6.59 11/26/2021    HGB 15.7 11/26/2021    HCT 47.1 11/26/2021    PLT  216 11/26/2021    CHOL 141 03/28/2018    TRIG 87 03/28/2018    HDL 39 (L) 03/28/2018    ALT 39 11/26/2021    AST 22 11/26/2021     11/26/2021    K 4.4 11/26/2021     11/26/2021    CREATININE 0.8 11/26/2021    BUN 14 11/26/2021    CO2 24 11/26/2021    TSH 1.748 06/02/2014    INR 0.9 10/03/2018    HGBA1C 5.3 12/14/2015       Current Outpatient Medications on File Prior to Visit   Medication Sig Dispense Refill    celecoxib (CELEBREX) 200 MG capsule Take 1 capsule (200 mg total) by mouth daily as needed for Pain. 90 capsule 3    cholecalciferol, vitamin D3, (VITAMIN D3) 25 mcg (1,000 unit) capsule Take 1 capsule (1,000 Units total) by mouth once daily. 90 capsule 3    divalproex (DEPAKOTE) 500 MG TbEC Take 1 tablet (500 mg total) by mouth every 12 (twelve) hours. 180 tablet 3    divalproex ER (DEPAKOTE ER) 250 MG 24 hr tablet Take 1 tablet (250 mg total) by mouth every evening. 90 tablet 3    triamcinolone acetonide 0.1% (KENALOG) 0.1 % cream Apply topically 2 (two) times daily as needed. to reduce redness and itching 28.4 g 1    cetirizine (ZYRTEC) 10 MG tablet Take 1 tablet (10 mg total) by mouth once daily. (Patient not taking: Reported on 5/27/2022) 30 tablet 0    fluticasone propionate (FLONASE) 50 mcg/actuation nasal spray 1 spray (50 mcg total) by Each Nostril route once daily. (Patient not taking: Reported on 5/27/2022) 16 g 0    levocetirizine (XYZAL) 5 MG tablet Take 1 tablet (5 mg total) by mouth nightly as needed for Allergies. 90 tablet 0     No current facility-administered medications on file prior to visit.         Assessment:   42 y.o. male with multiple co-morbid illnesses here to follow-up with PCP and continue work-up of chronic issues    Plan:     Problem List Items Addressed This Visit        Neuro    Cerebral palsy     24hr care in the home, has PoA   · Continue Neuro follow-up  · Unclear benefit of NS follow-up at this time             Hydrocephalus     Since birth, shunt  "revised by Dr Das in 2013  · copntinu Neuro f/u annually             Seizure disorder     Controlled on depakote. Has not had a seizure "in years".  · Continue depakote  · Check level              Cardiac/Vascular    Essential hypertension    Relevant Medications    lisinopriL 10 MG tablet       Orthopedic    Bilateral knee contractures - Primary    Relevant Orders    Ambulatory referral/consult to Home Health    X-Ray Knee 3 View Bilateral       Other    Functional quadriplegia     Related to CP, wheelchair bound, dependent for all ADLs  · Continue 24 hour sitters  · PoA making all decisions  · Continue communication with caretakers             Other Visit Diagnoses     Abnormal finding of blood chemistry        Relevant Orders    TSH    VALPROIC ACID    Abnormal findings on diagnostic imaging of other specified body structures         Relevant Orders    TSH          Health Maintenance       Date Due Completion Date    COVID-19 Vaccine (1) Never done ---    Influenza Vaccine (Season Ended) 09/01/2022 1/29/2020    TETANUS VACCINE 09/24/2022 9/24/2012    Lipid Panel 03/28/2023 3/28/2018          No future appointments.      Follow up ROUTINE. Total clinical care time was 60 min, issues addressed include knee pain, rash, HTN    Anushka Telles MD/MPH  [facility]  [phone] [employer]             "

## 2022-05-27 NOTE — PROGRESS NOTES
Sent referral to [facility] for x-ray, faxed face sheet, orders and note from today.

## 2022-06-03 ENCOUNTER — TELEPHONE (OUTPATIENT)
Dept: PRIMARY CARE CLINIC | Facility: CLINIC | Age: 42
End: 2022-06-03
Payer: MEDICARE

## 2022-06-03 NOTE — TELEPHONE ENCOUNTER
Spoke with Cynthia,/PHN, [phone], she is arranging x-ray at home.     She will have the company call us and have result sent to Dr. Telles.

## 2022-06-09 ENCOUNTER — TELEPHONE (OUTPATIENT)
Dept: PRIMARY CARE CLINIC | Facility: CLINIC | Age: 42
End: 2022-06-09
Payer: MEDICARE

## 2022-06-09 DIAGNOSIS — I10 ESSENTIAL HYPERTENSION: ICD-10-CM

## 2022-06-09 RX ORDER — LISINOPRIL 10 MG/1
10 TABLET ORAL DAILY
Qty: 90 TABLET | Refills: 3 | Status: SHIPPED | OUTPATIENT
Start: 2022-06-09 | End: 2022-10-18 | Stop reason: SDUPTHER

## 2022-06-17 ENCOUNTER — TELEPHONE (OUTPATIENT)
Dept: PRIMARY CARE CLINIC | Facility: CLINIC | Age: 42
End: 2022-06-17
Payer: MEDICARE

## 2022-06-17 NOTE — TELEPHONE ENCOUNTER
Pt approved for Portable X-ray of Right Knee with [employer] [facility] for date of service 5/27/22.        Case# [id number]  Authorization # [id number]

## 2022-06-29 ENCOUNTER — TELEPHONE (OUTPATIENT)
Dept: PRIMARY CARE CLINIC | Facility: CLINIC | Age: 42
End: 2022-06-29
Payer: MEDICARE

## 2022-06-29 NOTE — TELEPHONE ENCOUNTER
Please let patient's caretaker know that his valproic acid level was elevated.  We need to recheck this.  If it is still elevated in the we need to consider lowering the dose.    Can they bring him to clinic to recheck his lab?  Or do they want to pay 35 dollars to have a phlebotomist come to the home?  If they have home health then that team can will collect the lab too. We just need to know their preferred method.    Thanks,  KJ

## 2022-09-29 ENCOUNTER — OFFICE VISIT (OUTPATIENT)
Dept: PRIMARY CARE CLINIC | Facility: CLINIC | Age: 42
End: 2022-09-29
Payer: MEDICARE

## 2022-09-29 VITALS
HEART RATE: 106 BPM | BODY MASS INDEX: 32.95 KG/M2 | OXYGEN SATURATION: 93 % | DIASTOLIC BLOOD PRESSURE: 87 MMHG | RESPIRATION RATE: 18 BRPM | SYSTOLIC BLOOD PRESSURE: 144 MMHG | HEIGHT: 72 IN | TEMPERATURE: 98 F | WEIGHT: 243.25 LBS

## 2022-09-29 DIAGNOSIS — R60.9 EDEMA, UNSPECIFIED TYPE: ICD-10-CM

## 2022-09-29 DIAGNOSIS — G80.9 CEREBRAL PALSY, UNSPECIFIED TYPE: Primary | ICD-10-CM

## 2022-09-29 DIAGNOSIS — G91.9 HYDROCEPHALUS, UNSPECIFIED TYPE: ICD-10-CM

## 2022-09-29 DIAGNOSIS — G40.909 SEIZURE DISORDER: ICD-10-CM

## 2022-09-29 DIAGNOSIS — R53.2 FUNCTIONAL QUADRIPLEGIA: ICD-10-CM

## 2022-09-29 DIAGNOSIS — L30.9 ECZEMA, UNSPECIFIED TYPE: ICD-10-CM

## 2022-09-29 DIAGNOSIS — I10 ESSENTIAL HYPERTENSION: ICD-10-CM

## 2022-09-29 PROCEDURE — 99213 PR OFFICE/OUTPT VISIT, EST, LEVL III, 20-29 MIN: ICD-10-PCS | Mod: S$GLB,,, | Performed by: NURSE PRACTITIONER

## 2022-09-29 PROCEDURE — 1159F PR MEDICATION LIST DOCUMENTED IN MEDICAL RECORD: ICD-10-PCS | Mod: CPTII,S$GLB,, | Performed by: NURSE PRACTITIONER

## 2022-09-29 PROCEDURE — 3079F DIAST BP 80-89 MM HG: CPT | Mod: CPTII,S$GLB,, | Performed by: NURSE PRACTITIONER

## 2022-09-29 PROCEDURE — 1159F MED LIST DOCD IN RCRD: CPT | Mod: CPTII,S$GLB,, | Performed by: NURSE PRACTITIONER

## 2022-09-29 PROCEDURE — 3077F PR MOST RECENT SYSTOLIC BLOOD PRESSURE >= 140 MM HG: ICD-10-PCS | Mod: CPTII,S$GLB,, | Performed by: NURSE PRACTITIONER

## 2022-09-29 PROCEDURE — 3008F BODY MASS INDEX DOCD: CPT | Mod: CPTII,S$GLB,, | Performed by: NURSE PRACTITIONER

## 2022-09-29 PROCEDURE — 3079F PR MOST RECENT DIASTOLIC BLOOD PRESSURE 80-89 MM HG: ICD-10-PCS | Mod: CPTII,S$GLB,, | Performed by: NURSE PRACTITIONER

## 2022-09-29 PROCEDURE — 3008F PR BODY MASS INDEX (BMI) DOCUMENTED: ICD-10-PCS | Mod: CPTII,S$GLB,, | Performed by: NURSE PRACTITIONER

## 2022-09-29 PROCEDURE — 4010F PR ACE/ARB THEARPY RXD/TAKEN: ICD-10-PCS | Mod: CPTII,S$GLB,, | Performed by: NURSE PRACTITIONER

## 2022-09-29 PROCEDURE — 1160F PR REVIEW ALL MEDS BY PRESCRIBER/CLIN PHARMACIST DOCUMENTED: ICD-10-PCS | Mod: CPTII,S$GLB,, | Performed by: NURSE PRACTITIONER

## 2022-09-29 PROCEDURE — 3077F SYST BP >= 140 MM HG: CPT | Mod: CPTII,S$GLB,, | Performed by: NURSE PRACTITIONER

## 2022-09-29 PROCEDURE — 4010F ACE/ARB THERAPY RXD/TAKEN: CPT | Mod: CPTII,S$GLB,, | Performed by: NURSE PRACTITIONER

## 2022-09-29 PROCEDURE — 99213 OFFICE O/P EST LOW 20 MIN: CPT | Mod: S$GLB,,, | Performed by: NURSE PRACTITIONER

## 2022-09-29 PROCEDURE — 1160F RVW MEDS BY RX/DR IN RCRD: CPT | Mod: CPTII,S$GLB,, | Performed by: NURSE PRACTITIONER

## 2022-09-29 RX ORDER — TRIAMCINOLONE ACETONIDE 1 MG/G
OINTMENT TOPICAL 2 TIMES DAILY
Qty: 80 G | Refills: 11 | Status: SHIPPED | OUTPATIENT
Start: 2022-09-29 | End: 2023-04-17

## 2022-09-29 RX ORDER — FUROSEMIDE 20 MG/1
20 TABLET ORAL DAILY
Qty: 30 TABLET | Refills: 11 | Status: SHIPPED | OUTPATIENT
Start: 2022-09-29 | End: 2023-04-17

## 2022-09-29 NOTE — PROGRESS NOTES
Primary Care Provider Appointment- [facility]      Patient ID: Leonid Cox is a 42 y.o. male.    Chief Complaint: Edema (Left hand and foot ) and Follow-up (Referral to OT/PT/Rx for Compression Stocking /Wheelchair with foot rests )      Prior to this visit, patient's last encounter with PCP was 5/27/2022.    HPI:  This is a 43 y/o male who is here for f/u.  The patient has cerebral palsy and hydrocephalus.  He is blind and has joint contractures to the left hand but has left side weakness.  On today his complaint is peripheral edema in which the left is greater than the rt lower extremities.  The left arm has limited ROM and the left hand has contractures.  His upper torso is disproportionate and he has had several surgeries to repair, release or revise bones and joints.    The patient received 24 hours assistance with his care.  The caretakers are concerned because the patient has never had this amount of fluid in the past.  He mainly rest in the chair all day and the chair he has at home does not have legs and is not collapsible to move around.    Past Medical History:   Diagnosis Date    Blind     Cerebral palsy     Hydrocephalus     Hypertension     Seizure disorder     Seizures     Urinary reflux        Past Surgical History:   Procedure Laterality Date    FOOT SURGERY      SHUNT REVISION      TENDON RELEASE      TOTAL HIP ARTHROPLASTY         Family History   Problem Relation Age of Onset    Cancer Mother     Cancer Father        Social History     Socioeconomic History    Marital status: Single   Tobacco Use    Smoking status: Never    Smokeless tobacco: Never   Substance and Sexual Activity    Alcohol use: No    Drug use: No   Social History Narrative    Lives in property owned by grandmother. Disabled with [employer] caretakers 24hrs to patient. Primary caretaker Sherri takes care of him. Shalonda is the active caretaker, but there is no official legal power of .         Mother  passed away 12/2013       Current Outpatient Medications   Medication Sig Dispense Refill    celecoxib (CELEBREX) 200 MG capsule Take 1 capsule (200 mg total) by mouth daily as needed for Pain. 90 capsule 3    cetirizine (ZYRTEC) 10 MG tablet Take 1 tablet (10 mg total) by mouth once daily. 30 tablet 0    cholecalciferol, vitamin D3, (VITAMIN D3) 25 mcg (1,000 unit) capsule Take 1 capsule (1,000 Units total) by mouth once daily. 90 capsule 3    divalproex (DEPAKOTE) 500 MG TbEC Take 1 tablet (500 mg total) by mouth every 12 (twelve) hours. 180 tablet 3    divalproex ER (DEPAKOTE ER) 250 MG 24 hr tablet Take 1 tablet (250 mg total) by mouth every evening. 90 tablet 3    fluticasone propionate (FLONASE) 50 mcg/actuation nasal spray 1 spray (50 mcg total) by Each Nostril route once daily. 16 g 0    lisinopriL 10 MG tablet Take 1 tablet (10 mg total) by mouth once daily. 90 tablet 3    triamcinolone acetonide 0.1% (KENALOG) 0.1 % cream Apply topically 2 (two) times daily as needed. to reduce redness and itching 28.4 g 1    furosemide (LASIX) 20 MG tablet Take 1 tablet (20 mg total) by mouth once daily. 30 tablet 11    levocetirizine (XYZAL) 5 MG tablet Take 1 tablet (5 mg total) by mouth nightly as needed for Allergies. 90 tablet 0    triamcinolone acetonide 0.1% (KENALOG) 0.1 % ointment Apply topically 2 (two) times daily. 80 g 11     No current facility-administered medications for this visit.       Review of patient's allergies indicates:   Allergen Reactions    Adhesive tape-silicones      Other reaction(s): blisters    Codeine      Other reaction(s): Nausea    Morphine Itching            Objective:   BP (!) 144/87   Pulse 106   Temp 97.6 °F (36.4 °C) (Oral)   Resp 18   Ht 6' (1.829 m)   Wt 110.3 kg (243 lb 4.4 oz)   SpO2 (!) 93%   BMI 32.99 kg/m²   Review of Systems   Constitutional: Negative.    HENT: Negative.     Eyes:         The patient is blind.   Respiratory: Negative.     Cardiovascular: Negative.     Gastrointestinal: Negative.    Genitourinary: Negative.    Musculoskeletal:         Left foot turns in weakness. Left side weakness.   Skin: Negative.    Neurological: Negative.    Endo/Heme/Allergies: Negative.    Psychiatric/Behavioral: Negative.      Physical Exam  Constitutional:       Appearance: Normal appearance. He is normal weight.   HENT:      Head: Normocephalic and atraumatic.      Nose: Nose normal.      Mouth/Throat:      Mouth: Mucous membranes are moist.      Pharynx: Oropharynx is clear.   Eyes:      Comments: The patients ocular movement was all over, he is blind.   Cardiovascular:      Rate and Rhythm: Normal rate and regular rhythm.      Pulses: Normal pulses.      Heart sounds: Normal heart sounds.   Pulmonary:      Effort: Pulmonary effort is normal.      Breath sounds: Normal breath sounds.   Abdominal:      General: Bowel sounds are normal.      Palpations: Abdomen is soft.   Musculoskeletal:         General: Swelling and deformity present.      Cervical back: Normal range of motion and neck supple.      Right lower leg: Edema present.      Left lower leg: Edema present.   Skin:     General: Skin is warm and dry.      Capillary Refill: Capillary refill takes 2 to 3 seconds.   Neurological:      Mental Status: He is alert.      Sensory: Sensory deficit present.      Motor: Weakness present.      Coordination: Coordination abnormal.      Gait: Gait abnormal.      Deep Tendon Reflexes: Reflexes abnormal.      Comments: Oriented x 2   Psychiatric:         Mood and Affect: Mood normal.         Behavior: Behavior normal.         Thought Content: Thought content normal.        Lab Results   Component Value Date    WBC 6.59 11/26/2021    HGB 15.7 11/26/2021    HCT 47.1 11/26/2021     11/26/2021    CHOL 141 03/28/2018    TRIG 87 03/28/2018    HDL 39 (L) 03/28/2018    ALT 39 11/26/2021    AST 22 11/26/2021     11/26/2021    K 4.4 11/26/2021     11/26/2021    CREATININE 0.8  11/26/2021    BUN 14 11/26/2021    CO2 24 11/26/2021    TSH 3.496 05/27/2022    INR 0.9 10/03/2018    HGBA1C 5.3 12/14/2015       Medication List with Changes/Refills   New Medications    FUROSEMIDE (LASIX) 20 MG TABLET    Take 1 tablet (20 mg total) by mouth once daily.    TRIAMCINOLONE ACETONIDE 0.1% (KENALOG) 0.1 % OINTMENT    Apply topically 2 (two) times daily.   Current Medications    CELECOXIB (CELEBREX) 200 MG CAPSULE    Take 1 capsule (200 mg total) by mouth daily as needed for Pain.    CETIRIZINE (ZYRTEC) 10 MG TABLET    Take 1 tablet (10 mg total) by mouth once daily.    CHOLECALCIFEROL, VITAMIN D3, (VITAMIN D3) 25 MCG (1,000 UNIT) CAPSULE    Take 1 capsule (1,000 Units total) by mouth once daily.    DIVALPROEX (DEPAKOTE) 500 MG TBEC    Take 1 tablet (500 mg total) by mouth every 12 (twelve) hours.    DIVALPROEX ER (DEPAKOTE ER) 250 MG 24 HR TABLET    Take 1 tablet (250 mg total) by mouth every evening.    FLUTICASONE PROPIONATE (FLONASE) 50 MCG/ACTUATION NASAL SPRAY    1 spray (50 mcg total) by Each Nostril route once daily.    LEVOCETIRIZINE (XYZAL) 5 MG TABLET    Take 1 tablet (5 mg total) by mouth nightly as needed for Allergies.    LISINOPRIL 10 MG TABLET    Take 1 tablet (10 mg total) by mouth once daily.    TRIAMCINOLONE ACETONIDE 0.1% (KENALOG) 0.1 % CREAM    Apply topically 2 (two) times daily as needed. to reduce redness and itching          Assessment:   42 y.o. male with multiple co-morbid illnesses here to follow up with PCP and continue work-up of chronic issues notably.     1. Cerebral palsy, unspecified type  WHEELCHAIR FOR HOME USE    Ambulatory referral/consult to Home Health      2. Edema, unspecified type  CBC Auto Differential    TSH    BNP    US Lower Extremity Veins Bilateral    Echo Saline Bubble? No    furosemide (LASIX) 20 MG tablet      3. Eczema, unspecified type  triamcinolone acetonide 0.1% (KENALOG) 0.1 % ointment      4. Hydrocephalus, unspecified type        5. Seizure  "disorder        6. Essential hypertension        7. Functional quadriplegia             Plan:     1. Cerebral palsy, unspecified type  Assessment & Plan:  PT and OT ordered to optimize joints and prevent further muscle degradation. The patient has bilateral lower ext edema which has progressively worsened and several joints which are stiff or contracted eg...The patient left hand, left shoulder joint and left ankle.  He could use a light  Weight WC with leg that lift.    Orders:  -     WHEELCHAIR FOR HOME USE  -     Ambulatory referral/consult to Home Health    2. Edema, unspecified type  -     CBC Auto Differential  -     TSH  -     BNP  -     US Lower Extremity Veins Bilateral  -     Echo Saline Bubble? No  -     furosemide (LASIX) 20 MG tablet    3. Eczema, unspecified type  -     triamcinolone acetonide 0.1% (KENALOG) 0.1 % ointment    4. Hydrocephalus, unspecified type  Assessment & Plan:  Conversation appropriate, answer small tasked questions.  Continue follow up with neurology.  No S/S of increased ICP at this time.      5. Seizure disorder  Overview:  Controlled on depakote. Has not had a seizure "in years".    Assessment & Plan:  Continue depakote, no recent seizure activity noted.      6. Essential hypertension  Assessment & Plan:  Started on furosemide 20 mg po q day until the edema is resolved and then prn swelling.      7. Functional quadriplegia  Overview:  Related to CP, wheelchair bound, dependent for all ADLs    Assessment & Plan:  Wheelchair bound but needs a collapsible light wheelchair with legs d/t lower ext swelling and stiffness.       Health Maintenance         Date Due Completion Date    COVID-19 Vaccine (1) Never done ---    Influenza Vaccine (1) 09/01/2022 1/29/2020    TETANUS VACCINE 09/24/2022 9/24/2012    Lipid Panel 03/28/2023 3/28/2018                    Follow up in about 19 days (around 10/18/2022), or if symptoms worsen or fail to improve. 30 min spent with patient in face to face " encounter. The following issues were addressed diagnosis, medications, labs as noted above, recent diagnostic test and health maintenance.      Dr. Yanira Lujan, DNP, MSN, CHFN, ACNP, APRN, B.C.  [facility]  [facility]  [facility] [phone]

## 2022-09-29 NOTE — ASSESSMENT & PLAN NOTE
Wheelchair bound but needs a collapsible light wheelchair with legs d/t lower ext swelling and stiffness.

## 2022-09-29 NOTE — ASSESSMENT & PLAN NOTE
Conversation appropriate, answer small tasked questions.  Continue follow up with neurology.  No S/S of increased ICP at this time.

## 2022-09-29 NOTE — ASSESSMENT & PLAN NOTE
PT and OT ordered to optimize joints and prevent further muscle degradation. The patient has bilateral lower ext edema which has progressively worsened and several joints which are stiff or contracted eg...The patient left hand, left shoulder joint and left ankle.  He could use a light  Weight WC with leg that lift.

## 2022-10-03 ENCOUNTER — HOSPITAL ENCOUNTER (OUTPATIENT)
Dept: CARDIOLOGY | Facility: HOSPITAL | Age: 42
Discharge: HOME | End: 2022-10-03
Attending: NURSE PRACTITIONER
Payer: MEDICARE

## 2022-10-03 VITALS
DIASTOLIC BLOOD PRESSURE: 87 MMHG | HEART RATE: 70 BPM | SYSTOLIC BLOOD PRESSURE: 144 MMHG | BODY MASS INDEX: 32.91 KG/M2 | WEIGHT: 243 LBS | HEIGHT: 72 IN

## 2022-10-03 DIAGNOSIS — R60.9 EDEMA, UNSPECIFIED TYPE: ICD-10-CM

## 2022-10-03 LAB
ASCENDING AORTA: 3.4 CM
AV INDEX (PROSTH): 0.61
AV MEAN GRADIENT: 16 MMHG
AV PEAK GRADIENT: 23 MMHG
AV VALVE AREA: 2.1 CM2
AV VELOCITY RATIO: 0.6
BSA FOR ECHO PROCEDURE: 2.37 M2
CV ECHO LV RWT: 0.4 CM
DOP CALC AO PEAK VEL: 2.42 M/S
DOP CALC AO VTI: 38.06 CM
DOP CALC LVOT AREA: 3.5 CM2
DOP CALC LVOT DIAMETER: 2.1 CM
DOP CALC LVOT PEAK VEL: 1.45 M/S
DOP CALC LVOT STROKE VOLUME: 79.8 CM3
DOP CALCLVOT PEAK VEL VTI: 23.05 CM
E WAVE DECELERATION TIME: 123.97 MSEC
E/A RATIO: 1.15
ECHO LV POSTERIOR WALL: 0.78 CM (ref 0.6–1.1)
EJECTION FRACTION: 65 %
FRACTIONAL SHORTENING: 29 % (ref 28–44)
INTERVENTRICULAR SEPTUM: 1.15 CM (ref 0.6–1.1)
IVRT: 117.03 MSEC
LEFT ATRIUM SIZE: 2.61 CM
LEFT INTERNAL DIMENSION IN SYSTOLE: 2.8 CM (ref 2.1–4)
LEFT VENTRICLE DIASTOLIC VOLUME INDEX: 29.01 ML/M2
LEFT VENTRICLE DIASTOLIC VOLUME: 67.02 ML
LEFT VENTRICLE MASS INDEX: 51 G/M2
LEFT VENTRICLE SYSTOLIC VOLUME INDEX: 12.8 ML/M2
LEFT VENTRICLE SYSTOLIC VOLUME: 29.67 ML
LEFT VENTRICULAR INTERNAL DIMENSION IN DIASTOLE: 3.93 CM (ref 3.5–6)
LEFT VENTRICULAR MASS: 117.52 G
MV PEAK A VEL: 0.46 M/S
MV PEAK E VEL: 0.53 M/S
MV STENOSIS PRESSURE HALF TIME: 35.95 MS
MV VALVE AREA P 1/2 METHOD: 6.12 CM2
SINUS: 3.64 CM
STJ: 3.23 CM

## 2022-10-03 PROCEDURE — 93306 TTE W/DOPPLER COMPLETE: CPT

## 2022-10-03 PROCEDURE — 93306 TTE W/DOPPLER COMPLETE: CPT | Mod: 26,,, | Performed by: INTERNAL MEDICINE

## 2022-10-03 PROCEDURE — 93306 ECHO (CUPID ONLY): ICD-10-PCS | Mod: 26,,, | Performed by: INTERNAL MEDICINE

## 2022-10-07 ENCOUNTER — HOSPITAL ENCOUNTER (OUTPATIENT)
Dept: RADIOLOGY | Facility: HOSPITAL | Age: 42
Discharge: HOME | End: 2022-10-07
Attending: NURSE PRACTITIONER
Payer: MEDICARE

## 2022-10-07 ENCOUNTER — PATIENT MESSAGE (OUTPATIENT)
Dept: PRIMARY CARE CLINIC | Facility: CLINIC | Age: 42
End: 2022-10-07
Payer: MEDICARE

## 2022-10-07 ENCOUNTER — TELEPHONE (OUTPATIENT)
Dept: PRIMARY CARE CLINIC | Facility: CLINIC | Age: 42
End: 2022-10-07
Payer: MEDICARE

## 2022-10-07 DIAGNOSIS — R60.9 EDEMA, UNSPECIFIED TYPE: ICD-10-CM

## 2022-10-07 DIAGNOSIS — G80.9 CEREBRAL PALSY, UNSPECIFIED TYPE: Primary | ICD-10-CM

## 2022-10-10 PROCEDURE — G0180 PR HOME HEALTH MD CERTIFICATION: ICD-10-PCS | Mod: ,,, | Performed by: INTERNAL MEDICINE

## 2022-10-10 PROCEDURE — G0180 MD CERTIFICATION HHA PATIENT: HCPCS | Mod: ,,, | Performed by: INTERNAL MEDICINE

## 2022-10-15 ENCOUNTER — DOCUMENT SCAN (OUTPATIENT)
Dept: HOME HEALTH SERVICES | Facility: HOSPITAL | Age: 42
End: 2022-10-15
Payer: MEDICARE

## 2022-10-18 ENCOUNTER — OFFICE VISIT (OUTPATIENT)
Dept: PRIMARY CARE CLINIC | Facility: CLINIC | Age: 42
End: 2022-10-18
Payer: MEDICARE

## 2022-10-18 ENCOUNTER — TELEPHONE (OUTPATIENT)
Dept: PRIMARY CARE CLINIC | Facility: CLINIC | Age: 42
End: 2022-10-18
Payer: MEDICARE

## 2022-10-18 VITALS
SYSTOLIC BLOOD PRESSURE: 119 MMHG | HEART RATE: 96 BPM | RESPIRATION RATE: 16 BRPM | BODY MASS INDEX: 32.91 KG/M2 | OXYGEN SATURATION: 97 % | WEIGHT: 243 LBS | DIASTOLIC BLOOD PRESSURE: 76 MMHG | HEIGHT: 72 IN | TEMPERATURE: 98 F

## 2022-10-18 DIAGNOSIS — Z23 NEED FOR COVID-19 VACCINE: ICD-10-CM

## 2022-10-18 DIAGNOSIS — M79.89 LEG SWELLING: ICD-10-CM

## 2022-10-18 DIAGNOSIS — J30.9 ALLERGIC SINUSITIS: ICD-10-CM

## 2022-10-18 DIAGNOSIS — G91.9 HYDROCEPHALUS, UNSPECIFIED TYPE: ICD-10-CM

## 2022-10-18 DIAGNOSIS — E55.9 VITAMIN D DEFICIENCY: ICD-10-CM

## 2022-10-18 DIAGNOSIS — B35.6 TINEA CRURIS: ICD-10-CM

## 2022-10-18 DIAGNOSIS — I10 ESSENTIAL HYPERTENSION: ICD-10-CM

## 2022-10-18 DIAGNOSIS — H54.3 BLINDNESS OF BOTH EYES: ICD-10-CM

## 2022-10-18 DIAGNOSIS — R60.9 EDEMA, UNSPECIFIED TYPE: ICD-10-CM

## 2022-10-18 DIAGNOSIS — G80.0 SPASTIC QUADRIPLEGIC CEREBRAL PALSY: Primary | ICD-10-CM

## 2022-10-18 DIAGNOSIS — G40.909 SEIZURE DISORDER: ICD-10-CM

## 2022-10-18 PROCEDURE — 99213 PR OFFICE/OUTPT VISIT, EST, LEVL III, 20-29 MIN: ICD-10-PCS | Mod: 25,S$GLB,, | Performed by: INTERNAL MEDICINE

## 2022-10-18 PROCEDURE — 1159F MED LIST DOCD IN RCRD: CPT | Mod: CPTII,S$GLB,, | Performed by: INTERNAL MEDICINE

## 2022-10-18 PROCEDURE — 4010F PR ACE/ARB THEARPY RXD/TAKEN: ICD-10-PCS | Mod: CPTII,S$GLB,, | Performed by: INTERNAL MEDICINE

## 2022-10-18 PROCEDURE — 99499 UNLISTED E&M SERVICE: CPT | Mod: S$GLB,,, | Performed by: INTERNAL MEDICINE

## 2022-10-18 PROCEDURE — 90694 FLU VACCINE - QUADRIVALENT - ADJUVANTED: ICD-10-PCS | Mod: S$GLB,,, | Performed by: INTERNAL MEDICINE

## 2022-10-18 PROCEDURE — 3074F PR MOST RECENT SYSTOLIC BLOOD PRESSURE < 130 MM HG: ICD-10-PCS | Mod: CPTII,S$GLB,, | Performed by: INTERNAL MEDICINE

## 2022-10-18 PROCEDURE — 90694 VACC AIIV4 NO PRSRV 0.5ML IM: CPT | Mod: S$GLB,,, | Performed by: INTERNAL MEDICINE

## 2022-10-18 PROCEDURE — 3078F DIAST BP <80 MM HG: CPT | Mod: CPTII,S$GLB,, | Performed by: INTERNAL MEDICINE

## 2022-10-18 PROCEDURE — 4010F ACE/ARB THERAPY RXD/TAKEN: CPT | Mod: CPTII,S$GLB,, | Performed by: INTERNAL MEDICINE

## 2022-10-18 PROCEDURE — G0008 FLU VACCINE - QUADRIVALENT - ADJUVANTED: ICD-10-PCS | Mod: S$GLB,,, | Performed by: INTERNAL MEDICINE

## 2022-10-18 PROCEDURE — G0008 ADMIN INFLUENZA VIRUS VAC: HCPCS | Mod: S$GLB,,, | Performed by: INTERNAL MEDICINE

## 2022-10-18 PROCEDURE — 3078F PR MOST RECENT DIASTOLIC BLOOD PRESSURE < 80 MM HG: ICD-10-PCS | Mod: CPTII,S$GLB,, | Performed by: INTERNAL MEDICINE

## 2022-10-18 PROCEDURE — 99213 OFFICE O/P EST LOW 20 MIN: CPT | Mod: 25,S$GLB,, | Performed by: INTERNAL MEDICINE

## 2022-10-18 PROCEDURE — 99499 RISK ADDL DX/OHS AUDIT: ICD-10-PCS | Mod: S$GLB,,, | Performed by: INTERNAL MEDICINE

## 2022-10-18 PROCEDURE — 3074F SYST BP LT 130 MM HG: CPT | Mod: CPTII,S$GLB,, | Performed by: INTERNAL MEDICINE

## 2022-10-18 PROCEDURE — 1159F PR MEDICATION LIST DOCUMENTED IN MEDICAL RECORD: ICD-10-PCS | Mod: CPTII,S$GLB,, | Performed by: INTERNAL MEDICINE

## 2022-10-18 RX ORDER — DIVALPROEX SODIUM 500 MG/1
500 TABLET, DELAYED RELEASE ORAL EVERY 12 HOURS
Qty: 180 TABLET | Refills: 3 | Status: SHIPPED | OUTPATIENT
Start: 2022-10-18 | End: 2023-04-17 | Stop reason: SDUPTHER

## 2022-10-18 RX ORDER — CETIRIZINE HYDROCHLORIDE 10 MG/1
10 TABLET ORAL DAILY
Qty: 90 TABLET | Refills: 3 | Status: SHIPPED | OUTPATIENT
Start: 2022-10-18 | End: 2023-01-05 | Stop reason: SDUPTHER

## 2022-10-18 RX ORDER — FLUTICASONE PROPIONATE 50 MCG
1 SPRAY, SUSPENSION (ML) NASAL DAILY
Qty: 16 G | Refills: 0 | Status: SHIPPED | OUTPATIENT
Start: 2022-10-18 | End: 2023-03-21

## 2022-10-18 RX ORDER — LISINOPRIL 10 MG/1
10 TABLET ORAL DAILY
Qty: 90 TABLET | Refills: 3 | Status: SHIPPED | OUTPATIENT
Start: 2022-10-18 | End: 2023-07-24

## 2022-10-18 RX ORDER — PRENATAL VIT 91/IRON/FOLIC/DHA 28-975-200
COMBINATION PACKAGE (EA) ORAL 2 TIMES DAILY
Qty: 100 G | Refills: 11 | Status: SHIPPED | OUTPATIENT
Start: 2022-10-18 | End: 2023-07-24 | Stop reason: SDUPTHER

## 2022-10-18 RX ORDER — DIVALPROEX SODIUM 250 MG/1
250 TABLET, FILM COATED, EXTENDED RELEASE ORAL NIGHTLY
Qty: 90 TABLET | Refills: 3 | Status: SHIPPED | OUTPATIENT
Start: 2022-10-18 | End: 2022-12-21

## 2022-10-18 RX ORDER — CELECOXIB 200 MG/1
200 CAPSULE ORAL DAILY PRN
Qty: 90 CAPSULE | Refills: 3 | Status: SHIPPED | OUTPATIENT
Start: 2022-10-18 | End: 2023-04-17 | Stop reason: SDUPTHER

## 2022-10-18 RX ORDER — VIT C/E/ZN/COPPR/LUTEIN/ZEAXAN 250MG-90MG
1000 CAPSULE ORAL DAILY
Qty: 90 CAPSULE | Refills: 3 | Status: SHIPPED | OUTPATIENT
Start: 2022-10-18 | End: 2023-04-17 | Stop reason: SDUPTHER

## 2022-10-18 NOTE — PROGRESS NOTES
"Primary Care Provider Appointment - [facility]  SHARED NOTE: Felipa Blancas (MS4), Dr Telles (Attending)    Subjective:      Patient ID: Leonid Cox is a 42 y.o. male with cerebral palsy, hydrocephalus, blindness, and seizure disorder.     Chief Complaint: Follow-up (Review labs) and Foot Swelling    Prior to this visit, patient's last encounter with PCP was 9/29/2022. The patient receives 24 hours assistance with his care.     Home Health: Has started with PT on T/Th at 2pm.    Venous Stasis: US of bilateral lower limbs not completed; message previously about whether this can be done at home. Ordered today in clinic.     Cerebral palsy: Referral to OT/PT/Rx for Compression Stocking /Wheelchair with foot rests addressed at last visit. Home Health fax sent out 10/17/22.     Seizure disorder: Takes Depakote 500 mg BID and 250 mg at night; has not has a true "seizure" in years.     Edema:  Still experiencing edema in the lower limbs; started on lasix 20 mg daily. Likely 2/2 to venous stasis due to immobility. Wrapped bilateral lower limbs in foot wraps, will message DME about wheelchair.     Health Maintenance: Needs FLU VACCINE  After obtaining consent, and per orders of Dr. Anushka Telles, injection of High-dose flu vaccine given by Felipa Blancas MS4. Patient instructed to remain in clinic for 20 minutes afterwards, and to report any adverse reaction to me immediately. Has  Shunt, per Dr. Telles the patient should receive high-dose flu shot.      NDC:56671-030-87  Lot: [id number]  Expiration: 5/19/23  Location: Right Deltoid  Method: Intramuscular Injection  Time: 12:26 PM  Date: 10/18/2022  - Felipa Blancas  MS4    4Ms for Medical Decision-Making in Older Adults    Last Completed EAWV: None    MOBILITY:  Get Up and Go:  No flowsheet data found.  Activities of Daily Living:  No flowsheet data found.  Whisper Test:  No flowsheet data found.  Disability Status:  No flowsheet data found.  Nutrition " Screening:  No flowsheet data found. Screening Score: 0-7 Malnourished, 8-11 At Risk, 12-14 Normal    MENTATION:   Depression Patient Health Questionnaire:  Depression Patient Health Questionnaire 10/18/2022   PHQ-4 Total Score 0     Has Dementia Dx: No    Cognitive Function Screening:  No flowsheet data found.  Cognitive Function Screening Total - Less than 4 = Abnormal,  Greater than or equal to 4 = Normal    MEDICATIONS:  High Risk Medications:  Total Active Medications: 0  This patient does not have an active medication from one of the medication groupers.    WHAT MATTERS MOST:  Advance Care Planning   ACP Status:   Patient has had an ACP conversation  Living Will: Yes  Power of : Yes  [place]: Yes      Past Surgical History:   Procedure Laterality Date    FOOT SURGERY      SHUNT REVISION      TENDON RELEASE      TOTAL HIP ARTHROPLASTY         Past Medical History:   Diagnosis Date    Blind     Cerebral palsy     Hydrocephalus     Hypertension     Seizure disorder     Seizures     Urinary reflux        Social History     Socioeconomic History    Marital status: Single   Tobacco Use    Smoking status: Never    Smokeless tobacco: Never   Substance and Sexual Activity    Alcohol use: No    Drug use: No   Social History Narrative    Lives in property owned by grandmother. Disabled with [employer] caretakers 24hrs to patient. Primary caretaker Sherri takes care of him. Shalonda is the active caretaker, but there is no official legal power of .         Mother passed away 12/2013       Review of Systems   Constitutional: Negative.    HENT: Negative.     Eyes: Negative.    Respiratory: Negative.     Cardiovascular:  Positive for leg swelling (Edema likely 2/2 to venous stasis).   Gastrointestinal: Negative.    Endocrine: Negative.    Genitourinary: Negative.    Musculoskeletal:         Joint contractures and CP in wheelchair   Skin:  Positive for rash. Pallor: in groin, under testicles - using  triamcinalone cream.       Rash in groin intermittent for <6 months; has tried triamcinolone cream, which does not seem to help much.   Psychiatric/Behavioral: Negative.       Objective:   /76   Pulse 96   Temp 98.2 °F (36.8 °C) (Oral)   Resp 16   Ht 6' (1.829 m)   Wt 110.2 kg (243 lb)   SpO2 97%   BMI 32.96 kg/m²     Physical Exam  Constitutional:       Appearance: Normal appearance.   HENT:      Head:      Comments: Has  shunt in place      Right Ear: External ear normal.      Left Ear: External ear normal.      Nose: Nose normal.      Mouth/Throat:      Mouth: Mucous membranes are moist.      Pharynx: Oropharynx is clear.   Cardiovascular:      Rate and Rhythm: Normal rate.      Pulses: Normal pulses.   Pulmonary:      Effort: Pulmonary effort is normal.   Musculoskeletal:      Cervical back: Normal range of motion.   Skin:     General: Skin is warm.   Neurological:      Mental Status: He is alert. Mental status is at baseline.   Psychiatric:         Mood and Affect: Mood normal.         Behavior: Behavior normal.           Lab Results   Component Value Date    WBC 5.94 09/29/2022    HGB 15.2 09/29/2022    HCT 45.3 09/29/2022     (L) 09/29/2022    CHOL 141 03/28/2018    TRIG 87 03/28/2018    HDL 39 (L) 03/28/2018    ALT 39 11/26/2021    AST 22 11/26/2021     11/26/2021    K 4.4 11/26/2021     11/26/2021    CREATININE 0.8 11/26/2021    BUN 14 11/26/2021    CO2 24 11/26/2021    TSH 3.073 09/29/2022    INR 0.9 10/03/2018    HGBA1C 5.3 12/14/2015       Current Outpatient Medications on File Prior to Visit   Medication Sig Dispense Refill    furosemide (LASIX) 20 MG tablet Take 1 tablet (20 mg total) by mouth once daily. 30 tablet 11    triamcinolone acetonide 0.1% (KENALOG) 0.1 % cream Apply topically 2 (two) times daily as needed. to reduce redness and itching 28.4 g 1    triamcinolone acetonide 0.1% (KENALOG) 0.1 % ointment Apply topically 2 (two) times daily. 80 g 11     [DISCONTINUED] celecoxib (CELEBREX) 200 MG capsule Take 1 capsule (200 mg total) by mouth daily as needed for Pain. 90 capsule 3    [DISCONTINUED] cetirizine (ZYRTEC) 10 MG tablet Take 1 tablet (10 mg total) by mouth once daily. 30 tablet 0    [DISCONTINUED] cholecalciferol, vitamin D3, (VITAMIN D3) 25 mcg (1,000 unit) capsule Take 1 capsule (1,000 Units total) by mouth once daily. 90 capsule 3    [DISCONTINUED] divalproex (DEPAKOTE) 500 MG TbEC Take 1 tablet (500 mg total) by mouth every 12 (twelve) hours. 180 tablet 3    [DISCONTINUED] divalproex ER (DEPAKOTE ER) 250 MG 24 hr tablet Take 1 tablet (250 mg total) by mouth every evening. 90 tablet 3    [DISCONTINUED] fluticasone propionate (FLONASE) 50 mcg/actuation nasal spray 1 spray (50 mcg total) by Each Nostril route once daily. 16 g 0    [DISCONTINUED] lisinopriL 10 MG tablet Take 1 tablet (10 mg total) by mouth once daily. 90 tablet 3    [DISCONTINUED] levocetirizine (XYZAL) 5 MG tablet Take 1 tablet (5 mg total) by mouth nightly as needed for Allergies. 90 tablet 0     No current facility-administered medications on file prior to visit.         Assessment:   42 y.o. male with multiple co-morbid illnesses here to follow-up with PCP and continue work-up of chronic issues:    Plan:     Problem List Items Addressed This Visit          Neuro    Cerebral palsy - Primary    Relevant Medications    divalproex ER (DEPAKOTE ER) 250 MG 24 hr tablet    celecoxib (CELEBREX) 200 MG capsule    Hydrocephalus     No indication of infection at shunt.   High dose flu vaccine today         Relevant Orders    Influenza (FLUAD) - Quadrivalent (Adjuvanted) *Preferred* (65+) (PF) (Completed)    Seizure disorder    Relevant Medications    divalproex (DEPAKOTE) 500 MG TbEC       Ophtho    Blind     Blindness since birth. Has 100% assistance for all ADLs  Continue supportive therapies            Derm    Tinea cruris    Relevant Medications    terbinafine HCL (LAMISIL) 1 % cream        Cardiac/Vascular    Essential hypertension    Relevant Medications    lisinopriL 10 MG tablet       ID    Need for COVID-19 vaccine    Relevant Orders    Ambulatory Request for [facility]       Other    Leg swelling    Relevant Orders    CV Ultrasound doppler venous legs bilat     Other Visit Diagnoses       Edema, unspecified type        Relevant Orders    CV Ultrasound doppler venous legs bilat    Vitamin D deficiency        Relevant Medications    cholecalciferol, vitamin D3, (VITAMIN D3) 25 mcg (1,000 unit) capsule    Allergic sinusitis        Relevant Medications    cetirizine (ZYRTEC) 10 MG tablet    fluticasone propionate (FLONASE) 50 mcg/actuation nasal spray            Health Maintenance         Date Due Completion Date    COVID-19 Vaccine (1) Never done ---    Influenza Vaccine (1) 09/01/2022 1/29/2020    TETANUS VACCINE 09/24/2022 9/24/2012    Lipid Panel 03/28/2023 3/28/2018            No future appointments.      Follow up ROUTINE. Total clinical care time was 30 min, issues addressed include: vaccines, leg wraps, venous stasis, tinea cruris    Felipa Blancas (student)    Anushka Telles MD/MPH  [facility]  [phone] [facility]

## 2022-10-18 NOTE — TELEPHONE ENCOUNTER
Called [facility] to inquire the status of elevated leg rest for custom wheelchair sent 10/7/22 via fax. Spoke to [facility] who reports that the leg rests were approved and sent to [facility] for fulfillment.    Called [facility] at [phone]. Spoke to Rosanna regarding delivery of leg rests for custom wheelchair. Told that a specialist will have to be sent out to measure and evaluate the wheelchair for the leg rests. After the evaluation the leg rests will have to be ordered. The coordinator Hailey will handle this. Told that it may be one to two weeks for this to be done. The order is being processed.

## 2022-10-18 NOTE — PATIENT INSTRUCTIONS
TODAY:  - leg US in the home  - flu vaccine in clinic today  - leg wraps today  - COVID vaccine in the home ([employer] will call you)  - meds refilled  - we will call about the wheelchair

## 2022-10-19 ENCOUNTER — EXTERNAL HOME HEALTH (OUTPATIENT)
Dept: HOME HEALTH SERVICES | Facility: HOSPITAL | Age: 42
End: 2022-10-19
Payer: MEDICARE

## 2022-11-05 ENCOUNTER — DOCUMENT SCAN (OUTPATIENT)
Dept: HOME HEALTH SERVICES | Facility: HOSPITAL | Age: 42
End: 2022-11-05
Payer: MEDICARE

## 2022-11-29 ENCOUNTER — TELEPHONE (OUTPATIENT)
Dept: PRIMARY CARE CLINIC | Facility: CLINIC | Age: 42
End: 2022-11-29
Payer: MEDICARE

## 2022-11-29 NOTE — TELEPHONE ENCOUNTER
Call received from Pat Garza from [employer]  ([phone]) regarding status of elevated leg rests for custom wheelchair. Called [facility] at [phone] to find out progress. Spoke to Tia. Told that the person who was to evaluate the chair to obtain the correct leg rests was out sick. Another specialist will be sent out to evaluate the chair soon. Called Pat back to inform her of reason for delay. Verbalized understanding.

## 2022-12-06 ENCOUNTER — TELEPHONE (OUTPATIENT)
Dept: FAMILY MEDICINE | Facility: CLINIC | Age: 42
End: 2022-12-06
Payer: MEDICARE

## 2023-01-05 DIAGNOSIS — J30.9 ALLERGIC SINUSITIS: ICD-10-CM

## 2023-01-05 RX ORDER — GUAIFENESIN, PSEUDOEPHEDRINE HYDROCHLORIDE 600; 60 MG/1; MG/1
1 TABLET, EXTENDED RELEASE ORAL 2 TIMES DAILY
Qty: 20 TABLET | Refills: 0 | Status: SHIPPED | OUTPATIENT
Start: 2023-01-05 | End: 2023-01-06 | Stop reason: SDUPTHER

## 2023-01-05 RX ORDER — CETIRIZINE HYDROCHLORIDE 10 MG/1
10 TABLET ORAL DAILY
Qty: 90 TABLET | Refills: 3 | Status: SHIPPED | OUTPATIENT
Start: 2023-01-05 | End: 2023-01-06 | Stop reason: SDUPTHER

## 2023-01-05 NOTE — TELEPHONE ENCOUNTER
I think family should test him for COVID.    To treat the symptoms, I recommend an antihistamine (zyrtec) and a cough/cold medication.    I will send those to the pharmacy.    Thanks,  MCKENNA

## 2023-01-05 NOTE — TELEPHONE ENCOUNTER
Phone call  received from home  Glendy Garza.  Stated that patient has been having a runny nose for 4 days.  Denies patient having fever or con gestiion.   Also spoke to Shalonda cordon, patient's guardian, who states that patient has not been tested for covid, but that she is sure that patient does not have covid.   Would like something called in to pharmacy     [facility]  [address]  [place]  [phone]    Please advise

## 2023-01-06 RX ORDER — CETIRIZINE HYDROCHLORIDE 10 MG/1
10 TABLET ORAL DAILY
Qty: 90 TABLET | Refills: 3 | Status: SHIPPED | OUTPATIENT
Start: 2023-01-06 | End: 2023-04-17 | Stop reason: SDUPTHER

## 2023-01-06 RX ORDER — GUAIFENESIN, PSEUDOEPHEDRINE HYDROCHLORIDE 600; 60 MG/1; MG/1
1 TABLET, EXTENDED RELEASE ORAL 2 TIMES DAILY
Qty: 20 TABLET | Refills: 0 | Status: SHIPPED | OUTPATIENT
Start: 2023-01-06 | End: 2023-01-16

## 2023-01-06 NOTE — TELEPHONE ENCOUNTER
Spoke to pt's caretaker, she insists pt does not have covid and she does not want him tested, I advised I would let you know, she would also like rxs sent to [facility] instead, please resend.

## 2023-01-18 ENCOUNTER — TELEPHONE (OUTPATIENT)
Dept: PRIMARY CARE CLINIC | Facility: CLINIC | Age: 43
End: 2023-01-18
Payer: MEDICARE

## 2023-01-18 NOTE — TELEPHONE ENCOUNTER
Call received from Mary Carmen Garza, supervisor with [employer] [phone]. Called to check on status of order for elevated leg rests.    Called [facility] [phone] to check on status of elevated leg rests. I was told that the Rehab Dept/Rosanna will be scheduling a visit with family this week to address this.     Called Mary Carmen back with this information, phone number to Jeannie and  Rosanna/Rehab Dept given to her as well in case she does not hear from them. Mary Carmen appreciated the call back.

## 2023-02-21 NOTE — DISCHARGE INSTRUCTIONS
Please schedule an appointment with an Orthopedist for follow up. If you have pain, you can take Tylenol up to 3 grams daily (6 x 500mg extra strength tablets) and ibuprofen up to 2400mg daily. You should also mention to your Primary Care doctor that you had an abnormal chest x-ray. If you start to have severe pain, numbness or trouble moving the arm, please come back to the Emergency Department.   Scribe Attestation (For Scribes USE Only)... Attending Attestation (For Attendings USE Only).../Scribe Attestation (For Scribes USE Only)...

## 2023-04-17 ENCOUNTER — TELEPHONE (OUTPATIENT)
Dept: PRIMARY CARE CLINIC | Facility: CLINIC | Age: 43
End: 2023-04-17

## 2023-04-17 ENCOUNTER — LAB VISIT (OUTPATIENT)
Dept: LAB | Facility: HOSPITAL | Age: 43
End: 2023-04-17
Attending: INTERNAL MEDICINE
Payer: MEDICARE

## 2023-04-17 ENCOUNTER — OFFICE VISIT (OUTPATIENT)
Dept: PRIMARY CARE CLINIC | Facility: CLINIC | Age: 43
End: 2023-04-17
Payer: MEDICARE

## 2023-04-17 VITALS
OXYGEN SATURATION: 99 % | HEART RATE: 82 BPM | HEIGHT: 72 IN | BODY MASS INDEX: 32.96 KG/M2 | TEMPERATURE: 98 F | DIASTOLIC BLOOD PRESSURE: 69 MMHG | SYSTOLIC BLOOD PRESSURE: 110 MMHG

## 2023-04-17 DIAGNOSIS — R60.0 PERIPHERAL EDEMA: ICD-10-CM

## 2023-04-17 DIAGNOSIS — R53.2 FUNCTIONAL QUADRIPLEGIA: ICD-10-CM

## 2023-04-17 DIAGNOSIS — E66.9 OBESITY (BMI 30.0-34.9): ICD-10-CM

## 2023-04-17 DIAGNOSIS — B35.6 TINEA CRURIS: ICD-10-CM

## 2023-04-17 DIAGNOSIS — Z66 DNR (DO NOT RESUSCITATE): ICD-10-CM

## 2023-04-17 DIAGNOSIS — G91.9 HYDROCEPHALUS, UNSPECIFIED TYPE: ICD-10-CM

## 2023-04-17 DIAGNOSIS — D69.6 THROMBOCYTOPENIA, UNSPECIFIED: ICD-10-CM

## 2023-04-17 DIAGNOSIS — J30.9 ALLERGIC SINUSITIS: ICD-10-CM

## 2023-04-17 DIAGNOSIS — H54.3 BLINDNESS OF BOTH EYES: ICD-10-CM

## 2023-04-17 DIAGNOSIS — G80.0 SPASTIC QUADRIPLEGIC CEREBRAL PALSY: ICD-10-CM

## 2023-04-17 DIAGNOSIS — G40.909 SEIZURE DISORDER: ICD-10-CM

## 2023-04-17 DIAGNOSIS — E66.9 OBESITY (BMI 30.0-34.9): Primary | ICD-10-CM

## 2023-04-17 DIAGNOSIS — E55.9 VITAMIN D DEFICIENCY: ICD-10-CM

## 2023-04-17 DIAGNOSIS — I10 ESSENTIAL HYPERTENSION: ICD-10-CM

## 2023-04-17 LAB
ALBUMIN SERPL BCP-MCNC: 3.8 G/DL (ref 3.5–5.2)
ALP SERPL-CCNC: 64 U/L (ref 55–135)
ALT SERPL W/O P-5'-P-CCNC: 54 U/L (ref 10–44)
ANION GAP SERPL CALC-SCNC: 8 MMOL/L (ref 8–16)
AST SERPL-CCNC: 36 U/L (ref 10–40)
BASOPHILS # BLD AUTO: 0.04 K/UL (ref 0–0.2)
BASOPHILS NFR BLD: 0.7 % (ref 0–1.9)
BILIRUB SERPL-MCNC: 0.3 MG/DL (ref 0.1–1)
BUN SERPL-MCNC: 19 MG/DL (ref 6–20)
CALCIUM SERPL-MCNC: 9.7 MG/DL (ref 8.7–10.5)
CHLORIDE SERPL-SCNC: 103 MMOL/L (ref 95–110)
CHOLEST SERPL-MCNC: 174 MG/DL (ref 120–199)
CHOLEST/HDLC SERPL: 4.5 {RATIO} (ref 2–5)
CO2 SERPL-SCNC: 31 MMOL/L (ref 23–29)
CREAT SERPL-MCNC: 1 MG/DL (ref 0.5–1.4)
DIFFERENTIAL METHOD: ABNORMAL
EOSINOPHIL # BLD AUTO: 0 K/UL (ref 0–0.5)
EOSINOPHIL NFR BLD: 0.5 % (ref 0–8)
ERYTHROCYTE [DISTWIDTH] IN BLOOD BY AUTOMATED COUNT: 13.2 % (ref 11.5–14.5)
EST. GFR  (NO RACE VARIABLE): >60 ML/MIN/1.73 M^2
GLUCOSE SERPL-MCNC: 78 MG/DL (ref 70–110)
HCT VFR BLD AUTO: 46.2 % (ref 40–54)
HDLC SERPL-MCNC: 39 MG/DL (ref 40–75)
HDLC SERPL: 22.4 % (ref 20–50)
HGB BLD-MCNC: 15.2 G/DL (ref 14–18)
IMM GRANULOCYTES # BLD AUTO: 0.03 K/UL (ref 0–0.04)
IMM GRANULOCYTES NFR BLD AUTO: 0.5 % (ref 0–0.5)
LDLC SERPL CALC-MCNC: 110.2 MG/DL (ref 63–159)
LYMPHOCYTES # BLD AUTO: 2.1 K/UL (ref 1–4.8)
LYMPHOCYTES NFR BLD: 38.8 % (ref 18–48)
MCH RBC QN AUTO: 30.7 PG (ref 27–31)
MCHC RBC AUTO-ENTMCNC: 32.9 G/DL (ref 32–36)
MCV RBC AUTO: 93 FL (ref 82–98)
MONOCYTES # BLD AUTO: 0.6 K/UL (ref 0.3–1)
MONOCYTES NFR BLD: 10.1 % (ref 4–15)
NEUTROPHILS # BLD AUTO: 2.7 K/UL (ref 1.8–7.7)
NEUTROPHILS NFR BLD: 49.4 % (ref 38–73)
NONHDLC SERPL-MCNC: 135 MG/DL
NRBC BLD-RTO: 0 /100 WBC
PLATELET # BLD AUTO: 52 K/UL (ref 150–450)
PMV BLD AUTO: 12.3 FL (ref 9.2–12.9)
POTASSIUM SERPL-SCNC: 4.2 MMOL/L (ref 3.5–5.1)
PROT SERPL-MCNC: 7.2 G/DL (ref 6–8.4)
RBC # BLD AUTO: 4.95 M/UL (ref 4.6–6.2)
SODIUM SERPL-SCNC: 142 MMOL/L (ref 136–145)
TRIGL SERPL-MCNC: 124 MG/DL (ref 30–150)
VALPROATE SERPL-MCNC: 101.5 UG/ML (ref 50–100)
WBC # BLD AUTO: 5.47 K/UL (ref 3.9–12.7)

## 2023-04-17 PROCEDURE — 80061 LIPID PANEL: CPT | Performed by: INTERNAL MEDICINE

## 2023-04-17 PROCEDURE — 85025 COMPLETE CBC W/AUTO DIFF WBC: CPT | Performed by: INTERNAL MEDICINE

## 2023-04-17 PROCEDURE — 1160F PR REVIEW ALL MEDS BY PRESCRIBER/CLIN PHARMACIST DOCUMENTED: ICD-10-PCS | Mod: CPTII,S$GLB,, | Performed by: INTERNAL MEDICINE

## 2023-04-17 PROCEDURE — 99499 UNLISTED E&M SERVICE: CPT | Mod: S$GLB,,, | Performed by: INTERNAL MEDICINE

## 2023-04-17 PROCEDURE — 3008F BODY MASS INDEX DOCD: CPT | Mod: CPTII,S$GLB,, | Performed by: INTERNAL MEDICINE

## 2023-04-17 PROCEDURE — 3078F DIAST BP <80 MM HG: CPT | Mod: CPTII,S$GLB,, | Performed by: INTERNAL MEDICINE

## 2023-04-17 PROCEDURE — 1159F MED LIST DOCD IN RCRD: CPT | Mod: CPTII,S$GLB,, | Performed by: INTERNAL MEDICINE

## 2023-04-17 PROCEDURE — 3074F PR MOST RECENT SYSTOLIC BLOOD PRESSURE < 130 MM HG: ICD-10-PCS | Mod: CPTII,S$GLB,, | Performed by: INTERNAL MEDICINE

## 2023-04-17 PROCEDURE — 4010F ACE/ARB THERAPY RXD/TAKEN: CPT | Mod: CPTII,S$GLB,, | Performed by: INTERNAL MEDICINE

## 2023-04-17 PROCEDURE — 3008F PR BODY MASS INDEX (BMI) DOCUMENTED: ICD-10-PCS | Mod: CPTII,S$GLB,, | Performed by: INTERNAL MEDICINE

## 2023-04-17 PROCEDURE — 99213 PR OFFICE/OUTPT VISIT, EST, LEVL III, 20-29 MIN: ICD-10-PCS | Mod: S$GLB,,, | Performed by: INTERNAL MEDICINE

## 2023-04-17 PROCEDURE — 80164 ASSAY DIPROPYLACETIC ACD TOT: CPT | Performed by: INTERNAL MEDICINE

## 2023-04-17 PROCEDURE — 80053 COMPREHEN METABOLIC PANEL: CPT | Performed by: INTERNAL MEDICINE

## 2023-04-17 PROCEDURE — 4010F PR ACE/ARB THEARPY RXD/TAKEN: ICD-10-PCS | Mod: CPTII,S$GLB,, | Performed by: INTERNAL MEDICINE

## 2023-04-17 PROCEDURE — 99499 RISK ADDL DX/OHS AUDIT: ICD-10-PCS | Mod: S$GLB,,, | Performed by: INTERNAL MEDICINE

## 2023-04-17 PROCEDURE — 1160F RVW MEDS BY RX/DR IN RCRD: CPT | Mod: CPTII,S$GLB,, | Performed by: INTERNAL MEDICINE

## 2023-04-17 PROCEDURE — 99213 OFFICE O/P EST LOW 20 MIN: CPT | Mod: S$GLB,,, | Performed by: INTERNAL MEDICINE

## 2023-04-17 PROCEDURE — 3074F SYST BP LT 130 MM HG: CPT | Mod: CPTII,S$GLB,, | Performed by: INTERNAL MEDICINE

## 2023-04-17 PROCEDURE — 3078F PR MOST RECENT DIASTOLIC BLOOD PRESSURE < 80 MM HG: ICD-10-PCS | Mod: CPTII,S$GLB,, | Performed by: INTERNAL MEDICINE

## 2023-04-17 PROCEDURE — 36415 COLL VENOUS BLD VENIPUNCTURE: CPT | Performed by: INTERNAL MEDICINE

## 2023-04-17 PROCEDURE — 1159F PR MEDICATION LIST DOCUMENTED IN MEDICAL RECORD: ICD-10-PCS | Mod: CPTII,S$GLB,, | Performed by: INTERNAL MEDICINE

## 2023-04-17 RX ORDER — DIVALPROEX SODIUM 250 MG/1
250 TABLET, FILM COATED, EXTENDED RELEASE ORAL NIGHTLY
Qty: 90 TABLET | Refills: 3 | Status: SHIPPED | OUTPATIENT
Start: 2023-04-17 | End: 2023-07-24 | Stop reason: SDUPTHER

## 2023-04-17 RX ORDER — FLUTICASONE PROPIONATE 50 MCG
SPRAY, SUSPENSION (ML) NASAL
Qty: 16 G | Refills: 10 | Status: SHIPPED | OUTPATIENT
Start: 2023-04-17 | End: 2023-10-23 | Stop reason: SDUPTHER

## 2023-04-17 RX ORDER — CELECOXIB 200 MG/1
200 CAPSULE ORAL DAILY PRN
Qty: 90 CAPSULE | Refills: 3 | Status: SHIPPED | OUTPATIENT
Start: 2023-04-17 | End: 2023-07-24 | Stop reason: SDUPTHER

## 2023-04-17 RX ORDER — DIVALPROEX SODIUM 500 MG/1
500 TABLET, DELAYED RELEASE ORAL EVERY 12 HOURS
Qty: 180 TABLET | Refills: 3 | Status: SHIPPED | OUTPATIENT
Start: 2023-04-17 | End: 2023-07-24 | Stop reason: SDUPTHER

## 2023-04-17 RX ORDER — LISINOPRIL 10 MG/1
10 TABLET ORAL DAILY
Qty: 90 TABLET | Refills: 3 | Status: CANCELLED | OUTPATIENT
Start: 2023-04-17 | End: 2024-04-16

## 2023-04-17 RX ORDER — TERBINAFINE HYDROCHLORIDE 250 MG/1
250 TABLET ORAL DAILY
Qty: 14 TABLET | Refills: 0 | Status: SHIPPED | OUTPATIENT
Start: 2023-04-17 | End: 2023-05-01

## 2023-04-17 RX ORDER — VIT C/E/ZN/COPPR/LUTEIN/ZEAXAN 250MG-90MG
1000 CAPSULE ORAL DAILY
Qty: 90 CAPSULE | Refills: 3 | Status: SHIPPED | OUTPATIENT
Start: 2023-04-17 | End: 2023-07-24 | Stop reason: SDUPTHER

## 2023-04-17 RX ORDER — CETIRIZINE HYDROCHLORIDE 10 MG/1
10 TABLET ORAL DAILY
Qty: 90 TABLET | Refills: 3 | Status: SHIPPED | OUTPATIENT
Start: 2023-04-17 | End: 2023-07-24 | Stop reason: SDUPTHER

## 2023-04-17 NOTE — ASSESSMENT & PLAN NOTE
Reports of tinea cruris in groin. Caretakers are applying steroid cream  · Start oral terbinafine  · Advised to stop steroid cream, and apply terbinafine cream  · Advised caretaker to call office with update

## 2023-04-17 NOTE — TELEPHONE ENCOUNTER
Please advise patient has legal guardian that his white count was really low.  His platelets were.  Does she want him to see a blood doctor regarding this?    If she is okay with him seeing a blood doctor, then please set him up with an appointment.  I placed a hematology referral in his in appointment encounter yesterday    Thanks,  Dr ANDRES

## 2023-04-17 NOTE — PATIENT INSTRUCTIONS
TODAY:  - HOLD lisinopril   - STOP triamcinolone cream in the groin  - START lamisil cream in the groin  - START lamisil tablets every day for 14 days  - Caretaker advised to call office [phone] with   + blood pressures   + updates on rash  - labs today to monitor cholesterol, kidney and liver functions, depakote level    (ADD Heme-Onc appt)

## 2023-04-17 NOTE — ASSESSMENT & PLAN NOTE
"Controlled on depakote. Has not had a seizure "in years".  · Continue depakote  · Check level today  · therapeutic  "

## 2023-04-17 NOTE — ASSESSMENT & PLAN NOTE
Leg swelling, sedentary status, swelling worsens through out the day  · Advised leg wraps  · Instruction of caregiver today  · Will continue with leg wraps every 2 days

## 2023-04-17 NOTE — PROGRESS NOTES
"[facility]  SHARED NOTE: Ida Pack (MS4), Dr Telles (Attending)    Subjective:      Patient ID: Leonid Cox is a 43 y.o. male with cerebral palsy, hydrocephalus, blindness, seizure disorder, and hypertension.      Chief Complaint: Follow-up    Prior to this visit, patient's last encounter with PCP was 10/18/2022.    Tinea cruris: Rash unchanged. Care agency reports using triamcinolone cream. Has not used prescribed terbinafine cream.     HTN  - Currently taking: lisinopril 10 MG tablet  - How often taking BP at home: every morning, average is: 109/70  - Today, BP is: 110/69    Home Health: Discharged from PT. Home sitters do exercises with him.     Cerebral palsy: 100% assistance for all ADLs.  shunt in place.     Seizure disorder: Takes Depakote 500 mg BID and extended-release 250 mg at night; has not has a true "seizure" in years.      Edema:  No longer on lasix 20 mg daily. Edema unchanged, likely 2/2 to venous stasis due to immobility. Wraps bilateral lower limbs in foot wraps every 2-3 days. No ulcers.    Vitamin D deficiency: Currently vitamin D3 supplement. Recommended taking OTC calcium supplement as well.    Care Gaps:  - HbA1c  - Lipid panel      4Ms for Medical Decision-Making in Older Adults    Last Completed EAWV: None    MOBILITY:  Get Up and Go:  No flowsheet data found.  Activities of Daily Living:  No flowsheet data found.  Whisper Test:  No flowsheet data found.  Disability Status:  No flowsheet data found.  Nutrition Screening:  No flowsheet data found. Screening Score: 0-7 Malnourished, 8-11 At Risk, 12-14 Normal    MENTATION:   Depression Patient Health Questionnaire:  Depression Patient Health Questionnaire 10/18/2022   PHQ-4 Total Score 0     Has Dementia Dx: No    Cognitive Function Screening:  No flowsheet data found.  Cognitive Function Screening Total - Less than 4 = Abnormal,  Greater than or equal to 4 = Normal    MEDICATIONS:  High Risk " Medications:  Total Active Medications: 0  This patient does not have an active medication from one of the medication groupers.    WHAT MATTERS MOST:  Advance Care Planning   ACP Status:   Patient has had an ACP conversation  Living Will: Yes  Power of : Yes  LaPOST: Yes                 Social History     Socioeconomic History    Marital status: Single   Tobacco Use    Smoking status: Never    Smokeless tobacco: Never   Substance and Sexual Activity    Alcohol use: No    Drug use: No   Social History Narrative    Lives in property owned by grandmother. Disabled with [employer] caretakers 24hrs to patient. Primary caretaker Sherri takes care of him. Shalonda is the active caretaker, but there is no official legal power of .         Mother passed away 12/2013       Review of Systems   Constitutional: Negative.    HENT: Negative.     Eyes: Negative.    Respiratory: Negative.     Cardiovascular:  Positive for leg swelling.   Gastrointestinal: Negative.    Endocrine: Negative.    Skin:  Positive for rash (genital).     Objective:   /69 (BP Location: Left arm, Patient Position: Sitting, BP Method: Large (Automatic))   Pulse 82   Temp 97.6 °F (36.4 °C)   Ht 6' (1.829 m)   SpO2 99%   BMI 32.96 kg/m²     Physical Exam  Constitutional:       General: He is not in acute distress.     Appearance: He is not toxic-appearing or diaphoretic.      Comments: Wheelchair bound   HENT:      Head: Atraumatic.      Right Ear: Ear canal and external ear normal. There is impacted cerumen.      Left Ear: Ear canal and external ear normal. There is impacted cerumen.      Nose: Nose normal. No congestion or rhinorrhea.      Mouth/Throat:      Mouth: Mucous membranes are dry.      Pharynx: Oropharynx is clear. No oropharyngeal exudate or posterior oropharyngeal erythema.   Eyes:      General: No scleral icterus.        Right eye: No discharge.         Left eye: No discharge.      Conjunctiva/sclera: Conjunctivae  normal.   Cardiovascular:      Rate and Rhythm: Normal rate and regular rhythm.      Pulses: Normal pulses.      Heart sounds: Normal heart sounds. No murmur heard.    No friction rub. No gallop.   Pulmonary:      Effort: Pulmonary effort is normal. No respiratory distress.      Breath sounds: Normal breath sounds. No stridor. No wheezing.   Abdominal:      General: Bowel sounds are normal. There is no distension.      Palpations: Abdomen is soft. There is no mass.      Tenderness: There is no abdominal tenderness. There is no guarding or rebound.      Hernia: No hernia is present.      Comments: Obese abdomen   Musculoskeletal:         General: Deformity present. No tenderness or signs of injury.      Right lower leg: Edema present.      Left lower leg: Edema present.      Comments: Contractures on L hand   Skin:     General: Skin is warm and dry.   Neurological:      Mental Status: He is alert. Mental status is at baseline.      Motor: Weakness present.      Coordination: Coordination abnormal.      Gait: Gait abnormal.   Psychiatric:      Comments: Answers questions out of context           Lab Results   Component Value Date    WBC 5.47 04/17/2023    HGB 15.2 04/17/2023    HCT 46.2 04/17/2023    PLT 52 (L) 04/17/2023    CHOL 174 04/17/2023    TRIG 124 04/17/2023    HDL 39 (L) 04/17/2023    ALT 54 (H) 04/17/2023    AST 36 04/17/2023     04/17/2023    K 4.2 04/17/2023     04/17/2023    CREATININE 1.0 04/17/2023    BUN 19 04/17/2023    CO2 31 (H) 04/17/2023    TSH 3.073 09/29/2022    INR 1.2 06/25/2021    HGBA1C 5.3 12/14/2015       Current Outpatient Medications on File Prior to Visit   Medication Sig Dispense Refill    lisinopriL 10 MG tablet Take 1 tablet (10 mg total) by mouth once daily. 90 tablet 3    terbinafine HCL (LAMISIL) 1 % cream Apply topically 2 (two) times daily. Apply in the groin 100 g 11    [DISCONTINUED] celecoxib (CELEBREX) 200 MG capsule Take 1 capsule (200 mg total) by mouth daily  as needed for Pain (Take with food). 90 capsule 3    [DISCONTINUED] cetirizine (ZYRTEC) 10 MG tablet Take 1 tablet (10 mg total) by mouth once daily. 90 tablet 3    [DISCONTINUED] cholecalciferol, vitamin D3, (VITAMIN D3) 25 mcg (1,000 unit) capsule Take 1 capsule (1,000 Units total) by mouth once daily. 90 capsule 3    [DISCONTINUED] divalproex (DEPAKOTE) 500 MG TbEC Take 1 tablet (500 mg total) by mouth every 12 (twelve) hours. 180 tablet 3    [DISCONTINUED] divalproex ER (DEPAKOTE ER) 250 MG 24 hr tablet TAKE 1 TABLET BY MOUTH EVERY EVENING 90 tablet 3    [DISCONTINUED] fluticasone propionate (FLONASE) 50 mcg/actuation nasal spray INSTILL 1 SPRAY IN EACH NOSTRIL EVERY DAY   * REQUEST REFILL WHEN NEEDED 16 g 10    [DISCONTINUED] furosemide (LASIX) 20 MG tablet Take 1 tablet (20 mg total) by mouth once daily. 30 tablet 11    [DISCONTINUED] triamcinolone acetonide 0.1% (KENALOG) 0.1 % cream Apply topically 2 (two) times daily as needed. to reduce redness and itching 28.4 g 1    [DISCONTINUED] triamcinolone acetonide 0.1% (KENALOG) 0.1 % ointment Apply topically 2 (two) times daily. 80 g 11     No current facility-administered medications on file prior to visit.         Assessment:   43 y.o. male with multiple co-morbid illnesses here to follow-up with PCP and continue work-up of chronic issues    Plan:     Problem List Items Addressed This Visit          Neuro    Cerebral palsy     24hr care in the home, has PoA   Continue Neuro follow-up per caretaker preference  Unclear benefit of NS follow-up at this time  Continue Depakote 500 BID and Depakote  nightly  CBC, CMP, lipid panel, serum valproate         Relevant Medications    celecoxib (CELEBREX) 200 MG capsule    divalproex ER (DEPAKOTE ER) 250 MG 24 hr tablet    Other Relevant Orders    LIPID PANEL (Completed)    CBC W/ AUTO DIFFERENTIAL (Completed)    VALPROIC ACID (Completed)    COMPREHENSIVE METABOLIC PANEL (Completed)    Hydrocephalus     No indication  "of infection at shunt.   Keep vaccines up today today         Seizure disorder     Controlled on depakote. Has not had a seizure "in years".  Continue depakote  Check level today  therapeutic         Relevant Medications    divalproex (DEPAKOTE) 500 MG TbEC       Ophtho    Blind     Blindness since birth. Has 100% assistance for all ADLs  Continue supportive therapies            Derm    Tinea cruris     Reports of tinea cruris in groin. Caretakers are applying steroid cream  Start oral terbinafine  Advised to stop steroid cream, and apply terbinafine cream  Advised caretaker to call office with update         Relevant Medications    terbinafine HCL (LAMISIL) 250 mg tablet       Cardiac/Vascular    Essential hypertension     Overtreated today  Advised to HOLD lisinopril  Advised to call with BPs twice weekly            Hematology    Thrombocytopenia, unspecified     Platelets continue to decrease  Dangerously low today  Will refer to Heme-Onc         Relevant Orders    Ambulatory referral/consult to Hematology / Oncology       Palliative Care    DNR (do not resuscitate)     LaPOST completed with [facility]CP[facility] and on 11/30/20 with comfort care focus. Patient also has Ad Directive and HCPoA forms from 2015.  There is conflict at this time regarding next steps in work-up  One guardian (Katherine) wants aggressive care  PoA wants to preserve comfort care focus             Other    Peripheral edema     Leg swelling, sedentary status, swelling worsens through out the day  Advised leg wraps  Instruction of caregiver today  Will continue with leg wraps every 2 days         Functional quadriplegia     Related to CP, wheelchair bound, dependent for all ADLs  Continue 24 hour sitters  PoA making all decisions  Continue communication with caretakers          Other Visit Diagnoses       Obesity (BMI 30.0-34.9)    -  Primary    Relevant Orders    LIPID PANEL (Completed)    Allergic sinusitis        Relevant Medications    cetirizine " (ZYRTEC) 10 MG tablet    fluticasone propionate (FLONASE) 50 mcg/actuation nasal spray    Vitamin D deficiency        Relevant Medications    cholecalciferol, vitamin D3, (VITAMIN D3) 25 mcg (1,000 unit) capsule            Health Maintenance         Date Due Completion Date    COVID-19 Vaccine (1) Never done ---    Hemoglobin A1c (Diabetic Prevention Screening) 12/14/2018 12/14/2015- TODAY    TETANUS VACCINE 09/24/2022 9/24/2012    Lipid Panel 03/28/2023 3/28/2018- TODAY            Future Appointments   Date Time Provider Department Center   10/23/2023 11:30 AM [facility]         Normal follow-up schedule. Total clinical care time was 60 min, issues addressed include.      Anushka Telles MD/MPH  [facility]  [phone] spectralink

## 2023-04-17 NOTE — ASSESSMENT & PLAN NOTE
24hr care in the home, has PoA   · Continue Neuro follow-up per caretaker preference  · Unclear benefit of NS follow-up at this time  · Continue Depakote 500 BID and Depakote  nightly  · CBC, CMP, lipid panel, serum valproate

## 2023-04-18 ENCOUNTER — TELEPHONE (OUTPATIENT)
Dept: PRIMARY CARE CLINIC | Facility: CLINIC | Age: 43
End: 2023-04-18
Payer: MEDICARE

## 2023-04-18 NOTE — TELEPHONE ENCOUNTER
----- Message from Anushka Telles MD sent at 4/17/2023  6:41 PM CDT -----  Can you get him a heme-onc appt for his low platelets (thrombocytopenia)?    Thanks!  Dr ANDRES

## 2023-04-18 NOTE — TELEPHONE ENCOUNTER
Spoke with ms [place] pt  care taker  in regards to pt white blood count , platelets pt is scheduled to hematology April 19, 2023 @ 1:30 pm with Dr Kyle Munson

## 2023-05-19 ENCOUNTER — LAB VISIT (OUTPATIENT)
Dept: LAB | Facility: HOSPITAL | Age: 43
End: 2023-05-19
Attending: INTERNAL MEDICINE
Payer: MEDICARE

## 2023-05-19 ENCOUNTER — OFFICE VISIT (OUTPATIENT)
Dept: HEMATOLOGY/ONCOLOGY | Facility: CLINIC | Age: 43
End: 2023-05-19
Payer: MEDICARE

## 2023-05-19 VITALS
OXYGEN SATURATION: 96 % | SYSTOLIC BLOOD PRESSURE: 127 MMHG | HEART RATE: 78 BPM | RESPIRATION RATE: 16 BRPM | DIASTOLIC BLOOD PRESSURE: 92 MMHG

## 2023-05-19 DIAGNOSIS — D69.6 THROMBOCYTOPENIA: Primary | ICD-10-CM

## 2023-05-19 DIAGNOSIS — R46.89 SELF NEGLECT: ICD-10-CM

## 2023-05-19 DIAGNOSIS — D69.6 THROMBOCYTOPENIA: ICD-10-CM

## 2023-05-19 DIAGNOSIS — G80.0 SPASTIC QUADRIPLEGIC CEREBRAL PALSY: ICD-10-CM

## 2023-05-19 DIAGNOSIS — R78.89: ICD-10-CM

## 2023-05-19 LAB
BASOPHILS # BLD AUTO: 0.02 K/UL (ref 0–0.2)
BASOPHILS NFR BLD: 0.4 % (ref 0–1.9)
DIFFERENTIAL METHOD: ABNORMAL
EOSINOPHIL # BLD AUTO: 0.1 K/UL (ref 0–0.5)
EOSINOPHIL NFR BLD: 1.2 % (ref 0–8)
ERYTHROCYTE [DISTWIDTH] IN BLOOD BY AUTOMATED COUNT: 14.2 % (ref 11.5–14.5)
FOLATE SERPL-MCNC: 7.1 NG/ML (ref 4–24)
HCT VFR BLD AUTO: 43.8 % (ref 40–54)
HCV AB SERPL QL IA: NORMAL
HGB BLD-MCNC: 14.6 G/DL (ref 14–18)
IMM GRANULOCYTES # BLD AUTO: 0.03 K/UL (ref 0–0.04)
IMM GRANULOCYTES NFR BLD AUTO: 0.6 % (ref 0–0.5)
LYMPHOCYTES # BLD AUTO: 2.1 K/UL (ref 1–4.8)
LYMPHOCYTES NFR BLD: 41.1 % (ref 18–48)
MCH RBC QN AUTO: 30.4 PG (ref 27–31)
MCHC RBC AUTO-ENTMCNC: 33.3 G/DL (ref 32–36)
MCV RBC AUTO: 91 FL (ref 82–98)
MONOCYTES # BLD AUTO: 0.4 K/UL (ref 0.3–1)
MONOCYTES NFR BLD: 8.8 % (ref 4–15)
NEUTROPHILS # BLD AUTO: 2.4 K/UL (ref 1.8–7.7)
NEUTROPHILS NFR BLD: 47.9 % (ref 38–73)
NRBC BLD-RTO: 0 /100 WBC
PLATELET # BLD AUTO: 251 K/UL (ref 150–450)
PMV BLD AUTO: 9.5 FL (ref 9.2–12.9)
RBC # BLD AUTO: 4.81 M/UL (ref 4.6–6.2)
VALPROATE SERPL-MCNC: 80.4 UG/ML (ref 50–100)
VIT B12 SERPL-MCNC: 405 PG/ML (ref 210–950)
WBC # BLD AUTO: 5.01 K/UL (ref 3.9–12.7)

## 2023-05-19 PROCEDURE — 3080F DIAST BP >= 90 MM HG: CPT | Mod: CPTII,,, | Performed by: INTERNAL MEDICINE

## 2023-05-19 PROCEDURE — 4010F ACE/ARB THERAPY RXD/TAKEN: CPT | Mod: CPTII,,, | Performed by: INTERNAL MEDICINE

## 2023-05-19 PROCEDURE — 85025 COMPLETE CBC W/AUTO DIFF WBC: CPT | Performed by: INTERNAL MEDICINE

## 2023-05-19 PROCEDURE — 99999 PR PBB SHADOW E&M-EST. PATIENT-LVL III: ICD-10-PCS | Mod: PBBFAC,,, | Performed by: INTERNAL MEDICINE

## 2023-05-19 PROCEDURE — 3074F SYST BP LT 130 MM HG: CPT | Mod: CPTII,,, | Performed by: INTERNAL MEDICINE

## 2023-05-19 PROCEDURE — 99203 PR OFFICE/OUTPT VISIT, NEW, LEVL III, 30-44 MIN: ICD-10-PCS | Mod: ,,, | Performed by: INTERNAL MEDICINE

## 2023-05-19 PROCEDURE — 99499 RISK ADDL DX/OHS AUDIT: ICD-10-PCS | Mod: S$GLB,,, | Performed by: INTERNAL MEDICINE

## 2023-05-19 PROCEDURE — 99499 UNLISTED E&M SERVICE: CPT | Mod: S$GLB,,, | Performed by: INTERNAL MEDICINE

## 2023-05-19 PROCEDURE — 82746 ASSAY OF FOLIC ACID SERUM: CPT | Performed by: INTERNAL MEDICINE

## 2023-05-19 PROCEDURE — 80164 ASSAY DIPROPYLACETIC ACD TOT: CPT | Performed by: INTERNAL MEDICINE

## 2023-05-19 PROCEDURE — 82607 VITAMIN B-12: CPT | Performed by: INTERNAL MEDICINE

## 2023-05-19 PROCEDURE — 4010F PR ACE/ARB THEARPY RXD/TAKEN: ICD-10-PCS | Mod: CPTII,,, | Performed by: INTERNAL MEDICINE

## 2023-05-19 PROCEDURE — 99203 OFFICE O/P NEW LOW 30 MIN: CPT | Mod: ,,, | Performed by: INTERNAL MEDICINE

## 2023-05-19 PROCEDURE — 86023 IMMUNOGLOBULIN ASSAY: CPT | Performed by: INTERNAL MEDICINE

## 2023-05-19 PROCEDURE — 99213 OFFICE O/P EST LOW 20 MIN: CPT | Mod: PO | Performed by: INTERNAL MEDICINE

## 2023-05-19 PROCEDURE — 3074F PR MOST RECENT SYSTOLIC BLOOD PRESSURE < 130 MM HG: ICD-10-PCS | Mod: CPTII,,, | Performed by: INTERNAL MEDICINE

## 2023-05-19 PROCEDURE — 86803 HEPATITIS C AB TEST: CPT | Performed by: INTERNAL MEDICINE

## 2023-05-19 PROCEDURE — 99999 PR PBB SHADOW E&M-EST. PATIENT-LVL III: CPT | Mod: PBBFAC,,, | Performed by: INTERNAL MEDICINE

## 2023-05-19 PROCEDURE — 3080F PR MOST RECENT DIASTOLIC BLOOD PRESSURE >= 90 MM HG: ICD-10-PCS | Mod: CPTII,,, | Performed by: INTERNAL MEDICINE

## 2023-05-19 NOTE — PROGRESS NOTES
PATIENT: Leonid Cox  MRN: [id number]  DATE: 5/19/2023    Diagnosis:   1. Thrombocytopenia    2. Spastic quadriplegic cerebral palsy    3. Dependence for activities of daily living    4. Elevated anticonvulsant drug level        Chief Complaint: Establish Care      Subjective:    History of Present Illness: Mr. Cox is a 43 y.o. male who presents for evaluation and management of thrombocytopenia. He has history of cerebral palsy, hydrocephaly, blindness, seizures, on long-term seizure medications. He had labs done with Dr. Telles last month demonstrating thrombocytopenia and is referred to hematology clinic for evaluation. He has no complaints at this time. His ability to provide history is limited; he is accompanied by an aide who can provide collateral. There is no report of bruising or bleeding. He has not had a seizure in many years. When he had a depakote level checked last month it was slightly above the upper range of expected. His CBC shows normal WBC and normal Hgb levels with isolated thrombocytopneia. He had a neg HIV test in 2020. It looks like he had HCV testing in 2011 which was presumed negative but apparently equivocal.       Past medical, surgical, family, and social histories have been reviewed and updated below.    Past Medical History:   Past Medical History:   Diagnosis Date    Blind     Cerebral palsy     Hydrocephalus     Hypertension     Seizure disorder     Seizures     Urinary reflux        Past Surgical History:   Past Surgical History:   Procedure Laterality Date    FOOT SURGERY      SHUNT REVISION      TENDON RELEASE      TOTAL HIP ARTHROPLASTY         Family History:   Family History   Problem Relation Age of Onset    Cancer Mother     Cancer Father        Social History:  reports that he has never smoked. He has never used smokeless tobacco. He reports that he does not drink alcohol and does not use drugs.    Allergies:  Review of patient's allergies indicates:   Allergen  Reactions    Adhesive tape-silicones      Other reaction(s): blisters    Codeine      Other reaction(s): Nausea    Morphine Itching       Medications:  Current Outpatient Medications   Medication Sig Dispense Refill    celecoxib (CELEBREX) 200 MG capsule Take 1 capsule (200 mg total) by mouth daily as needed for Pain (Take with food). 90 capsule 3    cetirizine (ZYRTEC) 10 MG tablet Take 1 tablet (10 mg total) by mouth once daily. 90 tablet 3    cholecalciferol, vitamin D3, (VITAMIN D3) 25 mcg (1,000 unit) capsule Take 1 capsule (1,000 Units total) by mouth once daily. 90 capsule 3    divalproex (DEPAKOTE) 500 MG TbEC Take 1 tablet (500 mg total) by mouth every 12 (twelve) hours. 180 tablet 3    divalproex ER (DEPAKOTE ER) 250 MG 24 hr tablet Take 1 tablet (250 mg total) by mouth every evening. 90 tablet 3    fluticasone propionate (FLONASE) 50 mcg/actuation nasal spray INSTILL 1 SPRAY IN EACH NOSTRIL EVERY DAY   * REQUEST REFILL WHEN NEEDED 16 g 10    lisinopriL 10 MG tablet Take 1 tablet (10 mg total) by mouth once daily. 90 tablet 3    terbinafine HCL (LAMISIL) 1 % cream Apply topically 2 (two) times daily. Apply in the groin 100 g 11     No current facility-administered medications for this visit.         ECOG Performance Status:   ECOG SCORE    4 - Completely disabled, cannot carry on any selfcare, totally confined to bed or chair         Objective:      Vitals:   Vitals:    05/19/23 0936   BP: (!) 127/92   BP Location: Right arm   Patient Position: Sitting   BP Method: Large (Automatic)   Pulse: 78   Resp: 16   SpO2: 96%     BMI: There is no height or weight on file to calculate BMI.    Physical Exam  Vitals and nursing note reviewed.   Constitutional:       General: He is not in acute distress.     Appearance: He is not toxic-appearing.      Comments: Wheelchair-bound   HENT:      Head: Normocephalic and atraumatic.   Eyes:      General: No scleral icterus.     Conjunctiva/sclera: Conjunctivae normal.    Cardiovascular:      Rate and Rhythm: Normal rate.   Pulmonary:      Effort: Pulmonary effort is normal. No respiratory distress.   Musculoskeletal:         General: No deformity.      Cervical back: Neck supple.   Lymphadenopathy:      Cervical: No cervical adenopathy.   Skin:     Coloration: Skin is not jaundiced.      Findings: No erythema.   Neurological:      Mental Status: He is alert.      Motor: Weakness and abnormal muscle tone present.      Coordination: Coordination abnormal.      Gait: Gait abnormal.      Comments:      Psychiatric:         Mood and Affect: Mood normal.         Behavior: Behavior normal.       Laboratory Data:  Labs have been reviewed.          Assessment:       1. Thrombocytopenia    2. Spastic quadriplegic cerebral palsy    3. Dependence for activities of daily living    4. Elevated anticonvulsant drug level      Referred for isolated thrombocytopenia. Thrombocytopenia is mild and asymptomatic. Etiology is uncertain though elevated depakote levels would be a possible contributing factor.  HIV neg 2020--no need to recheck  HCV equiv 2011--recheck today     Plan:     Recheck CBC  Check depakote level  Check b12/folate  Recheck HCV  Check platelet autoantibody  As his thrombocytopenia is isloated and asymptomatic, right now I don't think intervention is necessary. If his depakote level remains elevated I think it might be worth considering reducing the dose slightly as it sounds like he hasn't had seizures in quite a while (he said over 20 years?) but depakote can be responsible for cytopenias.      Arpit Leon MD, FACP  Hematology/Oncology  [facility] - [address], [address]  [place] [address]  Phone: [phone]  Fax: [phone]

## 2023-05-24 LAB — PA IGG BLD QL FC: NEGATIVE

## 2023-07-12 ENCOUNTER — TELEPHONE (OUTPATIENT)
Dept: PRIMARY CARE CLINIC | Facility: CLINIC | Age: 43
End: 2023-07-12
Payer: MEDICARE

## 2023-07-12 DIAGNOSIS — M25.473 ANKLE SWELLING, UNSPECIFIED LATERALITY: Primary | ICD-10-CM

## 2023-07-12 NOTE — TELEPHONE ENCOUNTER
XR orders signed.     Please fax AND email the XR order to [facility] [employer]. Phone [phone], Fax [phone]. Emails are [email or web address] and [email or web address]. Please cc me on the email and ask them to email me the results at [email or web address]    Also call them to tell them you sent the order;

## 2023-07-12 NOTE — TELEPHONE ENCOUNTER
Please order mobile xray for left foot/ ankle. Nurse stated that this is an emergency situation and she can tell that he is in a lot of pain. Pt is non verbal

## 2023-07-24 ENCOUNTER — OFFICE VISIT (OUTPATIENT)
Dept: PRIMARY CARE CLINIC | Facility: CLINIC | Age: 43
End: 2023-07-24
Payer: MEDICARE

## 2023-07-24 VITALS
HEIGHT: 72 IN | TEMPERATURE: 98 F | HEART RATE: 94 BPM | OXYGEN SATURATION: 99 % | BODY MASS INDEX: 32.96 KG/M2 | DIASTOLIC BLOOD PRESSURE: 73 MMHG | SYSTOLIC BLOOD PRESSURE: 118 MMHG

## 2023-07-24 DIAGNOSIS — S99.192A OTHER PHYSEAL FRACTURE OF LEFT METATARSAL, INITIAL ENCOUNTER FOR CLOSED FRACTURE: ICD-10-CM

## 2023-07-24 DIAGNOSIS — J30.9 ALLERGIC SINUSITIS: ICD-10-CM

## 2023-07-24 DIAGNOSIS — R93.89 ABNORMAL FINDINGS ON DIAGNOSTIC IMAGING OF OTHER SPECIFIED BODY STRUCTURES: ICD-10-CM

## 2023-07-24 DIAGNOSIS — B35.6 TINEA CRURIS: ICD-10-CM

## 2023-07-24 DIAGNOSIS — E55.9 VITAMIN D DEFICIENCY: ICD-10-CM

## 2023-07-24 DIAGNOSIS — M79.89 LEG SWELLING: ICD-10-CM

## 2023-07-24 DIAGNOSIS — G40.909 SEIZURE DISORDER: ICD-10-CM

## 2023-07-24 DIAGNOSIS — I10 ESSENTIAL HYPERTENSION: ICD-10-CM

## 2023-07-24 DIAGNOSIS — M25.473 ANKLE SWELLING, UNSPECIFIED LATERALITY: ICD-10-CM

## 2023-07-24 DIAGNOSIS — G80.0 SPASTIC QUADRIPLEGIC CEREBRAL PALSY: ICD-10-CM

## 2023-07-24 DIAGNOSIS — R79.9 ABNORMAL FINDING OF BLOOD CHEMISTRY, UNSPECIFIED: ICD-10-CM

## 2023-07-24 DIAGNOSIS — D69.6 THROMBOCYTOPENIA, UNSPECIFIED: Primary | ICD-10-CM

## 2023-07-24 DIAGNOSIS — R60.9 EDEMA, UNSPECIFIED TYPE: ICD-10-CM

## 2023-07-24 PROCEDURE — 4010F PR ACE/ARB THEARPY RXD/TAKEN: ICD-10-PCS | Mod: CPTII,S$GLB,, | Performed by: INTERNAL MEDICINE

## 2023-07-24 PROCEDURE — 3074F SYST BP LT 130 MM HG: CPT | Mod: CPTII,S$GLB,, | Performed by: INTERNAL MEDICINE

## 2023-07-24 PROCEDURE — 3078F PR MOST RECENT DIASTOLIC BLOOD PRESSURE < 80 MM HG: ICD-10-PCS | Mod: CPTII,S$GLB,, | Performed by: INTERNAL MEDICINE

## 2023-07-24 PROCEDURE — 99211 PR OFFICE/OUTPT VISIT, EST, LEVL I: ICD-10-PCS | Mod: S$GLB,,, | Performed by: INTERNAL MEDICINE

## 2023-07-24 PROCEDURE — 1159F PR MEDICATION LIST DOCUMENTED IN MEDICAL RECORD: ICD-10-PCS | Mod: CPTII,S$GLB,, | Performed by: INTERNAL MEDICINE

## 2023-07-24 PROCEDURE — 4010F ACE/ARB THERAPY RXD/TAKEN: CPT | Mod: CPTII,S$GLB,, | Performed by: INTERNAL MEDICINE

## 2023-07-24 PROCEDURE — 3008F PR BODY MASS INDEX (BMI) DOCUMENTED: ICD-10-PCS | Mod: CPTII,S$GLB,, | Performed by: INTERNAL MEDICINE

## 2023-07-24 PROCEDURE — 1159F MED LIST DOCD IN RCRD: CPT | Mod: CPTII,S$GLB,, | Performed by: INTERNAL MEDICINE

## 2023-07-24 PROCEDURE — 3008F BODY MASS INDEX DOCD: CPT | Mod: CPTII,S$GLB,, | Performed by: INTERNAL MEDICINE

## 2023-07-24 PROCEDURE — 3078F DIAST BP <80 MM HG: CPT | Mod: CPTII,S$GLB,, | Performed by: INTERNAL MEDICINE

## 2023-07-24 PROCEDURE — 99211 OFF/OP EST MAY X REQ PHY/QHP: CPT | Mod: S$GLB,,, | Performed by: INTERNAL MEDICINE

## 2023-07-24 PROCEDURE — 3074F PR MOST RECENT SYSTOLIC BLOOD PRESSURE < 130 MM HG: ICD-10-PCS | Mod: CPTII,S$GLB,, | Performed by: INTERNAL MEDICINE

## 2023-07-24 RX ORDER — DIVALPROEX SODIUM 500 MG/1
500 TABLET, DELAYED RELEASE ORAL EVERY 12 HOURS
Qty: 180 TABLET | Refills: 3 | Status: SHIPPED | OUTPATIENT
Start: 2023-07-24 | End: 2023-10-23 | Stop reason: SDUPTHER

## 2023-07-24 RX ORDER — PRENATAL VIT 91/IRON/FOLIC/DHA 28-975-200
COMBINATION PACKAGE (EA) ORAL 2 TIMES DAILY
Qty: 100 G | Refills: 11 | Status: SHIPPED | OUTPATIENT
Start: 2023-07-24

## 2023-07-24 RX ORDER — VIT C/E/ZN/COPPR/LUTEIN/ZEAXAN 250MG-90MG
1000 CAPSULE ORAL DAILY
Qty: 90 CAPSULE | Refills: 3 | Status: SHIPPED | OUTPATIENT
Start: 2023-07-24

## 2023-07-24 RX ORDER — CELECOXIB 200 MG/1
200 CAPSULE ORAL DAILY PRN
Qty: 90 CAPSULE | Refills: 3 | Status: SHIPPED | OUTPATIENT
Start: 2023-07-24 | End: 2023-10-23 | Stop reason: SDUPTHER

## 2023-07-24 RX ORDER — PRENATAL VIT 91/IRON/FOLIC/DHA 28-975-200
COMBINATION PACKAGE (EA) ORAL 2 TIMES DAILY
Qty: 100 G | Refills: 11 | Status: CANCELLED | OUTPATIENT
Start: 2023-07-24

## 2023-07-24 RX ORDER — CETIRIZINE HYDROCHLORIDE 10 MG/1
10 TABLET ORAL DAILY
Qty: 90 TABLET | Refills: 3 | Status: SHIPPED | OUTPATIENT
Start: 2023-07-24 | End: 2023-10-23 | Stop reason: SDUPTHER

## 2023-07-24 RX ORDER — LISINOPRIL 10 MG/1
10 TABLET ORAL DAILY
Qty: 90 TABLET | Refills: 3 | OUTPATIENT
Start: 2023-07-24 | End: 2024-07-23

## 2023-07-24 RX ORDER — DIVALPROEX SODIUM 250 MG/1
250 TABLET, FILM COATED, EXTENDED RELEASE ORAL NIGHTLY
Qty: 90 TABLET | Refills: 3 | Status: SHIPPED | OUTPATIENT
Start: 2023-07-24 | End: 2023-10-23 | Stop reason: SDUPTHER

## 2023-07-24 NOTE — PROGRESS NOTES
"Primary Care Provider Appointment - [facility]  SHARED NOTE: Humble Duran (FNP Student), Dr Telles (Attending)    Subjective:      Patient ID: Leonid Cox is a 43 y.o. male with cerebral palsy, hydrocephalus, blindness, seizure disorder, hypertension, and functional quadriplegia.     Chief Complaint: Follow-Up    Prior to this visit, patient's last encounter with PCP was 4/17/2023.    Pt reports he is doing "alright" today. Patient reports left knee pain that has been ongoing. Today supervisor, Theresa, with his home health service accompanies him to his appointment.     Theresa is requesting that patient complete PT/OT again at home to assist patient with regaining his strength especially in his left leg. States that LLE sometimes just "gives out." Reports there was previously a request place with Jeannie for new wheelchair with leg attachments as his current one does not have attachments for the legs and "this makes his swelling worse." States that Duramed never came to the house last October. They are also requesting a wheelchair with elevated leg rests to assist patient at home as well particularly when transitioning from sitting to standing and out of the car. Patient did receieve a wheelchair in 2021, but needs a different type of wheelchair due to intensive leg swelling, pain, and loss of function.    Concerns about affordability of terbinafine cream expressed during appointment. Patient is unable to afford OTC cream. They are unaware of the OTC benefits with [facility].      HEM/ONC  -Evaluated on 05/19/2023 per Dr. Leon for thrombocytopenia.   - labs all normal (anti-platelet and HCV)  - was to check vitamin levels, and is already supplementing with MVI  -Advised that since this is an isolated event and patient is asymptomatic that no further follow-up is necessary at this time.  -Also recommended slightly reducing patient Depakote dose as it can contribute to cytopenias and patient has been seizure " "free for >/= 20 years.    Cerebral Palsy and Functional Quadriplegia  - Full dependence for ADLs with caregivers in home  -Wheelchair and bed bound  -Caregivers present with patient 24-hours  - shunt in place  -No evidence of increased ICP  - leg swelling is impacting quality of life    Tinea Cruris  -Not using terbinafine cream, caregiver reports that patient is unable to afford this cream.       HTN  - Currently taking: Held lisinopril at last visit  - How often taking BP at home: daily, average is: 120-130s/70-80s  - Today, BP is: 118/73    Seizure Disorder  -Currently taking:Depakote 500 mg PO Q12H, Depakote  mg PO nightly  -Last seizure: "years" ago.     Mental Health  - Currently taking: Celebrex 200 mg PO daily  - Does not see therapist or psychiatrist.      Care Gaps:  - Covid vaccine--deferred  -Mj0l--smtzbwo today  -Tdap--to receive today    Medications: Does not have pill packs  Does have in-home caretakers who are managing his meds    4Ms for Medical Decision-Making in Older Adults    Last Completed EAWV: None    MOBILITY:  Get Up and Go:  No flowsheet data found.  Activities of Daily Living:  No flowsheet data found.  Whisper Test:  No flowsheet data found.  Disability Status:  No flowsheet data found.  Nutrition Screening:  No flowsheet data found. Screening Score: 0-7 Malnourished, 8-11 At Risk, 12-14 Normal    MENTATION:   Depression Patient Health Questionnaire:  Depression Patient Health Questionnaire 10/18/2022   PHQ-4 Total Score 0     Has Dementia Dx: No    Cognitive Function Screening:  No flowsheet data found.  Cognitive Function Screening Total - Less than 4 = Abnormal,  Greater than or equal to 4 = Normal    MEDICATIONS:  High Risk Medications:  Total Active Medications: 0  This patient does not have an active medication from one of the medication groupers.    WHAT MATTERS MOST:  Advance Care Planning   ACP Status:   Patient has had an ACP conversation  Living Will: Yes  Power of " ": Yes  LaPOST: Yes    What is most important right now is to focus on spending time at home, improvement in condition but with limits to invasive therapies, and comfort and QOL     Accordingly, we have decided that the best plan to meet the patient's goals includes continuing with treatment      What matters most to patient today is: getting his job back, "they wrong for not paying me"                 Social History     Socioeconomic History    Marital status: Single   Tobacco Use    Smoking status: Never    Smokeless tobacco: Never   Substance and Sexual Activity    Alcohol use: No    Drug use: No   Social History Narrative    Lives in property owned by grandmother. Disabled with [employer] caretakers 24hrs to patient. Primary caretaker Sherri takes care of him. Shalonda is the active caretaker, but there is no official legal power of .         Mother passed away 12/2013       Review of Systems   Constitutional:  Negative for chills, diaphoresis, fatigue and fever.   HENT:  Negative for postnasal drip and rhinorrhea.    Respiratory:  Negative for cough, chest tightness, shortness of breath and wheezing.    Cardiovascular:  Positive for leg swelling. Negative for chest pain and palpitations.   Gastrointestinal:  Negative for abdominal pain, constipation, diarrhea, nausea and vomiting.   Genitourinary:  Negative for difficulty urinating, dysuria, frequency and urgency.   Musculoskeletal:  Positive for gait problem (Patient unable to ambulate, primarily wheelchair bound.). Negative for arthralgias, back pain, joint swelling, myalgias, neck pain and neck stiffness.   Skin:  Positive for rash. Negative for wound.   Neurological:  Positive for weakness. Negative for dizziness, light-headedness and headaches.   Psychiatric/Behavioral:  Negative for sleep disturbance and suicidal ideas. Behavioral problem: Patient with weakness to LUE and LLE at baseline..     Objective:   /73 (BP Location: Right " arm, Patient Position: Sitting, BP Method: Large (Automatic))   Pulse 94   Temp 97.9 °F (36.6 °C) (Oral)   Ht 6' (1.829 m)   SpO2 99%   BMI 32.96 kg/m²     Physical Exam  Constitutional:       General: He is not in acute distress.     Appearance: Normal appearance. He is obese. He is not toxic-appearing.   HENT:      Head: Normocephalic and atraumatic.   Eyes:      Comments: EOM are erratic, patient is blind.    Cardiovascular:      Rate and Rhythm: Normal rate and regular rhythm.      Pulses: Normal pulses.      Heart sounds: Normal heart sounds.   Pulmonary:      Effort: Pulmonary effort is normal.      Breath sounds: Normal breath sounds.   Abdominal:      General: Bowel sounds are normal. There is no distension.      Palpations: Abdomen is soft.      Tenderness: There is no abdominal tenderness. There is no guarding.   Musculoskeletal:      Left upper arm: Swelling and deformity present.      Cervical back: Normal range of motion and neck supple.      Right lower leg: Swelling present. 2+ Edema present.      Left lower leg: Swelling and deformity present. 2+ Edema present.      Comments: Patient with LLE weakness, left foot slightly inverted.    Skin:     General: Skin is warm and dry.      Capillary Refill: Capillary refill takes less than 2 seconds.      Findings: Rash present.      Comments: Stasis dermatitis to BLE. MASD to groin, minimal redness noted.    Neurological:      Mental Status: He is alert. Mental status is at baseline.      GCS: GCS eye subscore is 4. GCS verbal subscore is 4. GCS motor subscore is 6.      Sensory: Sensation is intact.      Motor: Weakness, atrophy and abnormal muscle tone present.      Coordination: Coordination abnormal.      Gait: Gait abnormal.      Comments: Patient with  atrophy and weakness to LUE and LLE. Spasticity to LUE with extension. Unable to ambulate, gait significantly impaired. Patient oriented to self, situation, and place--reports years is 2013.    Psychiatric:         Attention and Perception: Attention normal.         Mood and Affect: Mood normal.         Speech: Speech normal.         Behavior: Behavior normal. Behavior is cooperative.         Lab Results   Component Value Date    WBC 5.32 07/24/2023    HGB 14.4 07/24/2023    HCT 43.8 07/24/2023     07/24/2023    CHOL 174 04/17/2023    TRIG 124 04/17/2023    HDL 39 (L) 04/17/2023    ALT 38 07/24/2023    AST 25 07/24/2023     07/24/2023    K 4.3 07/24/2023     07/24/2023    CREATININE 0.9 07/24/2023    BUN 16 07/24/2023    CO2 26 07/24/2023    TSH 3.073 09/29/2022    INR 1.2 06/25/2021    HGBA1C 5.1 07/24/2023       Current Outpatient Medications on File Prior to Visit   Medication Sig Dispense Refill    fluticasone propionate (FLONASE) 50 mcg/actuation nasal spray INSTILL 1 SPRAY IN EACH NOSTRIL EVERY DAY   * REQUEST REFILL WHEN NEEDED 16 g 10     No current facility-administered medications on file prior to visit.         Assessment:   43 y.o. male with multiple co-morbid illnesses here to follow-up with PCP and continue work-up of chronic issues    Plan:     Problem List Items Addressed This Visit          Neuro    Cerebral palsy    Relevant Orders    WHEELCHAIR FOR HOME USE    Ambulatory referral/consult to Home Health    Seizure disorder    Relevant Orders    VALPROIC ACID (Completed)    Magnesium (Completed)       Derm    Tinea cruris    Relevant Medications    terbinafine HCL (LAMISIL) 1 % cream       Hematology    Thrombocytopenia, unspecified - Primary    Relevant Orders    CBC W/ AUTO DIFFERENTIAL (Completed)    COMPREHENSIVE METABOLIC PANEL (Completed)    VALPROIC ACID (Completed)       Other    Other physeal fracture of left metatarsal, initial encounter for closed fracture    Relevant Orders    Ambulatory referral/consult to Home Health    Leg swelling     Severe leg swelling with decreased mobility as a result. Patient is in wheelchair for >12 hrs per day, due to mental  challenges can not follow instructions.  Prior Auth for wheelchair with elevated leg rests          Other Visit Diagnoses       Abnormal findings on diagnostic imaging of other specified body structures        Relevant Orders    HEMOGLOBIN A1C (Completed)    Ankle swelling, unspecified laterality        Relevant Orders    US Lower Extremity Veins Bilateral    Abnormal finding of blood chemistry, unspecified        Relevant Orders    HEMOGLOBIN A1C (Completed)    Edema, unspecified type        Relevant Orders    US Lower Extremity Veins Bilateral            Health Maintenance         Date Due Completion Date    COVID-19 Vaccine (1) Never done ---    Hemoglobin A1c (Diabetic Prevention Screening) 12/14/2018 12/14/2015- TODAY    TETANUS VACCINE 09/24/2022 9/24/2012- TODAY    Influenza Vaccine (1) 09/01/2023 10/18/2022    Lipid Panel 04/17/2028 4/17/2023            Future Appointments   Date Time Provider Department Center   8/9/2023  9:30 AM [facility] OIC-US1 MASTER [facility]TR [facility]   8/18/2023  9:30 AM Arpit Leon MD [facility] HEM ONC [facility]   10/23/2023 11:30 AM Anushka Telles MD [facility] MED [facility]         Follow up ROUTINE F2F. Total clinical care time was 20 min, issues addressed include DME     Anushka Telles MD/MPH  [facility]  [phone] hospitalsink

## 2023-07-24 NOTE — PATIENT INSTRUCTIONS
TODAY:  - his low platelets are likely related to depakote  - we will monitor depakote level and platelets as needed  - he can only get a wheelchair OR a walker every 5 years  - wheelchair being ordered today with elevated legs  - STOP lasix and lisinopril  - labs today  - ultrasound of legs to rule out clot  - PT/OT with home health  - whatever homework the PT/OT provider gives you, its the homework the rest of his life  - if you want to change his insurance, please call Ms DJ  - 90-L form completed today

## 2023-07-26 ENCOUNTER — TELEPHONE (OUTPATIENT)
Dept: PRIMARY CARE CLINIC | Facility: CLINIC | Age: 43
End: 2023-07-26
Payer: MEDICARE

## 2023-07-26 NOTE — TELEPHONE ENCOUNTER
SW called the number provided for [facility] for Ms. Mendez and faxed the new progress note for the wheelchair. Hopefully the new progress note will do.

## 2023-07-26 NOTE — ASSESSMENT & PLAN NOTE
Severe leg swelling with decreased mobility as a result. Patient is in wheelchair for >12 hrs per day, due to mental challenges can not follow instructions.  · Prior Auth for wheelchair with elevated leg rests

## 2023-07-27 ENCOUNTER — TELEPHONE (OUTPATIENT)
Dept: PRIMARY CARE CLINIC | Facility: CLINIC | Age: 43
End: 2023-07-27
Payer: MEDICARE

## 2023-07-27 DIAGNOSIS — G91.9 HYDROCEPHALUS, UNSPECIFIED TYPE: ICD-10-CM

## 2023-07-27 DIAGNOSIS — G80.0 SPASTIC QUADRIPLEGIC CEREBRAL PALSY: Primary | ICD-10-CM

## 2023-07-27 DIAGNOSIS — R60.0 PERIPHERAL EDEMA: ICD-10-CM

## 2023-07-27 NOTE — TELEPHONE ENCOUNTER
PH called the SW and stated that the pt recently received a custom wheelchair fully loaded. The rep stated that she can get the wheelchair serviced and you can send her an order to add the Elevate Leg Rest (ELR) to his already custom chair.

## 2023-07-31 NOTE — TELEPHONE ENCOUNTER
Please fax to [facility] the order to adjust his custom wheelchair by adding the elevating leg rest.    Thanks,  Dr ANDRES

## 2023-08-01 ENCOUNTER — TELEPHONE (OUTPATIENT)
Dept: PRIMARY CARE CLINIC | Facility: CLINIC | Age: 43
End: 2023-08-01
Payer: MEDICARE

## 2023-08-01 NOTE — TELEPHONE ENCOUNTER
SW spoke with [employer] [employer] again and they only need orders for Elevating Leg Rest (ELR). They don't need order for Wheelchair anymore

## 2023-08-01 NOTE — TELEPHONE ENCOUNTER
FLORENCIA faxed wheelchair orders to [facility] again and I thinks this is the 3rd time. When I spoke with [facility], they started that he has a custom fully loaded wheel chair and it is not time to replace it. If the repairs are too much for them, then they may have to replace it. They can add the anti-tippers to the chair

## 2023-08-02 ENCOUNTER — TELEPHONE (OUTPATIENT)
Dept: PRIMARY CARE CLINIC | Facility: CLINIC | Age: 43
End: 2023-08-02
Payer: MEDICARE

## 2023-08-02 NOTE — TELEPHONE ENCOUNTER
FLORENCIA spoke to [facility] and they are going to fax over the pts results from his xrays taken last month

## 2023-08-04 ENCOUNTER — TELEPHONE (OUTPATIENT)
Dept: PRIMARY CARE CLINIC | Facility: CLINIC | Age: 43
End: 2023-08-04
Payer: MEDICARE

## 2023-08-04 ENCOUNTER — DOCUMENT SCAN (OUTPATIENT)
Dept: HOME HEALTH SERVICES | Facility: HOSPITAL | Age: 43
End: 2023-08-04
Payer: MEDICARE

## 2023-08-04 DIAGNOSIS — G40.909 SEIZURE DISORDER: Primary | ICD-10-CM

## 2023-08-04 DIAGNOSIS — G80.0 SPASTIC QUADRIPLEGIC CEREBRAL PALSY: ICD-10-CM

## 2023-08-04 NOTE — TELEPHONE ENCOUNTER
Please let patient's caretakers know that his valproic acid level was elevated.     I will do an e-consult to Neuro to see if his dose of depakote should be adjusted.    Thanks,  Dr ANDRES

## 2023-08-06 ENCOUNTER — E-CONSULT (OUTPATIENT)
Dept: NEUROLOGY | Facility: CLINIC | Age: 43
End: 2023-08-06
Payer: MEDICARE

## 2023-08-06 DIAGNOSIS — G40.909 SEIZURE DISORDER: Primary | ICD-10-CM

## 2023-08-06 PROCEDURE — 99447 NTRPROF PH1/NTRNET/EHR 11-20: CPT | Mod: S$GLB,,, | Performed by: PSYCHIATRY & NEUROLOGY

## 2023-08-06 PROCEDURE — 99447 PR INTERPROF, PHONE/INTERNET/EHR, CONSULT, 11-20 MINS: ICD-10-PCS | Mod: S$GLB,,, | Performed by: PSYCHIATRY & NEUROLOGY

## 2023-08-06 NOTE — CONSULTS
[facility]  Response for E-Consult     Patient Name: Leonid Cox  MRN: [id number]  Primary Care Provider: Anushka Telles MD   Requesting Provider: Anushka Telles, *  E-Consult to General Neurology  Consult performed by: Rosanna Emery MD  Consult ordered by: Anushka Telles MD  Reason for consult: seizure management  Assessment/Recommendations: VA level only slightly elevated and unclear of the timing of his medication in relation to the lab. Ideally want to collect in the morning, just prior to his morning medication, or approx 12hrs after his nighttime dose.   If no other drug interactions or significant weight swings in either direction that could effect drug levels, I wouldn't recommend changing the dose unless you can confirm that the lab was performed as above.   Also, it appears that he's tolerated the current dose for several years without breakthrough seizures or side effects. If that is still the case, again, I would continue current dose.   Consider having the patient follow with epilepsy again as well, reasonable to see them every 1-2 years  if he is under good control.        Recommendation:   VA level only slightly elevated and unclear of the timing of his medication in relation to the lab. Ideally want to collect in the morning, just prior to his morning medication, or approx 12hrs after his nighttime dose.   If no other drug interactions or significant weight swings in either direction that could effect drug levels, I wouldn't recommend changing the dose unless you can confirm that the lab was performed as above.   Also, it appears that he's tolerated the current dose for several years without breakthrough seizures or side effects. If that is still the case, again, I would continue current dose.   Consider having the patient follow with epilepsy again as well, reasonable to see them every 1-2 years  if he is under good control.    Contingency: n/a    Total time of  Consultation: 15 minute    I did speak to the requesting provider verbally about this.     *This eConsult is based on the clinical data available to me and is furnished without benefit of a physical examination. The eConsult will need to be interpreted in light of any clinical issues or changes in patient status not available to me at the time of filing this eConsults. Significant changes in patient condition or level of acuity should result in immediate formal consultation and reevaluation. Please alert me if you have further questions.    Thank you for this eConsult referral.     Rosanna Emery MD  [facility]- Neurology

## 2023-08-07 ENCOUNTER — TELEPHONE (OUTPATIENT)
Dept: PRIMARY CARE CLINIC | Facility: CLINIC | Age: 43
End: 2023-08-07
Payer: MEDICARE

## 2023-08-07 DIAGNOSIS — G40.909 SEIZURE DISORDER: Primary | ICD-10-CM

## 2023-08-07 DIAGNOSIS — G80.0 SPASTIC QUADRIPLEGIC CEREBRAL PALSY: ICD-10-CM

## 2023-08-07 NOTE — TELEPHONE ENCOUNTER
Please let patient's caretakers know that even though his depakote level was slightly high, the neurologist did not think it warranted a change in his medication dose.    Do the caretakers want to see the epilepsy doctor in the clinic? If so then we can set them up with an appt.    If they want to know what the Neurologist said, here it is below:  Recommendation:   VA level only slightly elevated and unclear of the timing of his medication in relation to the lab. Ideally want to collect in the morning, just prior to his morning medication, or approx 12hrs after his nighttime dose.   If no other drug interactions or significant weight swings in either direction that could effect drug levels, I wouldn't recommend changing the dose unless you can confirm that the lab was performed as above.   Also, it appears that he's tolerated the current dose for several years without breakthrough seizures or side effects. If that is still the case, again, I would continue current dose.   Consider having the patient follow with epilepsy again as well, reasonable to see them every 1-2 years  if he is under good control.

## 2023-08-08 NOTE — TELEPHONE ENCOUNTER
Referral placed for Neuro epilepsy. Please see if you can help get him scheduled. This message is also being sent to the Neuro Epilepsy Dept Clinical Support team.    Thanks,  Dr ANDRES

## 2023-08-08 NOTE — TELEPHONE ENCOUNTER
Pt caretaker has been advised  about  pt Valproic Acid  level  and she stated that you can put a referral in for neuro

## 2023-08-09 ENCOUNTER — TELEPHONE (OUTPATIENT)
Dept: NEUROLOGY | Facility: CLINIC | Age: 43
End: 2023-08-09
Payer: MEDICARE

## 2023-08-09 ENCOUNTER — HOSPITAL ENCOUNTER (OUTPATIENT)
Dept: RADIOLOGY | Facility: HOSPITAL | Age: 43
Discharge: HOME | End: 2023-08-09
Attending: INTERNAL MEDICINE
Payer: MEDICARE

## 2023-08-09 DIAGNOSIS — R60.9 EDEMA, UNSPECIFIED TYPE: ICD-10-CM

## 2023-08-09 DIAGNOSIS — M25.473 ANKLE SWELLING, UNSPECIFIED LATERALITY: ICD-10-CM

## 2023-08-09 PROCEDURE — 93970 EXTREMITY STUDY: CPT | Mod: 26,,, | Performed by: RADIOLOGY

## 2023-08-09 PROCEDURE — 93970 EXTREMITY STUDY: CPT | Mod: TC

## 2023-08-09 PROCEDURE — 93970 US LOWER EXTREMITY VEINS BILATERAL: ICD-10-PCS | Mod: 26,,, | Performed by: RADIOLOGY

## 2023-08-09 NOTE — TELEPHONE ENCOUNTER
Spoke with pt caretaker Ms Susu Askew inform her that some one will reach out to her to have the pt scheduled with a date and time

## 2023-08-09 NOTE — TELEPHONE ENCOUNTER
Can you get this patient an appointment with Dr. Gorman? I'll schedule it if you need me to. Thanks!

## 2023-08-09 NOTE — TELEPHONE ENCOUNTER
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 Please contact the caregiver to schedule the appointment. Please Dr Arana Staff to confirm message received.      Contact info  Susu Askew    Note: Per staff request, message was sent as a "HIGH".    "

## 2023-08-10 ENCOUNTER — EXTERNAL HOME HEALTH (OUTPATIENT)
Dept: HOME HEALTH SERVICES | Facility: HOSPITAL | Age: 43
End: 2023-08-10
Payer: MEDICARE

## 2023-08-10 ENCOUNTER — DOCUMENT SCAN (OUTPATIENT)
Dept: HOME HEALTH SERVICES | Facility: HOSPITAL | Age: 43
End: 2023-08-10
Payer: MEDICARE

## 2023-08-14 ENCOUNTER — TELEPHONE (OUTPATIENT)
Dept: PRIMARY CARE CLINIC | Facility: CLINIC | Age: 43
End: 2023-08-14

## 2023-08-14 NOTE — TELEPHONE ENCOUNTER
Patient does not have a clot in his legs.     Please encourage his caretakers to elevate his legs, and use compression.    Dr MCKENNA Arizmendi

## 2023-08-16 ENCOUNTER — TELEPHONE (OUTPATIENT)
Dept: PRIMARY CARE CLINIC | Facility: CLINIC | Age: 43
End: 2023-08-16
Payer: MEDICARE

## 2023-08-16 NOTE — TELEPHONE ENCOUNTER
SW called the pt and spoke with Susu to inform her that his x-rays were normal. Sw ask her how was his sewlling and she stated that the swelling went down a lil bit. She also informed me that [facility] came out, Stephenie for OT and Lee from PT

## 2023-08-16 NOTE — TELEPHONE ENCOUNTER
XR results to be scanned into chart.    Please advise caretakers to keep his legs elevated and use compression.    Thanks,  Dr ANDRES

## 2023-08-17 NOTE — TELEPHONE ENCOUNTER
Spoke with pt caretaker and advise her to keep pt legs  elevated and use compression pt Caretaker want to no can you place an order for the compression pt caretaker states that  pt  can not afford the out of pocket cost .

## 2023-08-18 ENCOUNTER — OFFICE VISIT (OUTPATIENT)
Dept: HEMATOLOGY/ONCOLOGY | Facility: CLINIC | Age: 43
End: 2023-08-18
Payer: MEDICARE

## 2023-08-18 VITALS
OXYGEN SATURATION: 97 % | DIASTOLIC BLOOD PRESSURE: 98 MMHG | RESPIRATION RATE: 16 BRPM | HEART RATE: 85 BPM | SYSTOLIC BLOOD PRESSURE: 183 MMHG

## 2023-08-18 DIAGNOSIS — I10 ESSENTIAL HYPERTENSION: ICD-10-CM

## 2023-08-18 DIAGNOSIS — G80.0 SPASTIC QUADRIPLEGIC CEREBRAL PALSY: Primary | ICD-10-CM

## 2023-08-18 DIAGNOSIS — R53.2 FUNCTIONAL QUADRIPLEGIA: ICD-10-CM

## 2023-08-18 DIAGNOSIS — D69.6 THROMBOCYTOPENIA, UNSPECIFIED: ICD-10-CM

## 2023-08-18 DIAGNOSIS — H54.3 BLINDNESS OF BOTH EYES: ICD-10-CM

## 2023-08-18 DIAGNOSIS — G40.909 SEIZURE DISORDER: ICD-10-CM

## 2023-08-18 PROCEDURE — 3080F PR MOST RECENT DIASTOLIC BLOOD PRESSURE >= 90 MM HG: ICD-10-PCS | Mod: CPTII,S$GLB,, | Performed by: INTERNAL MEDICINE

## 2023-08-18 PROCEDURE — 99213 PR OFFICE/OUTPT VISIT, EST, LEVL III, 20-29 MIN: ICD-10-PCS | Mod: S$GLB,,, | Performed by: INTERNAL MEDICINE

## 2023-08-18 PROCEDURE — 4010F ACE/ARB THERAPY RXD/TAKEN: CPT | Mod: CPTII,S$GLB,, | Performed by: INTERNAL MEDICINE

## 2023-08-18 PROCEDURE — 1159F PR MEDICATION LIST DOCUMENTED IN MEDICAL RECORD: ICD-10-PCS | Mod: CPTII,S$GLB,, | Performed by: INTERNAL MEDICINE

## 2023-08-18 PROCEDURE — 3044F PR MOST RECENT HEMOGLOBIN A1C LEVEL <7.0%: ICD-10-PCS | Mod: CPTII,S$GLB,, | Performed by: INTERNAL MEDICINE

## 2023-08-18 PROCEDURE — 99999 PR PBB SHADOW E&M-EST. PATIENT-LVL III: ICD-10-PCS | Mod: PBBFAC,,, | Performed by: INTERNAL MEDICINE

## 2023-08-18 PROCEDURE — 3077F PR MOST RECENT SYSTOLIC BLOOD PRESSURE >= 140 MM HG: ICD-10-PCS | Mod: CPTII,S$GLB,, | Performed by: INTERNAL MEDICINE

## 2023-08-18 PROCEDURE — 99999 PR PBB SHADOW E&M-EST. PATIENT-LVL III: CPT | Mod: PBBFAC,,, | Performed by: INTERNAL MEDICINE

## 2023-08-18 PROCEDURE — 3077F SYST BP >= 140 MM HG: CPT | Mod: CPTII,S$GLB,, | Performed by: INTERNAL MEDICINE

## 2023-08-18 PROCEDURE — 3044F HG A1C LEVEL LT 7.0%: CPT | Mod: CPTII,S$GLB,, | Performed by: INTERNAL MEDICINE

## 2023-08-18 PROCEDURE — 99213 OFFICE O/P EST LOW 20 MIN: CPT | Mod: S$GLB,,, | Performed by: INTERNAL MEDICINE

## 2023-08-18 PROCEDURE — 4010F PR ACE/ARB THEARPY RXD/TAKEN: ICD-10-PCS | Mod: CPTII,S$GLB,, | Performed by: INTERNAL MEDICINE

## 2023-08-18 PROCEDURE — 1159F MED LIST DOCD IN RCRD: CPT | Mod: CPTII,S$GLB,, | Performed by: INTERNAL MEDICINE

## 2023-08-18 PROCEDURE — 3080F DIAST BP >= 90 MM HG: CPT | Mod: CPTII,S$GLB,, | Performed by: INTERNAL MEDICINE

## 2023-08-18 RX ORDER — FUROSEMIDE 20 MG/1
20 TABLET ORAL
COMMUNITY
Start: 2023-08-04 | End: 2023-09-21

## 2023-08-18 NOTE — TELEPHONE ENCOUNTER
Do they know how to order the compression stockings from [facility] OTC ordering program? If not, can you help them with ordering compression stockings, size large?    Thanks,  Dr ANDRES

## 2023-08-18 NOTE — PROGRESS NOTES
PATIENT: Leonid Cox  MRN: [id number]  DATE: 8/18/2023    Subjective:     Chief complaint:  Chief Complaint   Patient presents with    Follow-up     Interval History: Mr. Cox returns for follow up. He was seen approximately 3 months ago as a new referral for thrombocytopenia. At that time the platelets had reached a south of 52 around April. He was asymptomatic. Repeat CBC in May showed resolution of thrombocytopenia. Most recent labs in July show stability of platelets. He has no complaints at this time. He denies bleeding/bruising.         Objective:      Vitals:   Vitals:    08/18/23 0928   BP: (!) 183/98   BP Location: Right arm   Patient Position: Sitting   BP Method: Medium (Automatic)   Pulse: 85   Resp: 16   SpO2: 97%     BMI: There is no height or weight on file to calculate BMI.      Physical Exam:   Physical Exam  Vitals and nursing note reviewed.   Constitutional:       General: He is not in acute distress.     Appearance: He is not toxic-appearing.      Comments: Wheelchair-bound   HENT:      Head: Normocephalic and atraumatic.   Eyes:      General: No scleral icterus.     Conjunctiva/sclera: Conjunctivae normal.   Cardiovascular:      Rate and Rhythm: Normal rate.   Pulmonary:      Effort: Pulmonary effort is normal. No respiratory distress.   Musculoskeletal:         General: No deformity.      Cervical back: Neck supple.   Lymphadenopathy:      Cervical: No cervical adenopathy.   Skin:     Coloration: Skin is not jaundiced.      Findings: No erythema.   Neurological:      Mental Status: He is alert.      Motor: Weakness and abnormal muscle tone present.      Coordination: Coordination abnormal.      Gait: Gait abnormal.      Comments:      Psychiatric:         Mood and Affect: Mood normal.         Behavior: Behavior normal.           Laboratory Data:  WBC   Date Value Ref Range Status   07/24/2023 5.32 3.90 - 12.70 K/uL Final     Hemoglobin   Date Value Ref Range Status   07/24/2023 14.4 14.0 -  18.0 g/dL Final     POC Hematocrit   Date Value Ref Range Status   06/26/2021 44 36 - 54 %PCV Final     Hematocrit   Date Value Ref Range Status   07/24/2023 43.8 40.0 - 54.0 % Final     Platelets   Date Value Ref Range Status   07/24/2023 205 150 - 450 K/uL Final     Gran # (ANC)   Date Value Ref Range Status   07/24/2023 2.6 1.8 - 7.7 K/uL Final     Gran %   Date Value Ref Range Status   07/24/2023 49.5 38.0 - 73.0 % Final       Chemistry        Component Value Date/Time     07/24/2023 1128    K 4.3 07/24/2023 1128     07/24/2023 1128    CO2 26 07/24/2023 1128    BUN 16 07/24/2023 1128    CREATININE 0.9 07/24/2023 1128    GLU 85 07/24/2023 1128        Component Value Date/Time    CALCIUM 9.7 07/24/2023 1128    ALKPHOS 62 07/24/2023 1128    AST 25 07/24/2023 1128    ALT 38 07/24/2023 1128    BILITOT 0.3 07/24/2023 1128    ESTGFRAFRICA >60.0 11/26/2021 1649    EGFRNONAA >60.0 11/26/2021 1649              Assessment/Plan:     1. Spastic quadriplegic cerebral palsy    2. Thrombocytopenia, unspecified    3. Functional quadriplegia    4. Essential hypertension    5. Blindness of both eyes    6. Seizure disorder      Initially referred for thrombocytopenia--etiology not entirely clear though there is some suspicion of side effect from valproic acid. Most recent platelet count has normalized.      Med and Orders:       Follow Up:  Follow up if symptoms worsen or fail to improve.    Patient instructions:   There are no Patient Instructions on file for this visit.    Above care plan was discussed with patient and all questions were addressed to their expressed satisfaction.       Arpit Leon MD, FACP  Hematology & Medical Oncology  [facility]

## 2023-08-24 ENCOUNTER — OFFICE VISIT (OUTPATIENT)
Dept: NEUROLOGY | Facility: CLINIC | Age: 43
End: 2023-08-24
Payer: MEDICARE

## 2023-08-24 VITALS — SYSTOLIC BLOOD PRESSURE: 130 MMHG | HEART RATE: 76 BPM | DIASTOLIC BLOOD PRESSURE: 91 MMHG

## 2023-08-24 DIAGNOSIS — G91.9 HYDROCEPHALUS, UNSPECIFIED TYPE: ICD-10-CM

## 2023-08-24 DIAGNOSIS — G80.0 SPASTIC QUADRIPLEGIC CEREBRAL PALSY: ICD-10-CM

## 2023-08-24 DIAGNOSIS — H54.3 BLINDNESS OF BOTH EYES: ICD-10-CM

## 2023-08-24 DIAGNOSIS — R53.2 FUNCTIONAL QUADRIPLEGIA: ICD-10-CM

## 2023-08-24 DIAGNOSIS — G40.909 SEIZURE DISORDER: Primary | ICD-10-CM

## 2023-08-24 PROCEDURE — 4010F ACE/ARB THERAPY RXD/TAKEN: CPT | Mod: CPTII,S$GLB,,

## 2023-08-24 PROCEDURE — 1159F PR MEDICATION LIST DOCUMENTED IN MEDICAL RECORD: ICD-10-PCS | Mod: CPTII,S$GLB,,

## 2023-08-24 PROCEDURE — 3044F PR MOST RECENT HEMOGLOBIN A1C LEVEL <7.0%: ICD-10-PCS | Mod: CPTII,S$GLB,,

## 2023-08-24 PROCEDURE — 3080F PR MOST RECENT DIASTOLIC BLOOD PRESSURE >= 90 MM HG: ICD-10-PCS | Mod: CPTII,S$GLB,,

## 2023-08-24 PROCEDURE — 99999 PR PBB SHADOW E&M-EST. PATIENT-LVL III: ICD-10-PCS | Mod: PBBFAC,,,

## 2023-08-24 PROCEDURE — 99214 OFFICE O/P EST MOD 30 MIN: CPT | Mod: S$GLB,,,

## 2023-08-24 PROCEDURE — 99999 PR PBB SHADOW E&M-EST. PATIENT-LVL III: CPT | Mod: PBBFAC,,,

## 2023-08-24 PROCEDURE — 3080F DIAST BP >= 90 MM HG: CPT | Mod: CPTII,S$GLB,,

## 2023-08-24 PROCEDURE — 4010F PR ACE/ARB THEARPY RXD/TAKEN: ICD-10-PCS | Mod: CPTII,S$GLB,,

## 2023-08-24 PROCEDURE — 1159F MED LIST DOCD IN RCRD: CPT | Mod: CPTII,S$GLB,,

## 2023-08-24 PROCEDURE — 3075F PR MOST RECENT SYSTOLIC BLOOD PRESS GE 130-139MM HG: ICD-10-PCS | Mod: CPTII,S$GLB,,

## 2023-08-24 PROCEDURE — 99214 PR OFFICE/OUTPT VISIT, EST, LEVL IV, 30-39 MIN: ICD-10-PCS | Mod: S$GLB,,,

## 2023-08-24 PROCEDURE — 3075F SYST BP GE 130 - 139MM HG: CPT | Mod: CPTII,S$GLB,,

## 2023-08-24 PROCEDURE — 3044F HG A1C LEVEL LT 7.0%: CPT | Mod: CPTII,S$GLB,,

## 2023-08-24 NOTE — PROGRESS NOTES
"Date: 8/24/23  Patient Name: Leonid Cox   MRN: [id number]   PCP: Anushka Telles  Referring Provider: No ref. provider found    Assessment:   Leonid Cox is a 43 y.o. male presenting in follow up for longstanding epilepsy. Seizures are well controlled on /750. Continue this medication at same dose. Annual level/labs completed via primary care. Consider DEXA next visit. Follow up in 1 year or sooner with issues. Patient and caretaker are comfortable with plan. All questions and concerns are addressed at this time.   Plan:     Problem List Items Addressed This Visit          Neuro    Cerebral palsy    Hydrocephalus    Seizure disorder - Primary    Overview     Controlled on depakote. Has not had a seizure "in years".         Functional quadriplegia    Overview     Related to CP, wheelchair bound, dependent for all ADLs            Ophtho    Blind     Macey Muro PA-C   Neurology-Epilepsy  [facility]    Collaborating physician, Dr. Renato Gorman, was available during today's encounter.     I spent approximately 30 minutes on the day of this encounter preparing to see the patient, obtaining and reviewing history and results, performing a medically appropriate exam, counseling and educating the patient/family/caregiver, documenting clinical information, coordinating care, and ordering medications, tests, procedures, and referrals.    Patient note was created using MModal Dictation.  Any errors in syntax or even information may not have been identified and edited on initial review prior to signing this note.  Subjective:   Patient seen in consultation at the request of No ref. provider found for the evaluation of epilepsy. A copy of this note will be sent to the referring physician.     Interval Events/ROS 8/24/2023:    Accompanied by caretaker who provides majority of the history.     Current ASM/SEs: /750; no SE  Breakthrough seizures/events: none in years  Driving: does " "not drive  Sleep: "good"  Mood: "good"    Otherwise, no fever, no cold symptoms, no headache, no new weakness, no chest pain, no shortness of breath, no nausea, no vomiting, no diarrhea, no constipation. Uses wheelchair. Patient denies any pain and states he is feeling well today. Caretaker does not have any concerns.     Recent Labs   Lab 06/25/21  1732 05/27/22  1327 04/17/23  1322 05/19/23  1038 07/24/23  1128   Valproic Acid Level 78.2 114.1 H 101.5 H 80.4 108.0 H      HPI:   Mr. Leonid Cox is a 43 y.o. male presenting to establish care for epilepsy.  Patient follows with Dr. Piña for years and is transferring care after seeing Dr. Hicks.  Patient has a history of  spastic quadriparetic CP and hydrocephalus s/p shunting and a known seizure disorder.  He requires 24/7 caregiving though is verbal and very social.  The patient has a remote history of seizure with questionable frequency potentially going as far back as 20 years seizure-free.  Patient has remained seizure-free for at least the last 2 years per his caregiver who is with him.  They deny any side effects of Depakote and he had lab work checked as recently as summer 2020 when his Depakote level was 73.2.  Personally reviewed head CT from 09/2018 notable for bilateral shunts with extensive encephalomalacia in the right hemisphere and left occipital lobe.    PAST MEDICAL HISTORY:  Past Medical History:   Diagnosis Date    Blind     Cerebral palsy     Hydrocephalus     Hypertension     Seizure disorder     Seizures     Urinary reflux        PAST SURGICAL HISTORY:  Past Surgical History:   Procedure Laterality Date    FOOT SURGERY      SHUNT REVISION      TENDON RELEASE      TOTAL HIP ARTHROPLASTY         CURRENT MEDS:  Current Outpatient Medications   Medication Sig Dispense Refill    celecoxib (CELEBREX) 200 MG capsule Take 1 capsule (200 mg total) by mouth daily as needed for Pain (Take with food). 90 capsule 3    cetirizine (ZYRTEC) 10 MG tablet " Take 1 tablet (10 mg total) by mouth once daily. 90 tablet 3    cholecalciferol, vitamin D3, (VITAMIN D3) 25 mcg (1,000 unit) capsule Take 1 capsule (1,000 Units total) by mouth once daily. 90 capsule 3    divalproex (DEPAKOTE) 500 MG TbEC Take 1 tablet (500 mg total) by mouth every 12 (twelve) hours. 180 tablet 3    divalproex ER (DEPAKOTE ER) 250 MG 24 hr tablet Take 1 tablet (250 mg total) by mouth every evening. 90 tablet 3    fluticasone propionate (FLONASE) 50 mcg/actuation nasal spray INSTILL 1 SPRAY IN EACH NOSTRIL EVERY DAY   * REQUEST REFILL WHEN NEEDED 16 g 10    furosemide (LASIX) 20 MG tablet Take 20 mg by mouth.      terbinafine HCL (LAMISIL) 1 % cream Apply topically 2 (two) times daily. Apply in the groin 100 g 11     No current facility-administered medications for this visit.       ALLERGIES:  Review of patient's allergies indicates:   Allergen Reactions    Adhesive tape-silicones      Other reaction(s): blisters    Codeine      Other reaction(s): Nausea    Morphine Itching       FAMILY HISTORY:  Family History   Problem Relation Age of Onset    Cancer Mother     Cancer Father        SOCIAL HISTORY:  Social History     Tobacco Use    Smoking status: Never    Smokeless tobacco: Never   Substance Use Topics    Alcohol use: No    Drug use: No       Review of Systems:  12 system review of systems is negative except for the symptoms mentioned in HPI.      Objective:     Vitals:    08/24/23 1302   BP: (!) 130/91   Pulse: 76     General: NAD, well nourished   Eyes: no tearing, discharge, no erythema   ENT: moist mucous membranes of the oral cavity, nares patent    Neck: reduced ROM  Cardiovascular: Warm and well perfused  Lungs: Normal work of breathing, normal chest wall excursions  Skin: No rash, lesions, or breakdown on exposed skin  Psychiatry: Mood and affect are appropriate   Abdomen: soft, non tender, non distended  Extremeties: No cyanosis, clubbing or edema.    Neurological   MENTAL STATUS: Alert  and oriented to person, place, not time. Attention and concentration within normal limits. Speech with mild to moderate dysarthria, able to name and repeat without difficulty. Recent and remote memory fair.   CRANIAL NERVES: Bilateral blindness. +Nystagmus. Face symmetrical. Hearing grossly intact. Full shoulder shrug bilaterally. Tongue protrudes midline   SENSORY: Sensation is intact to light touch throughout.   MOTOR: Normal bulk and increased tone throughout.  3/5 deltoid, biceps, triceps, interosseous, hand  bilaterally. 2/5 iliopsoas, knee extension/flexion, foot dorsi/plantarflexion bilaterally.    CEREBELLAR/COORDINATION/GAIT: Wheelchair bound at baseline.  Mild ataxia of UEs b/l, LEs limited 2/2 weakness.

## 2023-08-24 NOTE — PATIENT INSTRUCTIONS
You came to Epilepsy Clinic because of your seizure disorder.  Your seizures are well controlled on deapkote 500mg in the morning and 750mg at night (500mg + 250mg). Please continue the same medication at the same dose.     Do not miss any doses of medication. If a dose of medication is missed, take it as soon as it is remembered even if that means doubling up on the dose. Get regular sleep. Go to sleep at the same time and wake up at the same time every day. People with epilepsy require more sleep than people without epilepsy.  Sleeping 10-12 hours a day can be normal for a person with epilepsy.  Every seizure makes it harder to prevent the next seizure. Epilepsy is associated with SUDEP, or sudden unexpected death in epilepsy.  The risk is significantly higher if convulsive seizures are not well controlled. For more information, check out these websites: https://www.epilepsy.com/, https://www.epilepsyallianceamer[place].org/, www.magdi-epilepsy.org    Avoid dangerous situations.  For example, no baths/pools alone, no heights, no power tools.  Wear a bike helmet.  If breakthrough seizures occur that are different in character, frequency, or duration from normal episodes, please patient portal me or call the office and we will decide the next steps. If multiple seizures occur in a row without return back to baseline, 911 needs to be called.     Return to clinic in 1 year or sooner with issues.  Please patient portal with any questions or concerns.    Macey Muro PA-C   Neurology-Epilepsy  [facility]

## 2023-09-05 ENCOUNTER — PATIENT MESSAGE (OUTPATIENT)
Dept: PRIMARY CARE CLINIC | Facility: CLINIC | Age: 43
End: 2023-09-05
Payer: MEDICARE

## 2023-09-21 RX ORDER — FUROSEMIDE 20 MG/1
20 TABLET ORAL
Qty: 28 TABLET | Refills: 5 | Status: SHIPPED | OUTPATIENT
Start: 2023-09-21 | End: 2023-10-23 | Stop reason: SDUPTHER

## 2023-10-04 ENCOUNTER — DOCUMENT SCAN (OUTPATIENT)
Dept: HOME HEALTH SERVICES | Facility: HOSPITAL | Age: 43
End: 2023-10-04
Payer: MEDICARE

## 2023-10-23 ENCOUNTER — OFFICE VISIT (OUTPATIENT)
Dept: PRIMARY CARE CLINIC | Facility: CLINIC | Age: 43
End: 2023-10-23
Payer: MEDICARE

## 2023-10-23 VITALS
BODY MASS INDEX: 32.96 KG/M2 | HEART RATE: 70 BPM | OXYGEN SATURATION: 100 % | TEMPERATURE: 98 F | DIASTOLIC BLOOD PRESSURE: 78 MMHG | SYSTOLIC BLOOD PRESSURE: 141 MMHG | HEIGHT: 72 IN

## 2023-10-23 DIAGNOSIS — M24.562 BILATERAL KNEE CONTRACTURES: ICD-10-CM

## 2023-10-23 DIAGNOSIS — J30.9 ALLERGIC SINUSITIS: ICD-10-CM

## 2023-10-23 DIAGNOSIS — M24.561 BILATERAL KNEE CONTRACTURES: ICD-10-CM

## 2023-10-23 DIAGNOSIS — M25.473 ANKLE SWELLING, UNSPECIFIED LATERALITY: ICD-10-CM

## 2023-10-23 DIAGNOSIS — G80.9 CEREBRAL PALSY, UNSPECIFIED TYPE: Primary | ICD-10-CM

## 2023-10-23 DIAGNOSIS — R53.2 FUNCTIONAL QUADRIPLEGIA: ICD-10-CM

## 2023-10-23 DIAGNOSIS — G40.909 SEIZURE DISORDER: ICD-10-CM

## 2023-10-23 DIAGNOSIS — G80.0 SPASTIC QUADRIPLEGIC CEREBRAL PALSY: ICD-10-CM

## 2023-10-23 PROCEDURE — 1159F MED LIST DOCD IN RCRD: CPT | Mod: CPTII,S$GLB,, | Performed by: INTERNAL MEDICINE

## 2023-10-23 PROCEDURE — 4010F ACE/ARB THERAPY RXD/TAKEN: CPT | Mod: CPTII,S$GLB,, | Performed by: INTERNAL MEDICINE

## 2023-10-23 PROCEDURE — 3044F PR MOST RECENT HEMOGLOBIN A1C LEVEL <7.0%: ICD-10-PCS | Mod: CPTII,S$GLB,, | Performed by: INTERNAL MEDICINE

## 2023-10-23 PROCEDURE — 99214 OFFICE O/P EST MOD 30 MIN: CPT | Mod: 25,S$GLB,, | Performed by: INTERNAL MEDICINE

## 2023-10-23 PROCEDURE — 3008F PR BODY MASS INDEX (BMI) DOCUMENTED: ICD-10-PCS | Mod: CPTII,S$GLB,, | Performed by: INTERNAL MEDICINE

## 2023-10-23 PROCEDURE — 4010F PR ACE/ARB THEARPY RXD/TAKEN: ICD-10-PCS | Mod: CPTII,S$GLB,, | Performed by: INTERNAL MEDICINE

## 2023-10-23 PROCEDURE — 3008F BODY MASS INDEX DOCD: CPT | Mod: CPTII,S$GLB,, | Performed by: INTERNAL MEDICINE

## 2023-10-23 PROCEDURE — 3044F HG A1C LEVEL LT 7.0%: CPT | Mod: CPTII,S$GLB,, | Performed by: INTERNAL MEDICINE

## 2023-10-23 PROCEDURE — 1159F PR MEDICATION LIST DOCUMENTED IN MEDICAL RECORD: ICD-10-PCS | Mod: CPTII,S$GLB,, | Performed by: INTERNAL MEDICINE

## 2023-10-23 PROCEDURE — 99499 UNLISTED E&M SERVICE: CPT | Mod: S$GLB,,, | Performed by: INTERNAL MEDICINE

## 2023-10-23 PROCEDURE — G0008 FLU VACCINE - QUADRIVALENT - ADJUVANTED: ICD-10-PCS | Mod: S$GLB,,, | Performed by: INTERNAL MEDICINE

## 2023-10-23 PROCEDURE — 3077F PR MOST RECENT SYSTOLIC BLOOD PRESSURE >= 140 MM HG: ICD-10-PCS | Mod: CPTII,S$GLB,, | Performed by: INTERNAL MEDICINE

## 2023-10-23 PROCEDURE — 99214 PR OFFICE/OUTPT VISIT, EST, LEVL IV, 30-39 MIN: ICD-10-PCS | Mod: 25,S$GLB,, | Performed by: INTERNAL MEDICINE

## 2023-10-23 PROCEDURE — 3078F PR MOST RECENT DIASTOLIC BLOOD PRESSURE < 80 MM HG: ICD-10-PCS | Mod: CPTII,S$GLB,, | Performed by: INTERNAL MEDICINE

## 2023-10-23 PROCEDURE — 3077F SYST BP >= 140 MM HG: CPT | Mod: CPTII,S$GLB,, | Performed by: INTERNAL MEDICINE

## 2023-10-23 PROCEDURE — 90694 VACC AIIV4 NO PRSRV 0.5ML IM: CPT | Mod: S$GLB,,, | Performed by: INTERNAL MEDICINE

## 2023-10-23 PROCEDURE — 3078F DIAST BP <80 MM HG: CPT | Mod: CPTII,S$GLB,, | Performed by: INTERNAL MEDICINE

## 2023-10-23 PROCEDURE — G0008 ADMIN INFLUENZA VIRUS VAC: HCPCS | Mod: S$GLB,,, | Performed by: INTERNAL MEDICINE

## 2023-10-23 PROCEDURE — 90694 FLU VACCINE - QUADRIVALENT - ADJUVANTED: ICD-10-PCS | Mod: S$GLB,,, | Performed by: INTERNAL MEDICINE

## 2023-10-23 RX ORDER — FUROSEMIDE 20 MG/1
20 TABLET ORAL DAILY PRN
Qty: 28 TABLET | Refills: 5 | Status: SHIPPED | OUTPATIENT
Start: 2023-10-23 | End: 2024-03-13

## 2023-10-23 RX ORDER — CETIRIZINE HYDROCHLORIDE 10 MG/1
10 TABLET ORAL DAILY
Qty: 90 TABLET | Refills: 3 | Status: SHIPPED | OUTPATIENT
Start: 2023-10-23 | End: 2024-10-22

## 2023-10-23 RX ORDER — ACETAMINOPHEN 500 MG
500 TABLET ORAL EVERY 6 HOURS PRN
Qty: 180 TABLET | Refills: 3 | Status: SHIPPED | OUTPATIENT
Start: 2023-10-23

## 2023-10-23 RX ORDER — DIVALPROEX SODIUM 500 MG/1
500 TABLET, DELAYED RELEASE ORAL EVERY 12 HOURS
Qty: 180 TABLET | Refills: 3 | Status: SHIPPED | OUTPATIENT
Start: 2023-10-23 | End: 2023-10-27 | Stop reason: SDUPTHER

## 2023-10-23 RX ORDER — CELECOXIB 200 MG/1
200 CAPSULE ORAL DAILY PRN
Qty: 90 CAPSULE | Refills: 3 | Status: SHIPPED | OUTPATIENT
Start: 2023-10-23

## 2023-10-23 RX ORDER — FLUTICASONE PROPIONATE 50 MCG
SPRAY, SUSPENSION (ML) NASAL
Qty: 16 G | Refills: 3 | Status: SHIPPED | OUTPATIENT
Start: 2023-10-23

## 2023-10-23 RX ORDER — DIVALPROEX SODIUM 250 MG/1
250 TABLET, FILM COATED, EXTENDED RELEASE ORAL NIGHTLY
Qty: 90 TABLET | Refills: 3 | Status: SHIPPED | OUTPATIENT
Start: 2023-10-23

## 2023-10-23 NOTE — Clinical Note
Dear Team Sherif, Could you schedule a follow-up for Mr Cox at your convenience? He has not had a seizure lately  Thanks, Dr ANDRES (PCP)

## 2023-10-23 NOTE — PROGRESS NOTES
Primary Care Provider Appointment - [facility]  SHARED NOTE: Melodie Cabral (MS4), Dr Telles (Attending)    Subjective:      Patient ID: Leonid Cox is a 43 y.o. male with cerebral palsy, hydrocephalus, blindness, seizure disorder, hypertension, and functional quadriplegia.          Chief Complaint: Follow-up    Prior to this visit, patient's last encounter with PCP was 7/24/2023.    Pt reports he is doing well today, he is accompanied by Ms. Salinas and Ms. Lee from [facility]. They are requesting a wheelchair with elevated leg rests to assist patient at home as well particularly when transitioning from sitting to standing and out of the car. Patient did receieve a wheelchair in 2021, but needs a different type of wheelchair due to intensive leg swelling, pain, and loss of function.  His fungal rash has resolved and there is no need for terbinafine cream at this time.    He has an ulcer on the medial side of his left leg.   He also swelling of his legs bilaterally. Homehealth has been using compression stockings.   His platelet levels have normalized as per Dr. Leon's notes, [facility] counseled on looking out for easy bruising, bleeding, and bleeding of the gums with brushin teeth.   Will refill all of his medications for 90 days. Depakote refill will be handled by Neurology.     [facility] states his blood pressure has been good at home.     HEM/ONC  -Evaluated on 8/18/23 per Dr. Leon for thrombocytopenia.   -Initially referred for thrombocytopenia--etiology not entirely clear though there is some suspicion of side effect from valproic acid. Most recent platelet count has normalized       Cerebral Palsy and Functional Quadriplegia  - Full dependence for ADLs with caregivers in home  -Wheelchair and bed bound  -Caregivers present with patient 24-hours  - shunt in place  -No evidence of increased ICP     HTN  - Currently taking: Held lisinopril at last visit  - How often taking BP at home:  "daily, average is: systolic 130s.   - Today, BP is: 141/78     Seizure Disorder  -Currently taking:Depakote 500 mg PO Q12H, Depakote  mg PO nightly  -Last seizure: "years" ago.      Mental Health  - Currently taking: Celebrex 200 mg PO daily  - Does not see therapist or psychiatrist    Care Gaps:  - Flu Vaccine given today  -Tetanus Vaccine    Medications: Does not have pill packs      FU in 3 months to specifically check overall health.       4Ms for Medical Decision-Making in Older Adults    Last Completed EAWV: None    MOBILITY:  Get Up and Go:       No data to display              Activities of Daily Living:       No data to display              Whisper Test:       No data to display              Disability Status:       No data to display              Nutrition Screening:       No data to display             Screening Score: 0-7 Malnourished, 8-11 At Risk, 12-14 Normal    MENTATION:   Depression Patient Health Questionnaire:      10/18/2022    11:26 AM   Depression Patient Health Questionnaire   PHQ-4 Total Score 0     Has Dementia Dx: No    Cognitive Function Screening:       No data to display              Cognitive Function Screening Total - Less than 4 = Abnormal,  Greater than or equal to 4 = Normal    MEDICATIONS:  High Risk Medications:  Total Active Medications: 0  This patient does not have an active medication from one of the medication groupers.    WHAT MATTERS MOST:  Advance Care Planning   ACP Status:   Patient has had an ACP conversation  Living Will: Yes  Power of : Yes  LaPOST: Yes    What is most important right now is to focus on spending time at homeimprovement in condition but with limits to invasive therapiescomfort and QOL     Accordingly, we have decided that the best plan to meet the patient's goals includes continuing with treatment      What matters most to patient today is: continuing with treatment                  Social History     Socioeconomic History    Marital " status: Single   Tobacco Use    Smoking status: Never    Smokeless tobacco: Never   Substance and Sexual Activity    Alcohol use: No    Drug use: No   Social History Narrative    Lives in property owned by grandmother. Disabled with [employer] caretakers 24hrs to patient. Primary caretaker Sherri takes care of him. Shalonda is the active caretaker, but there is no official legal power of .         Mother passed away 12/2013       Review of Systems   All other systems reviewed and are negative.      Objective:   BP (!) 141/78 (BP Location: Right arm, Patient Position: Sitting, BP Method: Large (Automatic))   Pulse 70   Temp 97.9 °F (36.6 °C) (Oral)   Ht 6' (1.829 m)   SpO2 100%   BMI 32.96 kg/m²     Physical Exam  Constitutional:       Comments: Wheelchair bound   Eyes:      Comments: Horizontal nystagmus   Cardiovascular:      Rate and Rhythm: Normal rate and regular rhythm.      Comments: Venous stasis bilaterally  Pulmonary:      Effort: Pulmonary effort is normal.      Breath sounds: Normal breath sounds.   Musculoskeletal:         General: Swelling and deformity present.      Comments: Contractures of upper extremities   Skin:     General: Skin is warm.      Capillary Refill: Capillary refill takes less than 2 seconds.      Findings: Lesion present.          Neurological:      Mental Status: He is alert.      Motor: Weakness present.      Gait: Gait abnormal.      Comments: Repetitive movements  Wheelchair bound             Lab Results   Component Value Date    WBC 5.32 07/24/2023    HGB 14.4 07/24/2023    HCT 43.8 07/24/2023     07/24/2023    CHOL 174 04/17/2023    TRIG 124 04/17/2023    HDL 39 (L) 04/17/2023    ALT 38 07/24/2023    AST 25 07/24/2023     07/24/2023    K 4.3 07/24/2023     07/24/2023    CREATININE 0.9 07/24/2023    BUN 16 07/24/2023    CO2 26 07/24/2023    TSH 3.073 09/29/2022    INR 1.2 06/25/2021    HGBA1C 5.1 07/24/2023       Current Outpatient Medications  on File Prior to Visit   Medication Sig Dispense Refill    cholecalciferol, vitamin D3, (VITAMIN D3) 25 mcg (1,000 unit) capsule Take 1 capsule (1,000 Units total) by mouth once daily. 90 capsule 3    terbinafine HCL (LAMISIL) 1 % cream Apply topically 2 (two) times daily. Apply in the groin 100 g 11    [DISCONTINUED] celecoxib (CELEBREX) 200 MG capsule Take 1 capsule (200 mg total) by mouth daily as needed for Pain (Take with food). 90 capsule 3    [DISCONTINUED] cetirizine (ZYRTEC) 10 MG tablet Take 1 tablet (10 mg total) by mouth once daily. 90 tablet 3    [DISCONTINUED] divalproex (DEPAKOTE) 500 MG TbEC Take 1 tablet (500 mg total) by mouth every 12 (twelve) hours. 180 tablet 3    [DISCONTINUED] divalproex ER (DEPAKOTE ER) 250 MG 24 hr tablet Take 1 tablet (250 mg total) by mouth every evening. 90 tablet 3    [DISCONTINUED] fluticasone propionate (FLONASE) 50 mcg/actuation nasal spray INSTILL 1 SPRAY IN EACH NOSTRIL EVERY DAY   * REQUEST REFILL WHEN NEEDED 16 g 10    [DISCONTINUED] furosemide (LASIX) 20 MG tablet TAKE 1 TABLET BY MOUTH EVERY DAY 28 tablet 5     No current facility-administered medications on file prior to visit.         Assessment:   43 y.o. male with multiple co-morbid illnesses here to follow-up with PCP and continue work-up of chronic issues    Plan:     Problem List Items Addressed This Visit          Neuro    Cerebral palsy - Primary    Relevant Medications    celecoxib (CELEBREX) 200 MG capsule    divalproex ER (DEPAKOTE ER) 250 MG 24 hr tablet    Other Relevant Orders    Ambulatory referral/consult to Physical Medicine Rehab    Ambulatory referral/consult to Physical Medicine Rehab    Seizure disorder    Relevant Medications    divalproex (DEPAKOTE) 500 MG TbEC    Other Relevant Orders    Ambulatory referral/consult to Neurology    Functional quadriplegia    Relevant Orders    Ambulatory referral/consult to Physical Medicine Rehab       Orthopedic    Bilateral knee contractures     Relevant Medications    acetaminophen (TYLENOL) 500 MG tablet     Other Visit Diagnoses       Allergic sinusitis        Relevant Medications    cetirizine (ZYRTEC) 10 MG tablet    fluticasone propionate (FLONASE) 50 mcg/actuation nasal spray    Ankle swelling, unspecified laterality        Relevant Medications    furosemide (LASIX) 20 MG tablet            Health Maintenance         Date Due Completion Date    COVID-19 Vaccine (1) Never done ---    TETANUS VACCINE 09/24/2022 9/24/2012    Influenza Vaccine (1) 09/01/2023 10/18/2022- TODAY    Hemoglobin A1c (Diabetic Prevention Screening) 07/24/2026 7/24/2023    Lipid Panel 04/17/2028 4/17/2023            Future Appointments   Date Time Provider Department Center   4/23/2024 10:30 AM [facility]elles [facility]         Follow up in about 6 months (around 4/23/2024), or ROUTINE F2F. Total clinical care time was 30 min      Anushka Telles MD/MPH  [facility]  [phone] [facility]

## 2023-10-23 NOTE — PATIENT INSTRUCTIONS
TODAY:  - Neurology appt with Dr Gorman for seizure monitoring  - PMR appointment for wheelchair evaluation  - take tylenol as needed for pain  - all meds refilled for 90 days  - Platelets were normal, will check twice yearly  - flu vaccine today  - leg wraps today

## 2023-10-26 ENCOUNTER — TELEPHONE (OUTPATIENT)
Dept: NEUROLOGY | Facility: CLINIC | Age: 43
End: 2023-10-26
Payer: MEDICARE

## 2023-10-26 DIAGNOSIS — G40.909 SEIZURE DISORDER: ICD-10-CM

## 2023-10-26 NOTE — TELEPHONE ENCOUNTER
Call to Lopez at [facility] he was calling to clarify pt scripts that was sent to [facility] on Oct 23 , Depakote 500 mg TBEC , Depakote to clarify that the scripts that was sent in oct 23 , he want to clarify that the scripts that were sent in to [facility]  is this how you want the pt to take the medication please advise

## 2023-10-26 NOTE — TELEPHONE ENCOUNTER
----- Message from Naheed Llody sent at 10/26/2023 11:29 AM CDT -----   Lopez with   [facility]  would like to be called back regarding   2 depakoate  RX sent 10/23/23 need  to verify what to used      can be reached at  [phone]

## 2023-10-26 NOTE — TELEPHONE ENCOUNTER
----- Message from Anushka Telles MD sent at 10/23/2023  1:01 PM CDT -----  Dear Team Sherif,  Could you schedule a follow-up for Mr Cox at your convenience? He has not had a seizure lately    Thanks,  Dr ANDRES (PCP)

## 2023-10-27 RX ORDER — DIVALPROEX SODIUM 500 MG/1
500 TABLET, FILM COATED, EXTENDED RELEASE ORAL EVERY 12 HOURS
Qty: 56 TABLET | Refills: 10 | Status: SHIPPED | OUTPATIENT
Start: 2023-10-27

## 2023-10-27 NOTE — TELEPHONE ENCOUNTER
Please let Lopez at [facility] [phone] know:  The depakote 500mg BID is delayed release, enteric coated.     The depakote 250mg qhs is extended release, to be taken at bedtime.    He should have both scripts filled.    Dr MCKENNA Arredondo

## 2023-10-30 NOTE — TELEPHONE ENCOUNTER
Spoke with Lopez at  [facility] and inform that  Dr advise The depakote 500mg BID is delayed release, enteric coated.      The depakote 250mg qhs is extended release, to be taken at bedtime. And that both scripts should be fill

## 2023-12-03 ENCOUNTER — PATIENT MESSAGE (OUTPATIENT)
Dept: PRIMARY CARE CLINIC | Facility: CLINIC | Age: 43
End: 2023-12-03
Payer: MEDICARE

## 2023-12-04 ENCOUNTER — PATIENT MESSAGE (OUTPATIENT)
Dept: PRIMARY CARE CLINIC | Facility: CLINIC | Age: 43
End: 2023-12-04

## 2023-12-04 ENCOUNTER — HOSPITAL ENCOUNTER (INPATIENT)
Facility: HOSPITAL | Age: 43
LOS: 4 days | Discharge: HOME HEALTH | DRG: 871 | End: 2023-12-09
Attending: STUDENT IN AN ORGANIZED HEALTH CARE EDUCATION/TRAINING PROGRAM | Admitting: INTERNAL MEDICINE
Payer: MEDICARE

## 2023-12-04 ENCOUNTER — OFFICE VISIT (OUTPATIENT)
Dept: PRIMARY CARE CLINIC | Facility: CLINIC | Age: 43
End: 2023-12-04
Payer: MEDICARE

## 2023-12-04 ENCOUNTER — TELEPHONE (OUTPATIENT)
Dept: PRIMARY CARE CLINIC | Facility: CLINIC | Age: 43
End: 2023-12-04
Payer: MEDICARE

## 2023-12-04 VITALS
TEMPERATURE: 100 F | RESPIRATION RATE: 22 BRPM | HEART RATE: 113 BPM | OXYGEN SATURATION: 97 % | DIASTOLIC BLOOD PRESSURE: 90 MMHG | SYSTOLIC BLOOD PRESSURE: 138 MMHG | BODY MASS INDEX: 32.96 KG/M2 | HEIGHT: 72 IN

## 2023-12-04 DIAGNOSIS — R07.9 CHEST PAIN: ICD-10-CM

## 2023-12-04 DIAGNOSIS — I10 ESSENTIAL HYPERTENSION: ICD-10-CM

## 2023-12-04 DIAGNOSIS — G91.9 HYDROCEPHALUS, UNSPECIFIED TYPE: ICD-10-CM

## 2023-12-04 DIAGNOSIS — L03.90 CELLULITIS, UNSPECIFIED CELLULITIS SITE: Primary | ICD-10-CM

## 2023-12-04 DIAGNOSIS — J06.9 VIRAL UPPER RESPIRATORY TRACT INFECTION: ICD-10-CM

## 2023-12-04 DIAGNOSIS — J10.1 INFLUENZA B: ICD-10-CM

## 2023-12-04 DIAGNOSIS — L03.312 CELLULITIS OF BACK EXCEPT BUTTOCK: ICD-10-CM

## 2023-12-04 DIAGNOSIS — R00.0 TACHYCARDIA: ICD-10-CM

## 2023-12-04 DIAGNOSIS — R46.89 SELF NEGLECT: ICD-10-CM

## 2023-12-04 DIAGNOSIS — A40.9 SEPSIS DUE TO STREPTOCOCCUS SPECIES WITHOUT ACUTE ORGAN DYSFUNCTION: Primary | ICD-10-CM

## 2023-12-04 LAB
ALBUMIN SERPL BCP-MCNC: 2.6 G/DL (ref 3.5–5.2)
ALLENS TEST: NORMAL
ALP SERPL-CCNC: 55 U/L (ref 55–135)
ALT SERPL W/O P-5'-P-CCNC: 16 U/L (ref 10–44)
ANION GAP SERPL CALC-SCNC: 9 MMOL/L (ref 8–16)
AST SERPL-CCNC: 13 U/L (ref 10–40)
BASOPHILS # BLD AUTO: 0.03 K/UL (ref 0–0.2)
BASOPHILS NFR BLD: 0.5 % (ref 0–1.9)
BILIRUB SERPL-MCNC: 0.3 MG/DL (ref 0.1–1)
BUN SERPL-MCNC: 14 MG/DL (ref 6–20)
CALCIUM SERPL-MCNC: 8.7 MG/DL (ref 8.7–10.5)
CHLORIDE SERPL-SCNC: 104 MMOL/L (ref 95–110)
CO2 SERPL-SCNC: 27 MMOL/L (ref 23–29)
CREAT SERPL-MCNC: 0.8 MG/DL (ref 0.5–1.4)
CRP SERPL-MCNC: 79.3 MG/L (ref 0–8.2)
DIFFERENTIAL METHOD: NORMAL
EOSINOPHIL # BLD AUTO: 0 K/UL (ref 0–0.5)
EOSINOPHIL NFR BLD: 0.6 % (ref 0–8)
ERYTHROCYTE [DISTWIDTH] IN BLOOD BY AUTOMATED COUNT: 13.2 % (ref 11.5–14.5)
EST. GFR  (NO RACE VARIABLE): >60 ML/MIN/1.73 M^2
GLUCOSE SERPL-MCNC: 119 MG/DL (ref 70–110)
HCT VFR BLD AUTO: 44.9 % (ref 40–54)
HGB BLD-MCNC: 15.1 G/DL (ref 14–18)
HIV 1+2 AB+HIV1 P24 AG SERPL QL IA: NORMAL
IMM GRANULOCYTES # BLD AUTO: 0.02 K/UL (ref 0–0.04)
IMM GRANULOCYTES NFR BLD AUTO: 0.3 % (ref 0–0.5)
INFLUENZA A, MOLECULAR: NOT DETECTED
INFLUENZA B, MOLECULAR: DETECTED
LDH SERPL L TO P-CCNC: 0.93 MMOL/L (ref 0.5–2.2)
LYMPHOCYTES # BLD AUTO: 1.4 K/UL (ref 1–4.8)
LYMPHOCYTES NFR BLD: 21.5 % (ref 18–48)
MCH RBC QN AUTO: 28.9 PG (ref 27–31)
MCHC RBC AUTO-ENTMCNC: 33.6 G/DL (ref 32–36)
MCV RBC AUTO: 86 FL (ref 82–98)
MONOCYTES # BLD AUTO: 0.8 K/UL (ref 0.3–1)
MONOCYTES NFR BLD: 12.1 % (ref 4–15)
NEUTROPHILS # BLD AUTO: 4.2 K/UL (ref 1.8–7.7)
NEUTROPHILS NFR BLD: 65 % (ref 38–73)
NRBC BLD-RTO: 0 /100 WBC
PLATELET # BLD AUTO: 182 K/UL (ref 150–450)
PMV BLD AUTO: 9.8 FL (ref 9.2–12.9)
POTASSIUM SERPL-SCNC: 3.5 MMOL/L (ref 3.5–5.1)
PROCALCITONIN SERPL IA-MCNC: 0.08 NG/ML
PROT SERPL-MCNC: 6.2 G/DL (ref 6–8.4)
RBC # BLD AUTO: 5.23 M/UL (ref 4.6–6.2)
RSV AG BY MOLECULAR METHOD: NOT DETECTED
SAMPLE: NORMAL
SARS-COV-2 RNA RESP QL NAA+PROBE: NOT DETECTED
SITE: NORMAL
SODIUM SERPL-SCNC: 140 MMOL/L (ref 136–145)
VALPROATE SERPL-MCNC: 89 UG/ML (ref 50–100)
WBC # BLD AUTO: 6.52 K/UL (ref 3.9–12.7)

## 2023-12-04 PROCEDURE — 4010F PR ACE/ARB THEARPY RXD/TAKEN: ICD-10-PCS | Mod: CPTII,S$GLB,, | Performed by: INTERNAL MEDICINE

## 2023-12-04 PROCEDURE — 96368 THER/DIAG CONCURRENT INF: CPT

## 2023-12-04 PROCEDURE — 3008F BODY MASS INDEX DOCD: CPT | Mod: CPTII,S$GLB,, | Performed by: INTERNAL MEDICINE

## 2023-12-04 PROCEDURE — 1159F PR MEDICATION LIST DOCUMENTED IN MEDICAL RECORD: ICD-10-PCS | Mod: CPTII,S$GLB,, | Performed by: INTERNAL MEDICINE

## 2023-12-04 PROCEDURE — 87040 BLOOD CULTURE FOR BACTERIA: CPT | Performed by: EMERGENCY MEDICINE

## 2023-12-04 PROCEDURE — 3044F HG A1C LEVEL LT 7.0%: CPT | Mod: CPTII,S$GLB,, | Performed by: INTERNAL MEDICINE

## 2023-12-04 PROCEDURE — 84145 PROCALCITONIN (PCT): CPT | Performed by: STUDENT IN AN ORGANIZED HEALTH CARE EDUCATION/TRAINING PROGRAM

## 2023-12-04 PROCEDURE — 3075F PR MOST RECENT SYSTOLIC BLOOD PRESS GE 130-139MM HG: ICD-10-PCS | Mod: CPTII,S$GLB,, | Performed by: INTERNAL MEDICINE

## 2023-12-04 PROCEDURE — 3075F SYST BP GE 130 - 139MM HG: CPT | Mod: CPTII,S$GLB,, | Performed by: INTERNAL MEDICINE

## 2023-12-04 PROCEDURE — 3080F PR MOST RECENT DIASTOLIC BLOOD PRESSURE >= 90 MM HG: ICD-10-PCS | Mod: CPTII,S$GLB,, | Performed by: INTERNAL MEDICINE

## 2023-12-04 PROCEDURE — 99215 OFFICE O/P EST HI 40 MIN: CPT | Mod: S$GLB,,, | Performed by: INTERNAL MEDICINE

## 2023-12-04 PROCEDURE — 3044F PR MOST RECENT HEMOGLOBIN A1C LEVEL <7.0%: ICD-10-PCS | Mod: CPTII,S$GLB,, | Performed by: INTERNAL MEDICINE

## 2023-12-04 PROCEDURE — 25000003 PHARM REV CODE 250: Performed by: STUDENT IN AN ORGANIZED HEALTH CARE EDUCATION/TRAINING PROGRAM

## 2023-12-04 PROCEDURE — 87086 URINE CULTURE/COLONY COUNT: CPT | Performed by: EMERGENCY MEDICINE

## 2023-12-04 PROCEDURE — 94761 N-INVAS EAR/PLS OXIMETRY MLT: CPT

## 2023-12-04 PROCEDURE — 3008F PR BODY MASS INDEX (BMI) DOCUMENTED: ICD-10-PCS | Mod: CPTII,S$GLB,, | Performed by: INTERNAL MEDICINE

## 2023-12-04 PROCEDURE — 93010 EKG 12-LEAD: ICD-10-PCS | Mod: ,,, | Performed by: INTERNAL MEDICINE

## 2023-12-04 PROCEDURE — 0241U SARS-COV2 (COVID) WITH FLU/RSV BY PCR: CPT | Performed by: STUDENT IN AN ORGANIZED HEALTH CARE EDUCATION/TRAINING PROGRAM

## 2023-12-04 PROCEDURE — 80053 COMPREHEN METABOLIC PANEL: CPT | Performed by: STUDENT IN AN ORGANIZED HEALTH CARE EDUCATION/TRAINING PROGRAM

## 2023-12-04 PROCEDURE — 96365 THER/PROPH/DIAG IV INF INIT: CPT

## 2023-12-04 PROCEDURE — 4010F ACE/ARB THERAPY RXD/TAKEN: CPT | Mod: CPTII,S$GLB,, | Performed by: INTERNAL MEDICINE

## 2023-12-04 PROCEDURE — 99900035 HC TECH TIME PER 15 MIN (STAT)

## 2023-12-04 PROCEDURE — 87389 HIV-1 AG W/HIV-1&-2 AB AG IA: CPT | Performed by: PHYSICIAN ASSISTANT

## 2023-12-04 PROCEDURE — 63600175 PHARM REV CODE 636 W HCPCS: Performed by: STUDENT IN AN ORGANIZED HEALTH CARE EDUCATION/TRAINING PROGRAM

## 2023-12-04 PROCEDURE — 81001 URINALYSIS AUTO W/SCOPE: CPT | Performed by: EMERGENCY MEDICINE

## 2023-12-04 PROCEDURE — 99285 EMERGENCY DEPT VISIT HI MDM: CPT | Mod: 25

## 2023-12-04 PROCEDURE — 80164 ASSAY DIPROPYLACETIC ACD TOT: CPT | Performed by: STUDENT IN AN ORGANIZED HEALTH CARE EDUCATION/TRAINING PROGRAM

## 2023-12-04 PROCEDURE — 99215 PR OFFICE/OUTPT VISIT, EST, LEVL V, 40-54 MIN: ICD-10-PCS | Mod: S$GLB,,, | Performed by: INTERNAL MEDICINE

## 2023-12-04 PROCEDURE — 3080F DIAST BP >= 90 MM HG: CPT | Mod: CPTII,S$GLB,, | Performed by: INTERNAL MEDICINE

## 2023-12-04 PROCEDURE — 1159F MED LIST DOCD IN RCRD: CPT | Mod: CPTII,S$GLB,, | Performed by: INTERNAL MEDICINE

## 2023-12-04 PROCEDURE — 83605 ASSAY OF LACTIC ACID: CPT

## 2023-12-04 PROCEDURE — 93010 ELECTROCARDIOGRAM REPORT: CPT | Mod: ,,, | Performed by: INTERNAL MEDICINE

## 2023-12-04 PROCEDURE — 86140 C-REACTIVE PROTEIN: CPT | Performed by: STUDENT IN AN ORGANIZED HEALTH CARE EDUCATION/TRAINING PROGRAM

## 2023-12-04 PROCEDURE — 93005 ELECTROCARDIOGRAM TRACING: CPT

## 2023-12-04 PROCEDURE — 96366 THER/PROPH/DIAG IV INF ADDON: CPT

## 2023-12-04 PROCEDURE — 85025 COMPLETE CBC W/AUTO DIFF WBC: CPT | Performed by: EMERGENCY MEDICINE

## 2023-12-04 RX ORDER — ACETAMINOPHEN 325 MG/1
650 TABLET ORAL
Status: COMPLETED | OUTPATIENT
Start: 2023-12-04 | End: 2023-12-04

## 2023-12-04 RX ORDER — OSELTAMIVIR PHOSPHATE 75 MG/1
75 CAPSULE ORAL
Status: COMPLETED | OUTPATIENT
Start: 2023-12-04 | End: 2023-12-04

## 2023-12-04 RX ADMIN — CEFEPIME 2 G: 2 INJECTION, POWDER, FOR SOLUTION INTRAVENOUS at 07:12

## 2023-12-04 RX ADMIN — OSELTAMIVIR 75 MG: 75 CAPSULE ORAL at 08:12

## 2023-12-04 RX ADMIN — IOHEXOL 100 ML: 350 INJECTION, SOLUTION INTRAVENOUS at 11:12

## 2023-12-04 RX ADMIN — ACETAMINOPHEN 650 MG: 325 TABLET ORAL at 06:12

## 2023-12-04 RX ADMIN — VANCOMYCIN HYDROCHLORIDE 2000 MG: 500 INJECTION, POWDER, LYOPHILIZED, FOR SOLUTION INTRAVENOUS at 07:12

## 2023-12-04 NOTE — TELEPHONE ENCOUNTER
----- Message from Amanda Camargo sent at 12/4/2023  1:46 PM CST -----  Contact: Shalonda [phone]  Shalonda Dorman patient's guardian called requesting a urgent call back from Dr. Telles or the nurse, patient is setting outside in the car and if no one calls her right back, she will send patient to urgent care

## 2023-12-04 NOTE — TELEPHONE ENCOUNTER
----- Message from Dilia Mark sent at 12/4/2023 12:58 PM CST -----  Contact: Rosetta/Shalonda [phone]  Shalonda wants to let you know she is sending pt to urgent care for what looks like a spider bite, due to not hearing back from doctor yet. She would still like a phone call back to update doctor on what happened.

## 2023-12-04 NOTE — TELEPHONE ENCOUNTER
----- Message from Yanira Tracy sent at 12/4/2023 10:49 AM CST -----  Contact: [phone]  [email or web address]     Patient is calling for Medical Advice regarding: a bite from something     How long has patient had these symptoms: yesterday     Pharmacy name and phone#:    Would like response via [employer]:  no     Comments:  Pts mother is calling she states the agency called and sent her photos of a bite the pt has she states she is needing to know what to do please give return call she states she sent a message with the photos as well please advise ASAP

## 2023-12-04 NOTE — TELEPHONE ENCOUNTER
----- Message from Shira Donnelly sent at 12/4/2023 12:06 PM CST -----  Contact: Guardian Shalonda [phone]  Patient's guardian states Patient needs to be seen somewhere today. Insect bite on butt right side. She states Patient is on the [facility] today. Please call to advise

## 2023-12-04 NOTE — ASSESSMENT & PLAN NOTE
Lesion on buttock as likely infectious source. Also presenting viral symptoms URI/LRI. Symptoms started this AM. Now with tachycardia, tachypnea, and fever.  Refer to ED for work-up and treatment  PCP advising 24-hr sitter care if hospitalized

## 2023-12-04 NOTE — TELEPHONE ENCOUNTER
----- Message from Amanda Camargo sent at 12/4/2023  1:46 PM CST -----  Contact: Shalonda [phone]  Shalonda Dorman patient's guardian called requesting a urgent call back from Dr. Telles or the nurse, patient is setting outside in the car and if no one calls her right back, she will send patient to urgent care

## 2023-12-04 NOTE — FIRST PROVIDER EVALUATION
"Medical screening examination initiated.  I have conducted a focused provider triage encounter, findings are as follows:    Brief history of present illness:    Pt here with a caregiver.  He complains of L shoulder pain.  Has had fever, cough, "boil" on L back/buttocks area.    Vitals:    12/04/23 1638   BP: (!) 149/94   Pulse: (!) 111   Resp: 20   Temp: 100.2 °F (37.9 °C)   TempSrc: Oral   SpO2: 96%   Weight: 110.2 kg (243 lb)   Height: 6' (1.829 m)       Pertinent physical exam:  No pain with L shoulder movement.  Abscess noted on his back close to midline and superior aspect of buttocks with surrounding induration and about 9cm area of erythema.    Brief workup plan:  Borderline febrile, tachycardic, clear source of infection in the soft tissues.  Will do sepsis workup and vancomycin and try to get bed quickly.  Will likely need procedural drainage.    Preliminary workup initiated; this workup will be continued and followed by the physician or advanced practice provider that is assigned to the patient when roomed.  "

## 2023-12-04 NOTE — TELEPHONE ENCOUNTER
----- Message from Ceci Mock sent at 12/4/2023  9:07 AM CST -----  Contact: franklin Liz   Franklin Liz sent messages & pictures of a bite that Leonid has on his buttock she would chelsie a call back about the status of this

## 2023-12-04 NOTE — ASSESSMENT & PLAN NOTE
No indication of infection at shunt. Does have fever and tachycardia today, with source of infection as lesion on buttock. High risk for poor outcomes with sepsis  Refer to ED for sepsis work-up and treatment  Vaccines up to date

## 2023-12-04 NOTE — ED PROVIDER NOTES
Source of History  Caregiver, patient, and EMR    Chief Complaint    Referral (Pt complains of boil to his right lower back, pt is functional quadriplegia w hx of cerebral palsy, HTN, and seizure disorder.  Sent from PCP clinic for further eval. / /)      History of Present Illness    Leonid Cox is a 43 y.o. male presenting with concern for abscess to the lower back.    He presented to primary physician office with his sitter for 3 days of lower back redness and swelling.  Also cough and sore throat.  At his baseline with his functional quadriplegia and cerebral palsy.  Some purulent discharge on bed from drainage of the wound.  Has felt warm at home.  Cough and chest discomfort as well.  Has history of  shunt but otherwise has had no change to his mental status.  History of seizures on valproic acid but has not had a seizure recently.  24 hour caregivers at home as well as a legal guardian.      Review of Systems    As per HPI and below:  Review of Systems:    Pertinent information obtained as above.  Otherwise limited due to: cognitive disability   Obtained from caregiver, positive fever and positive back lesion, with cough    Past History    As per HPI and below:  Past Medical History:   Diagnosis Date    Blind     Cerebral palsy     Hydrocephalus     Hypertension     Seizure disorder     Seizures     Urinary reflux        Past Surgical History:   Procedure Laterality Date    FOOT SURGERY      SHUNT REVISION      TENDON RELEASE      TOTAL HIP ARTHROPLASTY         Social History     Socioeconomic History    Marital status: Single   Tobacco Use    Smoking status: Never    Smokeless tobacco: Never   Substance and Sexual Activity    Alcohol use: No    Drug use: No   Social History Narrative    Lives in property owned by grandmother. Disabled with [employer] caretakers 24hrs to patient. Primary caretaker Sherri takes care of him. Shalonda is the active caretaker, but there is no official legal power of  .         Mother passed away 12/2013       Family History   Problem Relation Age of Onset    Cancer Mother     Cancer Father        Review of patient's allergies indicates:   Allergen Reactions    Adhesive tape-silicones      Other reaction(s): blisters    Codeine      Other reaction(s): Nausea    Morphine Itching       No current facility-administered medications on file prior to encounter.     Current Outpatient Medications on File Prior to Encounter   Medication Sig Dispense Refill    acetaminophen (TYLENOL) 500 MG tablet Take 1 tablet (500 mg total) by mouth every 6 (six) hours as needed for Pain. 180 tablet 3    celecoxib (CELEBREX) 200 MG capsule Take 1 capsule (200 mg total) by mouth daily as needed for Pain (Take with food). 90 capsule 3    cetirizine (ZYRTEC) 10 MG tablet Take 1 tablet (10 mg total) by mouth once daily. 90 tablet 3    cholecalciferol, vitamin D3, (VITAMIN D3) 25 mcg (1,000 unit) capsule Take 1 capsule (1,000 Units total) by mouth once daily. 90 capsule 3    divalproex 500 MG Tb24 Take 1 tablet (500 mg total) by mouth every 12 (twelve) hours. Delayed Release tablets 56 tablet 10    divalproex ER (DEPAKOTE ER) 250 MG 24 hr tablet Take 1 tablet (250 mg total) by mouth every evening. 90 tablet 3    fluticasone propionate (FLONASE) 50 mcg/actuation nasal spray INSTILL 1 SPRAY IN EACH NOSTRIL EVERY DAY   * REQUEST REFILL WHEN NEEDED 16 g 3    furosemide (LASIX) 20 MG tablet Take 1 tablet (20 mg total) by mouth daily as needed (leg swelling). 28 tablet 5    terbinafine HCL (LAMISIL) 1 % cream Apply topically 2 (two) times daily. Apply in the groin 100 g 11       Physical Exam    Reviewed nursing notes.  Vitals:    12/04/23 1638 12/04/23 1904 12/04/23 2015 12/04/23 2230   BP: (!) 149/94  133/83 (!) 141/91   BP Location:   Left arm Left arm   Patient Position:   Lying Lying   Pulse: (!) 111  102 104   Resp: 20  20 20   Temp: 100.2 °F (37.9 °C) (!) 100.7 °F (38.2 °C)     TempSrc: Oral Rectal      SpO2: 96%  (!) 94% 96%   Weight: 110.2 kg (243 lb)      Height: 6' (1.829 m)        General:  Alert, no acute distress.  Somewhat agitated but easily redirectable likely secondary to his functional quadriplegia and cerebral palsy.  Skin:  Warm, dry, intact.  Large erythematous indurated wound to the left lower back with no fluctuant abscess.  Head:  Normocephalic, atraumatic.    Neck:  Supple.   Eye:  Normal conjunctiva.   Ears, nose, mouth and throat:  Oral mucosa moist. Eyes have rhythmic movements likely baseline  Cardiovascular:  tachycardic rate and rhythm, Normal peripheral perfusion.  Respiratory:  coarse breath sounds bilaterally with dry cough.  Gastrointestinal:  Soft, Nontender, Non distended.    Back:  as below in picture  Neurological:  Alert and oriented to person, place, time, and situation.   Psychiatric:  can be a bit agitated at times but quite redirectable        Initial MDM and Data Review    43 y.o. male presenting for evaluation of concern for cellulitis and cough with fever and tachycardia.  Normotensive.  At his normal mental status baseline.  Exam as above.    Differential includes:  Most likely cellulitis versus deep space abscess of the lower back.  Also consider URI given cough.  Less likely shunt infection given no change in behavior or mental status. Pna considered. Sepsis.    Work-up includes:  Sepsis workup with blood cultures, CBC and CMP, CT pelvis, COVID flu and RSV, chest x-ray    Interventions include:  IV vancomycin, IV fluids, acetaminophen    The patient has significant medical comorbidities that influence decision making for this acute process, such as:  Cerebral palsy, agitation that frequently occurs towards centers and others due to significant dependence, seizure disorder, hydrocephalus with  shunt    I decided to obtain the patient's medical records and review relevant documentation from clinic records.  Pertinent information is noted.  According to PCP note, and  secure chat from his PCP, they are advising 24 hour sitter services and a one-to-one because of needs for full dependence and the fact that he can experience distress during his inpatient stay.    Medications   vancomycin 2 g in dextrose 5 % 500 mL IVPB (0 mg Intravenous Stopped 12/4/23 2111)   acetaminophen tablet 650 mg (650 mg Oral Given 12/4/23 1859)   lactated ringers bolus 1,000 mL (0 mLs Intravenous Stopped 12/4/23 2135)   ceFEPIme (MAXIPIME) 2 g in dextrose 5 % in water (D5W) 100 mL IVPB (MB+) (0 g Intravenous Stopped 12/4/23 2026)   oseltamivir capsule 75 mg (75 mg Oral Given 12/4/23 2058)       Results and ED Course    Labs Reviewed   SARS-COV2 (COVID) WITH FLU/RSV BY PCR - Abnormal; Notable for the following components:       Result Value    Influenza B, Molecular Detected (*)     All other components within normal limits   C-REACTIVE PROTEIN - Abnormal; Notable for the following components:    CRP 79.3 (*)     All other components within normal limits   COMPREHENSIVE METABOLIC PANEL - Abnormal; Notable for the following components:    Glucose 119 (*)     Albumin 2.6 (*)     All other components within normal limits   CULTURE, BLOOD   CULTURE, BLOOD   HIV 1 / 2 ANTIBODY    Narrative:     Release to patient->Immediate   CBC W/ AUTO DIFFERENTIAL    Narrative:     Release to patient->Immediate   PROCALCITONIN   VALPROIC ACID   URINALYSIS, REFLEX TO URINE CULTURE   ISTAT LACTATE       Imaging Results              X-Ray Chest AP Portable (Final result)  Result time 12/04/23 19:32:38      Final result by Carlos Felix MD (12/04/23 19:32:38)                   Impression:      See above comments.  Follow-up recommended.      Electronically signed by: Carlos Felix  Date:    12/04/2023  Time:    19:32               Narrative:    EXAMINATION:  XR CHEST AP PORTABLE    CLINICAL HISTORY:  Sepsis;    TECHNIQUE:  Single frontal view of the chest was performed.    COMPARISON:  08/25/2019    FINDINGS:  Retained  bilateral  shunt tubing similar to the prior study.    Suboptimal inspiration may limit characterization.    Slight obscuration of the left heart border and lung base may be associated with a small effusion.  Minimal left basilar atelectasis or infiltrate is a consideration.  Cardiac silhouette is normal size.    No evidence of pneumothorax.  No acute osseous abnormality.                                      ED Course as of 12/04/23 2328   Mon Dec 04, 2023   1844 POC Lactate: 0.93 [AC]   1901 WBC: 6.52 [AC]   1901 Hemoglobin: 15.1 [AC]   1903 X-Ray Chest AP Portable  There is left sided opacity that is concerning for possible pneumonia.  I added cefepime as well.  I added a CT chest for further clarification given that the x-ray has very poor inspiratory effort. [AC]   1942 FINDINGS:  Retained bilateral  shunt tubing similar to the prior study.     Suboptimal inspiration may limit characterization.     Slight obscuration of the left heart border and lung base may be associated with a small effusion.  Minimal left basilar atelectasis or infiltrate is a consideration.  Cardiac silhouette is normal size.     No evidence of pneumothorax.  No acute osseous abnormality.   [AC]   1942 Temp(!): 100.7 °F (38.2 °C)  Getting acetaminophen [AC]   2015 Influenza B, Molecular(!): Detected  Likely related to cough/fever [AC]   2016 Will start oseltamivir [AC]   2108 Awaiting CT [AC]   2248 There has been a delay of CT.  Still awaiting CT scan. [AC]      ED Course User Index  [AC] Clement Castrejon, DO       This patient does have evidence of infective focus  My overall impression is sepsis.  Source: Respiratory and Skin and Soft Tissue (location back)  Antibiotics given-   Antibiotics (72h ago, onward)      None        Vanc  Cefepime    Latest lactate reviewed-  Recent Labs   Lab 12/04/23  1831   POCLAC 0.93     Organ dysfunction indicated by  none    Fluid challenge Not needed - patient is not hypotensive    Pt did receive IVF  for tachycardia and dehydration but no 30cc/kg bolus needed    Post- resuscitation assessment No - Post resuscitation assessment not needed       Will Not start Pressors- Levophed for MAP of 65  Source control achieved by: abx      EKG interpreted by myself    EKG  Performed: 1640  Rate/Rhythm/Axis: 108 bpm, sinus rhythm, R axis   ms  Qtc 466 ms  Impression:  Right bundle branch block is present.  Sinus tachycardia.      Relevant imaging interpreted by myself  Question L sided consolidation but poor inspiratory effort makes interpretation difficult    Impression and Plan    43 y.o. male with findings of cellulitis, flu B and sepsis based on the work up in the emergency department as above.    Important lab/imaging findings include: indeterminate CXR led me to order CT Chest abdomen pelvis for further evaluation and clear delineation of possible secondary bacterial pna and the extent of the cellulitis    I did broaden the patient's antibiotic coverage due to concern for a secondary bacterial pneumonia in conjunction with the flu.    All tests, treatment options and disposition options were discussed with the patient.  The decision was made to admit the patient to the hospital.    The patient was signed out to oncoming attending in stable condition and all further questions/concerns by patient and/or family were addressed.    Signed out pending CT CAP and admission.    Critical Care:  Date: 12/04/2023  Performed by: Dr. Clement Castrejon  Authorized by: Dr. Clement Castrejon   Total critical care time (exclusive of procedural time) : 45 minutes  Upon my evaluation, this patient had a high probability of imminent or life-threatening deterioration due to [ sepsis ] which required my direct attention, intervention and personal management.  Critical care was necessary to treat or prevent imminent or life-threatening deterioration.  This may include review of laboratory data, radiology results, discussion with consultants and  family, and monitoring for potential decompensations. Interventions were performed as documented.                 Final diagnoses:  [R00.0] Tachycardia  [L03.90] Cellulitis, unspecified cellulitis site (Primary)  [J10.1] Influenza B        ED Disposition Condition    Admit Stable                  Clement Castrejon,   12/04/23 4620

## 2023-12-04 NOTE — PROGRESS NOTES
"  Primary Care Provider Appointment - [facility]  SHARED NOTE: Jeny Gonzalez (MA), Dr Telles (Attending)    Subjective:      Patient ID: Leonid Cox is a 43 y.o. male with functional quadriplegia 2/2 cerebral palsy, HTN, seizure disorder.       Chief Complaint: Blood Infection and URI    Prior to this visit, patient's last encounter with PCP was 10/23/2023.    Pt reports feeling ok, main complaint today is swelling L lower back.     Patient's sitter notes that patient had a bath 12/1/2023 without issues but after staff change, was noted to have something that appeared to be a "bite" on the L lower back, with surrounding redness. This redness continued, patient also developed cough and sore throat but no vomiting, diarrhea, urinary changes like hematuria or malodorous urine. Patient does not appear to have any changes to his personality or baseline. Continues to respond to sitters and caregivers appropriately. Bedsheets were changed Friday, sitter in room states that some drainage was noted by a previous sitter but she cannot remember if it was bloody or pus-like, states area today seems flatter than before. Patient does not react when they have evaluated/pressed on the area. Patient has felt warm to them at home, temperature here is 100 degrees      Sepsis  - SIRS criteria (fever, tachycardia, tachypnea)  - source of infection (viral URI/LRI versus buttock lesion)    Cerebral Palsy and Functional Quadriplegia  - Full dependence for ADLs with caregivers in home  - Wheelchair and bed bound  - Caregivers present with patient 24-hours  -  shunt in place  - No evidence of increased ICP    HTN  - Currently taking: Furosemide 20 mg QD PRN  - How often taking BP at home: daily, average is: 110-150s systolic, 80-90s diastolic.   - Today, BP is: 138/90  - Tachycardic     Seizure Disorder  -Currently taking:Depakote 500 mg PO Q12H, Depakote  mg PO nightly  -Last seizure: "years" ago.      Mental Health  - " Currently taking: Celebrex 200 mg PO daily  - Does not see therapist or psychiatrist    HLD  - Last lipid panel was on 4/17/2023  The 10-year ASCVD risk score (Megan KOENIG, et al., 2019) is: 2.1%    Values used to calculate the score:      Age: 43 years      Sex: Male      Is Non- : No      Diabetic: No      Tobacco smoker: No      Systolic Blood Pressure: 138 mmHg      Is BP treated: Yes      HDL Cholesterol: 39 mg/dL      Total Cholesterol: 174 mg/dL    Social issues  - Has 24 hr caregivers covered by medicaid  - is in transitional housing due to his primary home having issues  - Shalonda Dorman (legal guardian [phone]) and Susu (sitter management [phone]) spoken with on conference call today. Sitters are not covered for hospital admission, and patient will likely experience distress during inpatient stay  - PCP advising 24-hr sitter services if he is admitted, if patient is able treated in ED, the sitter can stay with them    Care Gaps:  -Tetanus Vaccine  - COVID vaccine    Medications: Does not have pill packs      4Ms for Medical Decision-Making in Older Adults    Last Completed EAWV: None    MOBILITY:  Get Up and Go:       No data to display              Activities of Daily Living:       No data to display              Whisper Test:       No data to display              Disability Status:       No data to display              Nutrition Screening:       No data to display             Screening Score: 0-7 Malnourished, 8-11 At Risk, 12-14 Normal    MENTATION:   Depression Patient Health Questionnaire:      12/4/2023     3:06 PM   Depression Patient Health Questionnaire   Over the last two weeks how often have you been bothered by little interest or pleasure in doing things Not at all   Over the last two weeks how often have you been bothered by feeling down, depressed or hopeless Not at all   PHQ-2 Total Score 0     Has Dementia Dx: No    Cognitive Function Screening:       No  data to display              Cognitive Function Screening Total - Less than 4 = Abnormal,  Greater than or equal to 4 = Normal    MEDICATIONS:  High Risk Medications:  Total Active Medications: 0  This patient does not have an active medication from one of the medication groupers.    WHAT MATTERS MOST:  Advance Care Planning   ACP Status:   Patient has had an ACP conversation  Living Will: Yes  Power of : Yes  [place]: Yes    What is most important right now is to focus on spending time at homeimprovement in condition but with limits to invasive therapiescomfort and QOL     Accordingly, we have decided that the best plan to meet the patient's goals includes continuing with treatment      What matters most to patient today is: addressing fever                 Social History     Socioeconomic History    Marital status: Single   Tobacco Use    Smoking status: Never    Smokeless tobacco: Never   Substance and Sexual Activity    Alcohol use: No    Drug use: No   Social History Narrative    Lives in property owned by grandmother. Disabled with [employer] caretakers 24hrs to patient. Primary caretaker Sherri takes care of him. Shalonda is the active caretaker, but there is no official legal power of .         Mother passed away 12/2013       Review of Systems   Constitutional:  Positive for fever. Negative for activity change, appetite change and fatigue.       Objective:   BP (!) 138/90 (BP Location: Right arm, Patient Position: Sitting, BP Method: Medium (Automatic))   Pulse (!) 113   Temp 100 °F (37.8 °C) (Oral)   Resp (!) 22   Ht 6' (1.829 m)   SpO2 97%   BMI 32.96 kg/m²     Physical Exam  Constitutional:       Appearance: He is ill-appearing. He is not toxic-appearing.   Musculoskeletal:         General: Swelling present.   Skin:     Findings: Bruising and lesion present.      Comments: Lesion on buttock   Neurological:      Mental Status: He is alert.      Cranial Nerves: Cranial nerve deficit  present.      Motor: Weakness present.      Gait: Gait abnormal.      Comments: Not at baseline             Lab Results   Component Value Date    WBC 5.32 07/24/2023    HGB 14.4 07/24/2023    HCT 43.8 07/24/2023     07/24/2023    CHOL 174 04/17/2023    TRIG 124 04/17/2023    HDL 39 (L) 04/17/2023    ALT 38 07/24/2023    AST 25 07/24/2023     07/24/2023    K 4.3 07/24/2023     07/24/2023    CREATININE 0.9 07/24/2023    BUN 16 07/24/2023    CO2 26 07/24/2023    TSH 3.073 09/29/2022    INR 1.2 06/25/2021    HGBA1C 5.1 07/24/2023       Current Outpatient Medications on File Prior to Visit   Medication Sig Dispense Refill    acetaminophen (TYLENOL) 500 MG tablet Take 1 tablet (500 mg total) by mouth every 6 (six) hours as needed for Pain. 180 tablet 3    celecoxib (CELEBREX) 200 MG capsule Take 1 capsule (200 mg total) by mouth daily as needed for Pain (Take with food). 90 capsule 3    cetirizine (ZYRTEC) 10 MG tablet Take 1 tablet (10 mg total) by mouth once daily. 90 tablet 3    cholecalciferol, vitamin D3, (VITAMIN D3) 25 mcg (1,000 unit) capsule Take 1 capsule (1,000 Units total) by mouth once daily. 90 capsule 3    divalproex 500 MG Tb24 Take 1 tablet (500 mg total) by mouth every 12 (twelve) hours. Delayed Release tablets 56 tablet 10    divalproex ER (DEPAKOTE ER) 250 MG 24 hr tablet Take 1 tablet (250 mg total) by mouth every evening. 90 tablet 3    fluticasone propionate (FLONASE) 50 mcg/actuation nasal spray INSTILL 1 SPRAY IN EACH NOSTRIL EVERY DAY   * REQUEST REFILL WHEN NEEDED 16 g 3    furosemide (LASIX) 20 MG tablet Take 1 tablet (20 mg total) by mouth daily as needed (leg swelling). 28 tablet 5    terbinafine HCL (LAMISIL) 1 % cream Apply topically 2 (two) times daily. Apply in the groin 100 g 11     No current facility-administered medications on file prior to visit.         Assessment:   43 y.o. male with multiple co-morbid illnesses here to follow-up with PCP and continue work-up of  chronic issues    Plan:     Problem List Items Addressed This Visit          Neuro    Hydrocephalus     No indication of infection at shunt. Does have fever and tachycardia today, with source of infection as lesion on buttock. High risk for poor outcomes with sepsis  Refer to ED for sepsis work-up and treatment  Vaccines up to date         Relevant Orders    Refer to Emergency Dept.       Psychiatric    Dependence for activities of daily living     Utilizes [facility] for sitter and caretaker services.  Continue supportive care  Would benefit from 24-hr sitter care if hospitalized            ENT    Viral upper respiratory tract infection     Cough, fever, tachycardia  Needs work-up for sepsis            ID    Sepsis due to Streptococcus species without acute organ dysfunction - Primary     Lesion on buttock as likely infectious source. Also presenting viral symptoms URI/LRI. Symptoms started this AM. Now with tachycardia, tachypnea, and fever.  Refer to ED for work-up and treatment  PCP advising 24-hr sitter care if hospitalized         Relevant Orders    Refer to Emergency Dept.       Health Maintenance         Date Due Completion Date    COVID-19 Vaccine (1) Never done ---    TETANUS VACCINE 09/24/2022 9/24/2012    Hemoglobin A1c (Diabetic Prevention Screening) 07/24/2026 7/24/2023    Lipid Panel 04/17/2028 4/17/2023            Future Appointments   Date Time Provider Department Center   4/23/2024 10:30 AM Anushka Telles MD [facility]         Follow up if symptoms worsen or fail to improve. Total clinical care time was 20 min      Anushka Telles MD/MPH  [facility]  [phone] [facility]

## 2023-12-04 NOTE — TELEPHONE ENCOUNTER
----- Message from Amanda Camargo sent at 12/4/2023  1:46 PM CST -----  Contact: Shalonda [phone]  Shalonda Dorman patient's guardian called requesting a urgent call back from Dr. Telles or the nurse, patient is setting outside in the car and if no one calls her right back, she will send patient to urgent care

## 2023-12-04 NOTE — ASSESSMENT & PLAN NOTE
Utilizes [employer] for sitter and caretaker services.  Continue supportive care  Would benefit from 24-hr sitter care if hospitalized

## 2023-12-05 PROBLEM — A41.9 SEPSIS: Status: ACTIVE | Noted: 2023-12-05

## 2023-12-05 PROBLEM — L03.312 CELLULITIS OF BACK EXCEPT BUTTOCK: Status: ACTIVE | Noted: 2023-12-05

## 2023-12-05 PROBLEM — J10.1 INFLUENZA B: Status: ACTIVE | Noted: 2023-12-05

## 2023-12-05 LAB
ALBUMIN SERPL BCP-MCNC: 2.6 G/DL (ref 3.5–5.2)
ALP SERPL-CCNC: 61 U/L (ref 55–135)
ALT SERPL W/O P-5'-P-CCNC: 15 U/L (ref 10–44)
ANION GAP SERPL CALC-SCNC: 13 MMOL/L (ref 8–16)
AST SERPL-CCNC: 23 U/L (ref 10–40)
BACTERIA #/AREA URNS AUTO: ABNORMAL /HPF
BASOPHILS # BLD AUTO: 0.02 K/UL (ref 0–0.2)
BASOPHILS NFR BLD: 0.3 % (ref 0–1.9)
BILIRUB SERPL-MCNC: 0.4 MG/DL (ref 0.1–1)
BILIRUB UR QL STRIP: NEGATIVE
BUN SERPL-MCNC: 12 MG/DL (ref 6–20)
CALCIUM SERPL-MCNC: 8.7 MG/DL (ref 8.7–10.5)
CHLORIDE SERPL-SCNC: 105 MMOL/L (ref 95–110)
CLARITY UR REFRACT.AUTO: CLEAR
CO2 SERPL-SCNC: 23 MMOL/L (ref 23–29)
COLOR UR AUTO: YELLOW
CREAT SERPL-MCNC: 0.8 MG/DL (ref 0.5–1.4)
DIFFERENTIAL METHOD: ABNORMAL
EOSINOPHIL # BLD AUTO: 0 K/UL (ref 0–0.5)
EOSINOPHIL NFR BLD: 0 % (ref 0–8)
ERYTHROCYTE [DISTWIDTH] IN BLOOD BY AUTOMATED COUNT: 13 % (ref 11.5–14.5)
EST. GFR  (NO RACE VARIABLE): >60 ML/MIN/1.73 M^2
GLUCOSE SERPL-MCNC: 91 MG/DL (ref 70–110)
GLUCOSE UR QL STRIP: NEGATIVE
HCT VFR BLD AUTO: 42.1 % (ref 40–54)
HGB BLD-MCNC: 13.9 G/DL (ref 14–18)
HGB UR QL STRIP: NEGATIVE
IMM GRANULOCYTES # BLD AUTO: 0.05 K/UL (ref 0–0.04)
IMM GRANULOCYTES NFR BLD AUTO: 0.7 % (ref 0–0.5)
KETONES UR QL STRIP: NEGATIVE
LEUKOCYTE ESTERASE UR QL STRIP: ABNORMAL
LYMPHOCYTES # BLD AUTO: 0.4 K/UL (ref 1–4.8)
LYMPHOCYTES NFR BLD: 5.4 % (ref 18–48)
MAGNESIUM SERPL-MCNC: 1.4 MG/DL (ref 1.6–2.6)
MCH RBC QN AUTO: 28.6 PG (ref 27–31)
MCHC RBC AUTO-ENTMCNC: 33 G/DL (ref 32–36)
MCV RBC AUTO: 87 FL (ref 82–98)
MICROSCOPIC COMMENT: ABNORMAL
MONOCYTES # BLD AUTO: 0.4 K/UL (ref 0.3–1)
MONOCYTES NFR BLD: 5.4 % (ref 4–15)
NEUTROPHILS # BLD AUTO: 6 K/UL (ref 1.8–7.7)
NEUTROPHILS NFR BLD: 88.2 % (ref 38–73)
NITRITE UR QL STRIP: POSITIVE
NRBC BLD-RTO: 0 /100 WBC
PH UR STRIP: 6 [PH] (ref 5–8)
PHOSPHATE SERPL-MCNC: 2.8 MG/DL (ref 2.7–4.5)
PLATELET # BLD AUTO: 159 K/UL (ref 150–450)
PLATELET BLD QL SMEAR: ABNORMAL
PMV BLD AUTO: 9.2 FL (ref 9.2–12.9)
POTASSIUM SERPL-SCNC: 4.1 MMOL/L (ref 3.5–5.1)
PROT SERPL-MCNC: 6.3 G/DL (ref 6–8.4)
PROT UR QL STRIP: ABNORMAL
RBC # BLD AUTO: 4.86 M/UL (ref 4.6–6.2)
RBC #/AREA URNS AUTO: 4 /HPF (ref 0–4)
SODIUM SERPL-SCNC: 141 MMOL/L (ref 136–145)
SP GR UR STRIP: 1.02 (ref 1–1.03)
URN SPEC COLLECT METH UR: ABNORMAL
WBC # BLD AUTO: 6.84 K/UL (ref 3.9–12.7)
WBC #/AREA URNS AUTO: 28 /HPF (ref 0–5)

## 2023-12-05 PROCEDURE — 99900035 HC TECH TIME PER 15 MIN (STAT)

## 2023-12-05 PROCEDURE — 85025 COMPLETE CBC W/AUTO DIFF WBC: CPT

## 2023-12-05 PROCEDURE — 63700000 PHARM REV CODE 250 ALT 637 W/O HCPCS

## 2023-12-05 PROCEDURE — 84100 ASSAY OF PHOSPHORUS: CPT

## 2023-12-05 PROCEDURE — 25000003 PHARM REV CODE 250

## 2023-12-05 PROCEDURE — 25000242 PHARM REV CODE 250 ALT 637 W/ HCPCS

## 2023-12-05 PROCEDURE — 94761 N-INVAS EAR/PLS OXIMETRY MLT: CPT

## 2023-12-05 PROCEDURE — 36415 COLL VENOUS BLD VENIPUNCTURE: CPT

## 2023-12-05 PROCEDURE — 27000207 HC ISOLATION

## 2023-12-05 PROCEDURE — 94640 AIRWAY INHALATION TREATMENT: CPT

## 2023-12-05 PROCEDURE — 63600175 PHARM REV CODE 636 W HCPCS

## 2023-12-05 PROCEDURE — 83735 ASSAY OF MAGNESIUM: CPT

## 2023-12-05 PROCEDURE — 80053 COMPREHEN METABOLIC PANEL: CPT

## 2023-12-05 PROCEDURE — 27000221 HC OXYGEN, UP TO 24 HOURS

## 2023-12-05 PROCEDURE — 25000003 PHARM REV CODE 250: Performed by: EMERGENCY MEDICINE

## 2023-12-05 PROCEDURE — 96361 HYDRATE IV INFUSION ADD-ON: CPT

## 2023-12-05 PROCEDURE — 63600175 PHARM REV CODE 636 W HCPCS: Performed by: EMERGENCY MEDICINE

## 2023-12-05 PROCEDURE — 20600001 HC STEP DOWN PRIVATE ROOM

## 2023-12-05 PROCEDURE — 96375 TX/PRO/DX INJ NEW DRUG ADDON: CPT

## 2023-12-05 PROCEDURE — 25500020 PHARM REV CODE 255: Performed by: STUDENT IN AN ORGANIZED HEALTH CARE EDUCATION/TRAINING PROGRAM

## 2023-12-05 RX ORDER — DIVALPROEX SODIUM 250 MG/1
250 TABLET, FILM COATED, EXTENDED RELEASE ORAL NIGHTLY
Status: DISCONTINUED | OUTPATIENT
Start: 2023-12-05 | End: 2023-12-07

## 2023-12-05 RX ORDER — NALOXONE HCL 0.4 MG/ML
0.02 VIAL (ML) INJECTION
Status: DISCONTINUED | OUTPATIENT
Start: 2023-12-05 | End: 2023-12-09 | Stop reason: HOSPADM

## 2023-12-05 RX ORDER — FLUTICASONE PROPIONATE 50 MCG
1 SPRAY, SUSPENSION (ML) NASAL DAILY
Status: DISCONTINUED | OUTPATIENT
Start: 2023-12-05 | End: 2023-12-09 | Stop reason: HOSPADM

## 2023-12-05 RX ORDER — SODIUM CHLORIDE 0.9 % (FLUSH) 0.9 %
10 SYRINGE (ML) INJECTION
Status: DISCONTINUED | OUTPATIENT
Start: 2023-12-05 | End: 2023-12-09 | Stop reason: HOSPADM

## 2023-12-05 RX ORDER — KETOROLAC TROMETHAMINE 30 MG/ML
15 INJECTION, SOLUTION INTRAMUSCULAR; INTRAVENOUS
Status: COMPLETED | OUTPATIENT
Start: 2023-12-05 | End: 2023-12-05

## 2023-12-05 RX ORDER — DIVALPROEX SODIUM 250 MG/1
500 TABLET, FILM COATED, EXTENDED RELEASE ORAL EVERY 12 HOURS
Status: DISCONTINUED | OUTPATIENT
Start: 2023-12-05 | End: 2023-12-07

## 2023-12-05 RX ORDER — TALC
6 POWDER (GRAM) TOPICAL NIGHTLY PRN
Status: DISCONTINUED | OUTPATIENT
Start: 2023-12-05 | End: 2023-12-09 | Stop reason: HOSPADM

## 2023-12-05 RX ORDER — SODIUM CHLORIDE 0.9 % (FLUSH) 0.9 %
10 SYRINGE (ML) INJECTION EVERY 12 HOURS PRN
Status: DISCONTINUED | OUTPATIENT
Start: 2023-12-05 | End: 2023-12-09 | Stop reason: HOSPADM

## 2023-12-05 RX ORDER — IBUPROFEN 200 MG
24 TABLET ORAL
Status: DISCONTINUED | OUTPATIENT
Start: 2023-12-05 | End: 2023-12-09 | Stop reason: HOSPADM

## 2023-12-05 RX ORDER — ENOXAPARIN SODIUM 100 MG/ML
40 INJECTION SUBCUTANEOUS EVERY 24 HOURS
Status: DISCONTINUED | OUTPATIENT
Start: 2023-12-05 | End: 2023-12-09 | Stop reason: HOSPADM

## 2023-12-05 RX ORDER — GLUCAGON 1 MG
1 KIT INJECTION
Status: DISCONTINUED | OUTPATIENT
Start: 2023-12-05 | End: 2023-12-09 | Stop reason: HOSPADM

## 2023-12-05 RX ORDER — ACETAMINOPHEN 325 MG/1
650 TABLET ORAL EVERY 6 HOURS PRN
Status: DISCONTINUED | OUTPATIENT
Start: 2023-12-05 | End: 2023-12-05

## 2023-12-05 RX ORDER — LEVALBUTEROL 1.25 MG/.5ML
1.25 SOLUTION, CONCENTRATE RESPIRATORY (INHALATION) EVERY 8 HOURS
Status: DISCONTINUED | OUTPATIENT
Start: 2023-12-05 | End: 2023-12-09 | Stop reason: HOSPADM

## 2023-12-05 RX ORDER — ACETAMINOPHEN 500 MG
1000 TABLET ORAL EVERY 8 HOURS PRN
Status: DISCONTINUED | OUTPATIENT
Start: 2023-12-05 | End: 2023-12-09 | Stop reason: HOSPADM

## 2023-12-05 RX ORDER — OSELTAMIVIR PHOSPHATE 75 MG/1
75 CAPSULE ORAL 2 TIMES DAILY
Status: DISCONTINUED | OUTPATIENT
Start: 2023-12-05 | End: 2023-12-07

## 2023-12-05 RX ORDER — IBUPROFEN 400 MG/1
400 TABLET ORAL ONCE
Status: COMPLETED | OUTPATIENT
Start: 2023-12-05 | End: 2023-12-05

## 2023-12-05 RX ORDER — SODIUM CHLORIDE 9 MG/ML
INJECTION, SOLUTION INTRAVENOUS CONTINUOUS
Status: ACTIVE | OUTPATIENT
Start: 2023-12-05 | End: 2023-12-05

## 2023-12-05 RX ORDER — MAGNESIUM SULFATE HEPTAHYDRATE 40 MG/ML
2 INJECTION, SOLUTION INTRAVENOUS ONCE
Status: COMPLETED | OUTPATIENT
Start: 2023-12-05 | End: 2023-12-05

## 2023-12-05 RX ORDER — IBUPROFEN 200 MG
16 TABLET ORAL
Status: DISCONTINUED | OUTPATIENT
Start: 2023-12-05 | End: 2023-12-09 | Stop reason: HOSPADM

## 2023-12-05 RX ORDER — IPRATROPIUM BROMIDE AND ALBUTEROL SULFATE 2.5; .5 MG/3ML; MG/3ML
3 SOLUTION RESPIRATORY (INHALATION)
Status: DISCONTINUED | OUTPATIENT
Start: 2023-12-05 | End: 2023-12-05

## 2023-12-05 RX ORDER — CETIRIZINE HYDROCHLORIDE 10 MG/1
10 TABLET ORAL DAILY
Status: DISCONTINUED | OUTPATIENT
Start: 2023-12-05 | End: 2023-12-07

## 2023-12-05 RX ORDER — ACETAMINOPHEN 325 MG/1
650 TABLET ORAL
Status: COMPLETED | OUTPATIENT
Start: 2023-12-05 | End: 2023-12-05

## 2023-12-05 RX ORDER — AZITHROMYCIN 250 MG/1
500 TABLET, FILM COATED ORAL DAILY
Status: DISCONTINUED | OUTPATIENT
Start: 2023-12-05 | End: 2023-12-07

## 2023-12-05 RX ADMIN — SODIUM CHLORIDE, POTASSIUM CHLORIDE, SODIUM LACTATE AND CALCIUM CHLORIDE 1000 ML: 600; 310; 30; 20 INJECTION, SOLUTION INTRAVENOUS at 12:12

## 2023-12-05 RX ADMIN — SODIUM CHLORIDE: 9 INJECTION, SOLUTION INTRAVENOUS at 11:12

## 2023-12-05 RX ADMIN — KETOROLAC TROMETHAMINE 15 MG: 30 INJECTION, SOLUTION INTRAMUSCULAR; INTRAVENOUS at 12:12

## 2023-12-05 RX ADMIN — IBUPROFEN 400 MG: 400 TABLET ORAL at 02:12

## 2023-12-05 RX ADMIN — LEVALBUTEROL 1.25 MG: 1.25 SOLUTION, CONCENTRATE RESPIRATORY (INHALATION) at 04:12

## 2023-12-05 RX ADMIN — OSELTAMIVIR PHOSPHATE 75 MG: 75 CAPSULE ORAL at 09:12

## 2023-12-05 RX ADMIN — DIVALPROEX SODIUM 250 MG: 250 TABLET, EXTENDED RELEASE ORAL at 09:12

## 2023-12-05 RX ADMIN — DIVALPROEX SODIUM 500 MG: 250 TABLET, EXTENDED RELEASE ORAL at 08:12

## 2023-12-05 RX ADMIN — ENOXAPARIN SODIUM 40 MG: 40 INJECTION SUBCUTANEOUS at 06:12

## 2023-12-05 RX ADMIN — VANCOMYCIN HYDROCHLORIDE 1750 MG: 5 INJECTION, POWDER, LYOPHILIZED, FOR SOLUTION INTRAVENOUS at 10:12

## 2023-12-05 RX ADMIN — OSELTAMIVIR PHOSPHATE 75 MG: 75 CAPSULE ORAL at 08:12

## 2023-12-05 RX ADMIN — FLUTICASONE PROPIONATE 50 MCG: 50 SPRAY, METERED NASAL at 08:12

## 2023-12-05 RX ADMIN — MAGNESIUM SULFATE HEPTAHYDRATE 2 G: 40 INJECTION, SOLUTION INTRAVENOUS at 11:12

## 2023-12-05 RX ADMIN — ACETAMINOPHEN 650 MG: 325 TABLET ORAL at 09:12

## 2023-12-05 RX ADMIN — CETIRIZINE HYDROCHLORIDE 10 MG: 10 TABLET, FILM COATED ORAL at 08:12

## 2023-12-05 RX ADMIN — DIVALPROEX SODIUM 500 MG: 250 TABLET, EXTENDED RELEASE ORAL at 09:12

## 2023-12-05 RX ADMIN — ACETAMINOPHEN 1000 MG: 500 TABLET ORAL at 04:12

## 2023-12-05 RX ADMIN — PIPERACILLIN SODIUM AND TAZOBACTAM SODIUM 4.5 G: 4; .5 INJECTION, POWDER, FOR SOLUTION INTRAVENOUS at 02:12

## 2023-12-05 RX ADMIN — AZITHROMYCIN 500 MG: 250 TABLET, FILM COATED ORAL at 08:12

## 2023-12-05 RX ADMIN — ACETAMINOPHEN 650 MG: 325 TABLET ORAL at 12:12

## 2023-12-05 NOTE — CLINICAL REVIEW
IP Sepsis Screen (most recent)       Sepsis Screen (IP) - 12/05/23 7322       Is the patient's history or complaint suggestive of a possible infection? Yes  -    Are there at least two of the following signs and symptoms present? Yes  -    Sepsis signs/symptoms - Hyper or Hypothermia Hyperthermia >100.4 or Hypothermia < 96.8  -    Sepsis signs/symptoms - Tachycardia Tachycardia     >90  -    Are any of the following organ dysfunction criteria present and not considered to be due to a chronic condition? Yes  -    Organ Dysfunction Criteria - Resp Comp Respiratory Compromise: Requiring > 5L NC  -JH    Initiate Sepsis Protocol No  -    Reason sepsis not considered Pt. receiving appropriate management  -              User Key  (r) = Recorded By, (t) = Taken By, (c) = Cosigned By      Initials Name    Nirmala Boles RN

## 2023-12-05 NOTE — ED NOTES
Pt care assumed. Report received by FELIPE Liz. Pt lying in stretcher in low and locked position and side rails raised x2. Call light, pt's belongings, and bedside table within pt's reach. Pt on continuous cardiac monitoring. Pulse oximetry and BP applied, cycling every 30 minutes. Pt in NAD and verbalized no needs at this time. Caregiver x1 at bedside.

## 2023-12-05 NOTE — ASSESSMENT & PLAN NOTE
"Full dependence for ADLs with caregivers in home. Wheelchair and bed bound with caregivers present with patient 24-hours  Noted in PCP note from 12/4 that "Patient does not appear to have any changes to his personality or baseline." Also noted that the patient has 24 hour sitters at home. Home sitters are not covered for hospital admission, and patient will likely experience distress during inpatient stay so PCP advising 24-hr sitter services during admission.      Plan:  - Sitters ordered      "

## 2023-12-05 NOTE — ED TRIAGE NOTES
Leonid Cox, a 43 y.o. male presents to the ED w/ complaint of boil to his right lower back, pt is functional quadriplegia w hx of cerebral palsy, HTN, and seizure disorder.  Sent from PCP clinic for further eval. Pt states that he has also been running fevers.    Triage note:  Chief Complaint   Patient presents with    Referral     Pt complains of boil to his right lower back, pt is functional quadriplegia w hx of cerebral palsy, HTN, and seizure disorder.  Sent from PCP clinic for further eval.           Review of patient's allergies indicates:   Allergen Reactions    Adhesive tape-silicones      Other reaction(s): blisters    Codeine      Other reaction(s): Nausea    Morphine Itching     Past Medical History:   Diagnosis Date    Blind     Cerebral palsy     Hydrocephalus     Hypertension     Seizure disorder     Seizures     Urinary reflux

## 2023-12-05 NOTE — SUBJECTIVE & OBJECTIVE
Past Medical History:   Diagnosis Date    Blind     Cerebral palsy     Hydrocephalus     Hypertension     Seizure disorder     Seizures     Urinary reflux        Past Surgical History:   Procedure Laterality Date    FOOT SURGERY      SHUNT REVISION      TENDON RELEASE      TOTAL HIP ARTHROPLASTY         Review of patient's allergies indicates:   Allergen Reactions    Adhesive tape-silicones      Other reaction(s): blisters    Codeine      Other reaction(s): Nausea    Morphine Itching       No current facility-administered medications on file prior to encounter.     Current Outpatient Medications on File Prior to Encounter   Medication Sig    acetaminophen (TYLENOL) 500 MG tablet Take 1 tablet (500 mg total) by mouth every 6 (six) hours as needed for Pain.    celecoxib (CELEBREX) 200 MG capsule Take 1 capsule (200 mg total) by mouth daily as needed for Pain (Take with food).    cetirizine (ZYRTEC) 10 MG tablet Take 1 tablet (10 mg total) by mouth once daily.    cholecalciferol, vitamin D3, (VITAMIN D3) 25 mcg (1,000 unit) capsule Take 1 capsule (1,000 Units total) by mouth once daily.    divalproex 500 MG Tb24 Take 1 tablet (500 mg total) by mouth every 12 (twelve) hours. Delayed Release tablets    divalproex ER (DEPAKOTE ER) 250 MG 24 hr tablet Take 1 tablet (250 mg total) by mouth every evening.    fluticasone propionate (FLONASE) 50 mcg/actuation nasal spray INSTILL 1 SPRAY IN EACH NOSTRIL EVERY DAY   * REQUEST REFILL WHEN NEEDED    furosemide (LASIX) 20 MG tablet Take 1 tablet (20 mg total) by mouth daily as needed (leg swelling).    terbinafine HCL (LAMISIL) 1 % cream Apply topically 2 (two) times daily. Apply in the groin     Family History       Problem Relation (Age of Onset)    Cancer Mother, Father          Tobacco Use    Smoking status: Never    Smokeless tobacco: Never   Substance and Sexual Activity    Alcohol use: No    Drug use: No    Sexual activity: Not on file     Review of Systems   Constitutional:   Negative for chills and fever.   HENT:  Positive for sore throat. Negative for rhinorrhea.    Eyes:  Negative for visual disturbance.   Respiratory:  Positive for cough. Negative for shortness of breath and wheezing.    Cardiovascular:  Negative for chest pain.   Gastrointestinal:  Negative for abdominal pain, diarrhea, nausea and vomiting.   Genitourinary:  Negative for difficulty urinating and dysuria.   Musculoskeletal:  Negative for arthralgias, back pain and myalgias.   Skin:  Positive for rash and wound.   Neurological:  Negative for light-headedness and headaches.     Objective:     Vital Signs (Most Recent):  Temp: (!) 102.4 °F (39.1 °C) (12/05/23 0042)  Pulse: (!) 130 (12/05/23 0159)  Resp: (!) 21 (12/05/23 0159)  BP: (!) 111/54 (12/05/23 0159)  SpO2: (S) 95 % (12/05/23 0159) Vital Signs (24h Range):  Temp:  [100 °F (37.8 °C)-102.4 °F (39.1 °C)] 102.4 °F (39.1 °C)  Pulse:  [102-144] 130  Resp:  [20-26] 21  SpO2:  [86 %-97 %] 95 %  BP: (103-149)/(54-94) 111/54     Weight: 110.2 kg (243 lb)  Body mass index is 32.96 kg/m².     Physical Exam  Vitals and nursing note reviewed.   Constitutional:       General: He is not in acute distress.     Appearance: He is not ill-appearing or toxic-appearing.   HENT:      Head: Normocephalic and atraumatic.      Right Ear: External ear normal.      Left Ear: External ear normal.      Mouth/Throat:      Mouth: Mucous membranes are moist.   Eyes:      General: No scleral icterus.        Right eye: No discharge.         Left eye: No discharge.   Cardiovascular:      Rate and Rhythm: Regular rhythm. Tachycardia present.      Heart sounds: Normal heart sounds. No murmur heard.  Pulmonary:      Effort: Pulmonary effort is normal. No respiratory distress.      Breath sounds: Normal breath sounds. No wheezing or rales.   Abdominal:      General: Bowel sounds are normal. There is no distension.      Palpations: Abdomen is soft.      Tenderness: There is no abdominal tenderness.  There is no guarding.   Musculoskeletal:      Cervical back: Neck supple. No tenderness.      Right lower leg: No edema.      Left lower leg: No edema.   Skin:     General: Skin is warm.      Coloration: Skin is not jaundiced.      Findings: Erythema and lesion present.   Neurological:      Mental Status: He is alert. Mental status is at baseline.      Comments: Oriented to self and place only   Psychiatric:         Mood and Affect: Mood normal.         Behavior: Behavior normal.                  Significant Labs: All pertinent labs within the past 24 hours have been reviewed.  CBC:   Recent Labs   Lab 12/04/23  1829   WBC 6.52   HGB 15.1   HCT 44.9        CMP:   Recent Labs   Lab 12/04/23 2012      K 3.5      CO2 27   *   BUN 14   CREATININE 0.8   CALCIUM 8.7   PROT 6.2   ALBUMIN 2.6*   BILITOT 0.3   ALKPHOS 55   AST 13   ALT 16   ANIONGAP 9       Significant Imaging: I have reviewed all pertinent imaging results/findings within the past 24 hours.

## 2023-12-05 NOTE — ED NOTES
Patient identifiers for Leonid Cox checked and correct.  Pt presents to the ER after running fever for the past few days.  LOC: Patient is awake, alert, and aware of environment with an appropriate affect. Patient is oriented x 4 and speaking appropriately.  APPEARANCE: Patient resting comfortably and in no acute distress. Patient is clean and well groomed, patient's clothing is properly fastened.  SKIN: The skin is warm and dry. Patient has normal skin turgor and moist mucus membrances. Breakdown noted on sacrum where there was a boil.   MUSKULOSKELETAL: Patient is moving all extremities well minus the left arm which is contracted. Pulses intact.   RESPIRATORY: Airway is open and patent. Respirations are spontaneous and non-labored with normal effort and rate.  CARDIAC: Patient has a normal rate and rhythm. No peripheral edema noted. Capillary refill < 3 seconds.  ABDOMEN: No distention noted. Bowel sounds active in all 4 quadrants. Soft and non-tender upon palpation.  NEUROLOGICAL: PERRL. Facial expression is symmetrical. Hand grasps are equal bilaterally. Normal sensation in all extremities when touched with finger.  Allergies reported:   Review of patient's allergies indicates:   Allergen Reactions    Adhesive tape-silicones      Other reaction(s): blisters    Codeine      Other reaction(s): Nausea    Morphine Itching

## 2023-12-05 NOTE — PLAN OF CARE
Problem: Adult Inpatient Plan of Care  Goal: Absence of Hospital-Acquired Illness or Injury  Outcome: Ongoing, Not Progressing  Intervention: Identify and Manage Fall Risk  Flowsheets (Taken 12/5/2023 1141)  Safety Promotion/Fall Prevention:   assistive device/personal item within reach   side rails raised x 2   lighting adjusted   medications reviewed  Plan of care was reviewed with the pt. Jazmine. VSS. Cardiac monitoring was done. Pt has been sinus tachy from the 110's to the 120's today. Pt has been febrile with temp running at 103.5F to 102.9F. MD has been notified and is aware about the pt's condition. PRN tylenol has been given and IV zosyn was started. Pt was turned  Q2. Pt is currently on 10L venti mask stating at 95% and above. Intake and output was documented. Safety precautions were in placed. Will continue to monitor.

## 2023-12-05 NOTE — PROGRESS NOTES
"Pharmacokinetic Initial Assessment: IV Vancomycin    Assessment/Plan:    Initiate intravenous vancomycin with loading dose of 2000 mg once followed by a maintenance dose of vancomycin 1500mg IV every 12 hours  Desired empiric serum trough concentration is 10 to 20 mcg/mL  Draw vancomycin trough level 30 min prior to fourth dose on 12/6 at approximately 0700  Pharmacy will continue to follow and monitor vancomycin.      Please contact pharmacy at extension [phone] with any questions regarding this assessment.     Thank you for the consult,   Kathy Malia       Patient brief summary:  Leonid Cox is a 43 y.o. male initiated on antimicrobial therapy with IV Vancomycin for treatment of suspected skin & soft tissue infection    Drug Allergies:   Review of patient's allergies indicates:   Allergen Reactions    Adhesive tape-silicones      Other reaction(s): blisters    Codeine      Other reaction(s): Nausea    Morphine Itching       Actual Body Weight:   110.2kg    Renal Function:   Estimated Creatinine Clearance: 154.1 mL/min (based on SCr of 0.8 mg/dL).,     Dialysis Method (if applicable):  N/A    CBC (last 72 hours):  Recent Labs   Lab Result Units 12/04/23  1829   WBC K/uL 6.52   Hemoglobin g/dL 15.1   Hematocrit % 44.9   Platelets K/uL 182   Gran % % 65.0   Lymph % % 21.5   Mono % % 12.1   Eosinophil % % 0.6   Basophil % % 0.5   Differential Method  Automated       Metabolic Panel (last 72 hours):  Recent Labs   Lab Result Units 12/04/23 2012 12/04/23  2343   Sodium mmol/L 140  --    Potassium mmol/L 3.5  --    Chloride mmol/L 104  --    CO2 mmol/L 27  --    Glucose mg/dL 119*  --    Glucose, UA   --  Negative   BUN mg/dL 14  --    Creatinine mg/dL 0.8  --    Albumin g/dL 2.6*  --    Total Bilirubin mg/dL 0.3  --    Alkaline Phosphatase U/L 55  --    AST U/L 13  --    ALT U/L 16  --        Drug levels (last 3 results):  No results for input(s): "VANCOMYCINRA", "VANCORANDOM", "VANCOMYCINPE", "VANCOPEAK", " ""VANCOMYCINTR", "VANCOTROUGH" in the last 72 hours.    Microbiologic Results:  Microbiology Results (last 7 days)       Procedure Component Value Units Date/Time    Blood culture x two cultures. Draw prior to antibiotics. [[id number]] Collected: 12/04/23 1847    Order Status: Completed Specimen: Blood from Peripheral, Forearm, Right Updated: 12/05/23 0315     Blood Culture, Routine No Growth to date    Narrative:      Aerobic and anaerobic    Blood culture x two cultures. Draw prior to antibiotics. [[id number]] Collected: 12/04/23 1830    Order Status: Completed Specimen: Blood from Peripheral, Forearm, Right Updated: 12/05/23 0145     Blood Culture, Routine No Growth to date    Narrative:      Aerobic and anaerobic    Urine culture [[id number]] Collected: 12/04/23 2343    Order Status: No result Specimen: Urine Updated: 12/05/23 0012            "

## 2023-12-05 NOTE — NURSING
Nurses Note -- 4 Eyes      12/5/2023   7:10 AM      Skin assessed during: Transfer      [] No Altered Skin Integrity Present    []Prevention Measures Documented      [x] Yes- Altered Skin Integrity Present or Discovered   [x] LDA Added if Not in Epic (Describe Wound)   [x] New Altered Skin Integrity was Present on Admit and Documented in LDA   [x] Wound Image Taken    Wound Care Consulted? Yes    Attending Nurse:  Presley LEARY     Second RN/Staff Member:  Stephany WANG

## 2023-12-05 NOTE — CARE UPDATE
RAPID RESPONSE NURSE ROUND       Rounding completed with charge RNChristy for MEWs reports NAD. No additional concerns verbalized at this time. Instructed to call [phone] for further concerns or assistance.

## 2023-12-05 NOTE — ASSESSMENT & PLAN NOTE
Currently not on any outpatient antihypertensives    Plan:  - Sepsis, hold antihypertensive medications  - Daily Vitals, assess need for anti-hypertensive medications

## 2023-12-05 NOTE — ASSESSMENT & PLAN NOTE
"This patient does have evidence of infective focus  My overall impression is sepsis.  Source: Respiratory and Skin and Soft Tissue (location lower back)  Antibiotics given-   Antibiotics (72h ago, onward)      Start     Stop Route Frequency Ordered    12/05/23 1950  cefTRIAXone (ROCEPHIN) 2 g in dextrose 5 % in water (D5W) 100 mL IVPB (MB+)         -- IV Every 24 hours (non-standard times) 12/05/23 0520    12/05/23 0900  azithromycin tablet 500 mg         12/08/23 0859 Oral Daily 12/05/23 0519    12/05/23 0730  vancomycin 1.75 g in 5 % dextrose 500 mL IVPB         -- IV Every 12 hours (non-standard times) 12/05/23 0411          Latest lactate reviewed-  No results for input(s): "LACTATE" in the last 72 hours.  Organ dysfunction indicated by Acute respiratory failure    4 days of swelling and erythema of the lower back. There is purulent drainage from the site. The patient denies injury or inciting event. The patient additionally reports cough and sore throat for the past 2-3 days. He denies fevers, chills, SOB, chest pain, abdominal pain, nausea, vomiting, diarrhea, or urinary symptoms.  Care givers at home report patient did feel warm over the past few days.    Presented tachycardic and hypertensive with temp 100.2. Following CT scan pt temp increased 102.4 and became tachycardic into the 140s and desaturated, requiring 10 L venturi mask (due to mouth breathing). CMP unremarkable. Albumin 2.6, CRP 79.3. Lactate 0.93, Procalcitonin wnl. UA with positive nitrites, occasional bacteria, and 28 WBC. Blood cultures NGTD. Positive for Influenza B. CT Chest, Abdomen, Pelvis concerning for edema and thickening of subcutaneous fat/skin overlying lower back without organized fluid collection. Scattered bilateral ground glass attenuation of lungs concerning for inflammatory/infectious process. In the ED received vanc loading dose, and 2L IVF, tamiflu. On my initial evaluation patient was tachycardic, saturating well on 10L " venturi mask and in no acute distress. Exam limited due to patient functional quadriplegia but no wheezing, rales, or decreased breath sounds appreciated. Slight tenderness to lower back, however again limited 2/2 pt habitus.     Concerned for source combined cellulitis and lower RTI, c/f superimposed bacterial pneumonia on influenza.    Will Not start Pressors- Levophed for MAP of 65  Source control achieved by: Antibiotics    PLAN  - Continue broad-spectrum antibiotics with vancomycin, ceftriaxone, and azithromycin  - Olseltamivir 75mg BID  - Follow-up blood cultures, urine cultures and deescalate antibiotics as appropriate  - NS IVF 100cc/hr for 10 hours  - Telemetry  - Strict I/Os

## 2023-12-05 NOTE — PROGRESS NOTES
Attempted to see pt for wound care consult for the coccyx. Pt refused. Wound care will follow-up.

## 2023-12-05 NOTE — HPI
"Leonid Cox is a 43 y.o. Male with PMHx of Cerebral Palsy and Functional Quadriplegia, HTN, HLD, thrombocytopenia, Seizure Disorder, and hydrocephalus s/p  shunt (2012) who presents for 4 days of swelling and erythema of the lower back. There is purulent drainage from the site. The patient denies injury or inciting event. The patient additionally reports cough and sore throat for the past 2-3 days. He denies fevers, chills, SOB, chest pain, abdominal pain, nausea, vomiting, diarrhea, or urinary symptoms.  Care givers at home report patient did feel warm over the past few days.    In the ED, he presented tachycardic and hypertensive with temp 100.2. Following CT scan pt temp increased 102.4 and became tachycardic into the 140s and desaturated, requiring 10 L venturi mask (due to mouth breathing). CMP unremarkable. Albumin 2.6, CRP 79.3. Lactate 0.93, Procalcitonin wnl. UA with positive nitrites, occasional bacteria, and 28 WBC. Blood cultures NGTD. Positive for Influenza B. CT Chest, Abdomen, Pelvis concerning for edema and thickening of subcutaneous fat/skin overlying lower back without organized fluid collection. Scattered bilateral ground glass attenuation of lungs concerning for inflammatory/infectious process. In the ED received vanc loading dose, and 2L IVF, tamiflu.  On my initial evaluation patient was tachycardic, saturating well on 10L venturi mask and in no acute distress. Exam limited due to patient functional quadriplegia but no wheezing, rales, or decreased breath sounds appreciated. Slight tenderness to lower back, however again limited 2/2 pt habitus.    Occasionally answering questions inappropriately. A&O to person, and place. Noted in PCP note from 12/4 that "Patient does not appear to have any changes to his personality or baseline." Also noted that the patient has 24 hour sitters at home. Home sitters are not covered for hospital admission, and patient will likely experience distress during " inpatient stay so PCP advising 24-hr sitter services during admission.     The history was obtained from the patient, chart review, and ED documentation. No caregivers at bedside during my evaluation.

## 2023-12-05 NOTE — H&P
[facility] - Telemetry [facility] Medicine  History & Physical    Patient Name: Leonid Cox  MRN: [id number]  Patient Class: IP- Inpatient  Admission Date: 12/4/2023  Attending Physician: Farnaz Pantoja MD   Primary Care Provider: Anushka Telles MD         Patient information was obtained from patient, past medical records, and ER records.     Subjective:     Principal Problem:Sepsis    Chief Complaint:   Chief Complaint   Patient presents with    Referral     Pt complains of boil to his right lower back, pt is functional quadriplegia w hx of cerebral palsy, HTN, and seizure disorder.  Sent from PCP clinic for further eval.              HPI: Leonid Cox is a 43 y.o. Male with PMHx of Cerebral Palsy and Functional Quadriplegia, HTN, HLD, thrombocytopenia, Seizure Disorder, and hydrocephalus s/p  shunt (2012) who presents for 4 days of swelling and erythema of the lower back. There is purulent drainage from the site. The patient denies injury or inciting event. The patient additionally reports cough and sore throat for the past 2-3 days. He denies fevers, chills, SOB, chest pain, abdominal pain, nausea, vomiting, diarrhea, or urinary symptoms.  Care givers at home report patient did feel warm over the past few days.    In the ED, he presented tachycardic and hypertensive with temp 100.2. Following CT scan pt temp increased 102.4 and became tachycardic into the 140s and desaturated, requiring 10 L venturi mask (due to mouth breathing). CMP unremarkable. Albumin 2.6, CRP 79.3. Lactate 0.93, Procalcitonin wnl. UA with positive nitrites, occasional bacteria, and 28 WBC. Blood cultures NGTD. Positive for Influenza B. CT Chest, Abdomen, Pelvis concerning for edema and thickening of subcutaneous fat/skin overlying lower back without organized fluid collection. Scattered bilateral ground glass attenuation of lungs concerning for inflammatory/infectious process. In the ED received vanc loading dose, and  "2L IVF, tamiflu.  On my initial evaluation patient was tachycardic, saturating well on 10L venturi mask and in no acute distress. Exam limited due to patient functional quadriplegia but no wheezing, rales, or decreased breath sounds appreciated. Slight tenderness to lower back, however again limited 2/2 pt habitus.    Occasionally answering questions inappropriately. A&O to person, and place. Noted in PCP note from 12/4 that "Patient does not appear to have any changes to his personality or baseline." Also noted that the patient has 24 hour sitters at home. Home sitters are not covered for hospital admission, and patient will likely experience distress during inpatient stay so PCP advising 24-hr sitter services during admission.     The history was obtained from the patient, chart review, and ED documentation. No caregivers at bedside during my evaluation.    Past Medical History:   Diagnosis Date    Blind     Cerebral palsy     Hydrocephalus     Hypertension     Seizure disorder     Seizures     Urinary reflux        Past Surgical History:   Procedure Laterality Date    FOOT SURGERY      SHUNT REVISION      TENDON RELEASE      TOTAL HIP ARTHROPLASTY         Review of patient's allergies indicates:   Allergen Reactions    Adhesive tape-silicones      Other reaction(s): blisters    Codeine      Other reaction(s): Nausea    Morphine Itching       No current facility-administered medications on file prior to encounter.     Current Outpatient Medications on File Prior to Encounter   Medication Sig    acetaminophen (TYLENOL) 500 MG tablet Take 1 tablet (500 mg total) by mouth every 6 (six) hours as needed for Pain.    celecoxib (CELEBREX) 200 MG capsule Take 1 capsule (200 mg total) by mouth daily as needed for Pain (Take with food).    cetirizine (ZYRTEC) 10 MG tablet Take 1 tablet (10 mg total) by mouth once daily.    cholecalciferol, vitamin D3, (VITAMIN D3) 25 mcg (1,000 unit) capsule Take 1 capsule (1,000 Units " total) by mouth once daily.    divalproex 500 MG Tb24 Take 1 tablet (500 mg total) by mouth every 12 (twelve) hours. Delayed Release tablets    divalproex ER (DEPAKOTE ER) 250 MG 24 hr tablet Take 1 tablet (250 mg total) by mouth every evening.    fluticasone propionate (FLONASE) 50 mcg/actuation nasal spray INSTILL 1 SPRAY IN EACH NOSTRIL EVERY DAY   * REQUEST REFILL WHEN NEEDED    furosemide (LASIX) 20 MG tablet Take 1 tablet (20 mg total) by mouth daily as needed (leg swelling).    terbinafine HCL (LAMISIL) 1 % cream Apply topically 2 (two) times daily. Apply in the groin     Family History       Problem Relation (Age of Onset)    Cancer Mother, Father          Tobacco Use    Smoking status: Never    Smokeless tobacco: Never   Substance and Sexual Activity    Alcohol use: No    Drug use: No    Sexual activity: Not on file     Review of Systems   Constitutional:  Negative for chills and fever.   HENT:  Positive for sore throat. Negative for rhinorrhea.    Eyes:  Negative for visual disturbance.   Respiratory:  Positive for cough. Negative for shortness of breath and wheezing.    Cardiovascular:  Negative for chest pain.   Gastrointestinal:  Negative for abdominal pain, diarrhea, nausea and vomiting.   Genitourinary:  Negative for difficulty urinating and dysuria.   Musculoskeletal:  Negative for arthralgias, back pain and myalgias.   Skin:  Positive for rash and wound.   Neurological:  Negative for light-headedness and headaches.     Objective:     Vital Signs (Most Recent):  Temp: (!) 102.4 °F (39.1 °C) (12/05/23 0042)  Pulse: (!) 130 (12/05/23 0159)  Resp: (!) 21 (12/05/23 0159)  BP: (!) 111/54 (12/05/23 0159)  SpO2: (S) 95 % (12/05/23 0159) Vital Signs (24h Range):  Temp:  [100 °F (37.8 °C)-102.4 °F (39.1 °C)] 102.4 °F (39.1 °C)  Pulse:  [102-144] 130  Resp:  [20-26] 21  SpO2:  [86 %-97 %] 95 %  BP: (103-149)/(54-94) 111/54     Weight: 110.2 kg (243 lb)  Body mass index is 32.96 kg/m².     Physical Exam  Vitals  and nursing note reviewed.   Constitutional:       General: He is not in acute distress.     Appearance: He is not ill-appearing or toxic-appearing.   HENT:      Head: Normocephalic and atraumatic.      Right Ear: External ear normal.      Left Ear: External ear normal.      Mouth/Throat:      Mouth: Mucous membranes are moist.   Eyes:      General: No scleral icterus.        Right eye: No discharge.         Left eye: No discharge.   Cardiovascular:      Rate and Rhythm: Regular rhythm. Tachycardia present.      Heart sounds: Normal heart sounds. No murmur heard.  Pulmonary:      Effort: Pulmonary effort is normal. No respiratory distress.      Breath sounds: Normal breath sounds. No wheezing or rales.   Abdominal:      General: Bowel sounds are normal. There is no distension.      Palpations: Abdomen is soft.      Tenderness: There is no abdominal tenderness. There is no guarding.   Musculoskeletal:      Cervical back: Neck supple. No tenderness.      Right lower leg: No edema.      Left lower leg: No edema.   Skin:     General: Skin is warm.      Coloration: Skin is not jaundiced.      Findings: Erythema and lesion present.   Neurological:      Mental Status: He is alert. Mental status is at baseline.      Comments: Oriented to self and place only   Psychiatric:         Mood and Affect: Mood normal.         Behavior: Behavior normal.                  Significant Labs: All pertinent labs within the past 24 hours have been reviewed.  CBC:   Recent Labs   Lab 12/04/23 1829   WBC 6.52   HGB 15.1   HCT 44.9        CMP:   Recent Labs   Lab 12/04/23 2012      K 3.5      CO2 27   *   BUN 14   CREATININE 0.8   CALCIUM 8.7   PROT 6.2   ALBUMIN 2.6*   BILITOT 0.3   ALKPHOS 55   AST 13   ALT 16   ANIONGAP 9       Significant Imaging: I have reviewed all pertinent imaging results/findings within the past 24 hours.  Assessment/Plan:     * Sepsis  This patient does have evidence of infective focus  My  "overall impression is sepsis.  Source: Respiratory and Skin and Soft Tissue (location lower back)  Antibiotics given-   Antibiotics (72h ago, onward)      Start     Stop Route Frequency Ordered    12/05/23 1950  cefTRIAXone (ROCEPHIN) 2 g in dextrose 5 % in water (D5W) 100 mL IVPB (MB+)         -- IV Every 24 hours (non-standard times) 12/05/23 0520    12/05/23 0900  azithromycin tablet 500 mg         12/08/23 0859 Oral Daily 12/05/23 0519    12/05/23 0730  vancomycin 1.75 g in 5 % dextrose 500 mL IVPB         -- IV Every 12 hours (non-standard times) 12/05/23 0411          Latest lactate reviewed-  No results for input(s): "LACTATE" in the last 72 hours.  Organ dysfunction indicated by Acute respiratory failure    4 days of swelling and erythema of the lower back. There is purulent drainage from the site. The patient denies injury or inciting event. The patient additionally reports cough and sore throat for the past 2-3 days. He denies fevers, chills, SOB, chest pain, abdominal pain, nausea, vomiting, diarrhea, or urinary symptoms.  Care givers at home report patient did feel warm over the past few days.    Presented tachycardic and hypertensive with temp 100.2. Following CT scan pt temp increased 102.4 and became tachycardic into the 140s and desaturated, requiring 10 L venturi mask (due to mouth breathing). CMP unremarkable. Albumin 2.6, CRP 79.3. Lactate 0.93, Procalcitonin wnl. UA with positive nitrites, occasional bacteria, and 28 WBC. Blood cultures NGTD. Positive for Influenza B. CT Chest, Abdomen, Pelvis concerning for edema and thickening of subcutaneous fat/skin overlying lower back without organized fluid collection. Scattered bilateral ground glass attenuation of lungs concerning for inflammatory/infectious process. In the ED received vanc loading dose, and 2L IVF, tamiflu. On my initial evaluation patient was tachycardic, saturating well on 10L venturi mask and in no acute distress. Exam limited due to " "patient functional quadriplegia but no wheezing, rales, or decreased breath sounds appreciated. Slight tenderness to lower back, however again limited 2/2 pt habitus.     Concerned for source combined cellulitis and lower RTI, c/f superimposed bacterial pneumonia on influenza.    Will Not start Pressors- Levophed for MAP of 65  Source control achieved by: Antibiotics    PLAN  - Continue broad-spectrum antibiotics with vancomycin, ceftriaxone, and azithromycin  - Olseltamivir 75mg BID  - Follow-up blood cultures, urine cultures and deescalate antibiotics as appropriate  - NS IVF 100cc/hr for 10 hours  - Telemetry  - Strict I/Os      Cellulitis of back except buttock  See "Sepsis" A&P Note      Influenza B  See "Sepsis" A&P Note      Essential hypertension  Currently not on any outpatient antihypertensives    Plan:  - Sepsis, hold antihypertensive medications  - Daily Vitals, assess need for anti-hypertensive medications    Seizure disorder  Reports last seizure years ago    Plan:  - Continue home Depakote ( QHS,  Q12)      Functional quadriplegia  See Cerebral Palsy A&P Note      Cerebral palsy  Full dependence for ADLs with caregivers in home. Wheelchair and bed bound with caregivers present with patient 24-hours  Noted in PCP note from 12/4 that "Patient does not appear to have any changes to his personality or baseline." Also noted that the patient has 24 hour sitters at home. Home sitters are not covered for hospital admission, and patient will likely experience distress during inpatient stay so PCP advising 24-hr sitter services during admission.      Plan:  - Sitters ordered          VTE Risk Mitigation (From admission, onward)           Ordered     IP VTE HIGH RISK PATIENT  Once         12/05/23 0522     Place sequential compression device  Until discontinued         12/05/23 0522                               Pharmacokinetic Initial Assessment: IV Vancomycin    Assessment/Plan:    Initiate " "intravenous vancomycin with loading dose of 2000 mg once followed by a maintenance dose of vancomycin 1500mg IV every 12 hours  Desired empiric serum trough concentration is 10 to 20 mcg/mL  Draw vancomycin trough level 30 min prior to fourth dose on 12/6 at approximately 0700  Pharmacy will continue to follow and monitor vancomycin.      Please contact pharmacy at extension [phone] with any questions regarding this assessment.     Thank you for the consult,   Kathy Finley       Patient brief summary:  Leonid Cox is a 43 y.o. male initiated on antimicrobial therapy with IV Vancomycin for treatment of suspected skin & soft tissue infection    Drug Allergies:   Review of patient's allergies indicates:   Allergen Reactions    Adhesive tape-silicones      Other reaction(s): blisters    Codeine      Other reaction(s): Nausea    Morphine Itching       Actual Body Weight:   110.2kg    Renal Function:   Estimated Creatinine Clearance: 154.1 mL/min (based on SCr of 0.8 mg/dL).,     Dialysis Method (if applicable):  N/A    CBC (last 72 hours):  Recent Labs   Lab Result Units 12/04/23  1829   WBC K/uL 6.52   Hemoglobin g/dL 15.1   Hematocrit % 44.9   Platelets K/uL 182   Gran % % 65.0   Lymph % % 21.5   Mono % % 12.1   Eosinophil % % 0.6   Basophil % % 0.5   Differential Method  Automated       Metabolic Panel (last 72 hours):  Recent Labs   Lab Result Units 12/04/23 2012 12/04/23  2343   Sodium mmol/L 140  --    Potassium mmol/L 3.5  --    Chloride mmol/L 104  --    CO2 mmol/L 27  --    Glucose mg/dL 119*  --    Glucose, UA   --  Negative   BUN mg/dL 14  --    Creatinine mg/dL 0.8  --    Albumin g/dL 2.6*  --    Total Bilirubin mg/dL 0.3  --    Alkaline Phosphatase U/L 55  --    AST U/L 13  --    ALT U/L 16  --        Drug levels (last 3 results):  No results for input(s): "VANCOMYCINRA", "VANCORANDOM", "VANCOMYCINPE", "VANCOPEAK", "VANCOMYCINTR", "VANCOTROUGH" in the last 72 hours.    Microbiologic Results:  Microbiology " Results (last 7 days)       Procedure Component Value Units Date/Time    Blood culture x two cultures. Draw prior to antibiotics. [[id number]] Collected: 12/04/23 1847    Order Status: Completed Specimen: Blood from Peripheral, Forearm, Right Updated: 12/05/23 0315     Blood Culture, Routine No Growth to date    Narrative:      Aerobic and anaerobic    Blood culture x two cultures. Draw prior to antibiotics. [[id number]] Collected: 12/04/23 1830    Order Status: Completed Specimen: Blood from Peripheral, Forearm, Right Updated: 12/05/23 0145     Blood Culture, Routine No Growth to date    Narrative:      Aerobic and anaerobic    Urine culture [[id number]] Collected: 12/04/23 2343    Order Status: No result Specimen: Urine Updated: 12/05/23 0012              Kalyan Wisdom MD  Department of Hospital Medicine  [facility]

## 2023-12-05 NOTE — PROVIDER PROGRESS NOTES - EMERGENCY DEPT.
Encounter Date: 12/4/2023    ED Physician Progress Notes        Physician Note:   Signed out to me.  Here with upper respiratory symptoms as well as lower back cellulitis versus deep abscess.  Previous attending does not see any bedside drainable collections clinically.  Got broad-spectrum antibiotics.  Pending CT chest abdomen pelvis to look for deep space infection, lung pathology.  Admission to hospital plus-minus surgery consult for abscess if there is a collection.    Update:  After CT scan was found to be tachycardic to the 140s with some new hypoxia.  Found to be febrile to 102 likely explains his tachycardia.  Given additional dose of Tylenol, Toradol, more IV fluids.  Heart rate improved to the 120s.  Was hypoxic on nasal cannula but was mouth breathing.  Switch to Venturi mask at 35% and satting 94%.  CT scan with no drainable collection.  UA possible UTI already covered with antibiotics.  Discussed with hospital medicine will admit to their service.  Step-down.    Critical Care Time:     The high probability of sudden, clinically significant deterioration in the patient's condition required the highest level of my preparedness to intervene urgently.    Services included the following: chart data review, reviewing nursing notes and/or old charts, documentation time, consultant collaboration regarding findings and treatment options, medication orders and management, direct patient care, vital sign assessments and ordering, interpreting and reviewing diagnostic studies/lab tests.     I spent 35 minutes on total Critical Care time, which includes only time during which I was engaged in work directly related to the patient's care, as described above, whether at the bedside or elsewhere in the Emergency Department.  It did not include time spent on separately billable procedures nor did it include the time spent by residents, students, nurses or physician assistants on this patient's care.    Critical Care was  needed secondary to the following conditions:  Influenza, cellulitis, hypoxia, tachycardia.

## 2023-12-06 LAB
ALBUMIN SERPL BCP-MCNC: 2.1 G/DL (ref 3.5–5.2)
ALP SERPL-CCNC: 108 U/L (ref 55–135)
ALT SERPL W/O P-5'-P-CCNC: 68 U/L (ref 10–44)
ANION GAP SERPL CALC-SCNC: 12 MMOL/L (ref 8–16)
AST SERPL-CCNC: 76 U/L (ref 10–40)
BACTERIA UR CULT: NO GROWTH
BASOPHILS # BLD AUTO: 0.04 K/UL (ref 0–0.2)
BASOPHILS NFR BLD: 1.1 % (ref 0–1.9)
BILIRUB SERPL-MCNC: 0.6 MG/DL (ref 0.1–1)
BUN SERPL-MCNC: 12 MG/DL (ref 6–20)
CALCIUM SERPL-MCNC: 8.1 MG/DL (ref 8.7–10.5)
CHLORIDE SERPL-SCNC: 107 MMOL/L (ref 95–110)
CO2 SERPL-SCNC: 21 MMOL/L (ref 23–29)
CREAT SERPL-MCNC: 0.8 MG/DL (ref 0.5–1.4)
DIFFERENTIAL METHOD: ABNORMAL
EOSINOPHIL # BLD AUTO: 0 K/UL (ref 0–0.5)
EOSINOPHIL NFR BLD: 0 % (ref 0–8)
ERYTHROCYTE [DISTWIDTH] IN BLOOD BY AUTOMATED COUNT: 12.9 % (ref 11.5–14.5)
EST. GFR  (NO RACE VARIABLE): >60 ML/MIN/1.73 M^2
GLUCOSE SERPL-MCNC: 77 MG/DL (ref 70–110)
HCT VFR BLD AUTO: 41.3 % (ref 40–54)
HGB BLD-MCNC: 13.9 G/DL (ref 14–18)
IMM GRANULOCYTES # BLD AUTO: 0.12 K/UL (ref 0–0.04)
IMM GRANULOCYTES NFR BLD AUTO: 3.4 % (ref 0–0.5)
LYMPHOCYTES # BLD AUTO: 0.3 K/UL (ref 1–4.8)
LYMPHOCYTES NFR BLD: 9.4 % (ref 18–48)
MAGNESIUM SERPL-MCNC: 2.3 MG/DL (ref 1.6–2.6)
MCH RBC QN AUTO: 28.9 PG (ref 27–31)
MCHC RBC AUTO-ENTMCNC: 33.7 G/DL (ref 32–36)
MCV RBC AUTO: 86 FL (ref 82–98)
MONOCYTES # BLD AUTO: 0.3 K/UL (ref 0.3–1)
MONOCYTES NFR BLD: 9.4 % (ref 4–15)
NEUTROPHILS # BLD AUTO: 2.7 K/UL (ref 1.8–7.7)
NEUTROPHILS NFR BLD: 76.7 % (ref 38–73)
NRBC BLD-RTO: 0 /100 WBC
PHOSPHATE SERPL-MCNC: 2.8 MG/DL (ref 2.7–4.5)
PLATELET # BLD AUTO: 114 K/UL (ref 150–450)
PLATELET BLD QL SMEAR: ABNORMAL
PMV BLD AUTO: 9.5 FL (ref 9.2–12.9)
POTASSIUM SERPL-SCNC: 5 MMOL/L (ref 3.5–5.1)
PROT SERPL-MCNC: 5.9 G/DL (ref 6–8.4)
RBC # BLD AUTO: 4.81 M/UL (ref 4.6–6.2)
SODIUM SERPL-SCNC: 140 MMOL/L (ref 136–145)
VANCOMYCIN TROUGH SERPL-MCNC: 19 UG/ML (ref 10–22)
WBC # BLD AUTO: 3.5 K/UL (ref 3.9–12.7)

## 2023-12-06 PROCEDURE — 27000221 HC OXYGEN, UP TO 24 HOURS

## 2023-12-06 PROCEDURE — 36415 COLL VENOUS BLD VENIPUNCTURE: CPT | Performed by: INTERNAL MEDICINE

## 2023-12-06 PROCEDURE — 25000003 PHARM REV CODE 250

## 2023-12-06 PROCEDURE — 83735 ASSAY OF MAGNESIUM: CPT

## 2023-12-06 PROCEDURE — 99900035 HC TECH TIME PER 15 MIN (STAT)

## 2023-12-06 PROCEDURE — 36415 COLL VENOUS BLD VENIPUNCTURE: CPT

## 2023-12-06 PROCEDURE — 20600001 HC STEP DOWN PRIVATE ROOM

## 2023-12-06 PROCEDURE — 27100171 HC OXYGEN HIGH FLOW UP TO 24 HOURS

## 2023-12-06 PROCEDURE — 85025 COMPLETE CBC W/AUTO DIFF WBC: CPT

## 2023-12-06 PROCEDURE — 63700000 PHARM REV CODE 250 ALT 637 W/O HCPCS

## 2023-12-06 PROCEDURE — 80202 ASSAY OF VANCOMYCIN: CPT | Performed by: INTERNAL MEDICINE

## 2023-12-06 PROCEDURE — 80053 COMPREHEN METABOLIC PANEL: CPT

## 2023-12-06 PROCEDURE — 63600175 PHARM REV CODE 636 W HCPCS

## 2023-12-06 PROCEDURE — 25000242 PHARM REV CODE 250 ALT 637 W/ HCPCS

## 2023-12-06 PROCEDURE — 27000207 HC ISOLATION

## 2023-12-06 PROCEDURE — 94761 N-INVAS EAR/PLS OXIMETRY MLT: CPT

## 2023-12-06 PROCEDURE — 84100 ASSAY OF PHOSPHORUS: CPT

## 2023-12-06 PROCEDURE — 94640 AIRWAY INHALATION TREATMENT: CPT

## 2023-12-06 RX ORDER — SODIUM CHLORIDE, SODIUM LACTATE, POTASSIUM CHLORIDE, CALCIUM CHLORIDE 600; 310; 30; 20 MG/100ML; MG/100ML; MG/100ML; MG/100ML
INJECTION, SOLUTION INTRAVENOUS CONTINUOUS
Status: ACTIVE | OUTPATIENT
Start: 2023-12-06 | End: 2023-12-07

## 2023-12-06 RX ADMIN — AZITHROMYCIN 500 MG: 250 TABLET, FILM COATED ORAL at 09:12

## 2023-12-06 RX ADMIN — PIPERACILLIN SODIUM AND TAZOBACTAM SODIUM 4.5 G: 4; .5 INJECTION, POWDER, FOR SOLUTION INTRAVENOUS at 01:12

## 2023-12-06 RX ADMIN — OSELTAMIVIR PHOSPHATE 75 MG: 75 CAPSULE ORAL at 08:12

## 2023-12-06 RX ADMIN — ENOXAPARIN SODIUM 40 MG: 40 INJECTION SUBCUTANEOUS at 06:12

## 2023-12-06 RX ADMIN — VANCOMYCIN HYDROCHLORIDE 1750 MG: 5 INJECTION, POWDER, LYOPHILIZED, FOR SOLUTION INTRAVENOUS at 09:12

## 2023-12-06 RX ADMIN — OSELTAMIVIR PHOSPHATE 75 MG: 75 CAPSULE ORAL at 09:12

## 2023-12-06 RX ADMIN — PIPERACILLIN SODIUM AND TAZOBACTAM SODIUM 4.5 G: 4; .5 INJECTION, POWDER, FOR SOLUTION INTRAVENOUS at 09:12

## 2023-12-06 RX ADMIN — PIPERACILLIN SODIUM AND TAZOBACTAM SODIUM 4.5 G: 4; .5 INJECTION, POWDER, FOR SOLUTION INTRAVENOUS at 08:12

## 2023-12-06 RX ADMIN — DIVALPROEX SODIUM 500 MG: 250 TABLET, EXTENDED RELEASE ORAL at 08:12

## 2023-12-06 RX ADMIN — SODIUM CHLORIDE, POTASSIUM CHLORIDE, SODIUM LACTATE AND CALCIUM CHLORIDE: 600; 310; 30; 20 INJECTION, SOLUTION INTRAVENOUS at 06:12

## 2023-12-06 RX ADMIN — FLUTICASONE PROPIONATE 50 MCG: 50 SPRAY, METERED NASAL at 09:12

## 2023-12-06 RX ADMIN — DIVALPROEX SODIUM 250 MG: 250 TABLET, EXTENDED RELEASE ORAL at 08:12

## 2023-12-06 RX ADMIN — CETIRIZINE HYDROCHLORIDE 10 MG: 10 TABLET, FILM COATED ORAL at 09:12

## 2023-12-06 RX ADMIN — DIVALPROEX SODIUM 500 MG: 250 TABLET, EXTENDED RELEASE ORAL at 09:12

## 2023-12-06 RX ADMIN — LEVALBUTEROL 1.25 MG: 1.25 SOLUTION, CONCENTRATE RESPIRATORY (INHALATION) at 04:12

## 2023-12-06 RX ADMIN — LEVALBUTEROL 1.25 MG: 1.25 SOLUTION, CONCENTRATE RESPIRATORY (INHALATION) at 12:12

## 2023-12-06 NOTE — CONSULTS
[facility] - Telemetry Stepdown  Wound Care    Patient Name:  Leonid Cox   MRN:  [id number]  Date: 12/6/2023  Diagnosis: Sepsis    History:     Past Medical History:   Diagnosis Date    Blind     Cerebral palsy     Hydrocephalus     Hypertension     Seizure disorder     Seizures     Urinary reflux        Social History     Socioeconomic History    Marital status: Single   Tobacco Use    Smoking status: Never    Smokeless tobacco: Never   Substance and Sexual Activity    Alcohol use: No    Drug use: No   Social History Narrative    Lives in property owned by grandmother. Disabled with [employer] caretakers 24hrs to patient. Primary caretaker Sherri takes care of him. Shalonda is the active caretaker, but there is no official legal power of .         Mother passed away 12/2013     Social Determinants of Health     Financial Resource Strain: Low Risk  (12/6/2023)    Overall Financial Resource Strain (CARDIA)     Difficulty of Paying Living Expenses: Not hard at all   Food Insecurity: No Food Insecurity (12/6/2023)    Hunger Vital Sign     Worried About Running Out of Food in the Last Year: Never true     Ran Out of Food in the Last Year: Never true   Transportation Needs: No Transportation Needs (12/6/2023)    PRAPARE - Transportation     Lack of Transportation (Medical): No     Lack of Transportation (Non-Medical): No   Housing Stability: Unknown (12/6/2023)    Housing Stability Vital Sign     Unable to Pay for Housing in the Last Year: No     Unstable Housing in the Last Year: No       Precautions:     Allergies as of 12/04/2023 - Reviewed 12/04/2023   Allergen Reaction Noted    Adhesive tape-silicones  09/24/2012    Codeine  09/24/2012    Morphine Itching 11/05/2012       [facility] Assessment Details/Treatment   Patient seen for wound care consultation.   Reviewed chart for this encounter.   See Flow Sheet for findings.    Pt in bed and agreeable to care. Abscess noted to the left lower back. The wound bed is  red, pink and moist with a moderate amount of creamy, sanguinous drainage. The periwound is intact, pink and blanchable. The wound was cleansed with NS and a foam dressing applied.     RECOMMENDATIONS:  - Sacral abscess: bedside nursing to cleanse with NS, pat dry, cover wound bed with aquacel ago, and secure with a foam dressing every 2 days and prn saturation   - Turning every 2 hours  - Heel protecting boots  - Waffle mattress overlay   - Bedside nursing to maintain pressure injury prevention interventions        12/06/23 1207        Altered Skin Integrity 12/04/23 2158 Left upper;lateral Coccyx #1 Abscess   Date First Assessed/Time First Assessed: 12/04/23 2158   Altered Skin Integrity Present on Admission - Did Patient arrive to the hospital with altered skin?: yes  Side: Left  Orientation: upper;lateral  Location: Coccyx  Wound Number: #1  Is this inju...   Wound Image    Dressing Appearance Intact;Moist drainage   Drainage Amount Moderate   Drainage Characteristics/Odor Serosanguineous;Creamy   Appearance Pink;Red;Moist   Periwound Area Intact;Moist;Pink   Wound Length (cm) 1.7 cm   Wound Width (cm) 1 cm   Wound Surface Area (cm^2) 1.7 cm^2   Care Cleansed with:;Sterile normal saline   Dressing Applied;Foam       Recommendations made to primary team for above plan via secure chat. Orders placed. Wound care will follow-up as needed.     12/06/2023

## 2023-12-06 NOTE — ASSESSMENT & PLAN NOTE
Currently not on any outpatient antihypertensives.    Plan:  - Sepsis, hold antihypertensive medications  - Daily Vitals, assess need for anti-hypertensive medications

## 2023-12-06 NOTE — ASSESSMENT & PLAN NOTE
"Full dependence for ADLs with caregivers in home. Wheelchair and bed bound with caregivers present with patient 24-hours. Noted in PCP note from 12/4 that "Patient does not appear to have any changes to his personality or baseline." Also noted that the patient has 24 hour sitters at home.     - Sitter at bedside        "

## 2023-12-06 NOTE — PLAN OF CARE
"  Problem: Adult Inpatient Plan of Care  Goal: Plan of Care Review  Outcome: Ongoing, Progressing  Goal: Patient-Specific Goal (Individualized)  Outcome: Ongoing, Progressing     Problem: Impaired Wound Healing  Goal: Optimal Wound Healing  Outcome: Ongoing, Progressing     Problem: Skin Injury Risk Increased  Goal: Skin Health and Integrity  Outcome: Ongoing, Progressing     Problem: Adjustment to Illness (Sepsis/Septic Shock)  Goal: Optimal Coping  Outcome: Ongoing, Progressing     Problem: Infection Progression (Sepsis/Septic Shock)  Goal: Absence of Infection Signs and Symptoms  Outcome: Ongoing, Progressing     Problem: Infection  Goal: Absence of Infection Signs and Symptoms  Outcome: Ongoing, Progressing  Assessment/Plan:      * Sepsis  This patient does have evidence of infective focus  My overall impression is sepsis.  Source: Respiratory and Skin and Soft Tissue (location lower back)  Antibiotics given-   Antibiotics (72h ago, onward)        Start     Stop Route Frequency Ordered     12/05/23 1445   piperacillin-tazobactam (ZOSYN) 4.5 g in dextrose 5 % in water (D5W) 100 mL IVPB (MB+)         -- IV Every 8 hours (non-standard times) 12/05/23 1343     12/05/23 0900   azithromycin tablet 500 mg         12/08/23 0859 Oral Daily 12/05/23 0519             Latest lactate reviewed-  No results for input(s): "LACTATE" in the last 72 hours.  Organ dysfunction indicated by Acute respiratory failure     4 days of swelling and erythema of the lower back. There is purulent drainage from the site. The patient denies injury or inciting event. The patient additionally reports cough and sore throat for the past 2-3 days. He denies fevers, chills, SOB, chest pain, abdominal pain, nausea, vomiting, diarrhea, or urinary symptoms.  Care givers at home report patient did feel warm over the past few days.     Presented tachycardic and hypertensive with temp 100.2. Following CT scan pt temp increased 102.4 and became tachycardic " "into the 140s and desaturated, requiring 10 L venturi mask (due to mouth breathing). CMP unremarkable. Albumin 2.6, CRP 79.3. Lactate 0.93, Procalcitonin wnl. UA with positive nitrites, occasional bacteria, and 28 WBC. Blood cultures NGTD. Positive for Influenza B. CT Chest, Abdomen, Pelvis concerning for edema and thickening of subcutaneous fat/skin overlying lower back without organized fluid collection. Scattered bilateral ground glass attenuation of lungs concerning for inflammatory/infectious process. In the ED received vanc loading dose, and 2L IVF, tamiflu. On my initial evaluation patient was tachycardic, saturating well on 10L venturi mask and in no acute distress. Exam limited due to patient functional quadriplegia but no wheezing, rales, or decreased breath sounds appreciated. Slight tenderness to lower back, however again limited 2/2 pt habitus.     Source combined cellulitis, UTI and influenza     Patient febrile during the day yesterday despite abx, therefore patient broadened to vanc/zosyn/azythro/tamiflu with resolution of SIRS overnight and stable vitals since. Blood cx, Ucx w/ NGTD, suspect presentation largely driven by flu. Patient improving clinically, vancomycin Dcd 12/06 given low concern for MRSA.     Will Not start Pressors- Levophed for MAP of 65  Source control achieved by: Antibiotics     PLAN  - Continue Zosyn, azithromycin  - Olseltamivir 75mg BID  - Telemetry  - Strict I/Os        Influenza B  See "Sepsis" A&P Note        Cellulitis of back except buttock  See "Sepsis" A&P Note        Functional quadriplegia  See Cerebral Palsy A&P Note        Essential hypertension  Currently not on any outpatient antihypertensives.     Plan:  - Sepsis, hold antihypertensive medications  - Daily Vitals, assess need for anti-hypertensive medications     Seizure disorder  Reports last seizure years ago     Plan:  - Continue home Depakote ( QHS,  Q12)        Cerebral palsy  Full dependence " "for ADLs with caregivers in home. Wheelchair and bed bound with caregivers present with patient 24-hours. Noted in PCP note from 12/4 that "Patient does not appear to have any changes to his personality or baseline." Also noted that the patient has 24 hour sitters at home.      - Sitter at bedside                 VTE Risk Mitigation (From admission, onward)              Ordered       enoxaparin injection 40 mg  Every 24 hours         12/05/23 0928       IP VTE HIGH RISK PATIENT  Once         12/05/23 0522       Place sequential compression device  Until discontinued         12/05/23 0522                         "

## 2023-12-06 NOTE — RESPIRATORY THERAPY
RAPID RESPONSE RESPIRATORY CHART CHECK       Chart check completed. Please call [phone] for further concerns or assistance.

## 2023-12-06 NOTE — PLAN OF CARE
[facility] - Telemetry Stepdown  Initial Discharge Assessment       Primary Care Provider: Anushka Telles MD    Admission Diagnosis: Tachycardia [R00.0]  Influenza B [J10.1]  Chest pain [R07.9]  Cellulitis, unspecified cellulitis site [L03.90]    Admission Date: 12/4/2023  Expected Discharge Date:     Transition of Care Barriers: None    Payor: [employer]ARE / Plan: [employer]URE [employer] / Product Type: Medicare Advantage /     Extended Emergency Contact Information  Primary Emergency Contact: Milla [place]cca  Mobile Phone: [phone]  Relation: Other  Secondary Emergency Contact: Shalonda Dorman  Address: [address] [place] [address] [facility]  Home Phone: [phone]  Relation: Other    Discharge Plan A: Home (with 24/7 sitters)  Discharge Plan B: Home, Home Health      [facility] [place] - [address]  [address]  [place] [address]  Phone: [phone] Fax: [phone]    [facility] - [place] - [address]  [address]  [facility] [address]  Phone: [phone] Fax: [phone]      Initial Assessment (most recent)       Adult Discharge Assessment - 12/06/23 1210          Discharge Assessment    Assessment Type Discharge Planning Assessment     Confirmed/corrected address, phone number and insurance Yes     Confirmed Demographics Correct on Facesheet     Source of Information legal guardian     If unable to respond/provide information was family/caregiver contacted? Yes     Contact Name/Number Shalonda Taylorjennifer legal guardian     Communicated ANT with patient/caregiver Date not available/Unable to determine     People in Home alone   with 24/7 sitter services    Do you expect to return to your current living situation? Yes     Do you have help at home or someone to help you manage your care at home? Yes     Who are your caregiver(s) and their phone number(s)?  [facility] care services (sitters)     Walking or Climbing Stairs ambulation difficulty, dependent;transferring difficulty, assistance 1 person     Mobility Management Per Shalonda, patient mainly uses his W/C, but can transfer from the W/C to the couch or bed with assistance     Dressing/Bathing bathing difficulty, assistance 1 person;dressing difficulty, assistance 1 person     Home Accessibility wheelchair accessible     Home Layout Able to live on 1st floor     Equipment Currently Used at Home wheelchair     Readmission within 30 days? No     Patient currently being followed by outpatient case management? No     Do you currently have service(s) that help you manage your care at home? Yes     Name and Contact number of agency [facility] for 24/7 sitters     Is the pt/caregiver preference to resume services with current agency Yes     Do you take prescription medications? Yes     Do you have prescription coverage? Yes     Do you have any problems affording any of your prescribed medications? No     Is the patient taking medications as prescribed? yes     Who is going to help you get home at discharge? [facility]     How do you get to doctors appointments? agency     Are you on dialysis? No     Do you take coumadin? No     DME Needed Upon Discharge  none     Discharge Plan discussed with: --   Legal guardian    Transition of Care Barriers None     Discharge Plan A Home   with 24/7 sitters    Discharge Plan B Home;Home Health        Financial Resource Strain    How hard is it for you to pay for the very basics like food, housing, medical care, and heating? Not hard at all        Housing Stability    In the last 12 months, was there a time when you were not able to pay the mortgage or rent on time? No     In the last 12 months, was there a time when you did not have a steady place to sleep or slept in a shelter (including now)? No        Transportation Needs    In the past 12 months, has lack of  transportation kept you from medical appointments or from getting medications? No     In the past 12 months, has lack of transportation kept you from meetings, work, or from getting things needed for daily living? No        Food Insecurity    Within the past 12 months, you worried that your food would run out before you got the money to buy more. Never true     Within the past 12 months, the food you bought just didn't last and you didn't have money to get more. Never true        Alcohol Use    Q1: How often do you have a drink containing alcohol? Never     Q2: How many drinks containing alcohol do you have on a typical day when you are drinking? Patient does not drink     Q3: How often do you have six or more drinks on one occasion? Never                 Discharge Plan A and Plan B have been determined by review of patient's clinical status, future medical and therapeutic needs, and coverage/benefits for post-acute care in coordination with multidisciplinary team members.    Comfort Farley LCSW  [facility]  Ext. [phone]

## 2023-12-06 NOTE — PROGRESS NOTES
Therapy with vancomycin complete and/or consult discontinued by provider. Pharmacy will sign off, please re-consult as needed.    Elizabeth Morelos, PharmD, BCPS  [id number]

## 2023-12-06 NOTE — HOSPITAL COURSE
Mr. Cox is a 43 y.o. M w/ a hx of Cerebral Palsy and Functional Quadriplegia, HTN, HLD, thrombocytopenia, Seizure Disorder, and hydrocephalus s/p  shunt (2012) admitted w/ cellulitis of lower back, a UTI and AHRF 2/2 Flu. In ED, pt tachycardic to 140s, hypertensive to 140s/90s, and febrile to 102.4, hypoxic requiring 10L facemask for O2 sats ~97%, WBC 6.52, hxh 15.1x44.9, plt 182, Na 140, K 3.5, Cl 104, Bicarb 27, , BUN 14, scr 0.8, procal 0.08, CRP 79.3, HIV (-ve), lactate 0.93, Flu B (+) UA w/ 28 WBC, occasional bacteria and leukocyte esterase (+), chest x-ray w/ left basilar atelactasis or infiltrate. Patient started on vancomycin for cellulitis of lower back, and ceftriaxone/azythromycin for CAP and UTI coverage, and Tamiflu for flu treatment. CT CAP w/ cellulitis of lower back w/out abscess, and ground glass opacities of lungs. Patient continued to fever throughout the day despite abx, given tylenol and broadened abx to include zosyn, which lead to resolution of tachycardia and fever. Patient improvement in hypoxia, weaning from face-mask to 2L NC w/ improved oxygen sats, and eventual weaning to RA. SLP evaluated, recommend NPO with MBSS prior to advancing diet. Deescalated broad spectrum abx to CAP coverage with Levaquin for 3 days. Patient passed bedside swallow study per SLP. Patient requiring hxh for assistance with swallowing difficulties and wound care for his sacral wound, communicated with primary guardian and sitter  who are in agreement for these services. Patient deemed medically stable for discharge, to follow-up with primary care physician at regular visit.

## 2023-12-06 NOTE — SUBJECTIVE & OBJECTIVE
Interval History: Patient vitals signs improving overnight, with resolution of tachycardia and fever following administration of tylenol and broadening of abx to zosyn. Patient also w/ improvement in hypoxia, weaning down from venturi face-mask to NC, then to RA. Patient reports other than occasional cough, he has no acute complaints.    Review of Systems   Constitutional:  Negative for chills, fatigue and fever.   HENT:  Negative for sore throat.    Eyes:  Negative for visual disturbance.   Respiratory:  Positive for cough. Negative for shortness of breath and wheezing.    Cardiovascular:  Negative for chest pain, palpitations and leg swelling.   Gastrointestinal:  Negative for constipation, diarrhea, nausea and vomiting.   Genitourinary:  Negative for dysuria, frequency and urgency.   Musculoskeletal:  Negative for myalgias.   Skin:  Negative for rash.   Neurological:  Negative for light-headedness.   Psychiatric/Behavioral:  Negative for confusion.      Objective:     Vital Signs (Most Recent):  Temp: 98.6 °F (37 °C) (12/06/23 1129)  Pulse: (!) 112 (12/06/23 1210)  Resp: 18 (12/06/23 1129)  BP: 126/80 (12/06/23 1129)  SpO2: (!) 94 % (12/06/23 1300) Vital Signs (24h Range):  Temp:  [97.3 °F (36.3 °C)-103 °F (39.4 °C)] 98.6 °F (37 °C)  Pulse:  [] 112  Resp:  [16-22] 18  SpO2:  [93 %-98 %] 94 %  BP: (101-126)/(57-80) 126/80     Weight: 94.2 kg (207 lb 10.8 oz)  Body mass index is 27.4 kg/m².    Intake/Output Summary (Last 24 hours) at 12/6/2023 1335  Last data filed at 12/6/2023 0928  Gross per 24 hour   Intake 295 ml   Output --   Net 295 ml         Physical Exam  Constitutional:       General: He is not in acute distress.     Appearance: He is not ill-appearing.      Comments: Patient lying in bed, awake during exam, left arm contracted, calm appearing and cooperative during interview   HENT:      Head: Normocephalic and atraumatic.      Right Ear: External ear normal.      Left Ear: External ear normal.    Eyes:      Extraocular Movements: Extraocular movements intact.   Cardiovascular:      Rate and Rhythm: Normal rate and regular rhythm.      Pulses: Normal pulses.      Heart sounds: Normal heart sounds.   Pulmonary:      Effort: Pulmonary effort is normal.      Breath sounds: Normal breath sounds.   Abdominal:      General: Abdomen is flat.      Palpations: Abdomen is soft.   Skin:     General: Skin is warm.      Capillary Refill: Capillary refill takes less than 2 seconds.   Neurological:      Mental Status: He is alert and oriented to person, place, and time.             Significant Labs: All pertinent labs within the past 24 hours have been reviewed.  CBC:   Recent Labs   Lab 12/04/23 1829 12/05/23 0627 12/06/23 0624   WBC 6.52 6.84 3.50*   HGB 15.1 13.9* 13.9*   HCT 44.9 42.1 41.3    159 114*     CMP:   Recent Labs   Lab 12/04/23 2012 12/05/23 0627 12/06/23 0624    141 140   K 3.5 4.1 5.0    105 107   CO2 27 23 21*   * 91 77   BUN 14 12 12   CREATININE 0.8 0.8 0.8   CALCIUM 8.7 8.7 8.1*   PROT 6.2 6.3 5.9*   ALBUMIN 2.6* 2.6* 2.1*   BILITOT 0.3 0.4 0.6   ALKPHOS 55 61 108   AST 13 23 76*   ALT 16 15 68*   ANIONGAP 9 13 12       Significant Imaging: I have reviewed all pertinent imaging results/findings within the past 24 hours.

## 2023-12-06 NOTE — PROGRESS NOTES
[facility] - Telemetry [facility] Medicine  Progress Note    Patient Name: Leonid Cox  MRN: [id number]  Patient Class: IP- Inpatient   Admission Date: 12/4/2023  Length of Stay: 1 days  Attending Physician: Farnaz Pantoja MD  Primary Care Provider: Anushka Telles MD        Subjective:     Principal Problem:Sepsis        HPI:  Leonid Cox is a 43 y.o. Male with PMHx of Cerebral Palsy and Functional Quadriplegia, HTN, HLD, thrombocytopenia, Seizure Disorder, and hydrocephalus s/p  shunt (2012) who presents for 4 days of swelling and erythema of the lower back. There is purulent drainage from the site. The patient denies injury or inciting event. The patient additionally reports cough and sore throat for the past 2-3 days. He denies fevers, chills, SOB, chest pain, abdominal pain, nausea, vomiting, diarrhea, or urinary symptoms.  Care givers at home report patient did feel warm over the past few days.    In the ED, he presented tachycardic and hypertensive with temp 100.2. Following CT scan pt temp increased 102.4 and became tachycardic into the 140s and desaturated, requiring 10 L venturi mask (due to mouth breathing). CMP unremarkable. Albumin 2.6, CRP 79.3. Lactate 0.93, Procalcitonin wnl. UA with positive nitrites, occasional bacteria, and 28 WBC. Blood cultures NGTD. Positive for Influenza B. CT Chest, Abdomen, Pelvis concerning for edema and thickening of subcutaneous fat/skin overlying lower back without organized fluid collection. Scattered bilateral ground glass attenuation of lungs concerning for inflammatory/infectious process. In the ED received vanc loading dose, and 2L IVF, tamiflu.  On my initial evaluation patient was tachycardic, saturating well on 10L venturi mask and in no acute distress. Exam limited due to patient functional quadriplegia but no wheezing, rales, or decreased breath sounds appreciated. Slight tenderness to lower back, however again limited 2/2 pt  "habitus.    Occasionally answering questions inappropriately. A&O to person, and place. Noted in PCP note from 12/4 that "Patient does not appear to have any changes to his personality or baseline." Also noted that the patient has 24 hour sitters at home. Home sitters are not covered for hospital admission, and patient will likely experience distress during inpatient stay so PCP advising 24-hr sitter services during admission.     The history was obtained from the patient, chart review, and ED documentation. No caregivers at bedside during my evaluation.    Overview/Hospital Course:  Mr. Cox is a 43 y.o. M w/ a hx of Cerebral Palsy and Functional Quadriplegia, HTN, HLD, thrombocytopenia, Seizure Disorder, and hydrocephalus s/p  shunt (2012) admitted w/ cellulitis of lower back, a UTI and AHRF 2/2 Flu. In ED, pt tachycardic to 140s, hypertensive to 140s/90s, and febrile to 102.4, hypoxic requiring 10L facemask for O2 sats ~97%, WBC 6.52, hxh 15.1x44.9, plt 182, Na 140, K 3.5, Cl 104, Bicarb 27, , BUN 14, scr 0.8, procal 0.08, CRP 79.3, HIV (-ve), lactate 0.93, Flu B (+) UA w/ 28 WBC, occasional bacteria and leukocyte esterase (+), chest x-ray w/ left basilar atelactasis or infiltrate. Patient started on vancomycin for cellulitis of lower back, and ceftriaxone/azythromycin for CAP and UTI coverage, and Tamiflu for flu treatment. CT CAP w/ cellulitis of lower back w/out abscess, and ground glass opacities of lungs. Patient continued to fever throughout the day despite abx, given tylenol and broadened abx to include zosyn, which lead to resolution of tachycardia and fever. Patient improvement in hypoxia, weaning from face-mask to 2L NC w/ improved oxygen sats, and eventual weaning to RA.     Interval History: Patient vitals signs improving overnight, with resolution of tachycardia and fever following administration of tylenol and broadening of abx to zosyn. Patient also w/ improvement in hypoxia, weaning down " from venturi face-mask to NC, then to RA. Patient reports other than occasional cough, he has no acute complaints.    Review of Systems   Constitutional:  Negative for chills, fatigue and fever.   HENT:  Negative for sore throat.    Eyes:  Negative for visual disturbance.   Respiratory:  Positive for cough. Negative for shortness of breath and wheezing.    Cardiovascular:  Negative for chest pain, palpitations and leg swelling.   Gastrointestinal:  Negative for constipation, diarrhea, nausea and vomiting.   Genitourinary:  Negative for dysuria, frequency and urgency.   Musculoskeletal:  Negative for myalgias.   Skin:  Negative for rash.   Neurological:  Negative for light-headedness.   Psychiatric/Behavioral:  Negative for confusion.      Objective:     Vital Signs (Most Recent):  Temp: 98.6 °F (37 °C) (12/06/23 1129)  Pulse: (!) 112 (12/06/23 1210)  Resp: 18 (12/06/23 1129)  BP: 126/80 (12/06/23 1129)  SpO2: (!) 94 % (12/06/23 1300) Vital Signs (24h Range):  Temp:  [97.3 °F (36.3 °C)-103 °F (39.4 °C)] 98.6 °F (37 °C)  Pulse:  [] 112  Resp:  [16-22] 18  SpO2:  [93 %-98 %] 94 %  BP: (101-126)/(57-80) 126/80     Weight: 94.2 kg (207 lb 10.8 oz)  Body mass index is 27.4 kg/m².    Intake/Output Summary (Last 24 hours) at 12/6/2023 1335  Last data filed at 12/6/2023 0928  Gross per 24 hour   Intake 295 ml   Output --   Net 295 ml         Physical Exam  Constitutional:       General: He is not in acute distress.     Appearance: He is not ill-appearing.      Comments: Patient lying in bed, awake during exam, left arm contracted, calm appearing and cooperative during interview   HENT:      Head: Normocephalic and atraumatic.      Right Ear: External ear normal.      Left Ear: External ear normal.   Eyes:      Extraocular Movements: Extraocular movements intact.   Cardiovascular:      Rate and Rhythm: Normal rate and regular rhythm.      Pulses: Normal pulses.      Heart sounds: Normal heart sounds.   Pulmonary:       "Effort: Pulmonary effort is normal.      Breath sounds: Normal breath sounds.   Abdominal:      General: Abdomen is flat.      Palpations: Abdomen is soft.   Skin:     General: Skin is warm.      Capillary Refill: Capillary refill takes less than 2 seconds.   Neurological:      Mental Status: He is alert and oriented to person, place, and time.             Significant Labs: All pertinent labs within the past 24 hours have been reviewed.  CBC:   Recent Labs   Lab 12/04/23 1829 12/05/23 0627 12/06/23 0624   WBC 6.52 6.84 3.50*   HGB 15.1 13.9* 13.9*   HCT 44.9 42.1 41.3    159 114*     CMP:   Recent Labs   Lab 12/04/23 2012 12/05/23 0627 12/06/23 0624    141 140   K 3.5 4.1 5.0    105 107   CO2 27 23 21*   * 91 77   BUN 14 12 12   CREATININE 0.8 0.8 0.8   CALCIUM 8.7 8.7 8.1*   PROT 6.2 6.3 5.9*   ALBUMIN 2.6* 2.6* 2.1*   BILITOT 0.3 0.4 0.6   ALKPHOS 55 61 108   AST 13 23 76*   ALT 16 15 68*   ANIONGAP 9 13 12       Significant Imaging: I have reviewed all pertinent imaging results/findings within the past 24 hours.    Assessment/Plan:      * Sepsis  This patient does have evidence of infective focus  My overall impression is sepsis.  Source: Respiratory and Skin and Soft Tissue (location lower back)  Antibiotics given-   Antibiotics (72h ago, onward)      Start     Stop Route Frequency Ordered    12/05/23 1445  piperacillin-tazobactam (ZOSYN) 4.5 g in dextrose 5 % in water (D5W) 100 mL IVPB (MB+)         -- IV Every 8 hours (non-standard times) 12/05/23 1343    12/05/23 0900  azithromycin tablet 500 mg         12/08/23 0859 Oral Daily 12/05/23 0519          Latest lactate reviewed-  No results for input(s): "LACTATE" in the last 72 hours.  Organ dysfunction indicated by Acute respiratory failure    4 days of swelling and erythema of the lower back. There is purulent drainage from the site. The patient denies injury or inciting event. The patient additionally reports cough and sore " "throat for the past 2-3 days. He denies fevers, chills, SOB, chest pain, abdominal pain, nausea, vomiting, diarrhea, or urinary symptoms.  Care givers at home report patient did feel warm over the past few days.    Presented tachycardic and hypertensive with temp 100.2. Following CT scan pt temp increased 102.4 and became tachycardic into the 140s and desaturated, requiring 10 L venturi mask (due to mouth breathing). CMP unremarkable. Albumin 2.6, CRP 79.3. Lactate 0.93, Procalcitonin wnl. UA with positive nitrites, occasional bacteria, and 28 WBC. Blood cultures NGTD. Positive for Influenza B. CT Chest, Abdomen, Pelvis concerning for edema and thickening of subcutaneous fat/skin overlying lower back without organized fluid collection. Scattered bilateral ground glass attenuation of lungs concerning for inflammatory/infectious process. In the ED received vanc loading dose, and 2L IVF, tamiflu. On my initial evaluation patient was tachycardic, saturating well on 10L venturi mask and in no acute distress. Exam limited due to patient functional quadriplegia but no wheezing, rales, or decreased breath sounds appreciated. Slight tenderness to lower back, however again limited 2/2 pt habitus.    Source combined cellulitis, UTI and influenza    Patient febrile during the day yesterday despite abx, therefore patient broadened to vanc/zosyn/azythro/tamiflu with resolution of SIRS overnight and stable vitals since. Blood cx, Ucx w/ NGTD, suspect presentation largely driven by flu. Patient improving clinically, vancomycin Dcd 12/06 given low concern for MRSA.    Will Not start Pressors- Levophed for MAP of 65  Source control achieved by: Antibiotics    PLAN  - Continue Zosyn, azithromycin  - Olseltamivir 75mg BID  - Telemetry  - Strict I/Os      Influenza B  See "Sepsis" A&P Note      Cellulitis of back except buttock  See "Sepsis" A&P Note      Functional quadriplegia  See Cerebral Palsy A&P Note      Essential " "hypertension  Currently not on any outpatient antihypertensives.    Plan:  - Sepsis, hold antihypertensive medications  - Daily Vitals, assess need for anti-hypertensive medications    Seizure disorder  Reports last seizure years ago    Plan:  - Continue home Depakote ( QHS,  Q12)      Cerebral palsy  Full dependence for ADLs with caregivers in home. Wheelchair and bed bound with caregivers present with patient 24-hours. Noted in PCP note from 12/4 that "Patient does not appear to have any changes to his personality or baseline." Also noted that the patient has 24 hour sitters at home.     - Sitter at bedside            VTE Risk Mitigation (From admission, onward)           Ordered     enoxaparin injection 40 mg  Every 24 hours         12/05/23 0928     IP VTE HIGH RISK PATIENT  Once         12/05/23 0522     Place sequential compression device  Until discontinued         12/05/23 0522                    Discharge Planning   ANT: 12/8/2023     Code Status: Full Code   Is the patient medically ready for discharge?:     Reason for patient still in hospital (select all that apply): Patient trending condition  Discharge Plan A: Home (with 24/7 sitters)                  Andi Garcia MD  Department of Hospital Medicine   [facility] - Telemetry Stepdown    "

## 2023-12-06 NOTE — ASSESSMENT & PLAN NOTE
"This patient does have evidence of infective focus  My overall impression is sepsis.  Source: Respiratory and Skin and Soft Tissue (location lower back)  Antibiotics given-   Antibiotics (72h ago, onward)      Start     Stop Route Frequency Ordered    12/05/23 1445  piperacillin-tazobactam (ZOSYN) 4.5 g in dextrose 5 % in water (D5W) 100 mL IVPB (MB+)         -- IV Every 8 hours (non-standard times) 12/05/23 1343    12/05/23 0900  azithromycin tablet 500 mg         12/08/23 0859 Oral Daily 12/05/23 0519          Latest lactate reviewed-  No results for input(s): "LACTATE" in the last 72 hours.  Organ dysfunction indicated by Acute respiratory failure    4 days of swelling and erythema of the lower back. There is purulent drainage from the site. The patient denies injury or inciting event. The patient additionally reports cough and sore throat for the past 2-3 days. He denies fevers, chills, SOB, chest pain, abdominal pain, nausea, vomiting, diarrhea, or urinary symptoms.  Care givers at home report patient did feel warm over the past few days.    Presented tachycardic and hypertensive with temp 100.2. Following CT scan pt temp increased 102.4 and became tachycardic into the 140s and desaturated, requiring 10 L venturi mask (due to mouth breathing). CMP unremarkable. Albumin 2.6, CRP 79.3. Lactate 0.93, Procalcitonin wnl. UA with positive nitrites, occasional bacteria, and 28 WBC. Blood cultures NGTD. Positive for Influenza B. CT Chest, Abdomen, Pelvis concerning for edema and thickening of subcutaneous fat/skin overlying lower back without organized fluid collection. Scattered bilateral ground glass attenuation of lungs concerning for inflammatory/infectious process. In the ED received vanc loading dose, and 2L IVF, tamiflu. On my initial evaluation patient was tachycardic, saturating well on 10L venturi mask and in no acute distress. Exam limited due to patient functional quadriplegia but no wheezing, rales, or " decreased breath sounds appreciated. Slight tenderness to lower back, however again limited 2/2 pt habitus.    Source combined cellulitis, UTI and influenza    Patient febrile during the day yesterday despite abx, therefore patient broadened to vanc/zosyn/azythro/tamiflu with resolution of SIRS overnight and stable vitals since. Blood cx, Ucx w/ NGTD, suspect presentation largely driven by flu. Patient improving clinically, vancomycin Dcd 12/06 given low concern for MRSA.    Will Not start Pressors- Levophed for MAP of 65  Source control achieved by: Antibiotics    PLAN  - Continue Zosyn, azithromycin  - Olseltamivir 75mg BID  - Telemetry  - Strict I/Os

## 2023-12-06 NOTE — PLAN OF CARE
Problem: Adult Inpatient Plan of Care  Goal: Absence of Hospital-Acquired Illness or Injury  Outcome: Ongoing, Progressing  Goal: Optimal Comfort and Wellbeing  Outcome: Ongoing, Progressing     Problem: Impaired Wound Healing  Goal: Optimal Wound Healing  Outcome: Ongoing, Progressing     Problem: Skin Injury Risk Increased  Goal: Skin Health and Integrity  Outcome: Ongoing, Progressing     Resting quietly in bed, , breathing even and un-labored, bed in low position, call light in reach, rails up x3, no acute distress noted at this time.

## 2023-12-06 NOTE — RESPIRATORY THERAPY
RAPID RESPONSE RESPIRATORY THERAPY FOLLOW-UP NOTE       Followed up with patient d/t possible aspiration.  MD, RT and RN bedside.  Pt not incessant coughing and maintaining SpO2 of 90% on room air.  Assigned RT preparing to place N.C. if needed.  Please call Rapid Response RT, Joseph Cabrales, DANIAL at [phone] with any questions or concerns.

## 2023-12-07 PROBLEM — R13.10 DYSPHAGIA: Status: ACTIVE | Noted: 2023-12-07

## 2023-12-07 PROCEDURE — 94640 AIRWAY INHALATION TREATMENT: CPT

## 2023-12-07 PROCEDURE — 63600175 PHARM REV CODE 636 W HCPCS: Performed by: HOSPITALIST

## 2023-12-07 PROCEDURE — 27000207 HC ISOLATION

## 2023-12-07 PROCEDURE — 63600175 PHARM REV CODE 636 W HCPCS

## 2023-12-07 PROCEDURE — 97535 SELF CARE MNGMENT TRAINING: CPT

## 2023-12-07 PROCEDURE — 25000003 PHARM REV CODE 250

## 2023-12-07 PROCEDURE — 99900035 HC TECH TIME PER 15 MIN (STAT)

## 2023-12-07 PROCEDURE — 25000242 PHARM REV CODE 250 ALT 637 W/ HCPCS

## 2023-12-07 PROCEDURE — 92610 EVALUATE SWALLOWING FUNCTION: CPT

## 2023-12-07 PROCEDURE — S5010 5% DEXTROSE AND 0.45% SALINE: HCPCS

## 2023-12-07 PROCEDURE — 25000003 PHARM REV CODE 250: Performed by: HOSPITALIST

## 2023-12-07 PROCEDURE — 20600001 HC STEP DOWN PRIVATE ROOM

## 2023-12-07 PROCEDURE — 94761 N-INVAS EAR/PLS OXIMETRY MLT: CPT

## 2023-12-07 RX ORDER — DEXTROSE MONOHYDRATE AND SODIUM CHLORIDE 5; .45 G/100ML; G/100ML
INJECTION, SOLUTION INTRAVENOUS CONTINUOUS
Status: DISCONTINUED | OUTPATIENT
Start: 2023-12-07 | End: 2023-12-08

## 2023-12-07 RX ORDER — LEVOFLOXACIN 5 MG/ML
750 INJECTION, SOLUTION INTRAVENOUS
Status: COMPLETED | OUTPATIENT
Start: 2023-12-07 | End: 2023-12-08

## 2023-12-07 RX ADMIN — DEXTROSE AND SODIUM CHLORIDE: 5; 450 INJECTION, SOLUTION INTRAVENOUS at 06:12

## 2023-12-07 RX ADMIN — LEVALBUTEROL 1.25 MG: 1.25 SOLUTION, CONCENTRATE RESPIRATORY (INHALATION) at 12:12

## 2023-12-07 RX ADMIN — ENOXAPARIN SODIUM 40 MG: 40 INJECTION SUBCUTANEOUS at 06:12

## 2023-12-07 RX ADMIN — LEVOFLOXACIN 750 MG: 750 INJECTION, SOLUTION INTRAVENOUS at 12:12

## 2023-12-07 RX ADMIN — DEXTROSE MONOHYDRATE 250 MG: 50 INJECTION, SOLUTION INTRAVENOUS at 06:12

## 2023-12-07 RX ADMIN — PIPERACILLIN SODIUM AND TAZOBACTAM SODIUM 4.5 G: 4; .5 INJECTION, POWDER, FOR SOLUTION INTRAVENOUS at 10:12

## 2023-12-07 RX ADMIN — DEXTROSE MONOHYDRATE 250 MG: 50 INJECTION, SOLUTION INTRAVENOUS at 01:12

## 2023-12-07 RX ADMIN — PIPERACILLIN SODIUM AND TAZOBACTAM SODIUM 4.5 G: 4; .5 INJECTION, POWDER, FOR SOLUTION INTRAVENOUS at 03:12

## 2023-12-07 NOTE — ASSESSMENT & PLAN NOTE
Reports last seizure years ago    Plan:  - Continue home Depakote ( QHS,  Q12)  - Temporarily transitioned to IV due to failure of swallow studies

## 2023-12-07 NOTE — PT/OT/SLP EVAL
"Speech Language Pathology Evaluation  Bedside Swallow    Patient Name:  Leonid Cox   MRN:  [id number]  Admitting Diagnosis: Sepsis    Recommendations:                 General Recommendations:  Dysphagia therapy and Modified barium swallow study  Diet recommendations:  NPO (except necessary meds crushed in 1/2 tsp puree), NPO   Aspiration Precautions: Frequent oral care and Strict aspiration precautions   General Precautions: Standard, aspiration, fall, NPO  Communication strategies:  none    Assessment:     Leonid Cox is a 43 y.o. male with an SLP diagnosis of Dysphagia. SLP will continue to follow for ongoing assessment.     History:     Past Medical History:   Diagnosis Date    Blind     Cerebral palsy     Hydrocephalus     Hypertension     Seizure disorder     Seizures     Urinary reflux      Past Surgical History:   Procedure Laterality Date    FOOT SURGERY      SHUNT REVISION      TENDON RELEASE      TOTAL HIP ARTHROPLASTY       Principal Problem:Sepsis     Chief Complaint:        Chief Complaint   Patient presents with    Referral       Pt complains of boil to his right lower back, pt is functional quadriplegia w hx of cerebral palsy, HTN, and seizure disorder.  Sent from PCP clinic for further eval.              HPI: "Leonid Cox is a 43 y.o. Male with PMHx of Cerebral Palsy and Functional Quadriplegia, HTN, HLD, thrombocytopenia, Seizure Disorder, and hydrocephalus s/p  shunt (2012) who presents for 4 days of swelling and erythema of the lower back. There is purulent drainage from the site. The patient denies injury or inciting event. The patient additionally reports cough and sore throat for the past 2-3 days. He denies fevers, chills, SOB, chest pain, abdominal pain, nausea, vomiting, diarrhea, or urinary symptoms.  Care givers at home report patient did feel warm over the past few days.  In the ED, he presented tachycardic and hypertensive with temp 100.2. Following CT scan pt temp increased " "102.4 and became tachycardic into the 140s and desaturated, requiring 10 L venturi mask (due to mouth breathing). CMP unremarkable. Albumin 2.6, CRP 79.3. Lactate 0.93, Procalcitonin wnl. UA with positive nitrites, occasional bacteria, and 28 WBC. Blood cultures NGTD. Positive for Influenza B. CT Chest, Abdomen, Pelvis concerning for edema and thickening of subcutaneous fat/skin overlying lower back without organized fluid collection. Scattered bilateral ground glass attenuation of lungs concerning for inflammatory/infectious process. In the ED received vanc loading dose, and 2L IVF, tamiflu.  On my initial evaluation patient was tachycardic, saturating well on 10L venturi mask and in no acute distress. Exam limited due to patient functional quadriplegia but no wheezing, rales, or decreased breath sounds appreciated. Slight tenderness to lower back, however again limited 2/2 pt habitus.  Occasionally answering questions inappropriately. A&O to person, and place. Noted in PCP note from 12/4 that "Patient does not appear to have any changes to his personality or baseline." Also noted that the patient has 24 hour sitters at home. Home sitters are not covered for hospital admission, and patient will likely experience distress during inpatient stay so PCP advising 24-hr sitter services during admission. "     Prior Intubation HX:  none    Modified Barium Swallow: none    Chest X-Rays: 12/6:"Bilateral low lung volumes.  Diffuse hazy ground-glass opacification of the bilateral lungs and prominence of the pulmonary vascular markings suggestive for pulmonary edema.  Blunting of the left costophrenic angle which may represent small pleural effusion.  No pneumothorax."    Prior diet: reg/thin, softer solids cut up per pt report     Subjective     Spoke with RN prior to session who report they gave pt thin water and pudding earlier this date and he demonstrated coughing with intake. PCT at bedside to help reposition prior to PO " trials.     Pain/Comfort:  Pain Rating 1: 0/10  Pain Rating Post-Intervention 1: 0/10    Respiratory Status: Nasal cannula, flow 2 L/min, off upon entry to room     Objective:     Oral Musculature Evaluation  Oral Musculature: general weakness  Dentition: present and adequate  Secretion Management: adequate  Mucosal Quality: sticky  Mandibular Strength and Mobility: impaired  Oral Labial Strength and Mobility: functional seal  Lingual Strength and Mobility: impaired anterior elevation, impaired left lateral movement, impaired right lateral movement, functional protrusion, impaired strength  Volitional Cough: present, dry  Volitional Swallow: elicited  Voice Prior to PO Intake: clear    Bedside Swallow Eval:   Consistencies Assessed:  Thin liquids tsp x5  Nectar thick liquids tsp x5, cup sip x4  Puree tsp x4  Solids cracker x 1/2      Oral Phase:   Anterior loss, poor sensitivity to labial residue   Decreased closure around utensil  Prolonged mastication w/ cracker   Mild oral residue w/ cracker, cleared with subsequent swallows    Pharyngeal Phase:   Intermittent dry coughing across trials  delayed swallow initiation  decreased hyolaryngeal excursion to palpation  multiple spontaneous swallows with all trials  Dry coughing after intake of cracker  wet vocal quality after swallow and coughing/choking with tsp thin liquids trials and larger boluses of NTL from cup rim  Eye watering, nose running     Treatment: Pt repositioned to be boosted upright in bed, HOB 90 degrees prior to PO trials. Pt able to self present NTL from cup and cracker, though required feed assist with spoon feeding/tsp trials. Pt with overt s/sx of airway compromise across all trials. Recommend strict NPO and MBSS prior to diet advancement. Education provided re: role of SLP, diet recs, swallow precs, s/s aspiration and POC.  Pt verbalized understanding and agreement.     Goals:   Multidisciplinary Problems       SLP Goals          Problem: SLP     Goal Priority Disciplines Outcome   SLP Goal     SLP Ongoing, Progressing   Description: Speech Language Pathology Goals  Goals expected to be met by 12/21    1. Pt will participate in ongoing swallow assessment to determine least restrictive PO diet.  2. Pt will complete objective swallow assessment prior to diet advancement.                                Plan:     Patient to be seen:  4 x/week   Plan of Care expires:  01/06/24  Plan of Care reviewed with:  patient   SLP Follow-Up:  Yes       Discharge recommendations:  Moderate Intensity Therapy   Barriers to Discharge:  Level of Skilled Assistance Needed      Time Tracking:     SLP Treatment Date:   12/07/23  Speech Start Time:  1219  Speech Stop Time:  1243     Speech Total Time (min):  24 min    Billable Minutes: Eval Swallow and Oral Function 16 and Self Care/Home Management Training 8    12/07/2023

## 2023-12-07 NOTE — PLAN OF CARE
Problem: Adult Inpatient Plan of Care  Goal: Plan of Care Review  Outcome: Ongoing, Progressing  Goal: Patient-Specific Goal (Individualized)  Outcome: Ongoing, Progressing  Goal: Absence of Hospital-Acquired Illness or Injury  Outcome: Ongoing, Progressing  Goal: Optimal Comfort and Wellbeing  Outcome: Ongoing, Progressing  Goal: Readiness for Transition of Care  Outcome: Ongoing, Progressing     Problem: Impaired Wound Healing  Goal: Optimal Wound Healing  Outcome: Ongoing, Progressing     Problem: Adjustment to Illness (Sepsis/Septic Shock)  Goal: Optimal Coping  Outcome: Ongoing, Progressing     Problem: Bleeding (Sepsis/Septic Shock)  Goal: Absence of Bleeding  Outcome: Ongoing, Progressing     Problem: Infection Progression (Sepsis/Septic Shock)  Goal: Absence of Infection Signs and Symptoms  Outcome: Ongoing, Progressing     Problem: Nutrition Impaired (Sepsis/Septic Shock)  Goal: Optimal Nutrition Intake  Outcome: Ongoing, Progressing     Problem: Infection  Goal: Absence of Infection Signs and Symptoms  Outcome: Ongoing, Progressing

## 2023-12-07 NOTE — ASSESSMENT & PLAN NOTE
"This patient does have evidence of infective focus  My overall impression is sepsis.  Source: Respiratory and Skin and Soft Tissue (location lower back)  Antibiotics given-   Antibiotics (72h ago, onward)      Start     Stop Route Frequency Ordered    12/07/23 1230  levoFLOXacin 750 mg/150 mL IVPB 750 mg         12/09/23 1229 IV Every 24 hours (non-standard times) 12/07/23 1129          Latest lactate reviewed-  No results for input(s): "LACTATE" in the last 72 hours.  Organ dysfunction indicated by Acute respiratory failure    4 days of swelling and erythema of the lower back. There is purulent drainage from the site. The patient denies injury or inciting event. The patient additionally reports cough and sore throat for the past 2-3 days. He denies fevers, chills, SOB, chest pain, abdominal pain, nausea, vomiting, diarrhea, or urinary symptoms.  Care givers at home report patient did feel warm over the past few days.    Presented tachycardic and hypertensive with temp 100.2. Following CT scan pt temp increased 102.4 and became tachycardic into the 140s and desaturated, requiring 10 L venturi mask (due to mouth breathing). CMP unremarkable. Albumin 2.6, CRP 79.3. Lactate 0.93, Procalcitonin wnl. UA with positive nitrites, occasional bacteria, and 28 WBC. Blood cultures NGTD. Positive for Influenza B. CT Chest, Abdomen, Pelvis concerning for edema and thickening of subcutaneous fat/skin overlying lower back without organized fluid collection. Scattered bilateral ground glass attenuation of lungs concerning for inflammatory/infectious process. In the ED received vanc loading dose, and 2L IVF, tamiflu. On my initial evaluation patient was tachycardic, saturating well on 10L venturi mask and in no acute distress. Exam limited due to patient functional quadriplegia but no wheezing, rales, or decreased breath sounds appreciated. Slight tenderness to lower back, however again limited 2/2 pt habitus.    Source combined " cellulitis, UTI and influenza    Patient febrile during the day yesterday despite abx, therefore patient broadened to vanc/zosyn/azythro/tamiflu with resolution of SIRS overnight and stable vitals since. Blood cx, Ucx w/ NGTD, suspect presentation largely driven by flu. Patient improving clinically, vancomycin Dcd 12/06 given low concern for MRSA.    Will Not start Pressors- Levophed for MAP of 65  Source control achieved by: Antibiotics    PLAN  - Zosyn and azithromycin transitioned to levaquin  - Oseltamivir 75mg BID, but held currently due to NPO status  - Telemetry  - Strict I/Os

## 2023-12-07 NOTE — ASSESSMENT & PLAN NOTE
"Full dependence for ADLs with caregivers in home. Wheelchair and bed bound with caregivers present with patient 24-hours. Noted in PCP note from 12/4 that "Patient does not appear to have any changes to his personality or baseline."         "

## 2023-12-07 NOTE — SUBJECTIVE & OBJECTIVE
Interval History: No acute events overnight. Patient failed bedside swallow test and also failed SLP evaluation. Remains NPO with continuous mIVF while NPO. Meds transitioned to IV forms. No IV form of Tamiflu, will hold at this time. MBSS on 12/8.    Review of Systems   Constitutional:  Negative for chills, fatigue and fever.   HENT:  Negative for sore throat.    Eyes:  Negative for visual disturbance.   Respiratory:  Positive for cough. Negative for shortness of breath and wheezing.    Cardiovascular:  Negative for chest pain, palpitations and leg swelling.   Gastrointestinal:  Negative for constipation, diarrhea, nausea and vomiting.   Genitourinary:  Negative for dysuria, frequency and urgency.   Musculoskeletal:  Negative for myalgias.   Skin:  Negative for rash.   Neurological:  Negative for light-headedness.   Psychiatric/Behavioral:  Negative for confusion.      Objective:     Vital Signs (Most Recent):  Temp: 99 °F (37.2 °C) (12/07/23 1637)  Pulse: 80 (12/07/23 1637)  Resp: 19 (12/07/23 1637)  BP: (!) 142/88 (12/07/23 1637)  SpO2: (!) 92 % (12/07/23 1637) Vital Signs (24h Range):  Temp:  [97.2 °F (36.2 °C)-100.2 °F (37.9 °C)] 99 °F (37.2 °C)  Pulse:  [] 80  Resp:  [18-20] 19  SpO2:  [89 %-99 %] 92 %  BP: (118-147)/(80-95) 142/88     Weight: 94.2 kg (207 lb 10.8 oz)  Body mass index is 27.4 kg/m².    Intake/Output Summary (Last 24 hours) at 12/7/2023 1729  Last data filed at 12/7/2023 1006  Gross per 24 hour   Intake 100 ml   Output 581 ml   Net -481 ml         Physical Exam  Constitutional:       General: He is not in acute distress.     Appearance: He is not ill-appearing.      Comments: Patient lying in bed, awake during exam, left arm contracted, calm appearing and cooperative during interview   HENT:      Head: Normocephalic and atraumatic.      Right Ear: External ear normal.      Left Ear: External ear normal.   Eyes:      Extraocular Movements: Extraocular movements intact.      Comments:  Wandering eye movements resembling nystagmus  Does not track with eyes   Cardiovascular:      Rate and Rhythm: Normal rate and regular rhythm.      Pulses: Normal pulses.      Heart sounds: Normal heart sounds.   Pulmonary:      Effort: Pulmonary effort is normal.      Breath sounds: Normal breath sounds.   Abdominal:      General: Abdomen is flat.      Palpations: Abdomen is soft.   Skin:     General: Skin is warm.      Capillary Refill: Capillary refill takes less than 2 seconds.   Neurological:      Mental Status: He is alert and oriented to person, place, and time.             Significant Labs: All pertinent labs within the past 24 hours have been reviewed.  CBC:   Recent Labs   Lab 12/06/23 0624   WBC 3.50*   HGB 13.9*   HCT 41.3   *     CMP:   Recent Labs   Lab 12/06/23 0624      K 5.0      CO2 21*   GLU 77   BUN 12   CREATININE 0.8   CALCIUM 8.1*   PROT 5.9*   ALBUMIN 2.1*   BILITOT 0.6   ALKPHOS 108   AST 76*   ALT 68*   ANIONGAP 12       Significant Imaging: I have reviewed all pertinent imaging results/findings within the past 24 hours.

## 2023-12-07 NOTE — ASSESSMENT & PLAN NOTE
Patient with aspiration event noted on 12/6 while eating lunch. Subsequent evaluation revealed poor swallowing, unclear if acute or chronic issue. Patient passed bedside swallow study, per SLP, it likely seems more like coughing is the main issue as opposed to dysphagia.    - Pureed diet  - Advance as tolerated

## 2023-12-07 NOTE — PLAN OF CARE
Problem: Adult Inpatient Plan of Care  Goal: Plan of Care Review  Outcome: Ongoing, Progressing  Goal: Patient-Specific Goal (Individualized)  Outcome: Ongoing, Progressing  Goal: Absence of Hospital-Acquired Illness or Injury  Outcome: Ongoing, Progressing  Goal: Optimal Comfort and Wellbeing  Outcome: Ongoing, Progressing  Goal: Readiness for Transition of Care  Outcome: Ongoing, Progressing     Problem: Impaired Wound Healing  Goal: Optimal Wound Healing  Outcome: Ongoing, Progressing     Problem: Skin Injury Risk Increased  Goal: Skin Health and Integrity  Outcome: Ongoing, Progressing     Problem: Adjustment to Illness (Sepsis/Septic Shock)  Goal: Optimal Coping  Outcome: Ongoing, Progressing     Problem: Bleeding (Sepsis/Septic Shock)  Goal: Absence of Bleeding  Outcome: Ongoing, Progressing     Problem: Glycemic Control Impaired (Sepsis/Septic Shock)  Goal: Blood Glucose Level Within Desired Range  Outcome: Ongoing, Progressing     Problem: Infection Progression (Sepsis/Septic Shock)  Goal: Absence of Infection Signs and Symptoms  Outcome: Ongoing, Progressing     Problem: Nutrition Impaired (Sepsis/Septic Shock)  Goal: Optimal Nutrition Intake  Outcome: Ongoing, Progressing     Problem: Infection  Goal: Absence of Infection Signs and Symptoms  Outcome: Ongoing, Progressing

## 2023-12-07 NOTE — PLAN OF CARE
Problem: SLP  Goal: SLP Goal  Description: Speech Language Pathology Goals  Goals expected to be met by 12/21    1. Pt will participate in ongoing swallow assessment to determine least restrictive PO diet.  2. Pt will complete objective swallow assessment prior to diet advancement.     Outcome: Ongoing, Progressing     Recommend NPO and MBSS prior to diet advancement. SLP will continue to follow.

## 2023-12-07 NOTE — NURSING
Swallow evaluation done at bedside with the assistance of the Charge Nurse. Pt unable to tolerate thin and thicken liquids. Did not advance to crackers due to patient coughing during evaluation. Anderson Oliva MD notified. No new orders.

## 2023-12-07 NOTE — ASSESSMENT & PLAN NOTE
Currently not on any outpatient antihypertensives.    Plan:  - Sepsis, hold antihypertensive medications  - Daily Vitals, assess need for anti-hypertensive medications   no

## 2023-12-07 NOTE — PROGRESS NOTES
[facility] - Telemetry [facility]  Progress Note    Patient Name: Leonid Cox  MRN: [id number]  Patient Class: IP- Inpatient   Admission Date: 12/4/2023  Length of Stay: 2 days  Attending Physician: Amanda Henderson*  Primary Care Provider: Anushka Telles MD        Subjective:     Principal Problem:Sepsis        HPI:  Leonid Cox is a 43 y.o. Male with PMHx of Cerebral Palsy and Functional Quadriplegia, HTN, HLD, thrombocytopenia, Seizure Disorder, and hydrocephalus s/p  shunt (2012) who presents for 4 days of swelling and erythema of the lower back. There is purulent drainage from the site. The patient denies injury or inciting event. The patient additionally reports cough and sore throat for the past 2-3 days. He denies fevers, chills, SOB, chest pain, abdominal pain, nausea, vomiting, diarrhea, or urinary symptoms.  Care givers at home report patient did feel warm over the past few days.    In the ED, he presented tachycardic and hypertensive with temp 100.2. Following CT scan pt temp increased 102.4 and became tachycardic into the 140s and desaturated, requiring 10 L venturi mask (due to mouth breathing). CMP unremarkable. Albumin 2.6, CRP 79.3. Lactate 0.93, Procalcitonin wnl. UA with positive nitrites, occasional bacteria, and 28 WBC. Blood cultures NGTD. Positive for Influenza B. CT Chest, Abdomen, Pelvis concerning for edema and thickening of subcutaneous fat/skin overlying lower back without organized fluid collection. Scattered bilateral ground glass attenuation of lungs concerning for inflammatory/infectious process. In the ED received vanc loading dose, and 2L IVF, tamiflu.  On my initial evaluation patient was tachycardic, saturating well on 10L venturi mask and in no acute distress. Exam limited due to patient functional quadriplegia but no wheezing, rales, or decreased breath sounds appreciated. Slight tenderness to lower back, however again limited 2/2 pt  "habitus.    Occasionally answering questions inappropriately. A&O to person, and place. Noted in PCP note from 12/4 that "Patient does not appear to have any changes to his personality or baseline." Also noted that the patient has 24 hour sitters at home. Home sitters are not covered for hospital admission, and patient will likely experience distress during inpatient stay so PCP advising 24-hr sitter services during admission.     The history was obtained from the patient, chart review, and ED documentation. No caregivers at bedside during my evaluation.    Overview/Hospital Course:  Mr. Cox is a 43 y.o. M w/ a hx of Cerebral Palsy and Functional Quadriplegia, HTN, HLD, thrombocytopenia, Seizure Disorder, and hydrocephalus s/p  shunt (2012) admitted w/ cellulitis of lower back, a UTI and AHRF 2/2 Flu. In ED, pt tachycardic to 140s, hypertensive to 140s/90s, and febrile to 102.4, hypoxic requiring 10L facemask for O2 sats ~97%, WBC 6.52, hxh 15.1x44.9, plt 182, Na 140, K 3.5, Cl 104, Bicarb 27, , BUN 14, scr 0.8, procal 0.08, CRP 79.3, HIV (-ve), lactate 0.93, Flu B (+) UA w/ 28 WBC, occasional bacteria and leukocyte esterase (+), chest x-ray w/ left basilar atelactasis or infiltrate. Patient started on vancomycin for cellulitis of lower back, and ceftriaxone/azythromycin for CAP and UTI coverage, and Tamiflu for flu treatment. CT CAP w/ cellulitis of lower back w/out abscess, and ground glass opacities of lungs. Patient continued to fever throughout the day despite abx, given tylenol and broadened abx to include zosyn, which lead to resolution of tachycardia and fever. Patient improvement in hypoxia, weaning from face-mask to 2L NC w/ improved oxygen sats, and eventual weaning to RA. SLP evaluated, recommend NPO with MBSS prior to advancing diet. Deescalated broad spectrum abx to CAP coverage with Levaquin for 3 days.     Interval History: No acute events overnight. Patient failed bedside swallow test and " also failed SLP evaluation. Remains NPO with continuous mIVF while NPO. Meds transitioned to IV forms. No IV form of Tamiflu, will hold at this time. MBSS on 12/8.    Review of Systems   Constitutional:  Negative for chills, fatigue and fever.   HENT:  Negative for sore throat.    Eyes:  Negative for visual disturbance.   Respiratory:  Positive for cough. Negative for shortness of breath and wheezing.    Cardiovascular:  Negative for chest pain, palpitations and leg swelling.   Gastrointestinal:  Negative for constipation, diarrhea, nausea and vomiting.   Genitourinary:  Negative for dysuria, frequency and urgency.   Musculoskeletal:  Negative for myalgias.   Skin:  Negative for rash.   Neurological:  Negative for light-headedness.   Psychiatric/Behavioral:  Negative for confusion.      Objective:     Vital Signs (Most Recent):  Temp: 99 °F (37.2 °C) (12/07/23 1637)  Pulse: 80 (12/07/23 1637)  Resp: 19 (12/07/23 1637)  BP: (!) 142/88 (12/07/23 1637)  SpO2: (!) 92 % (12/07/23 1637) Vital Signs (24h Range):  Temp:  [97.2 °F (36.2 °C)-100.2 °F (37.9 °C)] 99 °F (37.2 °C)  Pulse:  [] 80  Resp:  [18-20] 19  SpO2:  [89 %-99 %] 92 %  BP: (118-147)/(80-95) 142/88     Weight: 94.2 kg (207 lb 10.8 oz)  Body mass index is 27.4 kg/m².    Intake/Output Summary (Last 24 hours) at 12/7/2023 8539  Last data filed at 12/7/2023 1006  Gross per 24 hour   Intake 100 ml   Output 581 ml   Net -481 ml         Physical Exam  Constitutional:       General: He is not in acute distress.     Appearance: He is not ill-appearing.      Comments: Patient lying in bed, awake during exam, left arm contracted, calm appearing and cooperative during interview   HENT:      Head: Normocephalic and atraumatic.      Right Ear: External ear normal.      Left Ear: External ear normal.   Eyes:      Extraocular Movements: Extraocular movements intact.      Comments: Wandering eye movements resembling nystagmus  Does not track with eyes   Cardiovascular:  "     Rate and Rhythm: Normal rate and regular rhythm.      Pulses: Normal pulses.      Heart sounds: Normal heart sounds.   Pulmonary:      Effort: Pulmonary effort is normal.      Breath sounds: Normal breath sounds.   Abdominal:      General: Abdomen is flat.      Palpations: Abdomen is soft.   Skin:     General: Skin is warm.      Capillary Refill: Capillary refill takes less than 2 seconds.   Neurological:      Mental Status: He is alert and oriented to person, place, and time.             Significant Labs: All pertinent labs within the past 24 hours have been reviewed.  CBC:   Recent Labs   Lab 12/06/23 0624   WBC 3.50*   HGB 13.9*   HCT 41.3   *     CMP:   Recent Labs   Lab 12/06/23 0624      K 5.0      CO2 21*   GLU 77   BUN 12   CREATININE 0.8   CALCIUM 8.1*   PROT 5.9*   ALBUMIN 2.1*   BILITOT 0.6   ALKPHOS 108   AST 76*   ALT 68*   ANIONGAP 12       Significant Imaging: I have reviewed all pertinent imaging results/findings within the past 24 hours.    Assessment/Plan:      * Sepsis  This patient does have evidence of infective focus  My overall impression is sepsis.  Source: Respiratory and Skin and Soft Tissue (location lower back)  Antibiotics given-   Antibiotics (72h ago, onward)      Start     Stop Route Frequency Ordered    12/07/23 1230  levoFLOXacin 750 mg/150 mL IVPB 750 mg         12/09/23 1229 IV Every 24 hours (non-standard times) 12/07/23 1129          Latest lactate reviewed-  No results for input(s): "LACTATE" in the last 72 hours.  Organ dysfunction indicated by Acute respiratory failure    4 days of swelling and erythema of the lower back. There is purulent drainage from the site. The patient denies injury or inciting event. The patient additionally reports cough and sore throat for the past 2-3 days. He denies fevers, chills, SOB, chest pain, abdominal pain, nausea, vomiting, diarrhea, or urinary symptoms.  Care givers at home report patient did feel warm over the " "past few days.    Presented tachycardic and hypertensive with temp 100.2. Following CT scan pt temp increased 102.4 and became tachycardic into the 140s and desaturated, requiring 10 L venturi mask (due to mouth breathing). CMP unremarkable. Albumin 2.6, CRP 79.3. Lactate 0.93, Procalcitonin wnl. UA with positive nitrites, occasional bacteria, and 28 WBC. Blood cultures NGTD. Positive for Influenza B. CT Chest, Abdomen, Pelvis concerning for edema and thickening of subcutaneous fat/skin overlying lower back without organized fluid collection. Scattered bilateral ground glass attenuation of lungs concerning for inflammatory/infectious process. In the ED received vanc loading dose, and 2L IVF, tamiflu. On my initial evaluation patient was tachycardic, saturating well on 10L venturi mask and in no acute distress. Exam limited due to patient functional quadriplegia but no wheezing, rales, or decreased breath sounds appreciated. Slight tenderness to lower back, however again limited 2/2 pt habitus.    Source combined cellulitis, UTI and influenza    Patient febrile during the day yesterday despite abx, therefore patient broadened to vanc/zosyn/azythro/tamiflu with resolution of SIRS overnight and stable vitals since. Blood cx, Ucx w/ NGTD, suspect presentation largely driven by flu. Patient improving clinically, vancomycin Dcd 12/06 given low concern for MRSA.    Will Not start Pressors- Levophed for MAP of 65  Source control achieved by: Antibiotics    PLAN  - Zosyn and azithromycin transitioned to levaquin  - Oseltamivir 75mg BID, but held currently due to NPO status  - Telemetry  - Strict I/Os    Dysphagia  Patient with aspiration event noted on 12/6 while eating lunch. Subsequent evaluation revealed poor swallowing, unclear if acute or chronic issue.    - SLP consulted, appreciate assistance  - MBSS planned for 12/8    Influenza B  See "Sepsis" A&P Note      Cellulitis of back except buttock  See "Sepsis" A&P " "Note      Functional quadriplegia  See Cerebral Palsy A&P Note      Essential hypertension  Currently not on any outpatient antihypertensives.    Plan:  - Sepsis, hold antihypertensive medications  - Daily Vitals, assess need for anti-hypertensive medications    Seizure disorder  Reports last seizure years ago    Plan:  - Continue home Depakote ( QHS,  Q12)  - Temporarily transitioned to IV due to failure of swallow studies      Cerebral palsy  Full dependence for ADLs with caregivers in home. Wheelchair and bed bound with caregivers present with patient 24-hours. Noted in PCP note from 12/4 that "Patient does not appear to have any changes to his personality or baseline."             VTE Risk Mitigation (From admission, onward)           Ordered     enoxaparin injection 40 mg  Every 24 hours         12/05/23 0928     IP VTE HIGH RISK PATIENT  Once         12/05/23 0522     Place sequential compression device  Until discontinued         12/05/23 0522                    Discharge Planning   ANT: 12/9/2023     Code Status: Full Code   Is the patient medically ready for discharge?: No    Reason for patient still in hospital (select all that apply): Patient new problem, Patient trending condition, Treatment, and Pending disposition  Discharge Plan A: Home (with 24/7 sitters)   Discharge Delays: (!) Change in Medical Condition              Anderson Rossi MD  Department of Hospital Medicine   [facility] - Telemetry Stepdown    "

## 2023-12-08 LAB
ALBUMIN SERPL BCP-MCNC: 2 G/DL (ref 3.5–5.2)
ALP SERPL-CCNC: 262 U/L (ref 55–135)
ALT SERPL W/O P-5'-P-CCNC: 74 U/L (ref 10–44)
ANION GAP SERPL CALC-SCNC: 9 MMOL/L (ref 8–16)
AST SERPL-CCNC: 60 U/L (ref 10–40)
BASOPHILS # BLD AUTO: 0.05 K/UL (ref 0–0.2)
BASOPHILS NFR BLD: 1.5 % (ref 0–1.9)
BILIRUB SERPL-MCNC: 0.5 MG/DL (ref 0.1–1)
BUN SERPL-MCNC: 11 MG/DL (ref 6–20)
CALCIUM SERPL-MCNC: 8.1 MG/DL (ref 8.7–10.5)
CHLORIDE SERPL-SCNC: 108 MMOL/L (ref 95–110)
CO2 SERPL-SCNC: 24 MMOL/L (ref 23–29)
CREAT SERPL-MCNC: 0.8 MG/DL (ref 0.5–1.4)
DIFFERENTIAL METHOD: ABNORMAL
EOSINOPHIL # BLD AUTO: 0.1 K/UL (ref 0–0.5)
EOSINOPHIL NFR BLD: 3.3 % (ref 0–8)
ERYTHROCYTE [DISTWIDTH] IN BLOOD BY AUTOMATED COUNT: 13.2 % (ref 11.5–14.5)
EST. GFR  (NO RACE VARIABLE): >60 ML/MIN/1.73 M^2
GLUCOSE SERPL-MCNC: 88 MG/DL (ref 70–110)
HCT VFR BLD AUTO: 37.6 % (ref 40–54)
HGB BLD-MCNC: 12.6 G/DL (ref 14–18)
IMM GRANULOCYTES # BLD AUTO: 0.12 K/UL (ref 0–0.04)
IMM GRANULOCYTES NFR BLD AUTO: 3.6 % (ref 0–0.5)
LYMPHOCYTES # BLD AUTO: 1.3 K/UL (ref 1–4.8)
LYMPHOCYTES NFR BLD: 38.4 % (ref 18–48)
MAGNESIUM SERPL-MCNC: 1.8 MG/DL (ref 1.6–2.6)
MCH RBC QN AUTO: 29.7 PG (ref 27–31)
MCHC RBC AUTO-ENTMCNC: 33.5 G/DL (ref 32–36)
MCV RBC AUTO: 89 FL (ref 82–98)
MONOCYTES # BLD AUTO: 0.4 K/UL (ref 0.3–1)
MONOCYTES NFR BLD: 12.2 % (ref 4–15)
NEUTROPHILS # BLD AUTO: 1.4 K/UL (ref 1.8–7.7)
NEUTROPHILS NFR BLD: 41 % (ref 38–73)
NRBC BLD-RTO: 0 /100 WBC
PHOSPHATE SERPL-MCNC: 2 MG/DL (ref 2.7–4.5)
PLATELET # BLD AUTO: 130 K/UL (ref 150–450)
PLATELET BLD QL SMEAR: ABNORMAL
PMV BLD AUTO: 9.9 FL (ref 9.2–12.9)
POTASSIUM SERPL-SCNC: 3.5 MMOL/L (ref 3.5–5.1)
PROT SERPL-MCNC: 5.3 G/DL (ref 6–8.4)
RBC # BLD AUTO: 4.24 M/UL (ref 4.6–6.2)
SODIUM SERPL-SCNC: 141 MMOL/L (ref 136–145)
TOXIC GRANULES BLD QL SMEAR: PRESENT
WBC # BLD AUTO: 3.36 K/UL (ref 3.9–12.7)

## 2023-12-08 PROCEDURE — 83735 ASSAY OF MAGNESIUM: CPT

## 2023-12-08 PROCEDURE — 94640 AIRWAY INHALATION TREATMENT: CPT

## 2023-12-08 PROCEDURE — 97535 SELF CARE MNGMENT TRAINING: CPT

## 2023-12-08 PROCEDURE — 63600175 PHARM REV CODE 636 W HCPCS

## 2023-12-08 PROCEDURE — 25000242 PHARM REV CODE 250 ALT 637 W/ HCPCS

## 2023-12-08 PROCEDURE — 27000207 HC ISOLATION

## 2023-12-08 PROCEDURE — 63600175 PHARM REV CODE 636 W HCPCS: Performed by: HOSPITALIST

## 2023-12-08 PROCEDURE — 85025 COMPLETE CBC W/AUTO DIFF WBC: CPT

## 2023-12-08 PROCEDURE — 25000003 PHARM REV CODE 250: Performed by: HOSPITALIST

## 2023-12-08 PROCEDURE — 20600001 HC STEP DOWN PRIVATE ROOM

## 2023-12-08 PROCEDURE — 99900035 HC TECH TIME PER 15 MIN (STAT)

## 2023-12-08 PROCEDURE — 25000003 PHARM REV CODE 250

## 2023-12-08 PROCEDURE — 84100 ASSAY OF PHOSPHORUS: CPT

## 2023-12-08 PROCEDURE — 80053 COMPREHEN METABOLIC PANEL: CPT

## 2023-12-08 PROCEDURE — 27000221 HC OXYGEN, UP TO 24 HOURS

## 2023-12-08 PROCEDURE — 36415 COLL VENOUS BLD VENIPUNCTURE: CPT

## 2023-12-08 PROCEDURE — 92526 ORAL FUNCTION THERAPY: CPT

## 2023-12-08 PROCEDURE — 94761 N-INVAS EAR/PLS OXIMETRY MLT: CPT

## 2023-12-08 RX ADMIN — POTASSIUM PHOSPHATE, MONOBASIC POTASSIUM PHOSPHATE, DIBASIC 15 MMOL: 224; 236 INJECTION, SOLUTION, CONCENTRATE INTRAVENOUS at 01:12

## 2023-12-08 RX ADMIN — DEXTROSE MONOHYDRATE 250 MG: 50 INJECTION, SOLUTION INTRAVENOUS at 05:12

## 2023-12-08 RX ADMIN — DEXTROSE MONOHYDRATE 250 MG: 50 INJECTION, SOLUTION INTRAVENOUS at 12:12

## 2023-12-08 RX ADMIN — LEVALBUTEROL 1.25 MG: 1.25 SOLUTION, CONCENTRATE RESPIRATORY (INHALATION) at 03:12

## 2023-12-08 RX ADMIN — LEVOFLOXACIN 750 MG: 750 INJECTION, SOLUTION INTRAVENOUS at 12:12

## 2023-12-08 RX ADMIN — DEXTROSE MONOHYDRATE 250 MG: 50 INJECTION, SOLUTION INTRAVENOUS at 06:12

## 2023-12-08 RX ADMIN — LEVALBUTEROL 1.25 MG: 1.25 SOLUTION, CONCENTRATE RESPIRATORY (INHALATION) at 07:12

## 2023-12-08 RX ADMIN — ENOXAPARIN SODIUM 40 MG: 40 INJECTION SUBCUTANEOUS at 05:12

## 2023-12-08 NOTE — PLAN OF CARE
CHW met with patient/family at bedside. Patient experience rounding completed and reviewed the following.     Do you know your discharge plan? Yes,    If yes, what is the plan? Home    Have you discussed your needs and preferences with your SW/CM? Yes    If you are discharging home, do you have help at home? Yes    Do you think you will need help additional at home at discharge? No     Do you currently have difficulty keeping up with bills, affording medicine or buying food? No    Assigned SW/CM notified of any patient/family needs or concerns. Appropriate resources provided to address patient's needs.

## 2023-12-08 NOTE — PROGRESS NOTES
[facility] - Telemetry [facility] Medicine  Progress Note    Patient Name: Leonid Cox  MRN: [id number]  Patient Class: IP- Inpatient   Admission Date: 12/4/2023  Length of Stay: 3 days  Attending Physician: Amanda Henderson*  Primary Care Provider: Anushka Telles MD        Subjective:     Principal Problem:Sepsis        HPI:  Leonid Cox is a 43 y.o. Male with PMHx of Cerebral Palsy and Functional Quadriplegia, HTN, HLD, thrombocytopenia, Seizure Disorder, and hydrocephalus s/p  shunt (2012) who presents for 4 days of swelling and erythema of the lower back. There is purulent drainage from the site. The patient denies injury or inciting event. The patient additionally reports cough and sore throat for the past 2-3 days. He denies fevers, chills, SOB, chest pain, abdominal pain, nausea, vomiting, diarrhea, or urinary symptoms.  Care givers at home report patient did feel warm over the past few days.    In the ED, he presented tachycardic and hypertensive with temp 100.2. Following CT scan pt temp increased 102.4 and became tachycardic into the 140s and desaturated, requiring 10 L venturi mask (due to mouth breathing). CMP unremarkable. Albumin 2.6, CRP 79.3. Lactate 0.93, Procalcitonin wnl. UA with positive nitrites, occasional bacteria, and 28 WBC. Blood cultures NGTD. Positive for Influenza B. CT Chest, Abdomen, Pelvis concerning for edema and thickening of subcutaneous fat/skin overlying lower back without organized fluid collection. Scattered bilateral ground glass attenuation of lungs concerning for inflammatory/infectious process. In the ED received vanc loading dose, and 2L IVF, tamiflu.  On my initial evaluation patient was tachycardic, saturating well on 10L venturi mask and in no acute distress. Exam limited due to patient functional quadriplegia but no wheezing, rales, or decreased breath sounds appreciated. Slight tenderness to lower back, however again limited 2/2 pt  "habitus.    Occasionally answering questions inappropriately. A&O to person, and place. Noted in PCP note from 12/4 that "Patient does not appear to have any changes to his personality or baseline." Also noted that the patient has 24 hour sitters at home. Home sitters are not covered for hospital admission, and patient will likely experience distress during inpatient stay so PCP advising 24-hr sitter services during admission.     The history was obtained from the patient, chart review, and ED documentation. No caregivers at bedside during my evaluation.    Overview/Hospital Course:  Mr. Cox is a 43 y.o. M w/ a hx of Cerebral Palsy and Functional Quadriplegia, HTN, HLD, thrombocytopenia, Seizure Disorder, and hydrocephalus s/p  shunt (2012) admitted w/ cellulitis of lower back, a UTI and AHRF 2/2 Flu. In ED, pt tachycardic to 140s, hypertensive to 140s/90s, and febrile to 102.4, hypoxic requiring 10L facemask for O2 sats ~97%, WBC 6.52, hxh 15.1x44.9, plt 182, Na 140, K 3.5, Cl 104, Bicarb 27, , BUN 14, scr 0.8, procal 0.08, CRP 79.3, HIV (-ve), lactate 0.93, Flu B (+) UA w/ 28 WBC, occasional bacteria and leukocyte esterase (+), chest x-ray w/ left basilar atelactasis or infiltrate. Patient started on vancomycin for cellulitis of lower back, and ceftriaxone/azythromycin for CAP and UTI coverage, and Tamiflu for flu treatment. CT CAP w/ cellulitis of lower back w/out abscess, and ground glass opacities of lungs. Patient continued to fever throughout the day despite abx, given tylenol and broadened abx to include zosyn, which lead to resolution of tachycardia and fever. Patient improvement in hypoxia, weaning from face-mask to 2L NC w/ improved oxygen sats, and eventual weaning to RA. SLP evaluated, recommend NPO with MBSS prior to advancing diet. Deescalated broad spectrum abx to CAP coverage with Levaquin for 3 days. Patient passed bedside swallow study per SLP    Interval History: Patient seen by SLP and " cleared for pureed diet on bedside swallow. Patient otherwise reports he is doing well, has had several BM and reports no acute complaints.     Review of Systems   Constitutional:  Negative for chills, fatigue and fever.   HENT:  Negative for sore throat.    Eyes:  Negative for visual disturbance.   Respiratory:  Positive for cough. Negative for choking and shortness of breath.    Cardiovascular:  Negative for chest pain, palpitations and leg swelling.   Gastrointestinal:  Negative for abdominal pain, constipation, diarrhea, nausea and vomiting.   Genitourinary:  Negative for dysuria and urgency.   Musculoskeletal:  Negative for myalgias.   Skin:  Negative for rash.   Neurological:  Negative for syncope and headaches.     Objective:     Vital Signs (Most Recent):  Temp: 96.5 °F (35.8 °C) (12/08/23 1222)  Pulse: 83 (12/08/23 1222)  Resp: 20 (12/08/23 1222)  BP: (!) 143/91 (12/08/23 1222)  SpO2: (!) 92 % (12/08/23 1222) Vital Signs (24h Range):  Temp:  [96.5 °F (35.8 °C)-99 °F (37.2 °C)] 96.5 °F (35.8 °C)  Pulse:  [80-95] 83  Resp:  [17-20] 20  SpO2:  [90 %-100 %] 92 %  BP: (132-171)/(79-93) 143/91     Weight: 94.2 kg (207 lb 10.8 oz)  Body mass index is 27.4 kg/m².    Intake/Output Summary (Last 24 hours) at 12/8/2023 1241  Last data filed at 12/8/2023 0535  Gross per 24 hour   Intake --   Output 950 ml   Net -950 ml         Physical Exam  Constitutional:       General: He is not in acute distress.     Appearance: He is not ill-appearing.      Comments: Patient lying in bed on side, awake and alert during exam   HENT:      Head: Normocephalic.      Right Ear: External ear normal.      Left Ear: External ear normal.   Eyes:      Extraocular Movements: Extraocular movements intact.   Cardiovascular:      Rate and Rhythm: Normal rate and regular rhythm.      Pulses: Normal pulses.   Pulmonary:      Effort: Pulmonary effort is normal.      Breath sounds: Normal breath sounds.   Abdominal:      General: Abdomen is flat.  "     Palpations: Abdomen is soft.   Skin:     Capillary Refill: Capillary refill takes less than 2 seconds.   Neurological:      Mental Status: He is alert and oriented to person, place, and time.             Significant Labs: All pertinent labs within the past 24 hours have been reviewed.  CBC:   Recent Labs   Lab 12/08/23  0559   WBC 3.36*   HGB 12.6*   HCT 37.6*   *     CMP:   Recent Labs   Lab 12/08/23  0559      K 3.5      CO2 24   GLU 88   BUN 11   CREATININE 0.8   CALCIUM 8.1*   PROT 5.3*   ALBUMIN 2.0*   BILITOT 0.5   ALKPHOS 262*   AST 60*   ALT 74*   ANIONGAP 9       Significant Imaging: I have reviewed all pertinent imaging results/findings within the past 24 hours.    Assessment/Plan:      * Sepsis  This patient does have evidence of infective focus  My overall impression is sepsis.  Source: Respiratory and Skin and Soft Tissue (location lower back)  Antibiotics given-   Antibiotics (72h ago, onward)      Start     Stop Route Frequency Ordered    12/07/23 1230  levoFLOXacin 750 mg/150 mL IVPB 750 mg         12/09/23 1229 IV Every 24 hours (non-standard times) 12/07/23 1129          Latest lactate reviewed-  No results for input(s): "LACTATE" in the last 72 hours.  Organ dysfunction indicated by Acute respiratory failure    4 days of swelling and erythema of the lower back. There is purulent drainage from the site. The patient denies injury or inciting event. The patient additionally reports cough and sore throat for the past 2-3 days. He denies fevers, chills, SOB, chest pain, abdominal pain, nausea, vomiting, diarrhea, or urinary symptoms.  Care givers at home report patient did feel warm over the past few days.    Presented tachycardic and hypertensive with temp 100.2. Following CT scan pt temp increased 102.4 and became tachycardic into the 140s and desaturated, requiring 10 L venturi mask (due to mouth breathing). CMP unremarkable. Albumin 2.6, CRP 79.3. Lactate 0.93, Procalcitonin " "wnl. UA with positive nitrites, occasional bacteria, and 28 WBC. Blood cultures NGTD. Positive for Influenza B. CT Chest, Abdomen, Pelvis concerning for edema and thickening of subcutaneous fat/skin overlying lower back without organized fluid collection. Scattered bilateral ground glass attenuation of lungs concerning for inflammatory/infectious process. In the ED received vanc loading dose, and 2L IVF, tamiflu. On my initial evaluation patient was tachycardic, saturating well on 10L venturi mask and in no acute distress. Exam limited due to patient functional quadriplegia but no wheezing, rales, or decreased breath sounds appreciated. Slight tenderness to lower back, however again limited 2/2 pt habitus.    Source combined cellulitis, UTI and influenza    Patient febrile during the day yesterday despite abx, therefore patient broadened to vanc/zosyn/azythro/tamiflu with resolution of SIRS overnight and stable vitals since. Blood cx, Ucx w/ NGTD, suspect presentation largely driven by flu. Patient improving clinically, vancomycin Dcd 12/06 given low concern for MRSA.    Will Not start Pressors- Levophed for MAP of 65  Source control achieved by: Antibiotics    PLAN  - Continue levaquin  - Oseltamivir 75mg BID, but held currently due to diet restrictions  - Telemetry  - Strict I/Os    Dysphagia  Patient with aspiration event noted on 12/6 while eating lunch. Subsequent evaluation revealed poor swallowing, unclear if acute or chronic issue. Patient passed bedside swallow study, per SLP, it likely seems more like coughing is the main issue as opposed to dysphagia.    - Pureed diet  - Advance as tolerated    Influenza B  See "Sepsis" A&P Note      Cellulitis of back except buttock  See "Sepsis" A&P Note      Functional quadriplegia  See Cerebral Palsy A&P Note      Essential hypertension  Currently not on any outpatient antihypertensives.    Plan:  - Sepsis, hold antihypertensive medications  - Daily Vitals, assess " "need for anti-hypertensive medications    Seizure disorder  Reports last seizure years ago    Plan:  - Continue home Depakote ( QHS,  Q12)  - Temporarily transitioned to IV due to failure of swallow studies      Cerebral palsy  Full dependence for ADLs with caregivers in home. Wheelchair and bed bound with caregivers present with patient 24-hours. Noted in PCP note from 12/4 that "Patient does not appear to have any changes to his personality or baseline."             VTE Risk Mitigation (From admission, onward)           Ordered     enoxaparin injection 40 mg  Every 24 hours         12/05/23 0928     IP VTE HIGH RISK PATIENT  Once         12/05/23 0522     Place sequential compression device  Until discontinued         12/05/23 0522                    Discharge Planning   ANT: 12/9/2023     Code Status: Full Code   Is the patient medically ready for discharge?: No    Reason for patient still in hospital (select all that apply): Patient trending condition  Discharge Plan A: Home (with 24/7 sitters)   Discharge Delays: None known at this time              Andi Garcia MD  Department of Hospital Medicine   [facility] - Telemetry Stepdown    "

## 2023-12-08 NOTE — SUBJECTIVE & OBJECTIVE
Interval History: Patient seen by SLP and cleared for pureed diet on bedside swallow. Patient otherwise reports he is doing well, has had several BM and reports no acute complaints.     Review of Systems   Constitutional:  Negative for chills, fatigue and fever.   HENT:  Negative for sore throat.    Eyes:  Negative for visual disturbance.   Respiratory:  Positive for cough. Negative for choking and shortness of breath.    Cardiovascular:  Negative for chest pain, palpitations and leg swelling.   Gastrointestinal:  Negative for abdominal pain, constipation, diarrhea, nausea and vomiting.   Genitourinary:  Negative for dysuria and urgency.   Musculoskeletal:  Negative for myalgias.   Skin:  Negative for rash.   Neurological:  Negative for syncope and headaches.     Objective:     Vital Signs (Most Recent):  Temp: 96.5 °F (35.8 °C) (12/08/23 1222)  Pulse: 83 (12/08/23 1222)  Resp: 20 (12/08/23 1222)  BP: (!) 143/91 (12/08/23 1222)  SpO2: (!) 92 % (12/08/23 1222) Vital Signs (24h Range):  Temp:  [96.5 °F (35.8 °C)-99 °F (37.2 °C)] 96.5 °F (35.8 °C)  Pulse:  [80-95] 83  Resp:  [17-20] 20  SpO2:  [90 %-100 %] 92 %  BP: (132-171)/(79-93) 143/91     Weight: 94.2 kg (207 lb 10.8 oz)  Body mass index is 27.4 kg/m².    Intake/Output Summary (Last 24 hours) at 12/8/2023 1241  Last data filed at 12/8/2023 0535  Gross per 24 hour   Intake --   Output 950 ml   Net -950 ml         Physical Exam  Constitutional:       General: He is not in acute distress.     Appearance: He is not ill-appearing.      Comments: Patient lying in bed on side, awake and alert during exam   HENT:      Head: Normocephalic.      Right Ear: External ear normal.      Left Ear: External ear normal.   Eyes:      Extraocular Movements: Extraocular movements intact.   Cardiovascular:      Rate and Rhythm: Normal rate and regular rhythm.      Pulses: Normal pulses.   Pulmonary:      Effort: Pulmonary effort is normal.      Breath sounds: Normal breath sounds.    Abdominal:      General: Abdomen is flat.      Palpations: Abdomen is soft.   Skin:     Capillary Refill: Capillary refill takes less than 2 seconds.   Neurological:      Mental Status: He is alert and oriented to person, place, and time.             Significant Labs: All pertinent labs within the past 24 hours have been reviewed.  CBC:   Recent Labs   Lab 12/08/23  0559   WBC 3.36*   HGB 12.6*   HCT 37.6*   *     CMP:   Recent Labs   Lab 12/08/23  0559      K 3.5      CO2 24   GLU 88   BUN 11   CREATININE 0.8   CALCIUM 8.1*   PROT 5.3*   ALBUMIN 2.0*   BILITOT 0.5   ALKPHOS 262*   AST 60*   ALT 74*   ANIONGAP 9       Significant Imaging: I have reviewed all pertinent imaging results/findings within the past 24 hours.

## 2023-12-08 NOTE — PLAN OF CARE
[facility] - Telemetry Stepdown  Discharge Reassessment    Primary Care Provider: Anushka Telles MD    Expected Discharge Date: 12/9/2023    Reassessment (most recent)       Discharge Reassessment - 12/08/23 1204          Discharge Reassessment    Assessment Type Discharge Planning Reassessment     Did the patient's condition or plan change since previous assessment? No     Communicated ANT with patient/caregiver Yes     Discharge Plan A Home   with 24/7 sitters    Discharge Plan B Home;Home Health     DME Needed Upon Discharge  none     Transition of Care Barriers None     Why the patient remains in the hospital Requires continued medical care        Post-Acute Status    Post-Acute Authorization --   TBD    Discharge Delays None known at this time                 Discharge Plan A and Plan B have been determined by review of patient's clinical status, future medical and therapeutic needs, and coverage/benefits for post-acute care in coordination with multidisciplinary team members.    Comfort Farley, LISSY  [facility]  Ext. [phone]

## 2023-12-08 NOTE — ASSESSMENT & PLAN NOTE
"This patient does have evidence of infective focus  My overall impression is sepsis.  Source: Respiratory and Skin and Soft Tissue (location lower back)  Antibiotics given-   Antibiotics (72h ago, onward)      Start     Stop Route Frequency Ordered    12/07/23 1230  levoFLOXacin 750 mg/150 mL IVPB 750 mg         12/09/23 1229 IV Every 24 hours (non-standard times) 12/07/23 1129          Latest lactate reviewed-  No results for input(s): "LACTATE" in the last 72 hours.  Organ dysfunction indicated by Acute respiratory failure    4 days of swelling and erythema of the lower back. There is purulent drainage from the site. The patient denies injury or inciting event. The patient additionally reports cough and sore throat for the past 2-3 days. He denies fevers, chills, SOB, chest pain, abdominal pain, nausea, vomiting, diarrhea, or urinary symptoms.  Care givers at home report patient did feel warm over the past few days.    Presented tachycardic and hypertensive with temp 100.2. Following CT scan pt temp increased 102.4 and became tachycardic into the 140s and desaturated, requiring 10 L venturi mask (due to mouth breathing). CMP unremarkable. Albumin 2.6, CRP 79.3. Lactate 0.93, Procalcitonin wnl. UA with positive nitrites, occasional bacteria, and 28 WBC. Blood cultures NGTD. Positive for Influenza B. CT Chest, Abdomen, Pelvis concerning for edema and thickening of subcutaneous fat/skin overlying lower back without organized fluid collection. Scattered bilateral ground glass attenuation of lungs concerning for inflammatory/infectious process. In the ED received vanc loading dose, and 2L IVF, tamiflu. On my initial evaluation patient was tachycardic, saturating well on 10L venturi mask and in no acute distress. Exam limited due to patient functional quadriplegia but no wheezing, rales, or decreased breath sounds appreciated. Slight tenderness to lower back, however again limited 2/2 pt habitus.    Source combined " cellulitis, UTI and influenza    Patient febrile during the day yesterday despite abx, therefore patient broadened to vanc/zosyn/azythro/tamiflu with resolution of SIRS overnight and stable vitals since. Blood cx, Ucx w/ NGTD, suspect presentation largely driven by flu. Patient improving clinically, vancomycin Dcd 12/06 given low concern for MRSA.    Will Not start Pressors- Levophed for MAP of 65  Source control achieved by: Antibiotics    PLAN  - Continue levaquin  - Oseltamivir 75mg BID, but held currently due to diet restrictions  - Telemetry  - Strict I/Os

## 2023-12-08 NOTE — PT/OT/SLP PROGRESS
Speech Language Pathology Treatment    Patient Name:  Leonid Cox   MRN:  [id number]  Admitting Diagnosis: Sepsis    Recommendations:                 General Recommendations:  Dysphagia therapy  Diet recommendations:  Puree Diet - IDDSI Level 4, Liquid Diet Level: Thin liquids - IDDSI Level 0   Aspiration Precautions: 1 bite/sip at a time, Assistance with meals, Avoid talking while eating, Double swallow with each bite/sip, Feed only when awake/alert, HOB to 90 degrees, Meds crushed in puree, Remain upright 30 minutes post meal, Small bites/sips, and Strict aspiration precautions   General Precautions: Standard, aspiration, fall, pureed diet  Communication strategies:  none and go to room if call light pushed    Assessment:     Leonid Cox is a 43 y.o. male with an SLP diagnosis of Dysphagia.     Subjective     MBSS schedule for this AM, though pt refusing study despite prompting and encouragement. Session cleared with RN. Pt awake/alert and agreeable to ST. No family/caregiver present.     Pain/Comfort:  Pain Rating 1: 0/10  Pain Rating Post-Intervention 1: 0/10    Respiratory Status: Room air    Objective:     Has the patient been evaluated by SLP for swallowing?   Yes  Keep patient NPO? No      Pt seen bedside for ongoing swallow assessment. HOB raised and feed assist provided. Pt consumed 3 oz thin water from tsp, cup rim and straw, 1 oz of NTL from cup rim and straw, tsp puree x8 and cracker x1/2. He demonstrated prolonged and reduced mastication, benefiting from mixing cracker with pudding to improve mastication and oral clearance. He demonstrated appearance of delayed swallow trigger, multiple swallows, intermittent audible swallows and coughing x1 on initial sip of thin liquids and across all trials of dry cracker. Difficult to discern tolerance at bedside given dry cough at baseline, though pt appeared to tolerate small sips of thin liquids from tsp or straw and pure solids without over s/sx of airway  compromise. Recommend he be placed on a puree and thin diet at this time. Education provided re: role of SLP, diet recs, swallow precs, s/s aspiration and POC.  Pt verbalized understanding and agreement. He requires meds crushed in 1/2 tsp puree, feed assist ad ongoing monitoring for overt s/sx of airway compromise. RN and team update on impressions and recommendations post session.     Goals: Ongoing, pt refused MBSS 12/8  Multidisciplinary Problems       SLP Goals          Problem: SLP    Goal Priority Disciplines Outcome   SLP Goal     SLP Ongoing, Progressing   Description: Speech Language Pathology Goals  Goals expected to be met by 12/21    1. Pt will participate in ongoing swallow assessment to determine least restrictive PO diet.  2. Pt will complete objective swallow assessment prior to diet advancement.                                Plan:     Patient to be seen:  4 x/week   Plan of Care expires:  01/06/24  Plan of Care reviewed with:  patient   SLP Follow-Up:  Yes       Discharge recommendations:  Moderate Intensity Therapy   Barriers to Discharge:  Level of Skilled Assistance Needed      Time Tracking:     SLP Treatment Date:   12/08/23  Speech Session 1:  [id number]  Speech Session 2:  [id number]     Speech Total Time (min):  17 min + 10= 27 min    Billable Minutes: Treatment Swallowing Dysfunction 12 and Self Care/Home Management Training 15    12/08/2023

## 2023-12-08 NOTE — PLAN OF CARE
Pt in bed NAD, safety measures in progress, call light in reach, bed in lowest position, bed alarm on, no complaints voiced.         Problem: Adult Inpatient Plan of Care  Goal: Plan of Care Review  Outcome: Ongoing, Progressing  Goal: Patient-Specific Goal (Individualized)  Outcome: Ongoing, Progressing  Goal: Absence of Hospital-Acquired Illness or Injury  Outcome: Ongoing, Progressing  Goal: Optimal Comfort and Wellbeing  Outcome: Ongoing, Progressing  Goal: Readiness for Transition of Care  Outcome: Ongoing, Progressing     Problem: Impaired Wound Healing  Goal: Optimal Wound Healing  Outcome: Ongoing, Progressing     Problem: Skin Injury Risk Increased  Goal: Skin Health and Integrity  Outcome: Ongoing, Progressing     Problem: Adjustment to Illness (Sepsis/Septic Shock)  Goal: Optimal Coping  Outcome: Ongoing, Progressing     Problem: Bleeding (Sepsis/Septic Shock)  Goal: Absence of Bleeding  Outcome: Ongoing, Progressing

## 2023-12-09 VITALS
SYSTOLIC BLOOD PRESSURE: 165 MMHG | TEMPERATURE: 97 F | OXYGEN SATURATION: 96 % | HEART RATE: 70 BPM | RESPIRATION RATE: 18 BRPM | BODY MASS INDEX: 27.53 KG/M2 | DIASTOLIC BLOOD PRESSURE: 84 MMHG | WEIGHT: 207.69 LBS | HEIGHT: 73 IN

## 2023-12-09 LAB
ALBUMIN SERPL BCP-MCNC: 2.1 G/DL (ref 3.5–5.2)
ALP SERPL-CCNC: 298 U/L (ref 55–135)
ALT SERPL W/O P-5'-P-CCNC: 75 U/L (ref 10–44)
ANION GAP SERPL CALC-SCNC: 10 MMOL/L (ref 8–16)
AST SERPL-CCNC: 61 U/L (ref 10–40)
BACTERIA BLD CULT: NORMAL
BACTERIA BLD CULT: NORMAL
BASOPHILS # BLD AUTO: ABNORMAL K/UL (ref 0–0.2)
BASOPHILS NFR BLD: 0 % (ref 0–1.9)
BILIRUB SERPL-MCNC: 0.5 MG/DL (ref 0.1–1)
BUN SERPL-MCNC: 12 MG/DL (ref 6–20)
CALCIUM SERPL-MCNC: 8.4 MG/DL (ref 8.7–10.5)
CHLORIDE SERPL-SCNC: 110 MMOL/L (ref 95–110)
CO2 SERPL-SCNC: 22 MMOL/L (ref 23–29)
CREAT SERPL-MCNC: 0.8 MG/DL (ref 0.5–1.4)
DIFFERENTIAL METHOD: ABNORMAL
EOSINOPHIL # BLD AUTO: ABNORMAL K/UL (ref 0–0.5)
EOSINOPHIL NFR BLD: 5 % (ref 0–8)
ERYTHROCYTE [DISTWIDTH] IN BLOOD BY AUTOMATED COUNT: 13.4 % (ref 11.5–14.5)
EST. GFR  (NO RACE VARIABLE): >60 ML/MIN/1.73 M^2
GLUCOSE SERPL-MCNC: 79 MG/DL (ref 70–110)
HCT VFR BLD AUTO: 36.8 % (ref 40–54)
HGB BLD-MCNC: 12.3 G/DL (ref 14–18)
IMM GRANULOCYTES # BLD AUTO: ABNORMAL K/UL (ref 0–0.04)
IMM GRANULOCYTES NFR BLD AUTO: ABNORMAL % (ref 0–0.5)
LYMPHOCYTES # BLD AUTO: ABNORMAL K/UL (ref 1–4.8)
LYMPHOCYTES NFR BLD: 37 % (ref 18–48)
MAGNESIUM SERPL-MCNC: 1.8 MG/DL (ref 1.6–2.6)
MCH RBC QN AUTO: 29 PG (ref 27–31)
MCHC RBC AUTO-ENTMCNC: 33.4 G/DL (ref 32–36)
MCV RBC AUTO: 87 FL (ref 82–98)
METAMYELOCYTES NFR BLD MANUAL: 2 %
MONOCYTES # BLD AUTO: ABNORMAL K/UL (ref 0.3–1)
MONOCYTES NFR BLD: 5 % (ref 4–15)
NEUTROPHILS NFR BLD: 48 % (ref 38–73)
NEUTS BAND NFR BLD MANUAL: 2 %
NRBC BLD-RTO: 0 /100 WBC
PHOSPHATE SERPL-MCNC: 2.7 MG/DL (ref 2.7–4.5)
PLATELET # BLD AUTO: 146 K/UL (ref 150–450)
PMV BLD AUTO: 9.7 FL (ref 9.2–12.9)
POTASSIUM SERPL-SCNC: 4 MMOL/L (ref 3.5–5.1)
PROMYELOCYTES NFR BLD MANUAL: 1 %
PROT SERPL-MCNC: 5.5 G/DL (ref 6–8.4)
RBC # BLD AUTO: 4.24 M/UL (ref 4.6–6.2)
SODIUM SERPL-SCNC: 142 MMOL/L (ref 136–145)
WBC # BLD AUTO: 4.48 K/UL (ref 3.9–12.7)

## 2023-12-09 PROCEDURE — 94640 AIRWAY INHALATION TREATMENT: CPT

## 2023-12-09 PROCEDURE — 36415 COLL VENOUS BLD VENIPUNCTURE: CPT

## 2023-12-09 PROCEDURE — 63600175 PHARM REV CODE 636 W HCPCS

## 2023-12-09 PROCEDURE — 99900035 HC TECH TIME PER 15 MIN (STAT)

## 2023-12-09 PROCEDURE — 25000242 PHARM REV CODE 250 ALT 637 W/ HCPCS

## 2023-12-09 PROCEDURE — 25000003 PHARM REV CODE 250: Performed by: HOSPITALIST

## 2023-12-09 PROCEDURE — 94761 N-INVAS EAR/PLS OXIMETRY MLT: CPT

## 2023-12-09 PROCEDURE — 80053 COMPREHEN METABOLIC PANEL: CPT

## 2023-12-09 PROCEDURE — 84100 ASSAY OF PHOSPHORUS: CPT

## 2023-12-09 PROCEDURE — 83735 ASSAY OF MAGNESIUM: CPT

## 2023-12-09 PROCEDURE — 85007 BL SMEAR W/DIFF WBC COUNT: CPT

## 2023-12-09 PROCEDURE — 85027 COMPLETE CBC AUTOMATED: CPT

## 2023-12-09 RX ADMIN — DEXTROSE MONOHYDRATE 250 MG: 50 INJECTION, SOLUTION INTRAVENOUS at 12:12

## 2023-12-09 RX ADMIN — ENOXAPARIN SODIUM 40 MG: 40 INJECTION SUBCUTANEOUS at 04:12

## 2023-12-09 RX ADMIN — LEVALBUTEROL 1.25 MG: 1.25 SOLUTION, CONCENTRATE RESPIRATORY (INHALATION) at 08:12

## 2023-12-09 RX ADMIN — FLUTICASONE PROPIONATE 50 MCG: 50 SPRAY, METERED NASAL at 08:12

## 2023-12-09 NOTE — NURSING
Patient being discharge home with caregiver. Discharge instructions review with c/g. Remove IV access x2 and telemetry monitor.

## 2023-12-09 NOTE — DISCHARGE SUMMARY
[facility] - Telemetry [facility] Medicine  Discharge Summary      Patient Name: Leonid Cox  MRN: [id number]  AGUSTIN: [id number]  Patient Class: IP- Inpatient  Admission Date: 12/4/2023  Hospital Length of Stay: 4 days  Discharge Date and Time:  12/09/2023 9:21 AM  Attending Physician: Amanda Henderson*   Discharging Provider: Andi Garcia MD  Primary Care Provider: Anushka Telles MD  [facility] Medicine Team: [facility] 2 Andi Garcia MD  Primary Care Team: [facility]    HPI:   Leonid Cox is a 43 y.o. Male with PMHx of Cerebral Palsy and Functional Quadriplegia, HTN, HLD, thrombocytopenia, Seizure Disorder, and hydrocephalus s/p  shunt (2012) who presents for 4 days of swelling and erythema of the lower back. There is purulent drainage from the site. The patient denies injury or inciting event. The patient additionally reports cough and sore throat for the past 2-3 days. He denies fevers, chills, SOB, chest pain, abdominal pain, nausea, vomiting, diarrhea, or urinary symptoms.  Care givers at home report patient did feel warm over the past few days.    In the ED, he presented tachycardic and hypertensive with temp 100.2. Following CT scan pt temp increased 102.4 and became tachycardic into the 140s and desaturated, requiring 10 L venturi mask (due to mouth breathing). CMP unremarkable. Albumin 2.6, CRP 79.3. Lactate 0.93, Procalcitonin wnl. UA with positive nitrites, occasional bacteria, and 28 WBC. Blood cultures NGTD. Positive for Influenza B. CT Chest, Abdomen, Pelvis concerning for edema and thickening of subcutaneous fat/skin overlying lower back without organized fluid collection. Scattered bilateral ground glass attenuation of lungs concerning for inflammatory/infectious process. In the ED received vanc loading dose, and 2L IVF, tamiflu.  On my initial evaluation patient was tachycardic, saturating well on 10L venturi mask and in no acute distress. Exam limited due to patient  "functional quadriplegia but no wheezing, rales, or decreased breath sounds appreciated. Slight tenderness to lower back, however again limited 2/2 pt habitus.    Occasionally answering questions inappropriately. A&O to person, and place. Noted in PCP note from 12/4 that "Patient does not appear to have any changes to his personality or baseline." Also noted that the patient has 24 hour sitters at home. Home sitters are not covered for hospital admission, and patient will likely experience distress during inpatient stay so PCP advising 24-hr sitter services during admission.     The history was obtained from the patient, chart review, and ED documentation. No caregivers at bedside during my evaluation.    * No surgery found *      Hospital Course:   Mr. Cox is a 43 y.o. M w/ a hx of Cerebral Palsy and Functional Quadriplegia, HTN, HLD, thrombocytopenia, Seizure Disorder, and hydrocephalus s/p  shunt (2012) admitted w/ cellulitis of lower back, a UTI and AHRF 2/2 Flu. In ED, pt tachycardic to 140s, hypertensive to 140s/90s, and febrile to 102.4, hypoxic requiring 10L facemask for O2 sats ~97%, WBC 6.52, hxh 15.1x44.9, plt 182, Na 140, K 3.5, Cl 104, Bicarb 27, , BUN 14, scr 0.8, procal 0.08, CRP 79.3, HIV (-ve), lactate 0.93, Flu B (+) UA w/ 28 WBC, occasional bacteria and leukocyte esterase (+), chest x-ray w/ left basilar atelactasis or infiltrate. Patient started on vancomycin for cellulitis of lower back, and ceftriaxone/azythromycin for CAP and UTI coverage, and Tamiflu for flu treatment. CT CAP w/ cellulitis of lower back w/out abscess, and ground glass opacities of lungs. Patient continued to fever throughout the day despite abx, given tylenol and broadened abx to include zosyn, which lead to resolution of tachycardia and fever. Patient improvement in hypoxia, weaning from face-mask to 2L NC w/ improved oxygen sats, and eventual weaning to RA. SLP evaluated, recommend NPO with MBSS prior to advancing " diet. Deescalated broad spectrum abx to CAP coverage with Levaquin for 3 days. Patient passed bedside swallow study per SLP. Patient requiring hx for assistance with swallowing difficulties and wound care for his sacral wound, communicated with primary guardian and sitter  who are in agreement for these services. Patient deemed medically stable for discharge, to follow-up with primary care physician at regular visit.     Goals of Care Treatment Preferences:  Code Status: Full Code    Living Will: Yes  LaPOST: Yes  What is most important right now is to focus on spending time at home, improvement in condition but with limits to invasive therapies, comfort and QOL .  Accordingly, we have decided that the best plan to meet the patient's goals includes continuing with treatment.    Physical Exam  Constitutional:       General: He is not in acute distress.     Appearance: He is not ill-appearing.      Comments: Patient lying in bed on side, awake and alert during exam   HENT:      Head: Normocephalic.      Right Ear: External ear normal.      Left Ear: External ear normal.   Eyes:      Extraocular Movements: Extraocular movements intact.   Cardiovascular:      Rate and Rhythm: Normal rate and regular rhythm.      Pulses: Normal pulses.   Pulmonary:      Effort: Pulmonary effort is normal.      Breath sounds: Normal breath sounds.   Abdominal:      General: Abdomen is flat.      Palpations: Abdomen is soft.   Skin:     Capillary Refill: Capillary refill takes less than 2 seconds.   Neurological:      Mental Status: He is alert and oriented to person, place, and time.       Consults:   Consults (From admission, onward)          Status Ordering Provider     Inpatient consult to Social Work  Once        Provider:  (Not yet assigned)    Acknowledged DIDI SANCHEZ            No new Assessment & Plan notes have been filed under this hospital service since the last note was generated.  Service: [facility]  Medicine    Final Active Diagnoses:    Diagnosis Date Noted POA    PRINCIPAL PROBLEM:  Sepsis [A41.9] 12/05/2023 Yes    Dysphagia [R13.10] 12/07/2023 Yes    Cellulitis of back except buttock [L03.312] 12/05/2023 Yes    Influenza B [J10.1] 12/05/2023 Yes    Functional quadriplegia [R53.2] 11/30/2020 Yes    Essential hypertension [I10] 12/31/2012 Yes    Cerebral palsy [G80.9]  Yes    Seizure disorder [G40.909]  Yes      Problems Resolved During this Admission:       Discharged Condition: stable    Disposition:     Follow Up:   Follow-up Information       Anushka Telles MD Follow up.    Specialty: Internal Medicine  Why:  sent a message to pt's primary care office to make contact with pt to schedule her hospital f/u visit.  Contact information:  [address]  [facility] [address]  [phone]                           Patient Instructions:   No discharge procedures on file.    Significant Diagnostic Studies: N/A    Pending Diagnostic Studies:       Procedure Component Value Units Date/Time    Fl Modified Barium Swallow Speech [[id number]]     Order Status: Sent Lab Status: No result            Medications:  Reconciled Home Medications:      Medication List        CONTINUE taking these medications      acetaminophen 500 MG tablet  Commonly known as: TYLENOL  Take 1 tablet (500 mg total) by mouth every 6 (six) hours as needed for Pain.     celecoxib 200 MG capsule  Commonly known as: CeleBREX  Take 1 capsule (200 mg total) by mouth daily as needed for Pain (Take with food).     cetirizine 10 MG tablet  Commonly known as: ZYRTEC  Take 1 tablet (10 mg total) by mouth once daily.     cholecalciferol (vitamin D3) 25 mcg (1,000 unit) capsule  Commonly known as: VITAMIN D3  Take 1 capsule (1,000 Units total) by mouth once daily.     * divalproex  MG 24 hr tablet  Commonly known as: DEPAKOTE ER  Take 1 tablet (250 mg total) by mouth every evening.     * divalproex  MG Tb24  Commonly known as:  DEPAKOTE ER  Take 1 tablet (500 mg total) by mouth every 12 (twelve) hours. Delayed Release tablets     fluticasone propionate 50 mcg/actuation nasal spray  Commonly known as: FLONASE  INSTILL 1 SPRAY IN EACH NOSTRIL EVERY DAY   * REQUEST REFILL WHEN NEEDED     furosemide 20 MG tablet  Commonly known as: LASIX  Take 1 tablet (20 mg total) by mouth daily as needed (leg swelling).     terbinafine HCL 1 % cream  Commonly known as: LAMISIL  Apply topically 2 (two) times daily. Apply in the groin           * This list has 2 medication(s) that are the same as other medications prescribed for you. Read the directions carefully, and ask your doctor or other care provider to review them with you.                  Indwelling Lines/Drains at time of discharge:   Lines/Drains/Airways       None                   Time spent on the discharge of patient: 35 minutes         Andi Garcia MD  Department of Hospital Medicine  [facility] - Telemetry Stepdown

## 2023-12-09 NOTE — PLAN OF CARE
"CM spoke with the patents legal guardian  and she is not in agreement  with the patient discharging today.  The mother stated, " His sitters are not comfortable with his new diet and crushing his med's." The mother explained the appeal process and is waiting on the medical team to call her and further discuss.    SHARIFA received a message from medical team that the legal guardian, Shalonda FRASER is in agreement with home health    SHARIFA called and spoke with the patients mother re: the support services the patient will have including home health,  Care at home and will send request to [facility] for authorization for HH , HH set up and any other services. F: [phone]    Your fax has been successfully sent to [phone] at [phone].  ------------------------------------------------------------  From: [id number]  ------------------------------------------------------------  12/9/2023 10:09:17 AM Transmission Record          Sent to [phone] with remote ID "MARIA TERESA"          Result: (0/339;0/0) Success          Page record: 1 - 7          Elapsed time: 03:02 on channel [phone]   SHARIFA called and spoke with Rosa WANG with [employer] and voiced her concerns that the sitters are not trained nursing assistants. Rosa wanted to see if the patient can stay a little longer.  SHARIFA informed the patient nurse the services that will be offered as home health with PT/ST and the sitter will pick the patient up at 5:00 pm: Cassandra James RN  Case Management  [facility]  [phone]    "

## 2023-12-09 NOTE — DISCHARGE INSTRUCTIONS
You presented with signs and symptoms of sepsis, which refers to a severe infection. You were found to be positive for Influenza B which we believe was the primary cause of your presentation. We treated you with antibiotics and an antiviral called Oseltamivir (Tamiflu) and you improved without needing further antibiotic treatment. You had an episode of aspiration during your meal that was concerning and you were evaluated with a swallow test that showed that you could tolerate a softer diet. We think that your swallowing was primarily impaired due to your coughing as a result of the flu, but now that your respiratory symptoms have improved, we think your swallowing function will continue to improve as well.    Prevention of aspiration  SMALL BITES  SMALL SIPS  Alternate liquid and solid swallows  Ensure oral clearance after each bite  Sit upright (at least 45 degrees) when eating and drinking and for 30 minutes after eating  Multiple swallows per bite/sip  Small, frequent meals throughout the day  Consume one pill at a time

## 2023-12-09 NOTE — PLAN OF CARE
"   12/09/23 0959   Post-Acute Status   Post-Acute Authorization Home Health   Home Health Status Referrals Sent   Hospital Resources/Appts/Education Provided Appointments scheduled and added to AVS   Patient choice form signed by patient/caregiver List from CMS Compare   Discharge Delays None known at this time   Discharge Plan   Discharge Plan A Home Health     Spoke  with Kym FRASER legal guardian and Aimee  to review discharge recommendation of home health  and is agreeable to plan    Patient/family provided list of facilities in-network with patient's payor plan. Providers that are owned, operated, or affiliated with [facility] are included on the list.     Notified that referral sent to below listed facilities from in-network list based on proximity to home/family support:   1.[facility] (Home Health) Phone: [phone]    2. [facility] Phone: [phone]       3.[facility] Phone: [phone]         Patient/family instructed to identify preference.    Preferred Facility: (if more than 1, listed in order of descending preference)  1.[facility] (Home Health) Phone: [phone]       If an additional preferred facility not listed above is identified, additional referral to be sent. If above facilities unable to accept, will send additional referrals to in-network providers.      SHARIFA spoke with Triny WANG on call with [facility] (Home Health) Phone: [phone]   and gerber Colón has a program called" [facility].    Patient referred to Care at home and [facility]      .cmn  "

## 2023-12-09 NOTE — PLAN OF CARE
"[facility] - Telemetry Stepdown      HOME HEALTH ORDERS  FACE TO FACE ENCOUNTER    Patient Name: Leonid Cox  YOB: 1980    PCP: Anushka Telles MD   [facility] [address] [facility] [address]  PCP Phone Number: [phone]  PCP Fax: [phone]    Encounter Date: 12/4/23    Admit to Home Health    Diagnoses:  Active Hospital Problems    Diagnosis  POA    *Sepsis [A41.9]  Yes    Dysphagia [R13.10]  Yes    Cellulitis of back except buttock [L03.312]  Yes    Influenza B [J10.1]  Yes    Functional quadriplegia [R53.2]  Yes     Related to CP, wheelchair bound, dependent for all ADLs      Essential hypertension [I10]  Yes    Cerebral palsy [G80.9]  Yes    Seizure disorder [G40.909]  Yes     Controlled on depakote. Has not had a seizure "in years".        Resolved Hospital Problems   No resolved problems to display.       Follow Up Appointments:  Future Appointments   Date Time Provider Department Center   4/23/2024 10:30 AM Anushka Telles MD [facility] MED CLN [facility]       Allergies:  Review of patient's allergies indicates:   Allergen Reactions    Adhesive tape-silicones      Other reaction(s): blisters    Codeine      Other reaction(s): Nausea    Morphine Itching       Medications: Review discharge medications with patient and family and provide education.    Current Facility-Administered Medications   Medication Dose Route Frequency Provider Last Rate Last Admin    acetaminophen tablet 1,000 mg  1,000 mg Oral Q8H PRN Andi Garcia MD   1,000 mg at 12/05/23 1625    dextrose 10% bolus 125 mL 125 mL  12.5 g Intravenous PRN Kalyan Wisdom MD        dextrose 10% bolus 250 mL 250 mL  25 g Intravenous PRN Kalyan Wisdom MD        enoxaparin injection 40 mg  40 mg Subcutaneous Q24H (prophylaxis, 1700) Andi Garcia MD   40 mg at 12/08/23 1748    fluticasone propionate 50 mcg/actuation nasal spray 50 mcg  1 spray Each Nostril Daily Kalyan Wisdom MD   50 mcg at 12/09/23 0856    " glucagon (human recombinant) injection 1 mg  1 mg Intramuscular PRN Kalyan Wisdom MD        glucose chewable tablet 16 g  16 g Oral PRN Kalyan Wisdom MD        glucose chewable tablet 24 g  24 g Oral PRN Kalyan Wisdom MD        levalbuterol nebulizer solution 1.25 mg  1.25 mg Nebulization Q8H Anderson Rossi MD   1.25 mg at 12/09/23 0827    melatonin tablet 6 mg  6 mg Oral Nightly PRN Wicho Allred MD        naloxone 0.4 mg/mL injection 0.02 mg  0.02 mg Intravenous PRN Kalyan Wisdom MD        sodium chloride 0.9% flush 10 mL  10 mL Intravenous PRN Wicho Allred MD        sodium chloride 0.9% flush 10 mL  10 mL Intravenous Q12H PRN Kalyan Wisdom MD        valproate (DEPACON) 250 mg in dextrose 5 % (D5W) 50 mL IVPB  250 mg Intravenous Q6H Amanda Henderson MD   Stopped at 12/09/23 0124    And    valproate (DEPACON) 250 mg in dextrose 5 % (D5W) 50 mL IVPB  250 mg Intravenous QHS Amanda Henderson MD   Stopped at 12/09/23 0120     Current Discharge Medication List        CONTINUE these medications which have NOT CHANGED    Details   !! divalproex 500 MG Tb24 Take 1 tablet (500 mg total) by mouth every 12 (twelve) hours. Delayed Release tablets  Qty: 56 tablet, Refills: 10    Comments: Delayed Release tablets  Associated Diagnoses: Seizure disorder      acetaminophen (TYLENOL) 500 MG tablet Take 1 tablet (500 mg total) by mouth every 6 (six) hours as needed for Pain.  Qty: 180 tablet, Refills: 3    Associated Diagnoses: Bilateral knee contractures      celecoxib (CELEBREX) 200 MG capsule Take 1 capsule (200 mg total) by mouth daily as needed for Pain (Take with food).  Qty: 90 capsule, Refills: 3    Associated Diagnoses: Spastic quadriplegic cerebral palsy      cetirizine (ZYRTEC) 10 MG tablet Take 1 tablet (10 mg total) by mouth once daily.  Qty: 90 tablet, Refills: 3    Associated Diagnoses: Allergic sinusitis      cholecalciferol, vitamin D3, (VITAMIN D3) 25 mcg (1,000 unit) capsule  Take 1 capsule (1,000 Units total) by mouth once daily.  Qty: 90 capsule, Refills: 3    Associated Diagnoses: Vitamin D deficiency      !! divalproex ER (DEPAKOTE ER) 250 MG 24 hr tablet Take 1 tablet (250 mg total) by mouth every evening.  Qty: 90 tablet, Refills: 3    Associated Diagnoses: Spastic quadriplegic cerebral palsy      fluticasone propionate (FLONASE) 50 mcg/actuation nasal spray INSTILL 1 SPRAY IN EACH NOSTRIL EVERY DAY   * REQUEST REFILL WHEN NEEDED  Qty: 16 g, Refills: 3    Associated Diagnoses: Allergic sinusitis      furosemide (LASIX) 20 MG tablet Take 1 tablet (20 mg total) by mouth daily as needed (leg swelling).  Qty: 28 tablet, Refills: 5    Associated Diagnoses: Ankle swelling, unspecified laterality      terbinafine HCL (LAMISIL) 1 % cream Apply topically 2 (two) times daily. Apply in the groin  Qty: 100 g, Refills: 11    Associated Diagnoses: Tinea cruris       !! - Potential duplicate medications found. Please discuss with provider.            I have seen and examined this patient within the last 30 days. My clinical findings that support the need for the home health skilled services and home bound status are the following:no   Medical restrictions requiring assistance of another human to leave home due to  Decreased range of motions in extremities.  Mental confusion making it unsafe for patient to leave home alone due to  Intellectual Delay.     Diet:   soft diet    Labs:  Report Lab results to PCP.    Referrals/ Consults  Speech Therapy  to evaluate and treat for  Swallowing.    Activities:   activity as tolerated    Nursing:   Agency to admit patient within 24 hours of hospital discharge unless specified on physician order or at patient request    SN to complete comprehensive assessment including routine vital signs. Instruct on disease process and s/s of complications to report to MD. Review/verify medication list sent home with the patient at time of discharge  and instruct  patient/caregiver as needed. Frequency may be adjusted depending on start of care date.     Skilled nurse to perform up to 3 visits PRN for symptoms related to diagnosis    Notify MD if SBP > 160 or < 90; DBP > 90 or < 50; HR > 120 or < 50; Temp > 101; O2 < 88%    Ok to schedule additional visits based on staff availability and patient request on consecutive days within the home health episode.    When multiple disciplines ordered:    Start of Care occurs on Sunday - Wednesday schedule remaining discipline evaluations as ordered on separate consecutive days following the start of care.    Thursday SOC -schedule subsequent evaluations Friday and Monday the following week.     Friday - Saturday SOC - schedule subsequent discipline evaluations on consecutive days starting Monday of the following week.    For all post-discharge communication and subsequent orders please contact patient's primary care physician. If unable to reach primary care physician or do not receive response within 30 minutes, please contact [facility] for more clarification.    Miscellaneous   Routine Skin for Bedridden Patients: Instruct patient/caregiver to apply moisture barrier cream to all skin folds and wet areas in perineal area daily and after baths and all bowel movements.    Home Health Aide:  Speech Language Pathology Three times weekly    Wound Care Orders  yes:  Pressure Ulcer(s) Stage I :   Location: Low back  Apply Miconzazole:  Barrier ointment                       Frequency:  Daily                             If incontinent of stool or urine, apply thin layer Barrier cream                   twice daily and PRN to wound         Pressure relief measure:  for pressure redistribution             I certify that this patient is confined to his home and needs speech therapy.

## 2023-12-10 NOTE — PLAN OF CARE
[facility] - Telemetry Stepdown  Discharge Final Note    Primary Care Provider: Anushka Telles MD    Expected Discharge Date: 12/9/2023    Final Discharge Note (most recent)       Final Note - 12/09/23 0959          Final Note    Hospital Resources/Appts/Education Provided Appointments scheduled and added to AVS        Post-Acute Status    Post-Acute Authorization Home Health     Home Health Status Referrals Sent     Patient choice form signed by patient/caregiver List from CMS Compare     Discharge Delays None known at this time                     Important Message from Medicare  Important Message from Medicare regarding Discharge Appeal Rights: Given to patient/caregiver, Explained to patient/caregiver, Signed/date by patient/caregiver     Date IMM was signed: 12/08/23  Time IMM was signed: 1011    Contact Info       Anushka Telles MD   Specialty: Internal Medicine   Relationship: PCP - [facility] [address]   Phone: [phone]       Next Steps: Follow up    Instructions:  sent a message to pt's primary care office to make contact with pt to schedule her hospital f/u visit.    [facility]    [phone]       Next Steps: Follow up    Instructions: PHN will provide home health set up and call the patient sitter and legal guardian    Care at home    [phone]       Next Steps: Follow up    Instructions: offers support services to famiCare givers  and patient            Future Appointments   Date Time Provider Department Center   4/23/2024 10:30 AM Anushka Telles MD [facility] MED CLN [facility] PCW           Triny James RN  Case Management  [facility]  [phone]

## 2023-12-11 ENCOUNTER — PATIENT OUTREACH (OUTPATIENT)
Dept: ADMINISTRATIVE | Facility: CLINIC | Age: 43
End: 2023-12-11
Payer: MEDICARE

## 2023-12-11 NOTE — PROGRESS NOTES
Advance Directives:   Living Will: Yes        Copy on chart: Yes    LaPOST: Yes    Medical Power of : Yes    Goals of Care: What is most important right now is to focus on spending time at home, improvement in condition but with limits to invasive therapies, comfort and QOL . Accordingly, we have decided that the best plan to meet the patient's goals includes continuing with treatment.

## 2023-12-11 NOTE — PROGRESS NOTES
C3 nurse spoke with george Saez Keyonna and Cassandra for a TCC post hospital discharge follow up call. The patient does not have a scheduled HOSFU appointment with Anushka Telles MD  within 5-7 days post hospital discharge date 12/09/2023. C3 nurse was unable to schedule HOSFU appointment in [facility].    Message sent to PCP staff requesting they contact patient and schedule follow up appointment.

## 2023-12-11 NOTE — PLAN OF CARE
12/11/23 1104   Post-Acute Status   Post-Acute Authorization Home Health   Home Health Status Set-up Complete/Auth obtained   Hospital Resources/Appts/Education Provided Appointments scheduled and added to AVS   Patient choice form signed by patient/caregiver List from CMS Compare   Discharge Delays None known at this time   Discharge Plan   Discharge Plan A Home Health  ([facility] Phone: [phone])   Discharge Plan B Home     CM met with patients Caregiver: Aimee # [phone] to discuss any changes in discharge planning.  Patients plan is to dc home with Caregivers and home health. [facility]  accepted.   No changes in DC plans. ANT:  12/09/23        Triny James RN  Case Management  [facility]  [phone]

## 2023-12-13 ENCOUNTER — OFFICE VISIT (OUTPATIENT)
Dept: PRIMARY CARE CLINIC | Facility: CLINIC | Age: 43
End: 2023-12-13
Payer: MEDICARE

## 2023-12-13 VITALS
BODY MASS INDEX: 27.4 KG/M2 | HEIGHT: 73 IN | OXYGEN SATURATION: 98 % | TEMPERATURE: 98 F | HEART RATE: 79 BPM | SYSTOLIC BLOOD PRESSURE: 134 MMHG | DIASTOLIC BLOOD PRESSURE: 74 MMHG

## 2023-12-13 DIAGNOSIS — G91.9 HYDROCEPHALUS, UNSPECIFIED TYPE: ICD-10-CM

## 2023-12-13 DIAGNOSIS — R53.2 FUNCTIONAL QUADRIPLEGIA: ICD-10-CM

## 2023-12-13 DIAGNOSIS — J10.1 INFLUENZA B: ICD-10-CM

## 2023-12-13 DIAGNOSIS — R74.01 TRANSAMINITIS: Primary | ICD-10-CM

## 2023-12-13 DIAGNOSIS — R05.1 ACUTE COUGH: ICD-10-CM

## 2023-12-13 DIAGNOSIS — G80.0 SPASTIC QUADRIPLEGIC CEREBRAL PALSY: ICD-10-CM

## 2023-12-13 PROCEDURE — 3044F PR MOST RECENT HEMOGLOBIN A1C LEVEL <7.0%: ICD-10-PCS | Mod: CPTII,S$GLB,, | Performed by: INTERNAL MEDICINE

## 2023-12-13 PROCEDURE — 99496 TRANSJ CARE MGMT HIGH F2F 7D: CPT | Mod: S$GLB,,, | Performed by: INTERNAL MEDICINE

## 2023-12-13 PROCEDURE — 1111F DSCHRG MED/CURRENT MED MERGE: CPT | Mod: CPTII,S$GLB,, | Performed by: INTERNAL MEDICINE

## 2023-12-13 PROCEDURE — 4010F PR ACE/ARB THEARPY RXD/TAKEN: ICD-10-PCS | Mod: CPTII,S$GLB,, | Performed by: INTERNAL MEDICINE

## 2023-12-13 PROCEDURE — 3008F PR BODY MASS INDEX (BMI) DOCUMENTED: ICD-10-PCS | Mod: CPTII,S$GLB,, | Performed by: INTERNAL MEDICINE

## 2023-12-13 PROCEDURE — 3078F DIAST BP <80 MM HG: CPT | Mod: CPTII,S$GLB,, | Performed by: INTERNAL MEDICINE

## 2023-12-13 PROCEDURE — 3044F HG A1C LEVEL LT 7.0%: CPT | Mod: CPTII,S$GLB,, | Performed by: INTERNAL MEDICINE

## 2023-12-13 PROCEDURE — 3008F BODY MASS INDEX DOCD: CPT | Mod: CPTII,S$GLB,, | Performed by: INTERNAL MEDICINE

## 2023-12-13 PROCEDURE — 3075F PR MOST RECENT SYSTOLIC BLOOD PRESS GE 130-139MM HG: ICD-10-PCS | Mod: CPTII,S$GLB,, | Performed by: INTERNAL MEDICINE

## 2023-12-13 PROCEDURE — 1159F PR MEDICATION LIST DOCUMENTED IN MEDICAL RECORD: ICD-10-PCS | Mod: CPTII,S$GLB,, | Performed by: INTERNAL MEDICINE

## 2023-12-13 PROCEDURE — 4010F ACE/ARB THERAPY RXD/TAKEN: CPT | Mod: CPTII,S$GLB,, | Performed by: INTERNAL MEDICINE

## 2023-12-13 PROCEDURE — 99496 TRANSITIONAL CARE MANAGE SERVICE 7 DAY DISCHARGE: ICD-10-PCS | Mod: S$GLB,,, | Performed by: INTERNAL MEDICINE

## 2023-12-13 PROCEDURE — 3075F SYST BP GE 130 - 139MM HG: CPT | Mod: CPTII,S$GLB,, | Performed by: INTERNAL MEDICINE

## 2023-12-13 PROCEDURE — 3078F PR MOST RECENT DIASTOLIC BLOOD PRESSURE < 80 MM HG: ICD-10-PCS | Mod: CPTII,S$GLB,, | Performed by: INTERNAL MEDICINE

## 2023-12-13 PROCEDURE — 1159F MED LIST DOCD IN RCRD: CPT | Mod: CPTII,S$GLB,, | Performed by: INTERNAL MEDICINE

## 2023-12-13 PROCEDURE — 1111F PR DISCHARGE MEDS RECONCILED W/ CURRENT OUTPATIENT MED LIST: ICD-10-PCS | Mod: CPTII,S$GLB,, | Performed by: INTERNAL MEDICINE

## 2023-12-13 PROCEDURE — 99499 UNLISTED E&M SERVICE: CPT | Mod: S$GLB,,, | Performed by: INTERNAL MEDICINE

## 2023-12-13 RX ORDER — BENZONATATE 100 MG/1
100 CAPSULE ORAL 3 TIMES DAILY PRN
Qty: 30 CAPSULE | Refills: 0 | Status: SHIPPED | OUTPATIENT
Start: 2023-12-13 | End: 2023-12-13 | Stop reason: SDUPTHER

## 2023-12-13 RX ORDER — BENZONATATE 100 MG/1
100 CAPSULE ORAL 3 TIMES DAILY PRN
Qty: 30 CAPSULE | Refills: 0 | Status: SHIPPED | OUTPATIENT
Start: 2023-12-13 | End: 2023-12-23

## 2023-12-13 NOTE — LETTER
[facility]  [address]  [facility] [address]  Phone: [phone]  Fax: [phone] December 13, 2023    Leonid Cox  [address] [place] [address]      To Whom It May Concern:    Leonid Cox is unable to participate in adult day care until Monday, December 19, 2023.    If you have any questions or concerns, please feel free to call my office.    Sincerely,          Anushka Telles MD

## 2023-12-13 NOTE — PROGRESS NOTES
[facility]  Transitional Care Management (TCM) Clinic Visit  Shared Note: Salomon March (MS4) & Dr Telles (Attending)      Admit Date: 12/4/23   IP Discharge Date: 12/9/23  Hospital Length of Stay:5 days  Days since discharge (from IP or SNF): 4   [facility] Contact Note: none  Hospital Diagnosis: sepsis and PNA    HISTORY OF PRESENT ILLNESS      Patient ID: Leonid Cox is a 43 y.o. male was recently admitted to the hospital, this is their TCM encounter.    Hospital Course Synopsis:    43 y.o. Male with PMHx of Cerebral Palsy and Functional Quadriplegia, HTN, HLD, thrombocytopenia, Seizure Disorder, and hydrocephalus s/p  shunt (2012) who presented for 4 days of swelling and erythema of the lower back. There is purulent drainage from the site. The patient additionally reports cough and sore throat for the past 2-3 days. He was sent from his PCP office due to concern for sepsis. He presented tachycardic and hypertensive with temp 100.2. Following CT scan pt temp increased 102.4 and became tachycardic into the 140s and desaturated, requiring 10 L venturi mask (due to mouth breathing). CMP unremarkable. Albumin 2.6, CRP 79.3. Lactate 0.93, Procalcitonin wnl. UA with positive nitrites, occasional bacteria, and 28 WBC. Blood cultures NGTD. Positive for Influenza B. CT Chest, Abdomen, Pelvis concerning for edema and thickening of subcutaneous fat/skin overlying lower back without organized fluid collection. Scattered bilateral ground glass attenuation of lungs concerning for inflammatory/infectious process. In the ED received vanc loading dose, and 2L IVF, tamiflu.      Hospital Course:   Patient started on vancomycin for cellulitis of lower back, and ceftriaxone/azythromycin for CAP and UTI coverage, and Tamiflu for flu treatment. CT CAP w/ cellulitis of lower back w/out abscess, and ground glass opacities of lungs. Patient continued to fever throughout the day despite abx, given tylenol and  broadened abx to include zosyn, which lead to resolution of tachycardia and fever. Patient improvement in hypoxia, weaning from face-mask to 2L NC w/ improved oxygen sats, and eventual weaning to RA. SLP evaluated, recommend NPO with MBSS prior to advancing diet. Deescalated broad spectrum abx to CAP coverage with Levaquin for 3 days. Patient passed bedside swallow study per SLP. Patient requiring hxh for assistance with swallowing difficulties and wound care for his sacral wound, communicated with primary guardian and sitter  who are in agreement for these services. Patient deemed medically stable for discharge, to follow-up with primary care physician at regular visit.       Since Discharge  - no fevers, weaker than baseline, requires more assistance.   - able to transfer with assistance  - able to swallow PO meds  - recommended puree at Dc, but eating normal diet and at oatmeal this am w/o pain  - cough still persists. No SOB or chest pain  - red spots on right arm - new from hospital. Denies bug bites  - transfers to toilet. Denies dysuria, hematuria  - not wanting to do labs this visit      DECISION MAKING TODAY       Assessment & Plan:  1. Transaminitis  -     Comprehensive Metabolic Panel; Future; Expected date: 12/13/2023    2. Acute cough  -     Discontinue: benzonatate (TESSALON) 100 MG capsule; Take 1 capsule (100 mg total) by mouth 3 (three) times daily as needed for Cough.  Dispense: 30 capsule; Refill: 0  -     Discontinue: dextromethorphan-guaiFENesin  mg (MUCINEX DM)  mg per 12 hr tablet; Take 1 tablet by mouth 2 (two) times daily as needed (cough).  Dispense: 20 tablet; Refill: 0  -     benzonatate (TESSALON) 100 MG capsule; Take 1 capsule (100 mg total) by mouth 3 (three) times daily as needed for Cough.  Dispense: 30 capsule; Refill: 0  -     dextromethorphan-guaiFENesin  mg (MUCINEX DM)  mg per 12 hr tablet; Take 1 tablet by mouth 2 (two) times daily as  needed (cough).  Dispense: 20 tablet; Refill: 0    3. Functional quadriplegia  Overview:  Related to CP, wheelchair bound, dependent for all ADLs    Assessment & Plan:  Still wants new wheelchair with footrests. Can only get footrests. Cannot get new wheelchair b/c it hasn't been 5 years since this current wheelchair was provided.     - Will attempt to place footrest order or new wheelchair order    Orders:  -     WHEELCHAIR FOR HOME USE    4. Influenza B  Assessment & Plan:  Has residual dry cough from pneumonia.     - prescribed prn tessalon and mucinex DM      5. Hydrocephalus, unspecified type  -     WHEELCHAIR FOR HOME USE    6. Spastic quadriplegic cerebral palsy  -     WHEELCHAIR FOR HOME USE         Medication List on Discharge:     Medication List            Accurate as of December 13, 2023  5:47 PM. If you have any questions, ask your nurse or doctor.                START taking these medications      benzonatate 100 MG capsule  Commonly known as: TESSALON  Take 1 capsule (100 mg total) by mouth 3 (three) times daily as needed for Cough.  Started by: Anushka Telles MD     dextromethorphan-guaiFENesin  mg  mg per 12 hr tablet  Commonly known as: MUCINEX DM  Take 1 tablet by mouth 2 (two) times daily as needed (cough).  Started by: Anushka Telles MD            CONTINUE taking these medications      acetaminophen 500 MG tablet  Commonly known as: TYLENOL  Take 1 tablet (500 mg total) by mouth every 6 (six) hours as needed for Pain.     celecoxib 200 MG capsule  Commonly known as: CeleBREX  Take 1 capsule (200 mg total) by mouth daily as needed for Pain (Take with food).     cetirizine 10 MG tablet  Commonly known as: ZYRTEC  Take 1 tablet (10 mg total) by mouth once daily.     cholecalciferol (vitamin D3) 25 mcg (1,000 unit) capsule  Commonly known as: VITAMIN D3  Take 1 capsule (1,000 Units total) by mouth once daily.     * divalproex  MG 24 hr tablet  Commonly known as:  DEPAKOTE ER  Take 1 tablet (250 mg total) by mouth every evening.     * divalproex  MG Tb24  Commonly known as: DEPAKOTE ER  Take 1 tablet (500 mg total) by mouth every 12 (twelve) hours. Delayed Release tablets     fluticasone propionate 50 mcg/actuation nasal spray  Commonly known as: FLONASE  INSTILL 1 SPRAY IN EACH NOSTRIL EVERY DAY   * REQUEST REFILL WHEN NEEDED     furosemide 20 MG tablet  Commonly known as: LASIX  Take 1 tablet (20 mg total) by mouth daily as needed (leg swelling).     terbinafine HCL 1 % cream  Commonly known as: LAMISIL  Apply topically 2 (two) times daily. Apply in the groin           * This list has 2 medication(s) that are the same as other medications prescribed for you. Read the directions carefully, and ask your doctor or other care provider to review them with you.                  Medication Reconciliation:  Were medications changed on discharge? No  Were medications in the home? Yes  Is the patient taking the medications as directed? Yes  Does the patient understand the medications and changes? Yes  Does updated med list accurately reflects meds patient is currently taking? Yes    ENVIRONMENT OF CARE      Family and/or Caregiver present at visit?  Yes  Name of Caregiver: Shalonda  History provided by: patient and caregiver    Advance Care Planning   Advanced Care Planning Status:  Patient has had an ACP conversation  Living Will: Yes  Power of : Yes  [place]: Yes    Does Caregiver have HCPoA: Yes  Changes today: no       Needs assessment:  Functional Status: max assistance  Mobility: chair bound  Nutritional access: adequate intake and access  Home Health: Yes,  Agency [facility]    DME/Supplies: wheelchair     Diagnostic tests reviewed/disposition: No diagnosic tests pending after this hospitalization.  Disease/illness education:  has 24 hr sitters for medication management  Establishment or re-establishment of referral orders for community resources: No other  necessary community resources.   Discussion with other health care providers: No discussion with other health care providers necessary.   Does patient have an ostomy (ileostomy, colostomy, suprapubic catheter, nephrostomy tube, tracheostomy, PEG tube, pleurex catheter, cholecystostomy, etc)? No  Were BPAs reviewed? Yes    Social History     Socioeconomic History    Marital status: Single   Tobacco Use    Smoking status: Never    Smokeless tobacco: Never   Substance and Sexual Activity    Alcohol use: No    Drug use: No   Social History Narrative    Lives in property owned by grandmother. Disabled with [facility] caretakers 24hrs to patient. Primary caretaker Sherri takes care of him. Shalonda is the active caretaker, but there is no official legal power of .         Mother passed away 12/2013     Social Determinants of Health     Financial Resource Strain: Low Risk  (12/6/2023)    Overall Financial Resource Strain (CARDIA)     Difficulty of Paying Living Expenses: Not hard at all   Food Insecurity: No Food Insecurity (12/6/2023)    Hunger Vital Sign     Worried About Running Out of Food in the Last Year: Never true     Ran Out of Food in the Last Year: Never true   Transportation Needs: No Transportation Needs (12/6/2023)    PRAPARE - Transportation     Lack of Transportation (Medical): No     Lack of Transportation (Non-Medical): No   Housing Stability: Unknown (12/6/2023)    Housing Stability Vital Sign     Unable to Pay for Housing in the Last Year: No     Unstable Housing in the Last Year: No         OBJECTIVE:     Vital Signs:  Vitals:    12/13/23 1452   BP: 134/74   Pulse: 79   Temp: 97.5 °F (36.4 °C)       Review of Systems   Constitutional:  Negative for fever.   Eyes:  Positive for visual disturbance (baseline).   Respiratory:  Positive for cough. Negative for shortness of breath.    Cardiovascular:  Negative for chest pain and leg swelling.   Gastrointestinal:  Negative for abdominal pain,  constipation and diarrhea.   Genitourinary:  Negative for difficulty urinating, dysuria and hematuria.   Skin:  Positive for wound.   Neurological:  Positive for weakness.       Physical Exam:  Physical Exam  Constitutional:       Appearance: Normal appearance. He is obese.      Comments: Quadriplegic, in a wheelchair   HENT:      Nose: Nose normal.      Mouth/Throat:      Mouth: Mucous membranes are moist.   Eyes:      General: No scleral icterus.     Comments: EOM erratic, functionally blind   Cardiovascular:      Rate and Rhythm: Normal rate and regular rhythm.      Pulses: Normal pulses.      Heart sounds: Normal heart sounds. No murmur heard.  Pulmonary:      Effort: Pulmonary effort is normal. No respiratory distress.      Breath sounds: Normal breath sounds. No wheezing or rales.   Abdominal:      General: Bowel sounds are normal. There is no distension.      Palpations: Abdomen is soft. There is no mass.      Tenderness: There is no abdominal tenderness. There is no guarding.   Musculoskeletal:         General: No swelling, deformity or signs of injury. Normal range of motion.   Skin:     General: Skin is warm and dry.      Coloration: Skin is not jaundiced.      Findings: Wound (small erythematous wound present on lower back) present.      Comments: Small punctate, papular, erythematous, non-pruritic rashes on Right arm   Neurological:      Mental Status: He is alert.      Motor: Weakness present.      Gait: Gait abnormal.   Psychiatric:         Mood and Affect: Mood normal.         Behavior: Behavior normal.               INSTRUCTIONS FOR PATIENT:     Scheduled Follow-up, Appts Reviewed with Modifications if Needed: Yes  Future Appointments   Date Time Provider Department Center   4/23/2024 10:30 AM Anushka Telles MD [facility]   7/15/2024 11:00 AM Anushka Telles MD [facility] WOO       Signature: MD Anushka Nunez MD/MPH  [facility]  [phone] [facility]     Transition of Care Visit:  I have reviewed and updated the history and problem list.  I have reconciled the medication list.  I have discussed the hospitalization and current medical issues, prognosis and plans with the patient/family.

## 2023-12-13 NOTE — PATIENT INSTRUCTIONS
TODAY:  - labs today  - wheelchair leg rests  - home PT, OT, speech  - cough medicine at [facility]  ([facility] Phone: [phone]

## 2023-12-13 NOTE — ASSESSMENT & PLAN NOTE
Still wants new wheelchair with footrests. Can only get footrests. Cannot get new wheelchair b/c it hasn't been 5 years since this current wheelchair was provided.     - Will attempt to place footrest order or new wheelchair order

## 2023-12-14 ENCOUNTER — TELEPHONE (OUTPATIENT)
Dept: PRIMARY CARE CLINIC | Facility: CLINIC | Age: 43
End: 2023-12-14
Payer: MEDICARE

## 2023-12-14 NOTE — TELEPHONE ENCOUNTER
----- Message from Anushka Telles MD sent at 12/13/2023  5:47 PM CST -----  Please do the following for his discharge:  - fax wheelchair order for new elevated leg rests to [facility] and let klaus know. I've been messaging him about this patient  - call his home Home Health and confirm that they are going to offer PT, OT and speech therapy to the patient  ([facility] Phone: [phone]     Thanks!  Dr ANDRES

## 2023-12-14 NOTE — TELEPHONE ENCOUNTER
Spoke with Uzma at [facility] , that is who the pt is enrolled with. She went and seen pt today and the pt is doing well. Pt is eating, speaking, and balancing really well. Pt is at baseline so he will be appropriate PT/OT and speech therapy. Pt is being discharge on Dec 15, 2023. Pt is going back to the day program tomorrow  (Mon thru Fri) from 8 to 5 pm. Uzma stated that he will receive all this at the day program. The caretaker feels like he is not where they want him to be but after Uzma Restrepo, he is not appropriate for any additional services

## 2023-12-15 ENCOUNTER — TELEPHONE (OUTPATIENT)
Dept: PRIMARY CARE CLINIC | Facility: CLINIC | Age: 43
End: 2023-12-15
Payer: MEDICARE

## 2023-12-15 ENCOUNTER — LAB VISIT (OUTPATIENT)
Dept: LAB | Facility: HOSPITAL | Age: 43
End: 2023-12-15
Attending: INTERNAL MEDICINE
Payer: MEDICARE

## 2023-12-15 DIAGNOSIS — R74.01 TRANSAMINITIS: ICD-10-CM

## 2023-12-15 LAB
ALBUMIN SERPL BCP-MCNC: 2.9 G/DL (ref 3.5–5.2)
ALP SERPL-CCNC: 129 U/L (ref 55–135)
ALT SERPL W/O P-5'-P-CCNC: 30 U/L (ref 10–44)
ANION GAP SERPL CALC-SCNC: 7 MMOL/L (ref 8–16)
AST SERPL-CCNC: 25 U/L (ref 10–40)
BILIRUB SERPL-MCNC: 0.4 MG/DL (ref 0.1–1)
BUN SERPL-MCNC: 14 MG/DL (ref 6–20)
CALCIUM SERPL-MCNC: 8.7 MG/DL (ref 8.7–10.5)
CHLORIDE SERPL-SCNC: 107 MMOL/L (ref 95–110)
CO2 SERPL-SCNC: 29 MMOL/L (ref 23–29)
CREAT SERPL-MCNC: 0.7 MG/DL (ref 0.5–1.4)
EST. GFR  (NO RACE VARIABLE): >60 ML/MIN/1.73 M^2
GLUCOSE SERPL-MCNC: 82 MG/DL (ref 70–110)
POTASSIUM SERPL-SCNC: 3.9 MMOL/L (ref 3.5–5.1)
PROT SERPL-MCNC: 7 G/DL (ref 6–8.4)
SODIUM SERPL-SCNC: 143 MMOL/L (ref 136–145)

## 2023-12-15 PROCEDURE — 80053 COMPREHEN METABOLIC PANEL: CPT | Performed by: INTERNAL MEDICINE

## 2023-12-15 PROCEDURE — 36415 COLL VENOUS BLD VENIPUNCTURE: CPT | Performed by: INTERNAL MEDICINE

## 2023-12-15 NOTE — PROGRESS NOTES
Please let patient know that their bloodwork looks fine and does not require any change in treatment. Please see if they have any additional concerns.

## 2023-12-15 NOTE — TELEPHONE ENCOUNTER
----- Message from Anushka Telles MD sent at 12/15/2023  3:36 PM CST -----  Please let patient know that their bloodwork looks fine and does not require any change in treatment. Please see if they have any additional concerns.

## 2024-03-04 PROBLEM — A40.9 SEPSIS DUE TO STREPTOCOCCUS SPECIES WITHOUT ACUTE ORGAN DYSFUNCTION: Status: RESOLVED | Noted: 2023-12-04 | Resolved: 2024-03-04

## 2024-03-11 PROBLEM — A41.9 SEPSIS: Status: RESOLVED | Noted: 2023-12-05 | Resolved: 2024-03-11

## 2024-03-12 DIAGNOSIS — M25.473 ANKLE SWELLING, UNSPECIFIED LATERALITY: ICD-10-CM

## 2024-03-13 RX ORDER — FUROSEMIDE 20 MG/1
20 TABLET ORAL
Qty: 28 TABLET | Refills: 11 | Status: SHIPPED | OUTPATIENT
Start: 2024-03-13 | End: 2024-04-03 | Stop reason: SDUPTHER

## 2024-03-13 NOTE — TELEPHONE ENCOUNTER
----- Message from Anabella De La Rosa sent at 3/13/2024  2:09 PM CDT -----  Contact: [facility]   Requesting an RX refill or new RX.  Is this a refill or new RX: new  RX name and strength (copy/paste from chart):  furosemide (LASIX) 20 MG tablet  Is this a 30 day or 90 day RX:   Pharmacy name and phone # (copy/paste from chart):    [facility] [place] - [address]  [address]  [place] [address]  Phone: [phone] Fax: [phone]

## 2024-04-03 DIAGNOSIS — M25.473 ANKLE SWELLING, UNSPECIFIED LATERALITY: ICD-10-CM

## 2024-04-03 NOTE — TELEPHONE ENCOUNTER
Patient's nurse Rosa Barboza called, states that patient's meds need to be reconciled on their end, asked for medication list to be adjusted with Furosemide daily dosing cancelled and reordered to 20 mg QD PRN. Med pended with preferred pharmacy to [facility].

## 2024-04-04 RX ORDER — FUROSEMIDE 20 MG/1
20 TABLET ORAL DAILY PRN
Qty: 28 TABLET | Refills: 11 | Status: SHIPPED | OUTPATIENT
Start: 2024-04-04

## 2024-07-12 ENCOUNTER — TELEPHONE (OUTPATIENT)
Dept: PRIMARY CARE CLINIC | Facility: CLINIC | Age: 44
End: 2024-07-12
Payer: MEDICARE

## 2024-07-16 ENCOUNTER — TELEPHONE (OUTPATIENT)
Dept: PRIMARY CARE CLINIC | Facility: CLINIC | Age: 44
End: 2024-07-16
Payer: MEDICARE

## 2024-07-16 DIAGNOSIS — G80.0 SPASTIC QUADRIPLEGIC CEREBRAL PALSY: ICD-10-CM

## 2024-07-16 RX ORDER — DIVALPROEX SODIUM 500 MG/1
TABLET, DELAYED RELEASE ORAL
Qty: 62 TABLET | Status: SHIPPED | OUTPATIENT
Start: 2024-07-16

## 2024-07-16 NOTE — TELEPHONE ENCOUNTER
----- Message from Melissa Wilkins sent at 7/16/2024  1:51 PM CDT -----  Contact: [phone]  Requesting an RX refill or new RX.    Is this a refill or new RX: Refill     RX name and strength (copy/paste from chart):  divalproex ER (DEPAKOTE ER) 250 MG 24 hr tablet    Is this a 30 day or 90 day RX: 90    Pharmacy name and phone # (copy/paste from chart):      [facility] [place] [place] - [address]  [address]  [facility] [address]  Phone: [phone] Fax: [phone]    The doctors have asked that we provide their patients with the following 2 reminders -- prescription refills can take up to 72 hours, and a friendly reminder that in the future you can use your [facility] account to request refills: call back

## 2024-07-17 ENCOUNTER — TELEPHONE (OUTPATIENT)
Dept: PRIMARY CARE CLINIC | Facility: CLINIC | Age: 44
End: 2024-07-17
Payer: MEDICARE

## 2024-07-18 ENCOUNTER — HOSPITAL ENCOUNTER (OUTPATIENT)
Dept: RADIOLOGY | Facility: HOSPITAL | Age: 44
Discharge: HOME | End: 2024-07-18
Attending: NURSE PRACTITIONER
Payer: MEDICARE

## 2024-07-18 ENCOUNTER — TELEPHONE (OUTPATIENT)
Dept: PRIMARY CARE CLINIC | Facility: CLINIC | Age: 44
End: 2024-07-18
Payer: MEDICARE

## 2024-07-18 ENCOUNTER — OFFICE VISIT (OUTPATIENT)
Dept: PRIMARY CARE CLINIC | Facility: CLINIC | Age: 44
End: 2024-07-18
Payer: MEDICARE

## 2024-07-18 VITALS
BODY MASS INDEX: 27.4 KG/M2 | HEIGHT: 73 IN | HEART RATE: 65 BPM | DIASTOLIC BLOOD PRESSURE: 85 MMHG | SYSTOLIC BLOOD PRESSURE: 142 MMHG | OXYGEN SATURATION: 99 % | TEMPERATURE: 99 F

## 2024-07-18 DIAGNOSIS — M25.473 ANKLE SWELLING, UNSPECIFIED LATERALITY: ICD-10-CM

## 2024-07-18 DIAGNOSIS — K59.00 CONSTIPATION, UNSPECIFIED CONSTIPATION TYPE: ICD-10-CM

## 2024-07-18 DIAGNOSIS — G40.909 SEIZURE DISORDER: ICD-10-CM

## 2024-07-18 DIAGNOSIS — E55.9 VITAMIN D DEFICIENCY: ICD-10-CM

## 2024-07-18 DIAGNOSIS — G91.9 HYDROCEPHALUS, UNSPECIFIED TYPE: ICD-10-CM

## 2024-07-18 DIAGNOSIS — J30.9 ALLERGIC SINUSITIS: ICD-10-CM

## 2024-07-18 DIAGNOSIS — R21 RASH OF FOOT: ICD-10-CM

## 2024-07-18 DIAGNOSIS — M24.562 BILATERAL KNEE CONTRACTURES: ICD-10-CM

## 2024-07-18 DIAGNOSIS — G80.0 SPASTIC QUADRIPLEGIC CEREBRAL PALSY: ICD-10-CM

## 2024-07-18 DIAGNOSIS — M24.561 BILATERAL KNEE CONTRACTURES: ICD-10-CM

## 2024-07-18 DIAGNOSIS — Z76.89 POOR DENTITION REQUIRING REFERRAL TO DENTISTRY: ICD-10-CM

## 2024-07-18 DIAGNOSIS — K59.00 CONSTIPATION, UNSPECIFIED CONSTIPATION TYPE: Primary | ICD-10-CM

## 2024-07-18 DIAGNOSIS — R53.2 FUNCTIONAL QUADRIPLEGIA: ICD-10-CM

## 2024-07-18 DIAGNOSIS — B35.3 TINEA PEDIS OF BOTH FEET: ICD-10-CM

## 2024-07-18 DIAGNOSIS — D69.6 THROMBOCYTOPENIA, UNSPECIFIED: ICD-10-CM

## 2024-07-18 PROCEDURE — 1159F MED LIST DOCD IN RCRD: CPT | Mod: CPTII,S$GLB,, | Performed by: NURSE PRACTITIONER

## 2024-07-18 PROCEDURE — 3079F DIAST BP 80-89 MM HG: CPT | Mod: CPTII,S$GLB,, | Performed by: NURSE PRACTITIONER

## 2024-07-18 PROCEDURE — 74018 RADEX ABDOMEN 1 VIEW: CPT | Mod: TC

## 2024-07-18 PROCEDURE — 74018 RADEX ABDOMEN 1 VIEW: CPT | Mod: 26,,, | Performed by: RADIOLOGY

## 2024-07-18 PROCEDURE — 1160F RVW MEDS BY RX/DR IN RCRD: CPT | Mod: CPTII,S$GLB,, | Performed by: NURSE PRACTITIONER

## 2024-07-18 PROCEDURE — 3008F BODY MASS INDEX DOCD: CPT | Mod: CPTII,S$GLB,, | Performed by: NURSE PRACTITIONER

## 2024-07-18 PROCEDURE — 99204 OFFICE O/P NEW MOD 45 MIN: CPT | Mod: S$GLB,,, | Performed by: NURSE PRACTITIONER

## 2024-07-18 PROCEDURE — 3077F SYST BP >= 140 MM HG: CPT | Mod: CPTII,S$GLB,, | Performed by: NURSE PRACTITIONER

## 2024-07-18 RX ORDER — DIVALPROEX SODIUM 250 MG/1
250 TABLET, FILM COATED, EXTENDED RELEASE ORAL NIGHTLY
Qty: 90 TABLET | Refills: 3 | Status: SHIPPED | OUTPATIENT
Start: 2024-07-18

## 2024-07-18 RX ORDER — CELECOXIB 200 MG/1
200 CAPSULE ORAL DAILY PRN
Qty: 90 CAPSULE | Refills: 3 | Status: SHIPPED | OUTPATIENT
Start: 2024-07-18

## 2024-07-18 RX ORDER — FLUTICASONE PROPIONATE 50 MCG
SPRAY, SUSPENSION (ML) NASAL
Qty: 16 G | Refills: 3 | Status: SHIPPED | OUTPATIENT
Start: 2024-07-18

## 2024-07-18 RX ORDER — VIT C/E/ZN/COPPR/LUTEIN/ZEAXAN 250MG-90MG
1000 CAPSULE ORAL DAILY
Qty: 90 CAPSULE | Refills: 3 | Status: SHIPPED | OUTPATIENT
Start: 2024-07-18

## 2024-07-18 RX ORDER — PRENATAL VIT 91/IRON/FOLIC/DHA 28-975-200
COMBINATION PACKAGE (EA) ORAL 2 TIMES DAILY
Qty: 15 G | Refills: 1 | Status: SHIPPED | OUTPATIENT
Start: 2024-07-18

## 2024-07-18 RX ORDER — ACETAMINOPHEN 500 MG
500 TABLET ORAL EVERY 6 HOURS PRN
Qty: 180 TABLET | Refills: 3 | Status: SHIPPED | OUTPATIENT
Start: 2024-07-18

## 2024-07-18 RX ORDER — DIVALPROEX SODIUM 500 MG/1
500 TABLET, FILM COATED, EXTENDED RELEASE ORAL EVERY 12 HOURS
Qty: 56 TABLET | Refills: 10 | Status: SHIPPED | OUTPATIENT
Start: 2024-07-18

## 2024-07-18 RX ORDER — FUROSEMIDE 20 MG/1
20 TABLET ORAL DAILY PRN
Qty: 28 TABLET | Refills: 11 | Status: SHIPPED | OUTPATIENT
Start: 2024-07-18

## 2024-07-18 RX ORDER — CETIRIZINE HYDROCHLORIDE 10 MG/1
10 TABLET ORAL DAILY
Qty: 90 TABLET | Refills: 3 | Status: SHIPPED | OUTPATIENT
Start: 2024-07-18 | End: 2025-07-18

## 2024-07-18 NOTE — PATIENT INSTRUCTIONS
We have referred you to podiatry and to a dentist    I have refilled all of your medications    We will get an xray of your abdomen     We will see you in 3 months, sooner if needed    Drink an extra cup of water each day!    We will get some labs and urine    Wash your feet twice daily, carefully dry skin between toes. Apply lamisil to affected areas

## 2024-07-18 NOTE — PROGRESS NOTES
Primary Care Provider Appointment - [facility]  SHARED NOTE: Kaylyn Hough (DNP), Dr Telles (Attending)    Subjective:      Patient ID: Leonid Cox is a 44 y.o. male with PMHx of Cerebral Palsy and Functional Quadriplegia, HTN, HLD, thrombocytopenia, Seizure Disorder, and hydrocephalus s/p  shunt (2012)       Chief Complaint: Follow-up    Prior to this visit, patient's last encounter with PCP was 2023    Pt reports rash on feet bilaterally (staff requesting referral to podiatry), help with obtaining zip up shoes for ease in dressing and concern for constipation. He is accompanied by staff at his nursing home and they assist with some of the HPI- main complaint today is concerns regarding constipation. Staff reports that they are unsure of when patient's last BM occurred. Staff over multiple shifts has not witnessed a bowel movement per their report.  Patient believes he has not had constipation.  He denies nausea or vomiting  or abdominal pain.  He is passing gas.  No issues with appetite- eating well per staff report.He is having some gingival irritation that staff has noted- he has not seen a dentist in some time.  He is wheelchair bound and visually impaired.  He is in good spirits today and appears to have a good rapport with his caregivers.    His wheelchair is in need of repair.    HTN  - Currently taking: nothing  - How often taking BP at home: never, average is: unknown  - Today, BP is: 142/85    HLD  - Currently taking: nothing  - Last lipid panel was on 4/17/2023  The 10-year ASCVD risk score (Megan KOENIG, et al., 2019) is: 2.4%    Values used to calculate the score:      Age: 44 years      Sex: Male      Is Non- : No      Diabetic: No      Tobacco smoker: No      Systolic Blood Pressure: 142 mmHg      Is BP treated: Yes      HDL Cholesterol: 39 mg/dL      Total Cholesterol: 174 mg/dL         Seizures  - Currently taking: Depakote 500 mg BID with Depakote 250 mg q evening  -  Social support system consists of caregivers and housemates, lives at NH with self  - Hobbies include socializing    Specialty notes (if they see heme/onc, GI, ortho, ophth, derm, etc...)   - Last seen on 8/23/23, by Dr. Macey Muro PA-C, FU with them on 1 year or PRN  - Current treatment plan for this problem is Leonid Cox is a 43 y.o. male presenting in follow up for longstanding epilepsy. Seizures are well controlled on /750. Continue this medication at same dose. Annual level/labs completed via primary care. Consider DEXA next visit. Follow up in 1 year or sooner with issues. Patient and caretaker are comfortable with plan. All questions and concerns are addressed at this time.     Care Gaps:  - COVID booster  - pneumococcal    Medications: Does not have pill packs  - dosing of Depakote clarified and new prescriptions sent per staff request      FU in 3 months to specifically check seizure disorder      4Ms for Medical Decision-Making in Older Adults    Last Completed EAWV: None    MOBILITY:  Get Up and Go:       No data to display              Activities of Daily Living:       No data to display              Whisper Test:       No data to display              Disability Status:       No data to display              Nutrition Screening:       No data to display             Screening Score: 0-7 Malnourished, 8-11 At Risk, 12-14 Normal    MENTATION:   Depression Patient Health Questionnaire:      12/4/2023     3:06 PM   Depression Patient Health Questionnaire   Over the last two weeks how often have you been bothered by little interest or pleasure in doing things Not at all   Over the last two weeks how often have you been bothered by feeling down, depressed or hopeless Not at all   PHQ-2 Total Score 0     Has Dementia Dx: No    Cognitive Function Screening:       No data to display              Cognitive Function Screening Total - Less than 4 = Abnormal,  Greater than or equal to 4 =  Normal    MEDICATIONS:  High Risk Medications:  Total Active Medications: 0  This patient does not have an active medication from one of the medication groupers.    WHAT MATTERS MOST:  Advance Care Planning   ACP Status:   Patient has had an ACP conversation  Living Will: Yes  Power of : Yes  LaPOST: Yes    What is most important right now is to focus on extending life as long as possible, even it it means sacrificing quality    Accordingly, we have decided that the best plan to meet the patient's goals includes continuing with treatment      What matters most to patient today is: foot rash                 Social History     Socioeconomic History    Marital status: Single   Tobacco Use    Smoking status: Never    Smokeless tobacco: Never   Substance and Sexual Activity    Alcohol use: No    Drug use: No   Social History Narrative    Lives in property owned by grandmother. Disabled with [employer] caretakers 24hrs to patient. Primary caretaker Sherri takes care of him. Shalonda is the active caretaker, but there is no official legal power of .         Mother passed away 12/2013     Social Determinants of Health     Financial Resource Strain: Low Risk  (12/6/2023)    Overall Financial Resource Strain (CARDIA)     Difficulty of Paying Living Expenses: Not hard at all   Food Insecurity: No Food Insecurity (12/6/2023)    Hunger Vital Sign     Worried About Running Out of Food in the Last Year: Never true     Ran Out of Food in the Last Year: Never true   Transportation Needs: No Transportation Needs (12/6/2023)    PRAPARE - Transportation     Lack of Transportation (Medical): No     Lack of Transportation (Non-Medical): No   Housing Stability: Unknown (12/6/2023)    Housing Stability Vital Sign     Unable to Pay for Housing in the Last Year: No     Unstable Housing in the Last Year: No       Review of Systems   Constitutional:  Negative for diaphoresis, fatigue and unexpected weight change.   HENT:   "Negative for trouble swallowing.    Eyes:  Negative for visual disturbance.   Respiratory:  Negative for chest tightness and shortness of breath.    Cardiovascular:  Negative for chest pain, palpitations and leg swelling.   Gastrointestinal:  Positive for constipation (reported by staff). Negative for abdominal distention.   Endocrine: Negative for polydipsia, polyphagia and polyuria.   Genitourinary:  Negative for difficulty urinating and frequency.   Musculoskeletal:  Negative for arthralgias.   Skin:  Positive for rash. Negative for color change and pallor.   Allergic/Immunologic: Negative for immunocompromised state.   Neurological:  Negative for dizziness, syncope and light-headedness.   Hematological:  Negative for adenopathy.   Psychiatric/Behavioral:  The patient is not nervous/anxious.        Objective:   BP (!) 142/85 (BP Location: Right arm, Patient Position: Sitting, BP Method: Large (Automatic))   Pulse 65   Temp 98.5 °F (36.9 °C) (Oral)   Ht 6' 1" (1.854 m)   SpO2 99%   BMI 27.40 kg/m²     Physical Exam  Constitutional:       General: He is not in acute distress.     Appearance: He is well-developed.      Comments: Wheelchair bound  Brace on right foot  Upward gaze noted    HENT:      Head: Normocephalic and atraumatic.      Nose: Nose normal.      Mouth/Throat:      Mouth: Mucous membranes are dry.   Eyes:      Pupils: Pupils are equal, round, and reactive to light.   Cardiovascular:      Rate and Rhythm: Normal rate and regular rhythm.      Pulses: Normal pulses.      Heart sounds: Normal heart sounds. No murmur heard.  Pulmonary:      Effort: Pulmonary effort is normal. No respiratory distress.      Breath sounds: Normal breath sounds.   Abdominal:      General: Abdomen is flat.      Palpations: Abdomen is soft.   Musculoskeletal:         General: Normal range of motion.      Cervical back: Normal range of motion and neck supple.      Right lower leg: No edema.      Left lower leg: No edema. "   Neurological:      Mental Status: He is alert and oriented to person, place, and time.      Sensory: No sensory deficit.   Psychiatric:         Mood and Affect: Mood normal.         Behavior: Behavior normal.             Lab Results   Component Value Date    WBC 6.01 07/18/2024    HGB 15.4 07/18/2024    HCT 46.4 07/18/2024     07/18/2024    CHOL 174 04/17/2023    TRIG 124 04/17/2023    HDL 39 (L) 04/17/2023    ALT 15 07/18/2024    AST 13 07/18/2024     07/18/2024    K 4.7 07/18/2024     07/18/2024    CREATININE 0.9 07/18/2024    BUN 17 07/18/2024    CO2 26 07/18/2024    TSH 3.073 09/29/2022    INR 1.2 06/25/2021    HGBA1C 5.1 07/24/2023       Current Outpatient Medications on File Prior to Visit   Medication Sig Dispense Refill    divalproex (DEPAKOTE) 500 MG TbEC TAKE 1 TABLET BY MOUTH EVERY TWELVE HOURS 62 tablet PRN     No current facility-administered medications on file prior to visit.         Assessment:   44 y.o. male with multiple co-morbid illnesses here to follow-up with PCP and continue work-up of chronic issues    Plan:   1. Constipation, unspecified constipation type  Overview:  KUB today  Abdomen soft and nontender    Assessment & Plan:  KUB with some stool noted but does not appear obstructed  Recommend increasing hydration  Keep BM log    Orders:  -     X-Ray Abdomen AP 1 View; Future; Expected date: 07/18/2024    2. Bilateral knee contractures  -     acetaminophen (TYLENOL) 500 MG tablet; Take 1 tablet (500 mg total) by mouth every 6 (six) hours as needed for Pain.  Dispense: 180 tablet; Refill: 3    3. Spastic quadriplegic cerebral palsy  -     HME - OTHER  -     celecoxib (CELEBREX) 200 MG capsule; Take 1 capsule (200 mg total) by mouth daily as needed for Pain (Take with food).  Dispense: 90 capsule; Refill: 3  -     divalproex ER (DEPAKOTE ER) 250 MG 24 hr tablet; Take 1 tablet (250 mg total) by mouth every evening.  Dispense: 90 tablet; Refill: 3  -     CBC W/ AUTO  "DIFFERENTIAL; Future; Expected date: 07/18/2024  -     COMPREHENSIVE METABOLIC PANEL; Future; Expected date: 07/18/2024  -     Urinalysis; Future; Expected date: 07/18/2024    4. Allergic sinusitis  -     cetirizine (ZYRTEC) 10 MG tablet; Take 1 tablet (10 mg total) by mouth once daily.  Dispense: 90 tablet; Refill: 3  -     fluticasone propionate (FLONASE) 50 mcg/actuation nasal spray; INSTILL 1 SPRAY IN EACH NOSTRIL EVERY DAY   * REQUEST REFILL WHEN NEEDED  Dispense: 16 g; Refill: 3    5. Vitamin D deficiency  -     cholecalciferol, vitamin D3, (VITAMIN D3) 25 mcg (1,000 unit) capsule; Take 1 capsule (1,000 Units total) by mouth once daily.  Dispense: 90 capsule; Refill: 3    6. Seizure disorder  Overview:  Controlled on depakote. Has not had a seizure "in years".    Orders:  -     divalproex ER (DEPAKOTE ER) 500 MG Tb24 24 hr tablet; Take 1 tablet (500 mg total) by mouth every 12 (twelve) hours. Delayed Release tablets  Dispense: 56 tablet; Refill: 10  -     CBC W/ AUTO DIFFERENTIAL; Future; Expected date: 07/18/2024  -     COMPREHENSIVE METABOLIC PANEL; Future; Expected date: 07/18/2024  -     Urinalysis; Future; Expected date: 07/18/2024    7. Ankle swelling, unspecified laterality  -     furosemide (LASIX) 20 MG tablet; Take 1 tablet (20 mg total) by mouth daily as needed (edema).  Dispense: 28 tablet; Refill: 11    8. Rash of foot  -     Ambulatory referral/consult to Podiatry; Future; Expected date: 07/25/2024    9. Poor dentition requiring referral to dentistry  -     Ambulatory referral/consult to Dentistry; Future; Expected date: 07/25/2024    10. Hydrocephalus, unspecified type  Overview:  Shunt present and appears functional    Assessment & Plan:  No issues. Monitor.      11. Thrombocytopenia, unspecified  Overview:  Noted previously  Await repeat CBC    Assessment & Plan:  Await labs  Monitor      12. Functional quadriplegia  Overview:  Related to CP, wheelchair bound, dependent for all " "ADLs    Assessment & Plan:  Requiring wheelchair repair, order placed.   Requiring 24 hour care        13. Tinea pedis of both feet  Overview:  Noted on exam  PMH of tinea in other body sites    Assessment & Plan:  Terbinafine cream to area as directed  Keep feet clean and dry  Caregivers requesting referral to podiatry      Other orders  -     terbinafine HCL (LAMISIL) 1 % cream; Apply topically 2 (two) times daily.  Dispense: 15 g; Refill: 1         Health Maintenance         Date Due Completion Date    Pneumococcal Vaccines (Age 0-64) (2 of 2 - PPSV23 or PCV20) 02/05/2016 12/11/2015    COVID-19 Vaccine (1 - 2023-24 season) Never done ---    Influenza Vaccine (1) 09/01/2024 10/23/2023    Hemoglobin A1c (Diabetic Prevention Screening) 07/24/2026 7/24/2023    Lipid Panel 04/17/2028 4/17/2023    TETANUS VACCINE 09/03/2033 9/3/2023            Future Appointments   Date Time Provider Department Center   10/18/2024 10:00 AM Kaylyn Hough DNP [facility]         Follow up in about 3 months (around 10/18/2024), or if symptoms worsen or fail to improve. Total clinical care time was 20 min      Anushka Telles MD/MPH  [facility]  [phone] josseline Jaquez has not had a BM in "a while"  Bleeding gums with brushing teeth which has been going on for 6-12 months. He has built in crust in between his toes       Has a hard time to drink water- drinks juice sometimes but can be combative about drinking water. Someone is there with him 24/7 and no BMs noted  Patient denies any abd pain, nausea, vomiting or any other issues.  Feels like he is eating fine and denies having any constipation.     No hospitalizations since our LOV    Labs to assist with paperwork completion  "

## 2024-07-18 NOTE — TELEPHONE ENCOUNTER
----- Message from Kaylyn Hough, KARLA sent at 7/18/2024  3:06 PM CDT -----  Xray did not show a large amount of stool in colon- appears to be a normal amount. Increase hydration and if there are any other concerns let us know

## 2024-07-19 ENCOUNTER — TELEPHONE (OUTPATIENT)
Dept: PRIMARY CARE CLINIC | Facility: CLINIC | Age: 44
End: 2024-07-19
Payer: MEDICARE

## 2024-07-19 NOTE — TELEPHONE ENCOUNTER
FLORENCIA completed the pt's 90L and and EFRAIN Hough completed her section and signed it. FLORENCIA also sent a copy to his sitharriet Cardenas via email at [email or web address].

## 2024-07-22 ENCOUNTER — TELEPHONE (OUTPATIENT)
Dept: PRIMARY CARE CLINIC | Facility: CLINIC | Age: 44
End: 2024-07-22
Payer: MEDICARE

## 2024-07-22 NOTE — TELEPHONE ENCOUNTER
FLORENCIA faxed dental referral to [facility] dent[facility] for the pt with facesheet and note.

## 2024-07-29 PROBLEM — B35.3 TINEA PEDIS OF BOTH FEET: Status: ACTIVE | Noted: 2024-07-29

## 2024-07-29 NOTE — ASSESSMENT & PLAN NOTE
KUB with some stool noted but does not appear obstructed  Recommend increasing hydration  Keep BM log

## 2024-07-29 NOTE — ASSESSMENT & PLAN NOTE
Terbinafine cream to area as directed  Keep feet clean and dry  Caregivers requesting referral to podiatry

## 2024-11-01 ENCOUNTER — TELEPHONE (OUTPATIENT)
Dept: PRIMARY CARE CLINIC | Facility: CLINIC | Age: 44
End: 2024-11-01
Payer: MEDICARE

## 2024-11-08 ENCOUNTER — TELEPHONE (OUTPATIENT)
Dept: PRIMARY CARE CLINIC | Facility: CLINIC | Age: 44
End: 2024-11-08
Payer: MEDICARE

## 2024-11-11 ENCOUNTER — OFFICE VISIT (OUTPATIENT)
Dept: PRIMARY CARE CLINIC | Facility: CLINIC | Age: 44
End: 2024-11-11
Payer: MEDICARE

## 2024-11-11 DIAGNOSIS — J30.9 ALLERGIC SINUSITIS: ICD-10-CM

## 2024-11-11 DIAGNOSIS — G40.909 SEIZURE DISORDER: ICD-10-CM

## 2024-11-11 DIAGNOSIS — Z23 NEED FOR VACCINATION AGAINST STREPTOCOCCUS PNEUMONIAE: ICD-10-CM

## 2024-11-11 DIAGNOSIS — R53.2 FUNCTIONAL QUADRIPLEGIA: Primary | ICD-10-CM

## 2024-11-11 DIAGNOSIS — M25.473 ANKLE SWELLING, UNSPECIFIED LATERALITY: ICD-10-CM

## 2024-11-11 DIAGNOSIS — E55.9 VITAMIN D DEFICIENCY: ICD-10-CM

## 2024-11-11 DIAGNOSIS — G80.0 SPASTIC QUADRIPLEGIC CEREBRAL PALSY: ICD-10-CM

## 2024-11-11 DIAGNOSIS — M24.562 BILATERAL KNEE CONTRACTURES: ICD-10-CM

## 2024-11-11 DIAGNOSIS — M24.561 BILATERAL KNEE CONTRACTURES: ICD-10-CM

## 2024-11-11 PROBLEM — J06.9 VIRAL UPPER RESPIRATORY TRACT INFECTION: Status: RESOLVED | Noted: 2023-12-04 | Resolved: 2024-11-11

## 2024-11-11 PROBLEM — J10.1 INFLUENZA B: Status: RESOLVED | Noted: 2023-12-05 | Resolved: 2024-11-11

## 2024-11-11 PROBLEM — L03.312 CELLULITIS OF BACK EXCEPT BUTTOCK: Status: RESOLVED | Noted: 2023-12-05 | Resolved: 2024-11-11

## 2024-11-11 PROBLEM — S99.192A: Status: RESOLVED | Noted: 2017-10-12 | Resolved: 2024-11-11

## 2024-11-11 PROCEDURE — 1159F MED LIST DOCD IN RCRD: CPT | Mod: CPTII,S$GLB,, | Performed by: HOSPITALIST

## 2024-11-11 PROCEDURE — 90677 PCV20 VACCINE IM: CPT | Mod: S$GLB,,, | Performed by: HOSPITALIST

## 2024-11-11 PROCEDURE — G0009 ADMIN PNEUMOCOCCAL VACCINE: HCPCS | Mod: S$GLB,,, | Performed by: HOSPITALIST

## 2024-11-11 PROCEDURE — 99215 OFFICE O/P EST HI 40 MIN: CPT | Mod: 25,S$GLB,, | Performed by: HOSPITALIST

## 2024-11-11 RX ORDER — CETIRIZINE HYDROCHLORIDE 10 MG/1
10 TABLET ORAL DAILY
Qty: 90 TABLET | Refills: 3 | Status: SHIPPED | OUTPATIENT
Start: 2024-11-11 | End: 2025-11-11

## 2024-11-11 RX ORDER — DIVALPROEX SODIUM 250 MG/1
250 TABLET, FILM COATED, EXTENDED RELEASE ORAL NIGHTLY
Qty: 90 TABLET | Refills: 3 | Status: SHIPPED | OUTPATIENT
Start: 2024-11-11

## 2024-11-11 RX ORDER — DIVALPROEX SODIUM 500 MG/1
500 TABLET, FILM COATED, EXTENDED RELEASE ORAL EVERY 12 HOURS
Qty: 56 TABLET | Refills: 10 | Status: SHIPPED | OUTPATIENT
Start: 2024-11-11

## 2024-11-11 RX ORDER — CELECOXIB 200 MG/1
200 CAPSULE ORAL DAILY PRN
Qty: 90 CAPSULE | Refills: 3 | Status: SHIPPED | OUTPATIENT
Start: 2024-11-11

## 2024-11-11 RX ORDER — FUROSEMIDE 20 MG/1
20 TABLET ORAL DAILY PRN
Qty: 28 TABLET | Refills: 11 | Status: SHIPPED | OUTPATIENT
Start: 2024-11-11

## 2024-11-11 RX ORDER — VIT C/E/ZN/COPPR/LUTEIN/ZEAXAN 250MG-90MG
1000 CAPSULE ORAL DAILY
Qty: 90 CAPSULE | Refills: 3 | Status: SHIPPED | OUTPATIENT
Start: 2024-11-11

## 2024-11-11 RX ORDER — PRENATAL VIT 91/IRON/FOLIC/DHA 28-975-200
COMBINATION PACKAGE (EA) ORAL 2 TIMES DAILY
Qty: 15 G | Refills: 1 | Status: SHIPPED | OUTPATIENT
Start: 2024-11-11

## 2024-11-11 RX ORDER — ACETAMINOPHEN 500 MG
500 TABLET ORAL EVERY 6 HOURS PRN
Qty: 180 TABLET | Refills: 3 | Status: SHIPPED | OUTPATIENT
Start: 2024-11-11

## 2024-11-11 NOTE — ASSESSMENT & PLAN NOTE
Needs new WC as his current one is unable to be repaired.  PT referral for WC eval placed so that he can be set up with the best kind of WC for his needs and built to his measurements.

## 2024-11-11 NOTE — PROGRESS NOTES
"Primary Care Provider Appointment - [facility]  Leonid Wood MD      Subjective:      Patient ID: Leonid Cox is a 44 y.o. male with   Past Medical History:   Diagnosis Date    Blind     Cerebral palsy     Hydrocephalus     Hypertension     Seizure disorder     Seizures     Urinary reflux            Chief Complaint: Follow-up    Prior to this visit, patient's last encounter with PCP was 7/18/2024.    Pt reports doing "alright;" no complaints today.  No issues with constipation.  Seen with aide from NH.  Mostly WC bound but sometimes sits on sofa or recliner.  Wasn't able to repair WC; needs a new one.  Wheel gets stuck.              Medications: Does not have pill packs              4Ms for Medical Decision-Making in Older Adults    Last Completed EAWV: None    MOBILITY:  Get Up and Go:       No data to display              Activities of Daily Living:       No data to display              Whisper Test:       No data to display              Disability Status:       No data to display              Nutrition Screening:       No data to display             Screening Score: 0-7 Malnourished, 8-11 At Risk, 12-14 Normal  Fall Risk:      12/13/2023     2:30 PM 12/4/2023     2:30 PM 10/23/2023    11:30 AM   Fall Risk Assessment - Outpatient   Mobility Status Ambulatory w/ assistance Wheelchair Bound Ambulatory w/ assistance   Number of falls 0 0 0   Identified as fall risk False True False           MENTATION:   Depression Patient Health Questionnaire:      12/4/2023     3:06 PM   Depression Patient Health Questionnaire   Over the last two weeks how often have you been bothered by little interest or pleasure in doing things Not at all   Over the last two weeks how often have you been bothered by feeling down, depressed or hopeless Not at all   PHQ-2 Total Score 0     Has Dementia Dx: No  Has Anxiety Dx: No    Cognitive Function Screening:       No data to display              Cognitive Function Screening Total - " Less than 4 = Abnormal,  Greater than or equal to 4 = Normal        MEDICATIONS:  High Risk Medications:  Total Active Medications: 0  This patient does not have an active medication from one of the medication groupers.    WHAT MATTERS MOST:  Advance Care Planning   ACP Status:   Patient has had an ACP conversation  Living Will: Yes  Power of : Yes  LaPOST: Yes    What is most important right now is to focus on extending life as long as possible, even it it means sacrificing quality    Accordingly, we have decided that the best plan to meet the patient's goals includes continuing with treatment      What matters most to patient today is:                  Social History     Socioeconomic History    Marital status: Single   Tobacco Use    Smoking status: Never    Smokeless tobacco: Never   Substance and Sexual Activity    Alcohol use: No    Drug use: No   Social History Narrative    Lives in property owned by grandmother. Disabled with [employer] caretakers 24hrs to patient. Primary caretaker Sherri takes care of him. Shalonda is the active caretaker, but there is no official legal power of .         Mother passed away 12/2013     Social Drivers of Health     Financial Resource Strain: Low Risk  (12/6/2023)    Overall Financial Resource Strain (CARDIA)     Difficulty of Paying Living Expenses: Not hard at all   Food Insecurity: No Food Insecurity (12/6/2023)    Hunger Vital Sign     Worried About Running Out of Food in the Last Year: Never true     Ran Out of Food in the Last Year: Never true   Transportation Needs: No Transportation Needs (12/6/2023)    PRAPARE - Transportation     Lack of Transportation (Medical): No     Lack of Transportation (Non-Medical): No   Housing Stability: Unknown (12/6/2023)    Housing Stability Vital Sign     Unable to Pay for Housing in the Last Year: No     Unstable Housing in the Last Year: No       Review of Systems   Gastrointestinal:  Negative for constipation.         Objective:   There were no vitals taken for this visit.    Physical Exam  Vitals reviewed.   Constitutional:       General: He is not in acute distress.     Comments: Seen in manual WC   HENT:      Head:      Comments: Rugation of left scalp     Mouth/Throat:      Comments: Food particles on teeth  Eyes:      Comments: Vertical gaze preference, but not fixed.   Neck:      Comments: Vertical scar R neck with underlying cylindrical structure visible under skin; likely part of  shunt  Musculoskeletal:      Comments: Contractures noted of multiple joints.  [place]-neck deformity of 2nd-5th digits of L hand.  Hypermobile DIP joints of L hand and often hyperextends digits, bending them backwards with his other hand.   Skin:     General: Skin is warm and dry.   Neurological:      Mental Status: He is alert. Mental status is at baseline.      Comments: Often rotates head laterally back and forth while looking upwards.  Converses largely appropriately.              Lab Results   Component Value Date    WBC 6.01 07/18/2024    HGB 15.4 07/18/2024    HCT 46.4 07/18/2024     07/18/2024    CHOL 174 04/17/2023    TRIG 124 04/17/2023    HDL 39 (L) 04/17/2023    ALT 15 07/18/2024    AST 13 07/18/2024     07/18/2024    K 4.7 07/18/2024     07/18/2024    CREATININE 0.9 07/18/2024    BUN 17 07/18/2024    CO2 26 07/18/2024    TSH 3.073 09/29/2022    INR 1.2 06/25/2021    HGBA1C 5.1 07/24/2023       Current Outpatient Medications on File Prior to Visit   Medication Sig Dispense Refill    acetaminophen (TYLENOL) 500 MG tablet Take 1 tablet (500 mg total) by mouth every 6 (six) hours as needed for Pain. 180 tablet 3    celecoxib (CELEBREX) 200 MG capsule Take 1 capsule (200 mg total) by mouth daily as needed for Pain (Take with food). 90 capsule 3    cetirizine (ZYRTEC) 10 MG tablet Take 1 tablet (10 mg total) by mouth once daily. 90 tablet 3    cholecalciferol, vitamin D3, (VITAMIN D3) 25 mcg (1,000 unit) capsule  Take 1 capsule (1,000 Units total) by mouth once daily. 90 capsule 3    divalproex (DEPAKOTE) 500 MG TbEC TAKE 1 TABLET BY MOUTH EVERY TWELVE HOURS 62 tablet PRN    divalproex ER (DEPAKOTE ER) 250 MG 24 hr tablet Take 1 tablet (250 mg total) by mouth every evening. 90 tablet 3    divalproex ER (DEPAKOTE ER) 500 MG Tb24 24 hr tablet Take 1 tablet (500 mg total) by mouth every 12 (twelve) hours. Delayed Release tablets 56 tablet 10    fluticasone propionate (FLONASE) 50 mcg/actuation nasal spray INSTILL 1 SPRAY IN EACH NOSTRIL EVERY DAY   * REQUEST REFILL WHEN NEEDED 16 g 3    furosemide (LASIX) 20 MG tablet Take 1 tablet (20 mg total) by mouth daily as needed (edema). 28 tablet 11    terbinafine HCL (LAMISIL) 1 % cream Apply topically 2 (two) times daily. 15 g 1     No current facility-administered medications on file prior to visit.         Assessment:   44 y.o. male with multiple co-morbid illnesses here to follow-up for ongoing chronic disease management and preventive health maintenance.    Plan:     Problem List Items Addressed This Visit       Cerebral palsy    Relevant Medications    celecoxib (CELEBREX) 200 MG capsule    divalproex ER (DEPAKOTE ER) 250 MG 24 hr tablet    Seizure disorder     Refill for Depakote sent.         Relevant Medications    divalproex ER (DEPAKOTE ER) 500 MG Tb24 24 hr tablet    Bilateral knee contractures    Relevant Medications    acetaminophen (TYLENOL) 500 MG tablet    Functional quadriplegia - Primary     Needs new WC as his current one is unable to be repaired.  PT referral for WC eval placed so that he can be set up with the best kind of WC for his needs and built to his measurements.         Relevant Orders    Ambulatory referral/consult to Physical/Occupational Therapy     Other Visit Diagnoses       Allergic sinusitis        Relevant Medications    cetirizine (ZYRTEC) 10 MG tablet    Vitamin D deficiency        Relevant Medications    cholecalciferol, vitamin D3, (VITAMIN  D3) 25 mcg (1,000 unit) capsule    Ankle swelling, unspecified laterality        Relevant Medications    furosemide (LASIX) 20 MG tablet    Need for vaccination against Streptococcus pneumoniae        Relevant Medications    (VFC) PCV20 (Prevnar 20) IM vaccine (>/= 6 wks)            Health Maintenance         Date Due Completion Date    Pneumococcal Vaccines (Age 0-64) (2 of 2 - PPSV23 or PCV20) 02/05/2016 12/11/2015    Influenza Vaccine (1) 09/01/2024 10/23/2023    COVID-19 Vaccine (1 - 2024-25 season) Never done ---    Hemoglobin A1c (Diabetic Prevention Screening) 07/24/2026 7/24/2023    Lipid Panel 04/17/2028 4/17/2023    TETANUS VACCINE 09/03/2033 9/3/2023    RSV Vaccine (Age 60+ and Pregnant patients) (1 - 1-dose 75+ series) 03/03/2056 ---        PCV-20 administered today.    No future appointments.        Follow up in about 6 months (around 5/11/2025). Total clinical care time was 40 min.    Leonid Wood MD  [facility]  [facility]

## 2024-11-19 ENCOUNTER — OFFICE VISIT (OUTPATIENT)
Dept: PRIMARY CARE CLINIC | Facility: CLINIC | Age: 44
End: 2024-11-19
Payer: MEDICARE

## 2024-11-19 VITALS
HEIGHT: 73 IN | BODY MASS INDEX: 27.4 KG/M2 | DIASTOLIC BLOOD PRESSURE: 72 MMHG | SYSTOLIC BLOOD PRESSURE: 130 MMHG | OXYGEN SATURATION: 98 % | HEART RATE: 89 BPM | TEMPERATURE: 98 F

## 2024-11-19 DIAGNOSIS — B30.9 VIRAL CONJUNCTIVITIS: Primary | ICD-10-CM

## 2024-11-19 DIAGNOSIS — G80.0 SPASTIC QUADRIPLEGIC CEREBRAL PALSY: ICD-10-CM

## 2024-11-19 DIAGNOSIS — B30.9 VIRAL CONJUNCTIVITIS: ICD-10-CM

## 2024-11-19 PROCEDURE — 3078F DIAST BP <80 MM HG: CPT | Mod: CPTII,S$GLB,, | Performed by: HOSPITALIST

## 2024-11-19 PROCEDURE — 3008F BODY MASS INDEX DOCD: CPT | Mod: CPTII,S$GLB,, | Performed by: HOSPITALIST

## 2024-11-19 PROCEDURE — 99214 OFFICE O/P EST MOD 30 MIN: CPT | Mod: S$GLB,,, | Performed by: HOSPITALIST

## 2024-11-19 PROCEDURE — 1159F MED LIST DOCD IN RCRD: CPT | Mod: CPTII,S$GLB,, | Performed by: HOSPITALIST

## 2024-11-19 PROCEDURE — 3075F SYST BP GE 130 - 139MM HG: CPT | Mod: CPTII,S$GLB,, | Performed by: HOSPITALIST

## 2024-11-19 RX ORDER — OLOPATADINE HYDROCHLORIDE 1 MG/ML
1 SOLUTION/ DROPS OPHTHALMIC 2 TIMES DAILY
Qty: 5 ML | Refills: 0 | Status: SHIPPED | OUTPATIENT
Start: 2024-11-19 | End: 2024-11-20 | Stop reason: SDUPTHER

## 2024-11-19 RX ORDER — TOBRAMYCIN AND DEXAMETHASONE 3; 1 MG/ML; MG/ML
2 SUSPENSION/ DROPS OPHTHALMIC EVERY 6 HOURS
Qty: 2.5 ML | Refills: 0 | Status: CANCELLED | OUTPATIENT
Start: 2024-11-19 | End: 2024-11-26

## 2024-11-19 NOTE — PROGRESS NOTES
"Primary Care Provider Appointment - [facility]  Leonid Wood MD      Subjective:      Patient ID: Leonid Cox is a 44 y.o. male with   Past Medical History:   Diagnosis Date    Blind     Cerebral palsy     Hydrocephalus     Hypertension     Seizure disorder     Seizures     Urinary reflux            Chief Complaint: Conjunctivitis    Prior to this visit, patient's last encounter with PCP was 11/11/2024.    Pt reports eye turned red yesterday.  Denies any itching, burning, or discharge.  No rhinorrhea, coughing, or sneezing.  No known sick contacts.    Aide notes some yellow discharge and crusting.      11/11: Pt reports doing "alright;" no complaints today.  No issues with constipation.  Seen with aide from NH.  Mostly WC bound but sometimes sits on sofa or recliner.  Wasn't able to repair WC; needs a new one.  Wheel gets stuck.              Medications: Does not have pill packs              4Ms for Medical Decision-Making in Older Adults    Last Completed EAWV: None    MOBILITY:  Get Up and Go:       No data to display              Activities of Daily Living:       No data to display              Whisper Test:       No data to display              Disability Status:       No data to display              Nutrition Screening:       No data to display             Screening Score: 0-7 Malnourished, 8-11 At Risk, 12-14 Normal  Fall Risk:      11/11/2024    11:20 AM 12/13/2023     2:30 PM 12/4/2023     2:30 PM   Fall Risk Assessment - Outpatient   Mobility Status Ambulatory w/ assistance Ambulatory w/ assistance Wheelchair Bound   Number of falls 0 0 0   Identified as fall risk False False True           MENTATION:   Depression Patient Health Questionnaire:      12/4/2023     3:06 PM   Depression Patient Health Questionnaire   Over the last two weeks how often have you been bothered by little interest or pleasure in doing things Not at all   Over the last two weeks how often have you been bothered by feeling " down, depressed or hopeless Not at all   PHQ-2 Total Score 0     Has Dementia Dx: No  Has Anxiety Dx: No    Cognitive Function Screening:       No data to display              Cognitive Function Screening Total - Less than 4 = Abnormal,  Greater than or equal to 4 = Normal        MEDICATIONS:  High Risk Medications:  Total Active Medications: 0  This patient does not have an active medication from one of the medication groupers.    WHAT MATTERS MOST:  Advance Care Planning   ACP Status:   Patient has had an ACP conversation  Living Will: Yes  Power of : Yes  LaPOST: Yes    What is most important right now is to focus on extending life as long as possible, even it it means sacrificing quality    Accordingly, we have decided that the best plan to meet the patient's goals includes continuing with treatment      What matters most to patient today is:                  Social History     Socioeconomic History    Marital status: Single   Tobacco Use    Smoking status: Never    Smokeless tobacco: Never   Substance and Sexual Activity    Alcohol use: No    Drug use: No   Social History Narrative    Lives in property owned by grandmother. Disabled with [employer] caretakers 24hrs to patient. Primary caretaker Sherri takes care of him. Shalonda is the active caretaker, but there is no official legal power of .         Mother passed away 12/2013     Social Drivers of Health     Financial Resource Strain: Low Risk  (12/6/2023)    Overall Financial Resource Strain (CARDIA)     Difficulty of Paying Living Expenses: Not hard at all   Food Insecurity: No Food Insecurity (12/6/2023)    Hunger Vital Sign     Worried About Running Out of Food in the Last Year: Never true     Ran Out of Food in the Last Year: Never true   Transportation Needs: No Transportation Needs (12/6/2023)    PRAPARE - Transportation     Lack of Transportation (Medical): No     Lack of Transportation (Non-Medical): No   Housing Stability:  Unknown (12/6/2023)    Housing Stability Vital Sign     Unable to Pay for Housing in the Last Year: No     Unstable Housing in the Last Year: No       Review of Systems   Gastrointestinal:  Negative for constipation.        Objective:   There were no vitals taken for this visit.    Physical Exam  Vitals reviewed.   Constitutional:       General: He is not in acute distress.     Comments: Seen in manual WC   HENT:      Head:      Comments: Rugation of left scalp     Mouth/Throat:      Comments: Food particles on teeth  Eyes:      Extraocular Movements: Extraocular movements intact.      Conjunctiva/sclera:      Right eye: Right conjunctiva is injected.      Left eye: Left conjunctiva is injected.      Comments: Vertical gaze preference, but not fixed.  Dysconjugate gaze noted.  There is erythema of the eyelid margins and some mucoid discharge which has crusted at the medial angle of the eye.  Both sclera injected; L>R.   Neck:      Comments: Vertical scar R neck with underlying cylindrical structure visible under skin; likely part of  shunt  Musculoskeletal:      Comments: Contractures noted of multiple joints.  Ursa-neck deformity of 2nd-5th digits of L hand.  Hypermobile DIP joints of L hand and often hyperextends digits, bending them backwards with his other hand.   Skin:     General: Skin is warm and dry.   Neurological:      Mental Status: He is alert. Mental status is at baseline.      Comments: Often rotates head laterally back and forth while looking upwards.  Converses largely appropriately.              Lab Results   Component Value Date    WBC 6.01 07/18/2024    HGB 15.4 07/18/2024    HCT 46.4 07/18/2024     07/18/2024    CHOL 174 04/17/2023    TRIG 124 04/17/2023    HDL 39 (L) 04/17/2023    ALT 15 07/18/2024    AST 13 07/18/2024     07/18/2024    K 4.7 07/18/2024     07/18/2024    CREATININE 0.9 07/18/2024    BUN 17 07/18/2024    CO2 26 07/18/2024    TSH 3.073 09/29/2022    INR 1.2  06/25/2021    HGBA1C 5.1 07/24/2023       Current Outpatient Medications on File Prior to Visit   Medication Sig Dispense Refill    acetaminophen (TYLENOL) 500 MG tablet Take 1 tablet (500 mg total) by mouth every 6 (six) hours as needed for Pain. 180 tablet 3    celecoxib (CELEBREX) 200 MG capsule Take 1 capsule (200 mg total) by mouth daily as needed for Pain (Take with food). 90 capsule 3    cetirizine (ZYRTEC) 10 MG tablet Take 1 tablet (10 mg total) by mouth once daily. 90 tablet 3    cholecalciferol, vitamin D3, (VITAMIN D3) 25 mcg (1,000 unit) capsule Take 1 capsule (1,000 Units total) by mouth once daily. 90 capsule 3    divalproex (DEPAKOTE) 500 MG TbEC TAKE 1 TABLET BY MOUTH EVERY TWELVE HOURS 62 tablet PRN    divalproex ER (DEPAKOTE ER) 250 MG 24 hr tablet Take 1 tablet (250 mg total) by mouth every evening. 90 tablet 3    divalproex ER (DEPAKOTE ER) 500 MG Tb24 24 hr tablet Take 1 tablet (500 mg total) by mouth every 12 (twelve) hours. Delayed Release tablets 56 tablet 10    fluticasone propionate (FLONASE) 50 mcg/actuation nasal spray INSTILL 1 SPRAY IN EACH NOSTRIL EVERY DAY   * REQUEST REFILL WHEN NEEDED 16 g 3    furosemide (LASIX) 20 MG tablet Take 1 tablet (20 mg total) by mouth daily as needed (edema). 28 tablet 11    terbinafine HCL (LAMISIL) 1 % cream Apply topically 2 (two) times daily. 15 g 1     No current facility-administered medications on file prior to visit.         Assessment:   44 y.o. male with multiple co-morbid illnesses here to follow-up for ongoing chronic disease management and preventive health maintenance.    Plan:     Problem List Items Addressed This Visit       Cerebral palsy     Pt's current WC is unable to be repaired following evaluation by a qualified technician and requires a replacement.    Leonid Cox has a mobility limitation that significantly impairs her ability to participate in one or more mobility related activities of daily living (MRADLs) such as toileting,  feeding, dressing, grooming, and bathing in customary locations in the home.  The mobility limitation cannot be sufficiently resolved by the use of a cane or walker.   The use of a manual wheelchair will significantly improve the patients ability to participate in MRADLS and the patient will use it on regular basis in the home.  Leonid Cox has a regular caretaker who has expressed her willingness to use a manual wheelchair for the patient to facilitate ADLs. Patients upper body strength is sufficient for propulsion.  He/She also has a caregiver who is available, willing, and able to provide assistance with the wheelchair when needed.             Relevant Orders    WHEELCHAIR FOR HOME USE    Viral conjunctivitis - Primary     Counseled caregiver that this likely represents viral conjunctivitis which is typically highly contagious, so it is important to maintain good hand hygiene to reduce risk of spread.  Will Rx olopatadine eye drops to be used in the meantime to aid in sx but was counseled that as this is likely viral that antibiotics will not help and it will need to run its course.         Relevant Medications    olopatadine (PATANOL) 0.1 % ophthalmic solution         Health Maintenance         Date Due Completion Date    Influenza Vaccine (1) 09/01/2024 10/23/2023    COVID-19 Vaccine (1 - 2024-25 season) Never done ---    Hemoglobin A1c (Diabetic Prevention Screening) 07/24/2026 7/24/2023    Lipid Panel 04/17/2028 4/17/2023    TETANUS VACCINE 09/03/2033 9/3/2023    RSV Vaccine (Age 60+ and Pregnant patients) (1 - 1-dose 75+ series) 03/03/2056 ---            No future appointments.          Follow up in about 6 months (around 5/19/2025). Total clinical care time was 30 min.    Leonid Wood MD  [facility]  [facility]

## 2024-11-19 NOTE — ASSESSMENT & PLAN NOTE
Pt's current WC is unable to be repaired following evaluation by a qualified technician and requires a replacement.    Leonid Cox has a mobility limitation that significantly impairs her ability to participate in one or more mobility related activities of daily living (MRADLs) such as toileting, feeding, dressing, grooming, and bathing in customary locations in the home.  The mobility limitation cannot be sufficiently resolved by the use of a cane or walker.   The use of a manual wheelchair will significantly improve the patients ability to participate in MRADLS and the patient will use it on regular basis in the home.  Leonid Cox has a regular caretaker who has expressed her willingness to use a manual wheelchair for the patient to facilitate ADLs. Patients upper body strength is sufficient for propulsion.  He/She also has a caregiver who is available, willing, and able to provide assistance with the wheelchair when needed.

## 2024-11-19 NOTE — ASSESSMENT & PLAN NOTE
Counseled caregiver that this likely represents viral conjunctivitis which is typically highly contagious, so it is important to maintain good hand hygiene to reduce risk of spread.  Will Rx olopatadine eye drops to be used in the meantime to aid in sx but was counseled that as this is likely viral that antibiotics will not help and it will need to run its course.

## 2024-11-20 RX ORDER — OLOPATADINE HYDROCHLORIDE 1 MG/ML
1 SOLUTION/ DROPS OPHTHALMIC 2 TIMES DAILY
Qty: 5 ML | Refills: 0 | Status: SHIPPED | OUTPATIENT
Start: 2024-11-20 | End: 2024-11-27

## 2024-11-20 NOTE — TELEPHONE ENCOUNTER
Good morning Dr Wood  can you resend  Olopatadine  Patanol  0.1 % ophtalmic solution  [facility] [place] [address] [place] [address]

## 2024-11-21 ENCOUNTER — TELEPHONE (OUTPATIENT)
Dept: PRIMARY CARE CLINIC | Facility: CLINIC | Age: 44
End: 2024-11-21
Payer: MEDICARE

## 2024-12-13 ENCOUNTER — TELEPHONE (OUTPATIENT)
Dept: PRIMARY CARE CLINIC | Facility: CLINIC | Age: 44
End: 2024-12-13
Payer: MEDICARE

## 2024-12-27 ENCOUNTER — PATIENT OUTREACH (OUTPATIENT)
Dept: ADMINISTRATIVE | Facility: CLINIC | Age: 44
End: 2024-12-27
Payer: MEDICARE

## 2024-12-27 NOTE — PROGRESS NOTES
C3 nurse attempted to contact Leonid Cox for a TCC post hospital discharge follow up call. No answer. Left voicemail with callback information. The patient does not have a scheduled HOSFU appointment. Message sent to PCP staff for assistance with scheduling visit with patient.

## 2025-01-13 ENCOUNTER — IMMUNIZATION (OUTPATIENT)
Dept: INTERNAL MEDICINE | Facility: CLINIC | Age: 45
End: 2025-01-13
Payer: MEDICARE

## 2025-01-13 ENCOUNTER — OFFICE VISIT (OUTPATIENT)
Dept: PRIMARY CARE CLINIC | Facility: CLINIC | Age: 45
End: 2025-01-13
Payer: MEDICARE

## 2025-01-13 VITALS
TEMPERATURE: 98 F | BODY MASS INDEX: 27.4 KG/M2 | SYSTOLIC BLOOD PRESSURE: 145 MMHG | HEIGHT: 73 IN | OXYGEN SATURATION: 98 % | HEART RATE: 97 BPM | DIASTOLIC BLOOD PRESSURE: 101 MMHG

## 2025-01-13 DIAGNOSIS — G91.1 OBSTRUCTIVE HYDROCEPHALUS: ICD-10-CM

## 2025-01-13 DIAGNOSIS — G80.0 SPASTIC QUADRIPLEGIC CEREBRAL PALSY: ICD-10-CM

## 2025-01-13 DIAGNOSIS — Z23 NEED FOR VACCINATION: Primary | ICD-10-CM

## 2025-01-13 DIAGNOSIS — Z23 NEED FOR IMMUNIZATION AGAINST INFLUENZA: Primary | ICD-10-CM

## 2025-01-13 DIAGNOSIS — G40.909 SEIZURE DISORDER: ICD-10-CM

## 2025-01-13 PROCEDURE — 3080F DIAST BP >= 90 MM HG: CPT | Mod: CPTII,S$GLB,, | Performed by: HOSPITALIST

## 2025-01-13 PROCEDURE — 99215 OFFICE O/P EST HI 40 MIN: CPT | Mod: S$GLB,,, | Performed by: HOSPITALIST

## 2025-01-13 PROCEDURE — 3077F SYST BP >= 140 MM HG: CPT | Mod: CPTII,S$GLB,, | Performed by: HOSPITALIST

## 2025-01-13 PROCEDURE — 1159F MED LIST DOCD IN RCRD: CPT | Mod: CPTII,S$GLB,, | Performed by: HOSPITALIST

## 2025-01-13 PROCEDURE — 90656 IIV3 VACC NO PRSV 0.5 ML IM: CPT | Mod: S$GLB,,, | Performed by: INTERNAL MEDICINE

## 2025-01-13 PROCEDURE — G0008 ADMIN INFLUENZA VIRUS VAC: HCPCS | Mod: S$GLB,,, | Performed by: INTERNAL MEDICINE

## 2025-01-13 PROCEDURE — 3008F BODY MASS INDEX DOCD: CPT | Mod: CPTII,S$GLB,, | Performed by: HOSPITALIST

## 2025-01-13 NOTE — PROGRESS NOTES
Primary Care Provider Appointment - [facility]  Leonid Wood MD      Subjective:      Patient ID: Leonid Cox is a 44 y.o. male with   Past Medical History:   Diagnosis Date    Blind     Cerebral palsy     Hydrocephalus     Hypertension     Seizure disorder     Seizures     Urinary reflux            Chief Complaint: Follow-up    Prior to this visit, patient's last encounter with PCP was 11/19/2024.    Hospitalized 12/11-12/16 at  for viral GE.  Sx resolved and back to baseline.  Hasn't gotten new WC yet.  Aide mentions not noticing many BMs; possibly less than 1/wk?  Pt has no sx of abdominal discomfort.    11/19: Pt reports eye turned red yesterday.  Denies any itching, burning, or discharge.  No rhinorrhea, coughing, or sneezing.  No known sick contacts.    Aide notes some yellow discharge and crusting.              Medications: Does not have pill packs              4Ms for Medical Decision-Making in Older Adults    Last Completed EAWV: None    MOBILITY:  Get Up and Go:       No data to display              Activities of Daily Living:       No data to display              Whisper Test:       No data to display              Disability Status:       No data to display              Nutrition Screening:       No data to display             Screening Score: 0-7 Malnourished, 8-11 At Risk, 12-14 Normal  Fall Risk:      1/13/2025     1:00 PM 11/19/2024    10:00 AM 11/11/2024    11:20 AM   Fall Risk Assessment - Outpatient   Mobility Status Ambulatory Ambulatory w/ assistance Ambulatory w/ assistance   Number of falls 0 0 0   Identified as fall risk False False False           MENTATION:   Depression Patient Health Questionnaire:      1/13/2025     1:06 PM   Depression Patient Health Questionnaire   Over the last two weeks how often have you been bothered by little interest or pleasure in doing things Not at all   Over the last two weeks how often have you been bothered by feeling down, depressed or hopeless  Not at all   PHQ-2 Total Score 0     Has Dementia Dx: No  Has Anxiety Dx: No    Cognitive Function Screening:       No data to display              Cognitive Function Screening Total - Less than 4 = Abnormal,  Greater than or equal to 4 = Normal        MEDICATIONS:  High Risk Medications:  Total Active Medications: 0  This patient does not have an active medication from one of the medication groupers.    WHAT MATTERS MOST:  Advance Care Planning   ACP Status:   Patient has had an ACP conversation  Living Will: Yes  Power of : Yes  LaPOST: Yes    What is most important right now is to focus on extending life as long as possible, even it it means sacrificing quality    Accordingly, we have decided that the best plan to meet the patient's goals includes continuing with treatment      What matters most to patient today is:                  Social History     Socioeconomic History    Marital status: Single   Tobacco Use    Smoking status: Never    Smokeless tobacco: Never   Substance and Sexual Activity    Alcohol use: No    Drug use: No   Social History Narrative    Lives in property owned by grandmother. Disabled with [employer] caretakers 24hrs to patient. Primary caretaker Sherri takes care of him. Shalonda is the active caretaker, but there is no official legal power of .         Mother passed away 12/2013     Social Drivers of Health     Financial Resource Strain: Low Risk  (12/6/2023)    Overall Financial Resource Strain (CARDIA)     Difficulty of Paying Living Expenses: Not hard at all   Food Insecurity: Patient Unable To Answer (12/15/2024)    Received from [facility]    Hunger Vital Sign     Worried About Running Out of Food in the Last Year: Patient unable to answer     Ran Out of Food in the Last Year: Patient unable to answer   Transportation Needs: Patient Unable To Answer (12/15/2024)    Received from [facility]    PRAPARE - Transportation     Lack of Transportation (Medical): Patient  "unable to answer     Lack of Transportation (Non-Medical): Patient unable to answer   Housing Stability: Patient Unable To Answer (12/15/2024)    Received from [facility]    Housing Stability Vital Sign     Unable to Pay for Housing in the Last Year: Patient unable to answer     Number of Times Moved in the Last Year: 1     Homeless in the Last Year: Patient unable to answer       Review of Systems   Gastrointestinal:  Negative for constipation.        Objective:   BP (!) 145/101 (BP Location: Right arm, Patient Position: Sitting)   Pulse 97   Temp 97.6 °F (36.4 °C) (Oral)   Ht 6' 1" (1.854 m)   SpO2 98%   BMI 27.40 kg/m²     Physical Exam  Vitals reviewed.   Constitutional:       General: He is not in acute distress.     Comments: Seen in manual WC   HENT:      Head:      Comments: Rugation of left scalp     Mouth/Throat:      Comments: Food particles on teeth  Eyes:      Extraocular Movements: Extraocular movements intact.      Conjunctiva/sclera:      Right eye: Right conjunctiva is injected.      Left eye: Left conjunctiva is injected.      Comments: Vertical gaze preference, but not fixed.  Dysconjugate gaze noted.  There is erythema of the eyelid margins and some mucoid discharge which has crusted at the medial angle of the eye.  Both sclera injected; L>R.   Neck:      Comments: Vertical scar R neck with underlying cylindrical structure visible under skin; likely part of  shunt  Musculoskeletal:      Comments: Contractures noted of multiple joints.  Purlear-neck deformity of 2nd-5th digits of L hand.  Hypermobile DIP joints of L hand and often hyperextends digits, bending them backwards with his other hand.   Skin:     General: Skin is warm and dry.   Neurological:      Mental Status: He is alert. Mental status is at baseline.      Comments: Often rotates head laterally back and forth while looking upwards.  Converses largely appropriately.            Lab Results   Component Value Date    WBC 6.01 " 07/18/2024    HGB 15.4 07/18/2024    HCT 46.4 07/18/2024     07/18/2024    CHOL 174 04/17/2023    TRIG 124 04/17/2023    HDL 39 (L) 04/17/2023    ALT 15 07/18/2024    AST 13 07/18/2024     07/18/2024    K 4.7 07/18/2024     07/18/2024    CREATININE 0.9 07/18/2024    BUN 17 07/18/2024    CO2 26 07/18/2024    TSH 3.073 09/29/2022    INR 1.2 06/25/2021    HGBA1C 5.1 07/24/2023       Current Outpatient Medications on File Prior to Visit   Medication Sig Dispense Refill    acetaminophen (TYLENOL) 500 MG tablet Take 1 tablet (500 mg total) by mouth every 6 (six) hours as needed for Pain. 180 tablet 3    celecoxib (CELEBREX) 200 MG capsule Take 1 capsule (200 mg total) by mouth daily as needed for Pain (Take with food). 90 capsule 3    cetirizine (ZYRTEC) 10 MG tablet Take 1 tablet (10 mg total) by mouth once daily. 90 tablet 3    cholecalciferol, vitamin D3, (VITAMIN D3) 25 mcg (1,000 unit) capsule Take 1 capsule (1,000 Units total) by mouth once daily. 90 capsule 3    divalproex (DEPAKOTE) 500 MG TbEC TAKE 1 TABLET BY MOUTH EVERY TWELVE HOURS 62 tablet PRN    divalproex ER (DEPAKOTE ER) 250 MG 24 hr tablet Take 1 tablet (250 mg total) by mouth every evening. 90 tablet 3    divalproex ER (DEPAKOTE ER) 500 MG Tb24 24 hr tablet Take 1 tablet (500 mg total) by mouth every 12 (twelve) hours. Delayed Release tablets 56 tablet 10    fluticasone propionate (FLONASE) 50 mcg/actuation nasal spray INSTILL 1 SPRAY IN EACH NOSTRIL EVERY DAY   * REQUEST REFILL WHEN NEEDED 16 g 3    furosemide (LASIX) 20 MG tablet Take 1 tablet (20 mg total) by mouth daily as needed (edema). 28 tablet 11    terbinafine HCL (LAMISIL) 1 % cream Apply topically 2 (two) times daily. 15 g 1    olopatadine (PATANOL) 0.1 % ophthalmic solution Place 1 drop into both eyes 2 (two) times daily. for 7 days 5 mL 0     No current facility-administered medications on file prior to visit.         Assessment:   44 y.o. male with multiple co-morbid  illnesses here to follow-up for ongoing chronic disease management and preventive health maintenance.    Plan:     Problem List Items Addressed This Visit       Obstructive hydrocephalus     Managed with  shunt; no issues presently.         Seizure disorder     Controlled on Depakote.         Spastic quadriplegic cerebral palsy     WC-bound at baseline.  Orders for new WC to replace damaged one sent in November; counseled pt there can sometimes be a turn-around of a few months for such items.  He resides in a long-term care facility and has a personal care aide.          Other Visit Diagnoses       Need for immunization against influenza    -  Primary    Relevant Medications    influenza (Afluria) 45 mcg/0.5 mL IM vaccine (> or = 36 mo) 0.5 mL              Health Maintenance         Date Due Completion Date    COVID-19 Vaccine (1 - 2024-25 season) Never done ---    Hemoglobin A1c (Diabetic Prevention Screening) 07/24/2026 7/24/2023    Lipid Panel 04/17/2028 4/17/2023    TETANUS VACCINE 09/03/2033 9/3/2023    RSV Vaccine (Age 60+ and Pregnant patients) (1 - 1-dose 75+ series) 03/03/2056 ---        Flu shot given today.      Future Appointments   Date Time Provider Department Center   3/10/2025 11:00 AM Leonid Wood MD [facility]             Follow up in about 3 months (around 4/13/2025). Total clinical care time was 40 min.    Leonid Wood MD  [facility]

## 2025-01-13 NOTE — ASSESSMENT & PLAN NOTE
WC-bound at baseline.  Orders for new WC to replace damaged one sent in November; counseled pt there can sometimes be a turn-around of a few months for such items.  He resides in a long-term care facility and has a personal care aide.

## 2025-02-20 ENCOUNTER — HOSPITAL ENCOUNTER (EMERGENCY)
Facility: HOSPITAL | Age: 45
Discharge: HOME | End: 2025-02-20
Attending: EMERGENCY MEDICINE
Payer: MEDICARE

## 2025-02-20 VITALS
RESPIRATION RATE: 18 BRPM | WEIGHT: 207 LBS | HEIGHT: 73 IN | SYSTOLIC BLOOD PRESSURE: 141 MMHG | OXYGEN SATURATION: 98 % | TEMPERATURE: 98 F | DIASTOLIC BLOOD PRESSURE: 94 MMHG | HEART RATE: 89 BPM | BODY MASS INDEX: 27.43 KG/M2

## 2025-02-20 DIAGNOSIS — S01.81XA FACIAL LACERATION, INITIAL ENCOUNTER: Primary | ICD-10-CM

## 2025-02-20 PROCEDURE — 25000003 PHARM REV CODE 250: Mod: ER

## 2025-02-20 PROCEDURE — 63600175 PHARM REV CODE 636 W HCPCS: Mod: ER

## 2025-02-20 PROCEDURE — 99283 EMERGENCY DEPT VISIT LOW MDM: CPT | Mod: 25,ER

## 2025-02-20 PROCEDURE — 12011 RPR F/E/E/N/L/M 2.5 CM/<: CPT | Mod: ER

## 2025-02-20 RX ORDER — LIDOCAINE HYDROCHLORIDE 10 MG/ML
5 INJECTION, SOLUTION INFILTRATION; PERINEURAL
Status: COMPLETED | OUTPATIENT
Start: 2025-02-20 | End: 2025-02-20

## 2025-02-20 RX ORDER — MUPIROCIN 20 MG/G
1 OINTMENT TOPICAL
Status: COMPLETED | OUTPATIENT
Start: 2025-02-20 | End: 2025-02-20

## 2025-02-20 RX ORDER — DIAZEPAM 5 MG/1
5 TABLET ORAL
Status: COMPLETED | OUTPATIENT
Start: 2025-02-20 | End: 2025-02-20

## 2025-02-20 RX ADMIN — Medication: at 12:02

## 2025-02-20 RX ADMIN — LIDOCAINE HYDROCHLORIDE 5 ML: 10 INJECTION, SOLUTION INFILTRATION; PERINEURAL at 12:02

## 2025-02-20 RX ADMIN — DIAZEPAM 5 MG: 5 TABLET ORAL at 01:02

## 2025-02-20 RX ADMIN — MUPIROCIN 1 TUBE: 20 OINTMENT TOPICAL at 12:02

## 2025-02-20 NOTE — DISCHARGE INSTRUCTIONS
Please keep your wound clean and dry.  Wash gently with soap and water and apply antibiotic ointment (bacitracin, neosporin, etc.) over the wound after washing. Please watch for signs of infection including: increased\spreading redness, swelling, pus-like discharge, or a fever greater than 100.4F. If you experience any of these, please contact your Primary Care Doctor or Return to the Emergency Department for a wound check.     Please follow up with your Primary Care Doctor in 2-3 days for wound recheck. You may return to the Emergency Department if you are unable to see your Primary Care Doctor.  Please return to the ER for any new or worsening symptoms.    A healing laceration should not be exposed to direct sunlight because of the risk for hyperpigmentation (darkening of the skin). It is recommended that you use sunscreen on lacerations or abrasions to help prevent the development of hyperpigmentation once the wounds are healed.   Thank you for coming to our Emergency Department today. It is important to remember that some problems or medical conditions are difficult to diagnose and may not be found or addressed during your Emergency Department visit.  These conditions often start with non-specific symptoms and can only be diagnosed on follow up visits with your primary care physician or specialist when the symptoms continue or change. Please remember that all medical conditions can change, and we cannot predict how you will be feeling tomorrow or the next day. Return to the ER with any questions/concerns, new/concerning symptoms including fever, chest pain, shortness of breath, loss of consciousness, dizziness, weakness, worsening symptoms, failure to improve, or any other concerns. Also, please follow up with your Primary Care Physician and/or Pediatrician in the next 1-2 days to review your ED visit in entirety and for re-evaluation.   Be sure to follow up with your primary care doctor and review all  labs/imaging/tests that were performed during your ER visit with them. It is very common for us to identify non-emergent incidental findings which must be followed up with your primary care physician.  Some labs/imaging/tests may be outside of the normal range, and require non-emergent follow-up and/or further investigation/treatment/procedures/testing to help diagnose/exclude/prevent complications or other potentially serious medical conditions. Some abnormalities may not have been discussed or addressed during your ER visit. Some lab results may not return during your ER visit but can be accessible by downloading the free [facility] Mychart agapito or by visiting [email or web address] . It is important for you to review all labs/imaging/tests which are outside of the normal range with your physician.  An ER visit does not replace a primary care visit, and many screening tests or follow-up tests cannot be ordered by an ER doctor or performed by the ER. Some tests may even require pre-approval.  If you do not have a primary care doctor, you may contact the one listed on your discharge paperwork or you may also call the [facility] Appointment Desk at [phone] , or [facility] at  [phone] to schedule an appointment, or establish care with a primary care doctor or even a specialist and to obtain information about local resources. It is important to your health that you have a primary care doctor.  Please take all medications as directed. We have done our best to select a medication for you that will treat your condition however, all medications may potentially have side-effects and it is impossible to predict which medications may give you side-effects or what those side-effects (if any) those medications may give you.  If you feel that you are having a negative effect or side-effect of any medication you should stop taking those medications immediately and seek medical attention. If you feel that you are  having a life-threatening reaction call 911.  Do not drive, swim, climb to height, take a bath, operate heavy machinery, drink alcohol or take potentially sedating medications, sign any legal documents or make any important decisions for 24 hours if you have received any pain medications, sedatives or mood altering drugs during your ER visit or within 24 hours of taking them if they have been prescribed to you.   You can find additional resources for Dentists, hearing aids, durable medical equipment, low cost pharmacies and other resources at [email or web address]  Patient agrees with this plan. Discussed with her strict return precautions, they verbalized understanding. Patient is stable for discharge.   § Please take all medication as prescribed.

## 2025-02-20 NOTE — ED PROVIDER NOTES
Encounter Date: 2/20/2025    SCRIBE #1 NOTE: I, Stan Adams Do, am scribing for, and in the presence of,  Miki Terrazas PA-C. I have scribed the following portions of the note - Other sections scribed: HPI, ROS, PE.       History     Chief Complaint   Patient presents with    Laceration     Pt slipped and tripped while trying to move from bed to wheelchair. Denies LOC. Lac to (right) eye.      Leonid Cox is a 44 y.o. male, with a pertinent PMHx of cerebral palsy, HTN, and seizures, who presents to the ED with a wound near his right cheek s/p witnessed mechanical fall that occurred today. Per caregiver at bedside, independent historian, she reports the patient was moving from his bed to the wheelchair when the wheelchair moved, causing him to fall to the ground. She denies any LOC. No other exacerbating or alleviating factors. Patient denies any other injuries. Patient denies chest pain, SOB, or other associated symptoms. Patient does not recall his last tetanus vaccination.     The history is provided by the patient and a caregiver. No  was used.     Review of patient's allergies indicates:   Allergen Reactions    Adhesive tape-silicones      Other reaction(s): blisters    Codeine      Other reaction(s): Nausea    Morphine Itching     Past Medical History:   Diagnosis Date    Blind     Cerebral palsy     Hydrocephalus     Hypertension     Seizure disorder     Seizures     Urinary reflux      Past Surgical History:   Procedure Laterality Date    FOOT SURGERY      SHUNT REVISION      TENDON RELEASE      TOTAL HIP ARTHROPLASTY       Family History   Problem Relation Name Age of Onset    Cancer Mother      Cancer Father       Social History[1]  Review of Systems   Constitutional:  Negative for chills, diaphoresis, fatigue and fever.   HENT:  Negative for congestion, ear discharge, ear pain, rhinorrhea, sore throat and trouble swallowing.    Eyes:  Negative for photophobia, redness and visual  disturbance.   Respiratory:  Negative for cough and shortness of breath.    Cardiovascular:  Negative for chest pain, palpitations and leg swelling.   Gastrointestinal:  Negative for abdominal pain, diarrhea, nausea and vomiting.   Genitourinary:  Negative for difficulty urinating, dysuria, flank pain, frequency and hematuria.   Musculoskeletal:  Negative for arthralgias, back pain, myalgias, neck pain and neck stiffness.   Skin:  Positive for wound. Negative for pallor and rash.   Neurological:  Negative for dizziness, weakness, light-headedness, numbness and headaches.   Hematological:  Does not bruise/bleed easily.       Physical Exam     Initial Vitals [02/20/25 1133]   BP Pulse Resp Temp SpO2   (!) 141/94 89 18 98 °F (36.7 °C) 98 %      MAP       --         Physical Exam    Nursing note and vitals reviewed.  Constitutional: He appears well-developed and well-nourished. He is not diaphoretic. He does not appear ill. No distress.   HENT:   Head: Normocephalic. Head is with laceration. Head is without raccoon's eyes, without Walter's sign, without abrasion and without contusion.       Right Ear: External ear normal.   Left Ear: External ear normal.   Nose: Nose normal. Mouth/Throat: Uvula is midline and oropharynx is clear and moist.   Eyes: Conjunctivae and EOM are normal. Pupils are equal, round, and reactive to light. Right eye exhibits no discharge. Left eye exhibits no discharge. No scleral icterus.   Neck: Trachea normal. Neck supple.   Normal range of motion.   Full passive range of motion without pain.     Cardiovascular:  Normal rate, regular rhythm, normal heart sounds, intact distal pulses and normal pulses.     Exam reveals no distant heart sounds and no friction rub.       Pulmonary/Chest: Effort normal and breath sounds normal. No respiratory distress.   Abdominal: Abdomen is soft. Bowel sounds are normal. He exhibits no distension and no pulsatile midline mass. There is no abdominal tenderness.   No  right CVA tenderness.  No left CVA tenderness. There is no rebound and no guarding.   Musculoskeletal:         General: Normal range of motion.      Right shoulder: Normal.      Left shoulder: Normal.      Right elbow: Normal.      Left elbow: Normal.      Right wrist: Normal.      Left wrist: Normal.      Right hand: Normal.      Left hand: Normal.      Cervical back: Normal, full passive range of motion without pain, normal range of motion and neck supple.      Thoracic back: Normal.      Lumbar back: Normal.      Right hip: Normal.      Left hip: Normal.      Right knee: Normal.      Left knee: Normal.      Right lower leg: Normal.      Left lower leg: Normal.      Right ankle: Normal.      Left ankle: Normal.      Right foot: Normal.      Left foot: Normal.     Neurological: He is alert and oriented to person, place, and time. He has normal strength. No cranial nerve deficit or sensory deficit.   Skin: Skin is warm and dry. Capillary refill takes less than 2 seconds. No bruising, no ecchymosis and no rash noted. No erythema.   Approximately 1 cm laceration to the right lateral periocular region.    Psychiatric: He has a normal mood and affect. His speech is normal and behavior is normal. Thought content normal.         ED Course   Lac Repair    Date/Time: 2/20/2025 12:57 PM    Performed by: Miki Terrazas PA-C  Authorized by: Cynthia Freeman DO    Consent:     Consent obtained:  Verbal    Consent given by:  Patient    Risks, benefits, and alternatives were discussed: yes      Risks discussed:  Infection, pain, retained foreign body, need for additional repair, poor cosmetic result and poor wound healing    Alternatives discussed:  No treatment  Universal protocol:     Procedure explained and questions answered to patient or proxy's satisfaction: yes      Patient identity confirmed:  Verbally with patient  Anesthesia:     Anesthesia method:  Local infiltration and topical application    Topical anesthetic:  LET     Local anesthetic:  Lidocaine 1% w/o epi  Laceration details:     Location:  Face    Facial location: right temple.    Length (cm):  1.5  Treatment:     Area cleansed with:  Povidone-iodine and saline    Amount of cleaning:  Standard  Skin repair:     Repair method:  Sutures    Suture size:  6-0    Suture material:  Chromic gut    Suture technique:  Simple interrupted    Number of sutures:  3  Approximation:     Approximation:  Close  Repair type:     Repair type:  Simple  Post-procedure details:     Dressing:  Antibiotic ointment    Procedure completion:  Tolerated  Comments:      Laceration repaired with 3 sutures. Topical Abx applied. Patient tolerated well without acute complication.  Instructed patient to keep the area clean and dry and watch out for signs of infection including erythema, warmth, pus discharge, and fevers at home. Instructed patient that has sutures are absorbable and will not have to return to have them removed however I advised follow up with their primary care provider in the next 2-3 days for wound re-evaluation as well as strict return precautions for any signs of infection.  The patient was and caregiver verbalized understanding.      Labs Reviewed - No data to display       Imaging Results    None          Medications   LETS (LIDOcaine-TETRAcaine-EPINEPHrine) gel solution ( Topical (Top) Given 2/20/25 1209)   mupirocin 2 % ointment 1 Tube (1 Tube Topical (Top) Given 2/20/25 1209)   LIDOcaine HCL 10 mg/ml (1%) injection 5 mL (5 mLs Infiltration Given 2/20/25 1209)   diazePAM tablet 5 mg (5 mg Oral Given 2/20/25 1328)     Medical Decision Making  Leonid Cox is a 44 y.o. male, with a pertinent PMHx of cerebral palsy, HTN, and seizures, who presents to the ED with a wound near his right cheek s/p witnessed mechanical fall that occurred today. Per caregiver at bedside, independent historian, she reports the patient was moving from his bed to the wheelchair when the wheelchair moved,  causing him to fall to the ground. She denies any LOC. No other exacerbating or alleviating factors. Patient denies any other injuries. Patient denies chest pain, SOB, or other associated symptoms. Patient does not recall his last tetanus vaccination.     Patient's chart and medical history reviewed.  Patient's vitals reviewed.  They are afebrile, no respiratory distress, nontoxic-appearing in the ED.  On exam, approximately 1 cm laceration to the right lateral periocular region.     Differential diagnosis include but are not limited to:  Laceration, cellulitis, subarachnoid hemorrhage, epidural hematoma, subdural hematoma, facial fracture.  Patient was with patient was injury without loss of consciousness no nausea or vomiting.  Per patient's caretaker no abnormalities in mental status states patient was acting appropriately.  No localized bony tenderness or deformity.  Small laceration to the right temporal bleeding controlled no surrounding erythema or infectious findings.  Lack repair per procedure note.  Mupirocin applied afterwards patient tolerated well.  Wound care instructions provided at discharge.  Absorbable sutures utilized no follow up at this time.  Discussed extensively with patient and caretaker about the likelihood of scarring as well as post procedural methods to help reduce scarring.  Patient and caretaker acknowledges this and agreed to the procedure and does not have any questions at this time.  Patient remained hemodynamically stable and afebrile.  Will discharge with mupirocin and wound care instructions.    Instructed to follow up with primary care provider in 2-3 days for re-evaluation.    I discussed with the patient/family the diagnosis, treatment plan, indications for return to the emergency department, and for expected follow-up. The patient/family verbalized an understanding. The patient/family is asked if there are any questions or concerns. We discuss the case, until all issues are  addressed to the patient/family's satisfaction. Patient/family understands and is agreeable to the plan.     DISCLAIMER: This note was prepared with [employer] voice recognition transcription software. Garbled syntax, mangled pronouns, and other bizarre constructions may be attributed to that software system.      Amount and/or Complexity of Data Reviewed  Independent Historian: caregiver     Details: See HPI.    Risk  Prescription drug management.            Scribe Attestation:   Scribe #1: I performed the above scribed service and the documentation accurately describes the services I performed. I attest to the accuracy of the note.        ED Course as of 02/20/25 1419   Thu Feb 20, 2025   1156 Last tetanus on 09/03/2023 [AF]   1207 SpO2: 98 % [AF]   1207 Pulse: 89 [AF]   1207 Temp: 98 °F (36.7 °C) [AF]   1207 BP(!): 141/94 [AF]   1314 Valium ordered due to patient spacticity while attempting to suture. Will attempt again afterwards.  [AF]      ED Course User Index  [AF] Miki Terrazas PA-C                           Clinical Impression:  Final diagnoses:  [S01.81XA] Facial laceration, initial encounter (Primary)       I, Miki Terrazas PA-C, personally performed the services described in this documentation. All medical record entries made by the scribe were at my direction and in my presence. I have reviewed the chart and agree that the record reflects my personal performance and is accurate and complete.      DISCLAIMER: This note was prepared with [employer] voice recognition transcription software. Garbled syntax, mangled pronouns, and other bizarre constructions may be attributed to that software system.     ED Disposition Condition    Discharge Stable          ED Prescriptions    None       Follow-up Information       Follow up With Specialties Details Why Contact Info    Anushka Telles MD Internal Medicine Schedule an appointment as soon as possible for a visit in 1 day for follow up [address]  [facility] [address]  [phone]      [facility] ED Emergency Medicine Go to  If you have new or worsening symptoms, or if you have any concerns at all. [address] [facility] [address] [phone]                 [1]   Social History  Tobacco Use    Smoking status: Never    Smokeless tobacco: Never   Substance Use Topics    Alcohol use: No    Drug use: No        Miki Terrazas PA-C  02/20/25 141

## 2025-02-25 ENCOUNTER — HOSPITAL ENCOUNTER (OUTPATIENT)
Dept: RADIOLOGY | Facility: HOSPITAL | Age: 45
Discharge: HOME | End: 2025-02-25
Attending: HOSPITALIST
Payer: MEDICARE

## 2025-02-25 ENCOUNTER — TELEPHONE (OUTPATIENT)
Dept: PRIMARY CARE CLINIC | Facility: CLINIC | Age: 45
End: 2025-02-25
Payer: MEDICARE

## 2025-02-25 ENCOUNTER — OFFICE VISIT (OUTPATIENT)
Dept: PRIMARY CARE CLINIC | Facility: CLINIC | Age: 45
End: 2025-02-25
Attending: HOSPITALIST
Payer: MEDICARE

## 2025-02-25 VITALS
HEART RATE: 75 BPM | DIASTOLIC BLOOD PRESSURE: 95 MMHG | HEIGHT: 73 IN | TEMPERATURE: 98 F | SYSTOLIC BLOOD PRESSURE: 141 MMHG | BODY MASS INDEX: 27.31 KG/M2 | OXYGEN SATURATION: 100 %

## 2025-02-25 DIAGNOSIS — G91.1 OBSTRUCTIVE HYDROCEPHALUS: ICD-10-CM

## 2025-02-25 DIAGNOSIS — G80.0 SPASTIC QUADRIPLEGIC CEREBRAL PALSY: Primary | ICD-10-CM

## 2025-02-25 DIAGNOSIS — G47.00 INSOMNIA, UNSPECIFIED TYPE: ICD-10-CM

## 2025-02-25 DIAGNOSIS — R45.1 AGITATION: ICD-10-CM

## 2025-02-25 PROCEDURE — 3080F DIAST BP >= 90 MM HG: CPT | Mod: CPTII,S$GLB,, | Performed by: HOSPITALIST

## 2025-02-25 PROCEDURE — 70250 X-RAY EXAM OF SKULL: CPT | Mod: 26,,, | Performed by: INTERNAL MEDICINE

## 2025-02-25 PROCEDURE — 99214 OFFICE O/P EST MOD 30 MIN: CPT | Mod: S$GLB,,, | Performed by: HOSPITALIST

## 2025-02-25 PROCEDURE — 72020 X-RAY EXAM OF SPINE 1 VIEW: CPT | Mod: 26,,, | Performed by: INTERNAL MEDICINE

## 2025-02-25 PROCEDURE — 1159F MED LIST DOCD IN RCRD: CPT | Mod: CPTII,S$GLB,, | Performed by: HOSPITALIST

## 2025-02-25 PROCEDURE — 3077F SYST BP >= 140 MM HG: CPT | Mod: CPTII,S$GLB,, | Performed by: HOSPITALIST

## 2025-02-25 PROCEDURE — 74018 RADEX ABDOMEN 1 VIEW: CPT | Mod: TC

## 2025-02-25 PROCEDURE — 74018 RADEX ABDOMEN 1 VIEW: CPT | Mod: 26,,, | Performed by: INTERNAL MEDICINE

## 2025-02-25 PROCEDURE — 71045 X-RAY EXAM CHEST 1 VIEW: CPT | Mod: 26,,, | Performed by: INTERNAL MEDICINE

## 2025-02-25 PROCEDURE — 3008F BODY MASS INDEX DOCD: CPT | Mod: CPTII,S$GLB,, | Performed by: HOSPITALIST

## 2025-02-25 RX ORDER — DIAZEPAM 5 MG/1
5 TABLET ORAL
Qty: 1 TABLET | Refills: 0 | Status: SHIPPED | OUTPATIENT
Start: 2025-02-25 | End: 2025-03-27

## 2025-02-25 RX ORDER — TALC
6 POWDER (GRAM) TOPICAL NIGHTLY
Qty: 60 TABLET | Refills: 11 | Status: SHIPPED | OUTPATIENT
Start: 2025-02-25 | End: 2025-02-25

## 2025-02-25 RX ORDER — TALC
6 POWDER (GRAM) TOPICAL NIGHTLY
Qty: 60 TABLET | Refills: 11 | Status: SHIPPED | OUTPATIENT
Start: 2025-02-25

## 2025-02-25 NOTE — PROGRESS NOTES
Primary Care Provider Appointment - Surinder Maharaj MS-4, Dr. Leonid Wood MD (attending)      Subjective:      Patient ID: Leonid Cox is a 44 y.o. male with   Past Medical History:   Diagnosis Date    Blind     Cerebral palsy     Hydrocephalus     Hypertension     Seizure disorder     Seizures     Urinary reflux            Chief Complaint: Follow-up    Prior to this visit, patient's last encounter with PCP was 1/13/2025.    Leonid presents today with his care provider for a follow up appointment after visiting the emergency department Thursday, February 20 due to a laceration sustained to the right side of the face. On Thursday while ambulating from his bed to his wheelchair, the chair moved backwards resulting in him falling and hitting his head on the wheelchair. This resulted in a ~1.0 cm long laceration to the right inferolateral orbit with periorbital bruising. The wound was sutured closed in the emergency department. The caregiver also noted that Leonid has had a decrease in activity the past 2 months, noted by an increased time required to eat, 1.5 hours for a meal as opposed to 45 minutes previously, as well as a decrease in strength and ability to ambulate independently. Additionally, he seems to be waking earlier in the day and sleeping more during the day. The care manager noted that he moved houses 6 weeks ago but has spent time in this new house before, so the environment isn't entirely new to him.         Medications: Does not have pill packs              4Ms for Medical Decision-Making in Older Adults    Last Completed EAWV: None    MOBILITY:  Get Up and Go:       No data to display              Activities of Daily Living:       No data to display              Whisper Test:       No data to display              Disability Status:       No data to display              Nutrition Screening:       No data to display             Screening Score: 0-7 Malnourished, 8-11 At Risk,  12-14 Normal  Fall Risk:      2/25/2025    11:00 AM 1/13/2025     1:00 PM 11/19/2024    10:00 AM   Fall Risk Assessment - Outpatient   Mobility Status Ambulatory w/ assistance Ambulatory Ambulatory w/ assistance   Number of falls 1 0 0   Identified as fall risk False False False           MENTATION:   Depression Patient Health Questionnaire:      2/25/2025    11:08 AM   Depression Patient Health Questionnaire   Over the last two weeks how often have you been bothered by little interest or pleasure in doing things Not at all   Over the last two weeks how often have you been bothered by feeling down, depressed or hopeless Not at all   PHQ-2 Total Score 0     Has Dementia Dx: No  Has Anxiety Dx: No    Cognitive Function Screening:       No data to display              Cognitive Function Screening Total - Less than 4 = Abnormal,  Greater than or equal to 4 = Normal        MEDICATIONS:  High Risk Medications:  Total Active Medications: 1  diazePAM - 5 MG    WHAT MATTERS MOST:  Advance Care Planning   ACP Status:   Patient has had an ACP conversation  Living Will: Yes  Power of : Yes  [place]: Yes    What is most important right now is to focus on extending life as long as possible, even it it means sacrificing quality    Accordingly, we have decided that the best plan to meet the patient's goals includes continuing with treatment      What matters most to patient today is:                  Social History     Socioeconomic History    Marital status: Single   Tobacco Use    Smoking status: Never    Smokeless tobacco: Never   Substance and Sexual Activity    Alcohol use: No    Drug use: No   Social History Narrative    Lives in property owned by grandmother. Disabled with [employer] caretakers 24hrs to patient. Primary caretaker Sherri takes care of him. Shalonda is the active caretaker, but there is no official legal power of .         Mother passed away 12/2013     Social Drivers of Health     Financial  "Resource Strain: Low Risk  (12/6/2023)    Overall Financial Resource Strain (CARDIA)     Difficulty of Paying Living Expenses: Not hard at all   Food Insecurity: Patient Unable To Answer (12/15/2024)    Received from [facility]    Hunger Vital Sign     Worried About Running Out of Food in the Last Year: Patient unable to answer     Ran Out of Food in the Last Year: Patient unable to answer   Transportation Needs: Patient Unable To Answer (12/15/2024)    Received from [facility]    PRAPARE - Transportation     Lack of Transportation (Medical): Patient unable to answer     Lack of Transportation (Non-Medical): Patient unable to answer   Housing Stability: Patient Unable To Answer (12/15/2024)    Received from [facility]    Housing Stability Vital Sign     Unable to Pay for Housing in the Last Year: Patient unable to answer     Number of Times Moved in the Last Year: 1     Homeless in the Last Year: Patient unable to answer       Review of Systems   Gastrointestinal:  Negative for constipation.        Objective:   BP (!) 141/95 (BP Location: Right arm, Patient Position: Sitting)   Pulse 75   Temp 97.6 °F (36.4 °C) (Oral)   Ht 6' 1" (1.854 m)   SpO2 100%   BMI 27.31 kg/m²     Physical Exam  Vitals reviewed.   Constitutional:       General: He is not in acute distress.     Comments: Seen in manual WC   HENT:      Head:      Comments: Rugation of left scalp     Mouth/Throat:      Comments: Food particles on teeth  Eyes:      Extraocular Movements: Extraocular movements intact.      Conjunctiva/sclera:      Right eye: Right conjunctiva is injected.      Left eye: Left conjunctiva is injected.      Comments: Vertical gaze preference, but not fixed.  Dysconjugate gaze noted.  There is erythema of the eyelid margins and some mucoid discharge which has crusted at the medial angle of the eye.  Both sclera injected; L>R.   Neck:      Comments: Vertical scar R neck with underlying cylindrical structure visible under skin; " likely part of  shunt  Musculoskeletal:      Comments: Contractures noted of multiple joints.  [place]-neck deformity of 2nd-5th digits of L hand.  Hypermobile DIP joints of L hand and often hyperextends digits, bending them backwards with his other hand.   Skin:     General: Skin is warm and dry.   Neurological:      Mental Status: He is alert. Mental status is at baseline.      Comments: Often rotates head laterally back and forth while looking upwards.  Converses largely appropriately.              Lab Results   Component Value Date    WBC 6.01 07/18/2024    HGB 15.4 07/18/2024    HCT 46.4 07/18/2024     07/18/2024    CHOL 174 04/17/2023    TRIG 124 04/17/2023    HDL 39 (L) 04/17/2023    ALT 9 12/11/2024    AST 14 12/11/2024     12/16/2024    K 3.6 12/16/2024     07/18/2024    CREATININE 0.55 (L) 12/16/2024    BUN 11.0 12/16/2024    CO2 27 12/16/2024    TSH 3.073 09/29/2022    INR 1.2 06/25/2021    HGBA1C 5.1 07/24/2023       Current Outpatient Medications on File Prior to Visit   Medication Sig Dispense Refill    acetaminophen (TYLENOL) 500 MG tablet Take 1 tablet (500 mg total) by mouth every 6 (six) hours as needed for Pain. 180 tablet 3    celecoxib (CELEBREX) 200 MG capsule Take 1 capsule (200 mg total) by mouth daily as needed for Pain (Take with food). 90 capsule 3    cetirizine (ZYRTEC) 10 MG tablet Take 1 tablet (10 mg total) by mouth once daily. 90 tablet 3    cholecalciferol, vitamin D3, (VITAMIN D3) 25 mcg (1,000 unit) capsule Take 1 capsule (1,000 Units total) by mouth once daily. 90 capsule 3    divalproex (DEPAKOTE) 500 MG TbEC TAKE 1 TABLET BY MOUTH EVERY TWELVE HOURS 62 tablet PRN    divalproex ER (DEPAKOTE ER) 250 MG 24 hr tablet Take 1 tablet (250 mg total) by mouth every evening. 90 tablet 3    divalproex ER (DEPAKOTE ER) 500 MG Tb24 24 hr tablet Take 1 tablet (500 mg total) by mouth every 12 (twelve) hours. Delayed Release tablets 56 tablet 10    fluticasone propionate  (FLONASE) 50 mcg/actuation nasal spray INSTILL 1 SPRAY IN EACH NOSTRIL EVERY DAY   * REQUEST REFILL WHEN NEEDED 16 g 3    furosemide (LASIX) 20 MG tablet Take 1 tablet (20 mg total) by mouth daily as needed (edema). 28 tablet 11    terbinafine HCL (LAMISIL) 1 % cream Apply topically 2 (two) times daily. 15 g 1    olopatadine (PATANOL) 0.1 % ophthalmic solution Place 1 drop into both eyes 2 (two) times daily. for 7 days 5 mL 0     Current Facility-Administered Medications on File Prior to Visit   Medication Dose Route Frequency Provider Last Rate Last Admin    influenza (Afluria) 45 mcg/0.5 mL IM vaccine (> or = 36 mo) 0.5 mL  0.5 mL Intramuscular 1 time in Clinic/HOD              Assessment:   44 y.o. male with multiple co-morbid illnesses here to follow-up for ongoing chronic disease management and preventive health maintenance.    Plan:     Problem List Items Addressed This Visit       Obstructive hydrocephalus    Considering change in status following head trauma will check x-ray shunt series and consider further imaging if warranted.  May also consider Neurology referral.         Relevant Orders    X-Ray Shunt Series (XPD) (Completed)    Agitation    Pt given 5mg diazepam in clinic to facilitate cooperation with x-ray just prior to imaging.         Relevant Medications    diazePAM (VALIUM) 5 MG tablet    Spastic quadriplegic cerebral palsy - Primary    Resides in group home and has [facility] care.  Recently got new .  His bed is not as comfortable being more narrow than the one he is used to sleeping in so is unable to reposition himself as easily and is not sleeping well at night, which may possibly also account for his change in status.  Leonid requires a hospital bed due to him requiring positioning of the body in ways not feasible with an ordinary bed due to limited ability and cannot independently make changes in body position without the use of the bed.The positioning of the body cannot be sufficiently  resolved by the use of pillows and wedges.           Relevant Orders    Ambulatory referral/consult to Home Health    HOSPITAL BED FOR HOME USE    Insomnia    May be related to change in environment after being moved to a different group home or possibly lack of comfort in new bed.  Will trial qhs melatonin to see if this helps improve sleep.         Relevant Medications    melatonin (MELATIN) 3 mg tablet     Obstructive Hydrocephalus  Orders were placed for an X-ray including a shunt series to be completed to evaluate the structure and integrity of his existing shunt to allow for further workup and imaging. A single order of Diazepam/Valium was placed so that the patient may comfortably undergo the imaging procedure as he has had difficulty remaining calm and relatively still during imaging in the past.     Spastic quadriplegic cerebral palsy  Orders were placed for a bariatric hospital bed so the patient may ambulate whilst in bed with greater ease and comfort. The care manager noted he currently sleeps in a twin sized bed and this presents him with difficulty in turning and moving while trying to sleep.     Insomnia - unspecified type   Nightly melatonin 3 mg has been ordered for the patient in efforts to improve his sleep quality and increase his ease of falling asleep.     Health Maintenance         Date Due Completion Date    COVID-19 Vaccine (1 - 2024-25 season) Never done ---    Hemoglobin A1c (Diabetic Prevention Screening) 07/24/2026 7/24/2023    Lipid Panel 04/17/2028 4/17/2023    TETANUS VACCINE 09/03/2033 9/3/2023    RSV Vaccine (Age 60+ and Pregnant patients) (1 - 1-dose 75+ series) 03/03/2056 ---            Future Appointments   Date Time Provider Department Center   3/11/2025  1:40 PM Leonid Wood MD [facility] MED CLN [address] PC[facility]             Follow up in about 2 weeks (around 3/11/2025) for audio w/ Mary Carmen. Total clinical care time was 30 min.    Bijan Winchester Surinder and Jose  Plus  [facility]

## 2025-02-26 ENCOUNTER — TELEPHONE (OUTPATIENT)
Dept: PRIMARY CARE CLINIC | Facility: CLINIC | Age: 45
End: 2025-02-26
Payer: MEDICARE

## 2025-02-26 NOTE — TELEPHONE ENCOUNTER
RN received call from pt's group home SN.  SN concerned that pt was found to be masturbating for the first time in 6 1/2 years.  RN explained that pt received diazepam yesterday and it lowers inhibitions and impairs judgement.  RN asked if this was the only time this happened and per the SN it was as far as she is aware.

## 2025-02-26 NOTE — TELEPHONE ENCOUNTER
Chante called [facility] to follow up with the referral that was sent on yesterday and it was reported that it was not received. CHANTE faxed the HH referral again to [phone] and they will take a look at it tomorrow to see if they can staff him.    Chante also called the caregiver Virginia and informed her that the DME company will only give a hospital bed based on his weight and it will not be a large bed. If they would like to get the large bed, it would be a set fee of 970 and must be paid ahead of time.     After speaking with the family, they are going to just do the bed with justifications because they can't afford that price at this time. I did informed them that the doc will be back on next week and we will try to get the amended note to DME.

## 2025-02-28 ENCOUNTER — RESULTS FOLLOW-UP (OUTPATIENT)
Facility: CLINIC | Age: 45
End: 2025-02-28
Payer: MEDICARE

## 2025-02-28 PROBLEM — G47.00 INSOMNIA: Status: ACTIVE | Noted: 2025-02-28

## 2025-02-28 NOTE — TELEPHONE ENCOUNTER
----- Message from Leonid Wood MD sent at 2/28/2025 11:34 AM CST -----  Shawna, could you contact his care team and ask if he's doing any better?  Shunt x-ray was ok so if he's still off then we'll need to do a head CT with more valium and then whatever comes after that.  ----- Message -----  From: Tena, Rad Results In  Sent: 2/25/2025   2:56 PM CST  To: Leonid Wood MD

## 2025-02-28 NOTE — ASSESSMENT & PLAN NOTE
Considering change in status following head trauma will check x-ray shunt series and consider further imaging if warranted.  May also consider Neurology referral.

## 2025-02-28 NOTE — ASSESSMENT & PLAN NOTE
May be related to change in environment after being moved to a different group home or possibly lack of comfort in new bed.  Will trial qhs melatonin to see if this helps improve sleep.

## 2025-02-28 NOTE — ASSESSMENT & PLAN NOTE
Resides in group home and has [facility] care.  Recently got new WC.  His bed is not as comfortable being more narrow than the one he is used to sleeping in so is unable to reposition himself as easily and is not sleeping well at night, which may possibly also account for his change in status.  Will order HH PT/OT evals due to change in functional ability.    Leonid requires a hospital bed due to him requiring positioning of the body in ways not feasible with an ordinary bed due to limited ability and cannot independently make changes in body position without the use of the bed.The positioning of the body cannot be sufficiently resolved by the use of pillows and wedges.

## 2025-02-28 NOTE — TELEPHONE ENCOUNTER
RN spoke to pt's caregiver and he is back to his baseline per caregiver.  No further concerns at this time.

## 2025-03-06 ENCOUNTER — HOSPITAL ENCOUNTER (EMERGENCY)
Facility: HOSPITAL | Age: 45
Discharge: HOME | End: 2025-03-06
Attending: EMERGENCY MEDICINE
Payer: MEDICARE

## 2025-03-06 VITALS
WEIGHT: 207 LBS | TEMPERATURE: 99 F | BODY MASS INDEX: 27.43 KG/M2 | RESPIRATION RATE: 18 BRPM | SYSTOLIC BLOOD PRESSURE: 121 MMHG | HEART RATE: 83 BPM | DIASTOLIC BLOOD PRESSURE: 87 MMHG | OXYGEN SATURATION: 98 % | HEIGHT: 73 IN

## 2025-03-06 DIAGNOSIS — L25.9 CONTACT DERMATITIS, UNSPECIFIED CONTACT DERMATITIS TYPE, UNSPECIFIED TRIGGER: Primary | ICD-10-CM

## 2025-03-06 PROCEDURE — 63600175 PHARM REV CODE 636 W HCPCS: Mod: ER | Performed by: EMERGENCY MEDICINE

## 2025-03-06 PROCEDURE — 99284 EMERGENCY DEPT VISIT MOD MDM: CPT | Mod: ER

## 2025-03-06 PROCEDURE — 25000003 PHARM REV CODE 250: Mod: ER | Performed by: EMERGENCY MEDICINE

## 2025-03-06 RX ORDER — PREDNISONE 20 MG/1
40 TABLET ORAL DAILY
Qty: 10 TABLET | Refills: 0 | Status: SHIPPED | OUTPATIENT
Start: 2025-03-06 | End: 2025-03-11

## 2025-03-06 RX ORDER — CEPHALEXIN 500 MG/1
500 CAPSULE ORAL EVERY 6 HOURS
Qty: 28 CAPSULE | Refills: 0 | Status: SHIPPED | OUTPATIENT
Start: 2025-03-06 | End: 2025-03-13

## 2025-03-06 RX ORDER — PREDNISONE 20 MG/1
40 TABLET ORAL
Status: COMPLETED | OUTPATIENT
Start: 2025-03-06 | End: 2025-03-06

## 2025-03-06 RX ORDER — DEXAMETHASONE SODIUM PHOSPHATE 4 MG/ML
4 INJECTION, SOLUTION INTRA-ARTICULAR; INTRALESIONAL; INTRAMUSCULAR; INTRAVENOUS; SOFT TISSUE
Status: DISCONTINUED | OUTPATIENT
Start: 2025-03-06 | End: 2025-03-06

## 2025-03-06 RX ORDER — DIPHENHYDRAMINE HYDROCHLORIDE 50 MG/ML
25 INJECTION, SOLUTION INTRAMUSCULAR; INTRAVENOUS
Status: DISCONTINUED | OUTPATIENT
Start: 2025-03-06 | End: 2025-03-06

## 2025-03-06 RX ORDER — DIPHENHYDRAMINE HCL 25 MG
25 CAPSULE ORAL
Status: COMPLETED | OUTPATIENT
Start: 2025-03-06 | End: 2025-03-06

## 2025-03-06 RX ORDER — FAMOTIDINE 20 MG/1
40 TABLET, FILM COATED ORAL
Status: COMPLETED | OUTPATIENT
Start: 2025-03-06 | End: 2025-03-06

## 2025-03-06 RX ADMIN — DIPHENHYDRAMINE HYDROCHLORIDE 25 MG: 25 CAPSULE ORAL at 09:03

## 2025-03-06 RX ADMIN — FAMOTIDINE 40 MG: 20 TABLET, FILM COATED ORAL at 09:03

## 2025-03-06 RX ADMIN — PREDNISONE 40 MG: 20 TABLET ORAL at 09:03

## 2025-03-06 NOTE — ED PROVIDER NOTES
Encounter Date: 3/6/2025    SCRIBE #1 NOTE: I, Altagracia Ramos, am scribing for, and in the presence of,  Camille Patel MD. I have scribed the following portions of the note - Other sections scribed: HPI, ROS, PE.       History     Chief Complaint   Patient presents with    Rash     Pt presents to the ER c/o rash to bilateral hands since this morning.      Leonid Cox is a 45 y.o. male with PMHx of Cerebral palsy, Hydrocephalus, Seizure disorder, and HTN who presents to the ED with a erythematous rash to bilateral hands since this morning. Independent historian, caregiver, also noticed patient had a rash to his posterior neck while helping him bathe this morning but states the rash has since disappeared. Caregiver reports patient's skin does get red sometimes (but never this bad) so he uses Cetaphil soap and body lotion. No other exacerbating or alleviating factors. Patient denies wearing any gloves recently or touching something out of the ordinary. Denies PMHx of DM. Denies a fever, hand pain, or other associated symptoms. NKDA.    The history is provided by the patient and a caregiver. No  was used.     Review of patient's allergies indicates:   Allergen Reactions    Adhesive tape-silicones      Other reaction(s): blisters    Codeine      Other reaction(s): Nausea    Morphine Itching     Past Medical History:   Diagnosis Date    Blind     Cerebral palsy     Hydrocephalus     Hypertension     Seizure disorder     Seizures     Urinary reflux      Past Surgical History:   Procedure Laterality Date    FOOT SURGERY      SHUNT REVISION      TENDON RELEASE      TOTAL HIP ARTHROPLASTY       Family History   Problem Relation Name Age of Onset    Cancer Mother      Cancer Father       Social History[1]  Review of Systems   Constitutional:  Negative for activity change, fatigue and fever.   HENT:  Negative for facial swelling.    Eyes:  Negative for pain.   Respiratory:  Negative for chest tightness  and shortness of breath.    Cardiovascular:  Negative for chest pain.   Gastrointestinal:  Negative for abdominal pain, constipation, diarrhea, nausea and vomiting.   Genitourinary:  Negative for difficulty urinating and dysuria.   Musculoskeletal:  Negative for back pain and myalgias.   Skin:  Positive for color change (redness to bilateral hands) and rash (bilateral hands, posterior neck(resolved)).   Neurological:  Negative for weakness and headaches.   Hematological:  Negative for adenopathy.   Psychiatric/Behavioral:  Negative for behavioral problems.    All other systems reviewed and are negative.      Physical Exam     Initial Vitals [03/06/25 0839]   BP Pulse Resp Temp SpO2   126/84 86 18 98.6 °F (37 °C) 98 %      MAP       --         Physical Exam    Nursing note and vitals reviewed.  Constitutional: He appears well-developed.   HENT:   Head: Normocephalic.   Eyes: Conjunctivae are normal.   Neck: Neck supple.   Cardiovascular:  Regular rhythm and normal heart sounds.           Pulmonary/Chest: Breath sounds normal. No respiratory distress. He has no wheezes.   Abdominal: He exhibits no distension.   Musculoskeletal:         General: Normal range of motion.      Cervical back: Neck supple.     Neurological: He is alert.   Skin: Skin is warm and dry. There is erythema.   Erythema to dorsal aspect of bilateral hands, well demarcated from wrist to knuckles. No purulent drainage, induration, or warmth noted.    Psychiatric: He has a normal mood and affect.         ED Course   Procedures  Labs Reviewed - No data to display       Imaging Results    None          Medications   famotidine tablet 40 mg (40 mg Oral Given 3/6/25 0943)   diphenhydrAMINE capsule 25 mg (25 mg Oral Given 3/6/25 0943)   predniSONE tablet 40 mg (40 mg Oral Given 3/6/25 0943)     Medical Decision Making  This is an urgent evaluation of a 45-year-old male who presented to the emergency department today for evaluation of an erythematous rash  to the dorsal aspects of bilateral hands.  Differential diagnoses included contact dermatitis, cellulitis, allergic reaction, amongst others.  On physical examination, patient had well demarcated, erythematous skin to the dorsal aspect of bilateral hands.  This extended from bilateral wrists to the MCP joints of bilateral hands.  No tenderness on exam, no purulence, and no induration.  Patient was treated in the emergency department with Benadryl, prednisone, and famotidine, avoiding intramuscular injections as patient requested no shots. He was monitored in the ED and had moderate improvement of erythema of left hand but right hand remained erythematous. No worsening while being monitored. Will dc to home with prednisone for symptomatic care and cover with keflex for possible infectious etiologies. Follow up with PCP advised within the next 2-3 days and return precautions were provided including but not limited to any extension of this erythema, any development of fever or chills, or for any other concerning symptoms.  Patient's caregiver voiced understanding of this plan and he was discharged in stable condition into her care.    Camille Haney MD  11:22 AM  3/6/2025       Amount and/or Complexity of Data Reviewed  Independent Historian: caregiver     Details: See HPI.    Risk  OTC drugs.  Prescription drug management.            Scribe Attestation:   Scribe #1: I performed the above scribed service and the documentation accurately describes the services I performed. I attest to the accuracy of the note.                             I, Camille Haney , personally performed the services described in this documentation. All medical record entries made by the scribe were at my direction and in my presence. I have reviewed the chart and agree that the record reflects my personal performance and is accurate and complete.      DISCLAIMER: This note was prepared with Um[employer] voice recognition transcription software. Pasquale  syntax, mangled pronouns, and other bizarre constructions may be attributed to that software system.    Clinical Impression:  Final diagnoses:  [L25.9] Contact dermatitis, unspecified contact dermatitis type, unspecified trigger (Primary)          ED Disposition Condition    Discharge Stable          ED Prescriptions       Medication Sig Dispense Start Date End Date Auth. Provider    predniSONE (DELTASONE) 20 MG tablet Take 2 tablets (40 mg total) by mouth once daily. for 5 days 10 tablet 3/6/2025 3/11/2025 Camille Patel MD    cephALEXin (KEFLEX) 500 MG capsule Take 1 capsule (500 mg total) by mouth every 6 (six) hours. for 7 days 28 capsule 3/6/2025 3/13/2025 Camille Patel MD          Follow-up Information       Follow up With Specialties Details Why Contact Info    Anushka Telles MD Internal Medicine Schedule an appointment as soon as possible for a visit in 3 days  [address]  [place] [address]  [phone]                   [1]   Social History  Tobacco Use    Smoking status: Never    Smokeless tobacco: Never   Substance Use Topics    Alcohol use: No    Drug use: No        Camille Patel MD  03/06/25 [id number]

## 2025-03-11 ENCOUNTER — OFFICE VISIT (OUTPATIENT)
Dept: PRIMARY CARE CLINIC | Facility: CLINIC | Age: 45
End: 2025-03-11
Payer: MEDICARE

## 2025-03-11 DIAGNOSIS — L25.9 CONTACT DERMATITIS, UNSPECIFIED CONTACT DERMATITIS TYPE, UNSPECIFIED TRIGGER: Primary | ICD-10-CM

## 2025-03-11 PROCEDURE — 98016 BRIEF COMUNICAJ TECH-BSD SVC: CPT | Mod: 93,,, | Performed by: HOSPITALIST

## 2025-03-11 NOTE — PROGRESS NOTES
Audio Only Telehealth Visit     The patient location is: home  The chief complaint leading to consultation is: f/u ED visit  Visit type: Virtual visit with audio only (telephone)  Total time spent in medical discussion with patient: 10 minutes  Total time spent on date of the encounter:15 minutes       The reason for the audio only service rather than synchronous audio and video virtual visit was related to technical difficulties or patient preference/necessity.       Each patient to whom I provide medical services by telemedicine is:  (1) informed of the relationship between the physician and patient and the respective role of any other health care provider with respect to management of the patient; and (2) notified that they may decline to receive medical services by telemedicine and may withdraw from such care at any time. Patient verbally consented to receive this service via voice-only telephone call.       HPI: spoke w/ pt's afternoon attendant Channing Mercadoport ([phone]) about ED visit 3/6 for acute rash on dorsa of both hands which was first noted that morning.  Tx'd with prednisone and Keflex; now resolved.  As far as Channing knows, he isn't aware of any exposures to new soaps, fabrics, or foods.  Denies any other needs at this time.     Assessment and plan:  Advised Channing that in the event this or any other acute issue arises they can call us first as we might be able to see him same-day or within 24 hours as opposed to going to the ED.                        This service was not originating from a related E/M service provided within the previous 7 days nor will  to an E/M service or procedure within the next 24 hours or my soonest available appointment.  Prevailing standard of care was able to be met in this audio-only visit.

## 2025-03-27 ENCOUNTER — OFFICE VISIT (OUTPATIENT)
Dept: PRIMARY CARE CLINIC | Facility: CLINIC | Age: 45
End: 2025-03-27
Payer: MEDICARE

## 2025-03-27 VITALS
OXYGEN SATURATION: 98 % | SYSTOLIC BLOOD PRESSURE: 140 MMHG | TEMPERATURE: 98 F | DIASTOLIC BLOOD PRESSURE: 89 MMHG | HEART RATE: 74 BPM | BODY MASS INDEX: 27.31 KG/M2 | HEIGHT: 73 IN

## 2025-03-27 DIAGNOSIS — G40.909 SEIZURE DISORDER: Primary | ICD-10-CM

## 2025-03-27 DIAGNOSIS — M24.562 BILATERAL KNEE CONTRACTURES: ICD-10-CM

## 2025-03-27 DIAGNOSIS — I10 ESSENTIAL HYPERTENSION: ICD-10-CM

## 2025-03-27 DIAGNOSIS — G47.00 INSOMNIA, UNSPECIFIED TYPE: ICD-10-CM

## 2025-03-27 DIAGNOSIS — J00 ACUTE NASOPHARYNGITIS: ICD-10-CM

## 2025-03-27 DIAGNOSIS — M24.561 BILATERAL KNEE CONTRACTURES: ICD-10-CM

## 2025-03-27 DIAGNOSIS — J30.9 ALLERGIC SINUSITIS: ICD-10-CM

## 2025-03-27 PROCEDURE — G2211 COMPLEX E/M VISIT ADD ON: HCPCS | Mod: S$GLB,,, | Performed by: NURSE PRACTITIONER

## 2025-03-27 PROCEDURE — 1160F RVW MEDS BY RX/DR IN RCRD: CPT | Mod: CPTII,S$GLB,, | Performed by: NURSE PRACTITIONER

## 2025-03-27 PROCEDURE — 3008F BODY MASS INDEX DOCD: CPT | Mod: CPTII,S$GLB,, | Performed by: NURSE PRACTITIONER

## 2025-03-27 PROCEDURE — 3079F DIAST BP 80-89 MM HG: CPT | Mod: CPTII,S$GLB,, | Performed by: NURSE PRACTITIONER

## 2025-03-27 PROCEDURE — 3077F SYST BP >= 140 MM HG: CPT | Mod: CPTII,S$GLB,, | Performed by: NURSE PRACTITIONER

## 2025-03-27 PROCEDURE — 1159F MED LIST DOCD IN RCRD: CPT | Mod: CPTII,S$GLB,, | Performed by: NURSE PRACTITIONER

## 2025-03-27 PROCEDURE — 99214 OFFICE O/P EST MOD 30 MIN: CPT | Mod: S$GLB,,, | Performed by: NURSE PRACTITIONER

## 2025-03-27 RX ORDER — DEXTROMETHORPHAN HBR AND GUAIFENESIN 5; 100 MG/5ML; MG/5ML
5 LIQUID ORAL EVERY 6 HOURS PRN
Qty: 237 ML | Refills: 0 | Status: SHIPPED | OUTPATIENT
Start: 2025-03-27 | End: 2025-04-06

## 2025-03-27 RX ORDER — ACETAMINOPHEN 500 MG
500 TABLET ORAL EVERY 6 HOURS PRN
Qty: 180 TABLET | Refills: 3 | Status: SHIPPED | OUTPATIENT
Start: 2025-03-27

## 2025-03-27 RX ORDER — FLUTICASONE PROPIONATE 50 MCG
SPRAY, SUSPENSION (ML) NASAL
Qty: 16 G | Refills: 3 | Status: SHIPPED | OUTPATIENT
Start: 2025-03-27 | End: 2025-03-27 | Stop reason: SDUPTHER

## 2025-03-27 RX ORDER — FLUTICASONE PROPIONATE 50 MCG
SPRAY, SUSPENSION (ML) NASAL
Qty: 16 G | Refills: 3 | Status: SHIPPED | OUTPATIENT
Start: 2025-03-27

## 2025-03-27 NOTE — PROGRESS NOTES
Eduardo    4/7/2025  4:02 PM    Problem list  Problem List[1]    CC:  Flu like symptoms    HPI:  Patient presents for urgent evaluation for flu like symptoms.  He is accompanied by his PCA who helps with HPI  Cough, sniffles, chest congestion since last week  [place] roommate has been sick as well  No fevers, chills or respiratory distress  Appetite has changed- when he lived in his previous house he would eat well, now he only wants to eat meat.  Not drinking enough fluids, has not slept in 2-3 weeks, melatonin was prescribed and he doesn;t work.  He is wide awake at 5 am  He went from a whole house by himself now in living with roommate and in a smaller bed.  THe move happened about 6 weeks ago and his caregiver does find that his behavior has changed, he seems weaker.   Had been to the ED for a rash 3/6 that has improved.    Does not drink caffeine, only water with meals occasionally juice    Medications  Current Medications[2]   Prior to Admission medications    Medication Sig Start Date End Date Taking? Authorizing Provider   acetaminophen (TYLENOL) 500 MG tablet Take 1 tablet (500 mg total) by mouth every 6 (six) hours as needed for Pain. 3/27/25   Kaylyn Hough DNP   celecoxib (CELEBREX) 200 MG capsule Take 1 capsule (200 mg total) by mouth daily as needed for Pain (Take with food). 11/11/24   Leonid Wood MD   cetirizine (ZYRTEC) 10 MG tablet Take 1 tablet (10 mg total) by mouth once daily. 11/11/24 11/11/25  Leonid Wood MD   cholecalciferol, vitamin D3, (VITAMIN D3) 25 mcg (1,000 unit) capsule Take 1 capsule (1,000 Units total) by mouth once daily. 11/11/24   Leonid Wood MD   dextromethorphan-guaiFENesin 5-100 mg/5 mL Liqd Take 5 mLs by mouth every 6 (six) hours as needed (cough). 3/27/25 4/6/25  Kaylyn Hough DNP   diazePAM (VALIUM) 5 MG tablet Take 1 tablet (5 mg total) by mouth On call Procedure for Anxiety. 2/25/25 3/27/25  Leonid Wood MD    divalproex (DEPAKOTE) 500 MG TbEC TAKE 1 TABLET BY MOUTH EVERY TWELVE HOURS 7/16/24   Anushka Telles MD   divalproex ER (DEPAKOTE ER) 250 MG 24 hr tablet Take 1 tablet (250 mg total) by mouth every evening. 11/11/24   Leonid Wood MD   divalproex ER (DEPAKOTE ER) 500 MG Tb24 24 hr tablet Take 1 tablet (500 mg total) by mouth every 12 (twelve) hours. Delayed Release tablets 11/11/24   Leonid Wood MD   fluticasone propionate (FLONASE) 50 mcg/actuation nasal spray INSTILL 2 SPRAY IN EACH NOSTRIL EVERY DAY   * REQUEST REFILL WHEN NEEDEDINSTILL 2 SPRAY IN EACH NOSTRIL EVERY DAY   * REQUEST REFILL WHEN NEEDED 3/27/25   Kaylyn Hough DNP   furosemide (LASIX) 20 MG tablet Take 1 tablet (20 mg total) by mouth daily as needed (edema). 11/11/24   Leonid Wood MD   melatonin (MELATIN) 3 mg tablet Take 2 tablets (6 mg total) by mouth nightly. 2/25/25   Leonid Wood MD   olopatadine (PATANOL) 0.1 % ophthalmic solution Place 1 drop into both eyes 2 (two) times daily. for 7 days 11/20/24 11/27/24  Leonid Wood MD   terbinafine HCL (LAMISIL) 1 % cream Apply topically 2 (two) times daily. 11/11/24   Leonid Wood MD         History  Past Medical History:   Diagnosis Date    Blind     Cerebral palsy     Hydrocephalus     Hypertension     Seizure disorder     Seizures     Urinary reflux      Past Surgical History:   Procedure Laterality Date    FOOT SURGERY      SHUNT REVISION      TENDON RELEASE      TOTAL HIP ARTHROPLASTY       Social History[3]      Allergies  Review of patient's allergies indicates:   Allergen Reactions    Adhesive tape-silicones      Other reaction(s): blisters    Codeine      Other reaction(s): Nausea    Morphine Itching         Review of Systems   Review of Systems   Constitutional: Negative for diaphoresis and malaise/fatigue.   HENT:  Positive for congestion and sore throat.    Cardiovascular:  Negative for chest pain, claudication,  dyspnea on exertion, irregular heartbeat, leg swelling, near-syncope, orthopnea, palpitations, paroxysmal nocturnal dyspnea and syncope.   Respiratory:  Negative for shortness of breath.    Endocrine: Negative for polydipsia, polyphagia and polyuria.   Hematologic/Lymphatic: Does not bruise/bleed easily.   Gastrointestinal:  Negative for bloating, nausea and vomiting.   Genitourinary: Negative.    Neurological:  Negative for excessive daytime sleepiness, dizziness, light-headedness, loss of balance and weakness.   Psychiatric/Behavioral:  The patient has insomnia. The patient is not nervous/anxious.    Allergic/Immunologic: Negative.          Physical Exam  Wt Readings from Last 1 Encounters:   03/06/25 93.9 kg (207 lb)     BP Readings from Last 3 Encounters:   03/27/25 (!) 140/89   03/06/25 121/87   02/25/25 (!) 141/95     Pulse Readings from Last 1 Encounters:   03/27/25 74     Body mass index is 27.31 kg/m².    Physical Exam  Vitals and nursing note reviewed.   Constitutional:       Appearance: Normal appearance.   HENT:      Head: Normocephalic and atraumatic.      Mouth/Throat:      Mouth: Mucous membranes are moist.      Pharynx: Oropharynx is clear.   Eyes:      Pupils: Pupils are equal, round, and reactive to light.   Cardiovascular:      Rate and Rhythm: Normal rate and regular rhythm.      Pulses:           Radial pulses are 2+ on the right side and 2+ on the left side.        Dorsalis pedis pulses are 2+ on the right side and 2+ on the left side.        Posterior tibial pulses are 2+ on the right side and 2+ on the left side.      Heart sounds: No murmur heard.  Pulmonary:      Effort: Pulmonary effort is normal. No respiratory distress.      Breath sounds: Normal breath sounds.   Abdominal:      General: There is no distension.      Tenderness: There is no abdominal tenderness.   Musculoskeletal:      Cervical back: Normal range of motion.      Right lower leg: No edema.      Left lower leg: No edema.  "  Skin:     General: Skin is warm and dry.      Findings: No erythema.   Neurological:      General: No focal deficit present.      Mental Status: He is alert.   Psychiatric:         Mood and Affect: Mood normal.         Behavior: Behavior normal.         1. Seizure disorder  Overview:  Controlled on depakote. Has not had a seizure "in years".    Assessment & Plan:  Controlled on Depakote.      2. Allergic sinusitis  -     Discontinue: fluticasone propionate (FLONASE) 50 mcg/actuation nasal spray; INSTILL 2 SPRAY IN EACH NOSTRIL EVERY DAY   * REQUEST REFILL WHEN NEEDEDINSTILL 2 SPRAY IN EACH NOSTRIL EVERY DAY   * REQUEST REFILL WHEN NEEDED  Dispense: 16 g; Refill: 3  -     fluticasone propionate (FLONASE) 50 mcg/actuation nasal spray; INSTILL 2 SPRAY IN EACH NOSTRIL EVERY DAY   * REQUEST REFILL WHEN NEEDEDINSTILL 2 SPRAY IN EACH NOSTRIL EVERY DAY   * REQUEST REFILL WHEN NEEDED  Dispense: 16 g; Refill: 3    3. Bilateral knee contractures  -     acetaminophen (TYLENOL) 500 MG tablet; Take 1 tablet (500 mg total) by mouth every 6 (six) hours as needed for Pain.  Dispense: 180 tablet; Refill: 3    4. Essential hypertension  Overview:  No medications currently  At/above goal today    Assessment & Plan:  Recommend monitoring at home      5. Insomnia, unspecified type  Overview:  Melatonin unhelpful    Assessment & Plan:  Do suspect this is due to changes in his group home and new bed. Recommend new mattress      6. Acute nasopharyngitis  Overview:  Ongoing for several days    Assessment & Plan:  Recommend flonase nasal spray  And dextromethorphan guaifenesin cough syrup  Warm fluids  Rest  Tylenol for aches  Call with worsening symptoms      Other orders  -     dextromethorphan-guaiFENesin 5-100 mg/5 mL Liqd; Take 5 mLs by mouth every 6 (six) hours as needed (cough).  Dispense: 237 mL; Refill: 0              The total time spent for evaluation and management on 04/07/2025 including reviewing separately obtained history, " performing a medically appropriate exam and evaluation, documenting clinical information in the health record, independently interpreting results and communicating them to the patient/family/caregiver, and ordering medications/tests/procedures was >35 minutes.      Follow Up  3 months, sooner if needed      @Kaylyn Hough DNP         [1]   Patient Active Problem List  Diagnosis    Cerebral palsy    Obstructive hydrocephalus    Blind    Seizure disorder    Essential hypertension    Bilateral impacted cerumen    Back pain    Constipation    Urinary reflux    Peripheral edema    Blood glucose elevated    Bilateral knee contractures    Acquired pes planovalgus of left foot    Dependence for activities of daily living    Functional quadriplegia    DNR (do not resuscitate)    Acute pain of both knees    Agitation    Superficial bruising of abdominal wall    Tinea cruris    Need for COVID-19 vaccine    Leg swelling    Thrombocytopenia, unspecified    Dysphagia    Tinea pedis of both feet    Viral conjunctivitis    Spastic quadriplegic cerebral palsy    Insomnia    Contact dermatitis   [2]   Current Outpatient Medications   Medication Sig Dispense Refill    acetaminophen (TYLENOL) 500 MG tablet Take 1 tablet (500 mg total) by mouth every 6 (six) hours as needed for Pain. 180 tablet 3    celecoxib (CELEBREX) 200 MG capsule Take 1 capsule (200 mg total) by mouth daily as needed for Pain (Take with food). 90 capsule 3    cetirizine (ZYRTEC) 10 MG tablet Take 1 tablet (10 mg total) by mouth once daily. 90 tablet 3    cholecalciferol, vitamin D3, (VITAMIN D3) 25 mcg (1,000 unit) capsule Take 1 capsule (1,000 Units total) by mouth once daily. 90 capsule 3    diazePAM (VALIUM) 5 MG tablet Take 1 tablet (5 mg total) by mouth On call Procedure for Anxiety. 1 tablet 0    divalproex (DEPAKOTE) 500 MG TbEC TAKE 1 TABLET BY MOUTH EVERY TWELVE HOURS 62 tablet PRN    divalproex ER (DEPAKOTE ER) 250 MG 24 hr tablet Take 1 tablet (250 mg  total) by mouth every evening. 90 tablet 3    divalproex ER (DEPAKOTE ER) 500 MG Tb24 24 hr tablet Take 1 tablet (500 mg total) by mouth every 12 (twelve) hours. Delayed Release tablets 56 tablet 10    fluticasone propionate (FLONASE) 50 mcg/actuation nasal spray INSTILL 2 SPRAY IN EACH NOSTRIL EVERY DAY   * REQUEST REFILL WHEN NEEDEDINSTILL 2 SPRAY IN EACH NOSTRIL EVERY DAY   * REQUEST REFILL WHEN NEEDED 16 g 3    furosemide (LASIX) 20 MG tablet Take 1 tablet (20 mg total) by mouth daily as needed (edema). 28 tablet 11    melatonin (MELATIN) 3 mg tablet Take 2 tablets (6 mg total) by mouth nightly. 60 tablet 11    olopatadine (PATANOL) 0.1 % ophthalmic solution Place 1 drop into both eyes 2 (two) times daily. for 7 days 5 mL 0    terbinafine HCL (LAMISIL) 1 % cream Apply topically 2 (two) times daily. 15 g 1     Current Facility-Administered Medications   Medication Dose Route Frequency Provider Last Rate Last Admin    influenza (Afluria) 45 mcg/0.5 mL IM vaccine (> or = 36 mo) 0.5 mL  0.5 mL Intramuscular 1 time in Clinic/HOD        [3]   Social History  Socioeconomic History    Marital status: Single   Tobacco Use    Smoking status: Never    Smokeless tobacco: Never   Substance and Sexual Activity    Alcohol use: No    Drug use: No   Social History Narrative    Lives in property owned by grandmother. Disabled with [employer] caretakers 24hrs to patient. Primary caretaker Sherri takes care of him. Shalonda is the active caretaker, but there is no official legal power of .         Mother passed away 12/2013     Social Drivers of Health     Financial Resource Strain: Low Risk  (12/6/2023)    Overall Financial Resource Strain (CARDIA)     Difficulty of Paying Living Expenses: Not hard at all   Food Insecurity: Patient Unable To Answer (12/15/2024)    Received from [facility]    Hunger Vital Sign     Worried About Running Out of Food in the Last Year: Patient unable to answer     Ran Out of Food in the  Last Year: Patient unable to answer   Transportation Needs: Patient Unable To Answer (12/15/2024)    Received from [facility]    PRAPARE - Transportation     Lack of Transportation (Medical): Patient unable to answer     Lack of Transportation (Non-Medical): Patient unable to answer   Housing Stability: Patient Unable To Answer (12/15/2024)    Received from [facility]    Housing Stability Vital Sign     Unable to Pay for Housing in the Last Year: Patient unable to answer     Number of Times Moved in the Last Year: 1     Homeless in the Last Year: Patient unable to answer

## 2025-03-27 NOTE — PATIENT INSTRUCTIONS
Try to increase your fluid intake     Increase your Flonase to 2 sprays each nostril daily    Try the cough syrup as needed for your cough    Cool liquids can help with the sore throat    Try the Tylenol for pain    If you have any fever, chills, night sweats or other concerns let us know    We will discuss the problems sleeping with Dr. Wood- try Sleepy time tea to help    His hospital bed may not be comfortable for him- consider this as contributing to sleeplessness

## 2025-04-07 PROBLEM — J00 ACUTE NASOPHARYNGITIS: Status: ACTIVE | Noted: 2025-04-07

## 2025-04-07 NOTE — ASSESSMENT & PLAN NOTE
Recommend flonase nasal spray  And dextromethorphan guaifenesin cough syrup  Warm fluids  Rest  Tylenol for aches  Call with worsening symptoms

## 2025-05-20 ENCOUNTER — OFFICE VISIT (OUTPATIENT)
Dept: PRIMARY CARE CLINIC | Facility: CLINIC | Age: 45
End: 2025-05-20
Payer: MEDICARE

## 2025-05-20 VITALS
BODY MASS INDEX: 27.31 KG/M2 | SYSTOLIC BLOOD PRESSURE: 126 MMHG | OXYGEN SATURATION: 98 % | DIASTOLIC BLOOD PRESSURE: 80 MMHG | HEIGHT: 73 IN | HEART RATE: 69 BPM | TEMPERATURE: 98 F

## 2025-05-20 DIAGNOSIS — E55.9 VITAMIN D DEFICIENCY: ICD-10-CM

## 2025-05-20 DIAGNOSIS — J30.9 ALLERGIC SINUSITIS: ICD-10-CM

## 2025-05-20 DIAGNOSIS — G80.0 SPASTIC QUADRIPLEGIC CEREBRAL PALSY: ICD-10-CM

## 2025-05-20 DIAGNOSIS — M25.473 ANKLE SWELLING, UNSPECIFIED LATERALITY: ICD-10-CM

## 2025-05-20 DIAGNOSIS — G40.909 SEIZURE DISORDER: ICD-10-CM

## 2025-05-20 PROBLEM — B30.9 VIRAL CONJUNCTIVITIS: Status: RESOLVED | Noted: 2024-11-19 | Resolved: 2025-05-20

## 2025-05-20 PROBLEM — Z28.21 COVID-19 VACCINATION REFUSED: Status: ACTIVE | Noted: 2022-10-18

## 2025-05-20 PROCEDURE — 3008F BODY MASS INDEX DOCD: CPT | Mod: CPTII,S$GLB,, | Performed by: HOSPITALIST

## 2025-05-20 PROCEDURE — 3079F DIAST BP 80-89 MM HG: CPT | Mod: CPTII,S$GLB,, | Performed by: HOSPITALIST

## 2025-05-20 PROCEDURE — 3074F SYST BP LT 130 MM HG: CPT | Mod: CPTII,S$GLB,, | Performed by: HOSPITALIST

## 2025-05-20 PROCEDURE — 99214 OFFICE O/P EST MOD 30 MIN: CPT | Mod: S$GLB,,, | Performed by: HOSPITALIST

## 2025-05-20 RX ORDER — VIT C/E/ZN/COPPR/LUTEIN/ZEAXAN 250MG-90MG
1000 CAPSULE ORAL DAILY
Qty: 90 CAPSULE | Refills: 3 | Status: SHIPPED | OUTPATIENT
Start: 2025-05-20

## 2025-05-20 RX ORDER — DIVALPROEX SODIUM 500 MG/1
500 TABLET, FILM COATED, EXTENDED RELEASE ORAL EVERY 12 HOURS
Qty: 56 TABLET | Refills: 10 | Status: SHIPPED | OUTPATIENT
Start: 2025-05-20

## 2025-05-20 RX ORDER — CELECOXIB 200 MG/1
200 CAPSULE ORAL DAILY PRN
Qty: 90 CAPSULE | Refills: 3 | Status: SHIPPED | OUTPATIENT
Start: 2025-05-20

## 2025-05-20 RX ORDER — DIVALPROEX SODIUM 250 MG/1
250 TABLET, FILM COATED, EXTENDED RELEASE ORAL NIGHTLY
Qty: 90 TABLET | Refills: 3 | Status: SHIPPED | OUTPATIENT
Start: 2025-05-20

## 2025-05-20 RX ORDER — FUROSEMIDE 20 MG/1
20 TABLET ORAL DAILY PRN
Qty: 28 TABLET | Refills: 11 | Status: SHIPPED | OUTPATIENT
Start: 2025-05-20

## 2025-05-20 RX ORDER — CETIRIZINE HYDROCHLORIDE 10 MG/1
10 TABLET ORAL DAILY
Qty: 90 TABLET | Refills: 3 | Status: SHIPPED | OUTPATIENT
Start: 2025-05-20 | End: 2026-05-20

## 2025-05-20 NOTE — PROGRESS NOTES
Primary Care Provider Appointment - [facility] (formerly [facility])[3] Jamel MS-4, Dr. Leonid Wood MD (attending)      Subjective:      Patient ID: Leonid Cox is a 45 y.o. male with   Past Medical History:   Diagnosis Date    Blind     Cerebral palsy     Hydrocephalus     Hypertension     Seizure disorder     Seizures     Urinary reflux            Chief Complaint: Follow-up    Prior to this visit, patient's last encounter with PCP was 3/27/2025.    Presents for follow up with his care provider. Reports he has been feeling okay since his last visit, no more cold/flu symptoms. Reports some abdominal pain today that resolved with stooling. Also reports diarrhea- twice last evening and once this morning. Denies changes to his diet. Denies sick contacts and knowledge of other people with GI symptoms. Denies nausea and vomiting. Notes he is sleeping well. Does physical therapy at home but has felt a bit more weak this past week. Reports decreased appetite- getting 3 meals a day but eating little bites of it.         Medications: Does not have pill packs              4Ms for Medical Decision-Making in Older Adults    Last Completed EAWV: None    MOBILITY:  Get Up and Go:       No data to display              Activities of Daily Living:       No data to display              Whisper Test:       No data to display              Disability Status:       No data to display              Nutrition Screening:       No data to display             Screening Score: 0-7 Malnourished, 8-11 At Risk, 12-14 Normal  Fall Risk:      5/20/2025    10:20 AM 2/25/2025    11:00 AM 1/13/2025     1:00 PM   Fall Risk Assessment - Outpatient   Mobility Status Ambulatory w/ assistance Ambulatory w/ assistance Ambulatory   Number of falls 0 1 0   Identified as fall risk False False False           MENTATION:   Depression Patient Health Questionnaire:      2/25/2025    11:08 AM   Depression Patient Health Questionnaire   Over the last two weeks how often  have you been bothered by little interest or pleasure in doing things Not at all   Over the last two weeks how often have you been bothered by feeling down, depressed or hopeless Not at all   PHQ-2 Total Score 0     Has Dementia Dx: No  Has Anxiety Dx: No    Cognitive Function Screening:       No data to display              Cognitive Function Screening Total - Less than 4 = Abnormal,  Greater than or equal to 4 = Normal        MEDICATIONS:  High Risk Medications:  Total Active Medications: 0  This patient does not have an active medication from one of the medication groupers.    WHAT MATTERS MOST:  Advance Care Planning   ACP Status:   Patient has had an ACP conversation  Living Will: Yes  Power of : Yes  [place]: Yes    What is most important right now is to focus on extending life as long as possible, even it it means sacrificing quality    Accordingly, we have decided that the best plan to meet the patient's goals includes continuing with treatment      What matters most to patient today is:                  Social History     Socioeconomic History    Marital status: Single   Tobacco Use    Smoking status: Never    Smokeless tobacco: Never   Substance and Sexual Activity    Alcohol use: No    Drug use: No   Social History Narrative    Lives in property owned by grandmother. Disabled with [employer] caretakers 24hrs to patient. Primary caretaker Sherri takes care of him. Shalonda is the active caretaker, but there is no official legal power of .         Mother passed away 12/2013     Social Drivers of Health     Financial Resource Strain: Low Risk  (12/6/2023)    Overall Financial Resource Strain (CARDIA)     Difficulty of Paying Living Expenses: Not hard at all   Food Insecurity: Patient Unable To Answer (12/15/2024)    Received from [facility]    Hunger Vital Sign     Worried About Running Out of Food in the Last Year: Patient unable to answer     Ran Out of Food in the Last Year: Patient  "unable to answer   Transportation Needs: Patient Unable To Answer (12/15/2024)    Received from [facility]    PRAPARE - Transportation     Lack of Transportation (Medical): Patient unable to answer     Lack of Transportation (Non-Medical): Patient unable to answer   Housing Stability: Patient Unable To Answer (12/15/2024)    Received from [facility]    Housing Stability Vital Sign     Unable to Pay for Housing in the Last Year: Patient unable to answer     Number of Times Moved in the Last Year: 1     Homeless in the Last Year: Patient unable to answer       Review of Systems   Constitutional:  Positive for fatigue. Negative for fever and night sweats.   Respiratory:  Negative for cough, chest tightness and shortness of breath.    Cardiovascular:  Negative for chest pain.   Gastrointestinal:  Positive for diarrhea. Negative for constipation, nausea and vomiting.        Objective:   /80 (BP Location: Right arm, Patient Position: Sitting)   Pulse 69   Temp 97.8 °F (36.6 °C) (Oral)   Ht 6' 1" (1.854 m)   SpO2 98%   BMI 27.31 kg/m²     Physical Exam  Vitals reviewed.   Constitutional:       General: He is not in acute distress.     Comments: Seen in manual WC   HENT:      Head:      Comments: Rugation of left scalp     Mouth/Throat:      Comments: Food particles on teeth  Eyes:      Extraocular Movements: Extraocular movements intact.      Conjunctiva/sclera:      Right eye: Right conjunctiva is injected.      Left eye: Left conjunctiva is injected.      Comments: Vertical gaze preference, but not fixed.  Dysconjugate gaze noted.  There is erythema of the eyelid margins and some mucoid discharge which has crusted at the medial angle of the eye.  Both sclera injected; L>R.   Neck:      Comments: Vertical scar R neck with underlying cylindrical structure visible under skin; likely part of  shunt  Cardiovascular:      Rate and Rhythm: Normal rate and regular rhythm.      Heart sounds: Normal heart sounds. "   Pulmonary:      Effort: Pulmonary effort is normal.      Breath sounds: Normal breath sounds.   Musculoskeletal:      Comments: Contractures noted of multiple joints.  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  Hypermobile DIP joints of L hand and often hyperextends digits, bending them backwards with his other hand.   Skin:     General: Skin is warm and dry.   Neurological:      Mental Status: He is alert. Mental status is at baseline.      Comments: Often rotates head laterally back and forth while looking upwards.  Converses largely appropriately.   Psychiatric:         Mood and Affect: Mood normal.            Lab Results   Component Value Date    WBC 6.01 07/18/2024    HGB 15.4 07/18/2024    HCT 46.4 07/18/2024     07/18/2024    CHOL 174 04/17/2023    TRIG 124 04/17/2023    HDL 39 (L) 04/17/2023    ALT 9 12/11/2024    AST 14 12/11/2024     12/16/2024    K 3.6 12/16/2024     07/18/2024    CREATININE 0.55 (L) 12/16/2024    BUN 11.0 12/16/2024    CO2 27 12/16/2024    TSH 3.073 09/29/2022    INR 1.2 06/25/2021    HGBA1C 5.1 07/24/2023       Current Outpatient Medications on File Prior to Visit   Medication Sig Dispense Refill    acetaminophen (TYLENOL) 500 MG tablet Take 1 tablet (500 mg total) by mouth every 6 (six) hours as needed for Pain. 180 tablet 3    celecoxib (CELEBREX) 200 MG capsule Take 1 capsule (200 mg total) by mouth daily as needed for Pain (Take with food). 90 capsule 3    cetirizine (ZYRTEC) 10 MG tablet Take 1 tablet (10 mg total) by mouth once daily. 90 tablet 3    cholecalciferol, vitamin D3, (VITAMIN D3) 25 mcg (1,000 unit) capsule Take 1 capsule (1,000 Units total) by mouth once daily. 90 capsule 3    divalproex (DEPAKOTE) 500 MG TbEC TAKE 1 TABLET BY MOUTH EVERY TWELVE HOURS 62 tablet PRN    divalproex ER (DEPAKOTE ER) 250 MG 24 hr tablet Take 1 tablet (250 mg total) by mouth every evening. 90 tablet 3    divalproex ER (DEPAKOTE ER) 500 MG Tb24 24 hr tablet Take 1  "tablet (500 mg total) by mouth every 12 (twelve) hours. Delayed Release tablets 56 tablet 10    fluticasone propionate (FLONASE) 50 mcg/actuation nasal spray INSTILL 2 SPRAY IN EACH NOSTRIL EVERY DAY   * REQUEST REFILL WHEN NEEDEDINSTILL 2 SPRAY IN EACH NOSTRIL EVERY DAY   * REQUEST REFILL WHEN NEEDED 16 g 3    furosemide (LASIX) 20 MG tablet Take 1 tablet (20 mg total) by mouth daily as needed (edema). 28 tablet 11    melatonin (MELATIN) 3 mg tablet Take 2 tablets (6 mg total) by mouth nightly. 60 tablet 11    terbinafine HCL (LAMISIL) 1 % cream Apply topically 2 (two) times daily. 15 g 1    diazePAM (VALIUM) 5 MG tablet Take 1 tablet (5 mg total) by mouth On call Procedure for Anxiety. 1 tablet 0    olopatadine (PATANOL) 0.1 % ophthalmic solution Place 1 drop into both eyes 2 (two) times daily. for 7 days 5 mL 0     Current Facility-Administered Medications on File Prior to Visit   Medication Dose Route Frequency Provider Last Rate Last Admin    influenza (Afluria) 45 mcg/0.5 mL IM vaccine (> or = 36 mo) 0.5 mL  0.5 mL Intramuscular 1 time in Clinic/HOD              Assessment:   45 y.o. male with multiple co-morbid illnesses here to follow-up for ongoing chronic disease management and preventive health maintenance.    Plan:     1. Spastic quadriplegic cerebral palsy  Assessment & Plan:  Celebrex refill sent.      Orders:  -     divalproex ER (DEPAKOTE ER) 250 MG 24 hr tablet; Take 1 tablet (250 mg total) by mouth every evening.  Dispense: 90 tablet; Refill: 3  -     celecoxib (CELEBREX) 200 MG capsule; Take 1 capsule (200 mg total) by mouth daily as needed for Pain (Take with food).  Dispense: 90 capsule; Refill: 3    2. Seizure disorder  Overview:  Controlled on depakote. Has not had a seizure "in years".    Assessment & Plan:  Depakote refills sent.  No breakthrough seizures.    Orders:  -     divalproex ER (DEPAKOTE ER) 500 MG Tb24 24 hr tablet; Take 1 tablet (500 mg total) by mouth every 12 (twelve) hours. " Delayed Release tablets  Dispense: 56 tablet; Refill: 10    3. Allergic sinusitis  -     cetirizine (ZYRTEC) 10 MG tablet; Take 1 tablet (10 mg total) by mouth once daily.  Dispense: 90 tablet; Refill: 3    4. Vitamin D deficiency  -     cholecalciferol, vitamin D3, (VITAMIN D3) 25 mcg (1,000 unit) capsule; Take 1 capsule (1,000 Units total) by mouth once daily.  Dispense: 90 capsule; Refill: 3    5. Ankle swelling, unspecified laterality  -     furosemide (LASIX) 20 MG tablet; Take 1 tablet (20 mg total) by mouth daily as needed (edema).  Dispense: 28 tablet; Refill: 11            Health Maintenance         Date Due Completion Date    COVID-19 Vaccine (1 - 2024-25 season) Never done ---    Hemoglobin A1c (Diabetic Prevention Screening) 07/24/2026 7/24/2023    Lipid Panel 04/17/2028 4/17/2023    TETANUS VACCINE 09/03/2033 9/3/2023    RSV Vaccine (Age 60+ and Pregnant patients) (1 - 1-dose 75+ series) 03/03/2056 ---            No future appointments.            Follow up in about 6 months (around 11/20/2025). Total clinical care time was 30 min.    Barrett Mccullough  [facility]  [facility]    Leonid Wood MD   [facility]  [facility]  [facility]

## 2025-06-11 ENCOUNTER — TELEPHONE (OUTPATIENT)
Dept: PRIMARY CARE CLINIC | Facility: CLINIC | Age: 45
End: 2025-06-11
Payer: MEDICARE

## 2025-06-11 NOTE — TELEPHONE ENCOUNTER
Copied from CRM #[id number]. Topic: Appointments - Appointment Access  >> Jun 11, 2025 11:31 AM Melissa wrote:  No slot available to schedule an appointment for the patient.  Patient is established with which PCP:   Reason for the visit: 90L form and Toe nail came off   Would the patient like a call back, or a response through their [facility] portal?:  call back     Call back [phone]

## 2025-06-11 NOTE — TELEPHONE ENCOUNTER
Return call made to patient Care taker in regards to message patient has been scheduled for an in office visit to have needs address

## 2025-06-13 ENCOUNTER — OFFICE VISIT (OUTPATIENT)
Dept: PRIMARY CARE CLINIC | Facility: CLINIC | Age: 45
End: 2025-06-13
Payer: MEDICARE

## 2025-06-13 VITALS
DIASTOLIC BLOOD PRESSURE: 77 MMHG | TEMPERATURE: 99 F | HEIGHT: 73 IN | BODY MASS INDEX: 27.31 KG/M2 | OXYGEN SATURATION: 95 % | HEART RATE: 72 BPM | SYSTOLIC BLOOD PRESSURE: 122 MMHG

## 2025-06-13 DIAGNOSIS — L60.8 CHANGE IN NAIL APPEARANCE: ICD-10-CM

## 2025-06-13 DIAGNOSIS — G80.8 OTHER CEREBRAL PALSY: Primary | ICD-10-CM

## 2025-06-13 DIAGNOSIS — B35.3 TINEA PEDIS OF BOTH FEET: ICD-10-CM

## 2025-06-13 DIAGNOSIS — G40.909 SEIZURE DISORDER: ICD-10-CM

## 2025-06-13 DIAGNOSIS — I10 ESSENTIAL HYPERTENSION: ICD-10-CM

## 2025-06-13 NOTE — PATIENT INSTRUCTIONS
Continue your current medications    I recommend a cool compress and elevation    It appears that there was trauma to the big toe- watch for worsening swelling, bruising or pain.    We will see you in September, sooner if you need us

## 2025-06-13 NOTE — ASSESSMENT & PLAN NOTE
Recommend supportive care at this time  Elevate leg  Cool compress  Watch for increased swelling, change in color of nail or increased pain.  Will need podiatry referral at that time.  As patient has no complaints will monitor

## 2025-06-13 NOTE — PROGRESS NOTES
Surinder    6/13/2025  2:53 PM    Problem list  Problem List[1]    CC:  Toe problem, paperwork completion    HPI:  Patient presents today for evaluation of toe changes.  Caregiver noticed that the great toe of her right foot was purple and swollen.  Caregiver reports that the nail is lifted at edge but still attached at nail bed.  Unclear if any trauma occurred- patient is a difficult historian.  Per caregiver does move around in his wheelchair and in bed.   90L form filled out today      Medications  Current Medications[2]   Prior to Admission medications    Medication Sig Start Date End Date Taking? Authorizing Provider   acetaminophen (TYLENOL) 500 MG tablet Take 1 tablet (500 mg total) by mouth every 6 (six) hours as needed for Pain. 3/27/25   Kaylyn Hough DNP   celecoxib (CELEBREX) 200 MG capsule Take 1 capsule (200 mg total) by mouth daily as needed for Pain (Take with food). 5/20/25   Leonid Wood MD   cetirizine (ZYRTEC) 10 MG tablet Take 1 tablet (10 mg total) by mouth once daily. 5/20/25 5/20/26  Leonid Wood MD   cholecalciferol, vitamin D3, (VITAMIN D3) 25 mcg (1,000 unit) capsule Take 1 capsule (1,000 Units total) by mouth once daily. 5/20/25   Leonid Wood MD   divalproex (DEPAKOTE) 500 MG TbEC TAKE 1 TABLET BY MOUTH EVERY TWELVE HOURS 7/16/24   Anushka Telles MD   divalproex ER (DEPAKOTE ER) 250 MG 24 hr tablet Take 1 tablet (250 mg total) by mouth every evening. 5/20/25   Leonid Wood MD   divalproex ER (DEPAKOTE ER) 500 MG Tb24 24 hr tablet Take 1 tablet (500 mg total) by mouth every 12 (twelve) hours. Delayed Release tablets 5/20/25   Leonid Wood MD   fluticasone propionate (FLONASE) 50 mcg/actuation nasal spray INSTILL 2 SPRAY IN EACH NOSTRIL EVERY DAY   * REQUEST REFILL WHEN NEEDEDINSTILL 2 SPRAY IN EACH NOSTRIL EVERY DAY   * REQUEST REFILL WHEN NEEDED 3/27/25   Kaylyn Hough, DNP   furosemide (LASIX) 20 MG tablet Take  1 tablet (20 mg total) by mouth daily as needed (edema). 5/20/25   Leonid Wood MD   melatonin (MELATIN) 3 mg tablet Take 2 tablets (6 mg total) by mouth nightly. 2/25/25   Leonid Wood MD   terbinafine HCL (LAMISIL) 1 % cream Apply topically 2 (two) times daily. 11/11/24   Leonid Wood MD         History  Past Medical History:   Diagnosis Date    Blind     Cerebral palsy     Hydrocephalus     Hypertension     Seizure disorder     Seizures     Urinary reflux      Past Surgical History:   Procedure Laterality Date    FOOT SURGERY      SHUNT REVISION      TENDON RELEASE      TOTAL HIP ARTHROPLASTY       Social History[3]      Allergies  Review of patient's allergies indicates:   Allergen Reactions    Adhesive tape-silicones      Other reaction(s): blisters    Codeine      Other reaction(s): Nausea    Morphine Itching         Review of Systems   Review of Systems   Constitutional: Negative for diaphoresis and malaise/fatigue.   HENT: Negative.     Cardiovascular:  Negative for chest pain, claudication, dyspnea on exertion, irregular heartbeat, leg swelling, near-syncope, orthopnea, palpitations, paroxysmal nocturnal dyspnea and syncope.   Respiratory:  Negative for shortness of breath.    Endocrine: Negative for polydipsia, polyphagia and polyuria.   Hematologic/Lymphatic: Does not bruise/bleed easily.   Skin:  Positive for nail changes (right great toe).   Gastrointestinal:  Negative for bloating, nausea and vomiting.   Genitourinary: Negative.    Neurological:  Negative for excessive daytime sleepiness, dizziness, light-headedness, loss of balance and weakness.   Psychiatric/Behavioral:  The patient is not nervous/anxious.    Allergic/Immunologic: Negative.          Physical Exam  Wt Readings from Last 1 Encounters:   03/06/25 93.9 kg (207 lb)     BP Readings from Last 3 Encounters:   06/13/25 122/77   05/20/25 126/80   03/27/25 (!) 140/89     Pulse Readings from Last 1 Encounters:  "  06/13/25 72     Body mass index is 27.31 kg/m².    Physical Exam  Vitals and nursing note reviewed.   Constitutional:       Appearance: Normal appearance.   HENT:      Head: Normocephalic and atraumatic.      Mouth/Throat:      Mouth: Mucous membranes are moist.   Eyes:      Pupils: Pupils are equal, round, and reactive to light.   Cardiovascular:      Rate and Rhythm: Normal rate and regular rhythm.      Pulses:           Radial pulses are 2+ on the right side and 2+ on the left side.        Dorsalis pedis pulses are 1+ on the right side and 1+ on the left side.        Posterior tibial pulses are 2+ on the right side and 2+ on the left side.      Heart sounds: No murmur heard.  Pulmonary:      Effort: Pulmonary effort is normal. No respiratory distress.      Breath sounds: Normal breath sounds.   Abdominal:      General: There is no distension.      Tenderness: There is no abdominal tenderness.   Musculoskeletal:      Cervical back: Normal range of motion.      Right lower leg: No edema.      Left lower leg: No edema.   Feet:      Right foot:      Toenail Condition: Right toenails are abnormally thick. Fungal disease present.  Skin:     General: Skin is warm and dry.      Findings: Bruising (right great toe nail) present. No erythema.   Neurological:      General: No focal deficit present.      Mental Status: He is alert.   Psychiatric:         Mood and Affect: Mood normal.         Behavior: Behavior normal.         1. Other cerebral palsy  Assessment & Plan:  Requires 24 hour care  Form filled out for [facility], 90 L form      2. Seizure disorder  Overview:  Controlled on depakote. Has not had a seizure "in years".    Assessment & Plan:  No breakthrough seizures.  Continue current plan of care      3. Tinea pedis of both feet  Overview:  Noted on exam  PMH of tinea in other body sites    Assessment & Plan:  Continue terbinafine cream  Keep feet clean and dry  Monitor      4. Essential " hypertension  Overview:  No medications currently  At/above goal today    Assessment & Plan:  Recommend monitoring at home      5. Change in nail appearance  Overview:  Right great toe with some discoloration at corners of nail and near nail bed  Unclear if there was any traumatic injury but it does appear so  Patient denies pain    Assessment & Plan:  Recommend supportive care at this time  Elevate leg  Cool compress  Watch for increased swelling, change in color of nail or increased pain.  Will need podiatry referral at that time.  As patient has no complaints will monitor                    Follow Up  As scheduled with Dr. Wood      @[place] KARLA Hough         [1]   Patient Active Problem List  Diagnosis    Cerebral palsy    Obstructive hydrocephalus    Blind    Seizure disorder    Essential hypertension    Bilateral impacted cerumen    Back pain    Constipation    Urinary reflux    Peripheral edema    Blood glucose elevated    Bilateral knee contractures    Acquired pes planovalgus of left foot    Dependence for activities of daily living    Functional quadriplegia    DNR (do not resuscitate)    Acute pain of both knees    Agitation    Superficial bruising of abdominal wall    Tinea cruris    COVID-19 vaccination refused    Leg swelling    Thrombocytopenia, unspecified    Dysphagia    Tinea pedis of both feet    Spastic quadriplegic cerebral palsy    Insomnia    Contact dermatitis    Acute nasopharyngitis    Change in nail appearance   [2]   Current Outpatient Medications   Medication Sig Dispense Refill    acetaminophen (TYLENOL) 500 MG tablet Take 1 tablet (500 mg total) by mouth every 6 (six) hours as needed for Pain. 180 tablet 3    celecoxib (CELEBREX) 200 MG capsule Take 1 capsule (200 mg total) by mouth daily as needed for Pain (Take with food). 90 capsule 3    cetirizine (ZYRTEC) 10 MG tablet Take 1 tablet (10 mg total) by mouth once daily. 90 tablet 3    cholecalciferol, vitamin D3, (VITAMIN D3) 25  mcg (1,000 unit) capsule Take 1 capsule (1,000 Units total) by mouth once daily. 90 capsule 3    divalproex (DEPAKOTE) 500 MG TbEC TAKE 1 TABLET BY MOUTH EVERY TWELVE HOURS 62 tablet PRN    divalproex ER (DEPAKOTE ER) 250 MG 24 hr tablet Take 1 tablet (250 mg total) by mouth every evening. 90 tablet 3    divalproex ER (DEPAKOTE ER) 500 MG Tb24 24 hr tablet Take 1 tablet (500 mg total) by mouth every 12 (twelve) hours. Delayed Release tablets 56 tablet 10    fluticasone propionate (FLONASE) 50 mcg/actuation nasal spray INSTILL 2 SPRAY IN EACH NOSTRIL EVERY DAY   * REQUEST REFILL WHEN NEEDEDINSTILL 2 SPRAY IN EACH NOSTRIL EVERY DAY   * REQUEST REFILL WHEN NEEDED 16 g 3    furosemide (LASIX) 20 MG tablet Take 1 tablet (20 mg total) by mouth daily as needed (edema). 28 tablet 11    melatonin (MELATIN) 3 mg tablet Take 2 tablets (6 mg total) by mouth nightly. 60 tablet 11    terbinafine HCL (LAMISIL) 1 % cream Apply topically 2 (two) times daily. 15 g 1     No current facility-administered medications for this visit.   [3]   Social History  Socioeconomic History    Marital status: Single   Tobacco Use    Smoking status: Never    Smokeless tobacco: Never   Substance and Sexual Activity    Alcohol use: No    Drug use: No   Social History Narrative    Lives in property owned by grandmother. Disabled with [employer] caretakers 24hrs to patient. Primary caretaker Sherri takes care of him. Shalonda is the active caretaker, but there is no official legal power of .         Mother passed away 12/2013     Social Drivers of Health     Financial Resource Strain: Low Risk  (12/6/2023)    Overall Financial Resource Strain (CARDIA)     Difficulty of Paying Living Expenses: Not hard at all   Food Insecurity: Patient Unable To Answer (12/15/2024)    Received from [facility]    Hunger Vital Sign     Worried About Running Out of Food in the Last Year: Patient unable to answer     Ran Out of Food in the Last Year: Patient  unable to answer   Transportation Needs: Patient Unable To Answer (12/15/2024)    Received from [facility]    PRAPARE - Transportation     Lack of Transportation (Medical): Patient unable to answer     Lack of Transportation (Non-Medical): Patient unable to answer   Housing Stability: Patient Unable To Answer (12/15/2024)    Received from [facility]    Housing Stability Vital Sign     Unable to Pay for Housing in the Last Year: Patient unable to answer     Number of Times Moved in the Last Year: 1     Homeless in the Last Year: Patient unable to answer

## 2025-07-29 ENCOUNTER — OFFICE VISIT (OUTPATIENT)
Dept: PRIMARY CARE CLINIC | Facility: CLINIC | Age: 45
End: 2025-07-29
Payer: MEDICARE

## 2025-07-29 VITALS
BODY MASS INDEX: 27.31 KG/M2 | HEIGHT: 73 IN | DIASTOLIC BLOOD PRESSURE: 72 MMHG | OXYGEN SATURATION: 98 % | SYSTOLIC BLOOD PRESSURE: 122 MMHG | TEMPERATURE: 98 F | HEART RATE: 77 BPM

## 2025-07-29 DIAGNOSIS — R41.89 COGNITIVE AND BEHAVIORAL CHANGES: Primary | ICD-10-CM

## 2025-07-29 DIAGNOSIS — K06.8 GUMS, BLEEDING: ICD-10-CM

## 2025-07-29 DIAGNOSIS — R46.89 COGNITIVE AND BEHAVIORAL CHANGES: Primary | ICD-10-CM

## 2025-07-29 PROCEDURE — 3008F BODY MASS INDEX DOCD: CPT | Mod: CPTII,S$GLB,, | Performed by: HOSPITALIST

## 2025-07-29 PROCEDURE — 1159F MED LIST DOCD IN RCRD: CPT | Mod: CPTII,S$GLB,, | Performed by: HOSPITALIST

## 2025-07-29 PROCEDURE — G2211 COMPLEX E/M VISIT ADD ON: HCPCS | Mod: S$GLB,,, | Performed by: HOSPITALIST

## 2025-07-29 PROCEDURE — 99214 OFFICE O/P EST MOD 30 MIN: CPT | Mod: S$GLB,,, | Performed by: HOSPITALIST

## 2025-07-29 PROCEDURE — 3074F SYST BP LT 130 MM HG: CPT | Mod: CPTII,S$GLB,, | Performed by: HOSPITALIST

## 2025-07-29 PROCEDURE — 3078F DIAST BP <80 MM HG: CPT | Mod: CPTII,S$GLB,, | Performed by: HOSPITALIST

## 2025-07-29 NOTE — ASSESSMENT & PLAN NOTE
Timing and description of sx suggests UTI as likely underlying etiology, but caregivers note that the behavioral changes have been progressive over a longer time course.  In context of CP and hydrocephalus hx may need referral for psychiatrist to help with managing behavioral meds, especially as he is already on an AED regimen for seizures.  Other concerns would be shunt malfunction, but every other time this has been evaluated has shown no issues and imaging is very challenging to get with his CP and he would require sedation to hold still for imaging, which has also resulted in behavioral changes in the past.  Will start with dipstick UA and if suggestive of UTI will send for formal UA and reflex Cx and start on abx.

## 2025-07-29 NOTE — ASSESSMENT & PLAN NOTE
Exam consistent with gingivitis. Pt's neurologic impairments make oral hygiene more difficult to manage and needs regular dental care.  Advised his care staff that we typically don't refer for dental services and that the best way to get him set up with someone would be to go through his insurance to find someone accessible in-network.

## 2025-07-29 NOTE — PROGRESS NOTES
Primary Care Provider Appointment - [facility]  Leonid Wood MD       Subjective:      Patient ID: Leonid Cox is a 45 y.o. male with   Past Medical History:   Diagnosis Date    Blind     Cerebral palsy     Hydrocephalus     Hypertension     Seizure disorder     Seizures     Urinary reflux            Chief Complaint: Follow-up    Prior to this visit, patient's last encounter with PCP was 6/13/2025.    Nurse from [facility] provides additional hx.  Pt brought in today due to about a month of behavioral changes, agitation, cursing and hitting staff as well as his roommate.  For about 3 weeks has been having episodes of mostly urinary but also fecal incontinence.  Pt notes it hurts when he pees and smells different.  Pt feels like he's been sleeping more heavily and more tired.  He also notes bleeding gums when brushing his teeth lately; needs dentist.      5/20: Presents for follow up with his care provider. Reports he has been feeling okay since his last visit, no more cold/flu symptoms. Reports some abdominal pain today that resolved with stooling. Also reports diarrhea- twice last evening and once this morning. Denies changes to his diet. Denies sick contacts and knowledge of other people with GI symptoms. Denies nausea and vomiting. Notes he is sleeping well. Does physical therapy at home but has felt a bit more weak this past week. Reports decreased appetite- getting 3 meals a day but eating little bites of it.         Medications: Does not have pill packs              4Ms for Medical Decision-Making in Older Adults    Last Completed EAWV: None    MOBILITY:  Get Up and Go:       No data to display              Activities of Daily Living:       No data to display              Whisper Test:       No data to display              Disability Status:       No data to display              Nutrition Screening:       No data to display             Screening Score: 0-7 Malnourished, 8-11 At Risk, 12-14  Normal  Fall Risk:      6/13/2025     2:20 PM 5/20/2025    10:20 AM 2/25/2025    11:00 AM   Fall Risk Assessment - Outpatient   Mobility Status Ambulatory w/ assistance Ambulatory w/ assistance Ambulatory w/ assistance   Number of falls 0 0 1   Identified as fall risk False False False           MENTATION:   Depression Patient Health Questionnaire:      6/13/2025     2:32 PM   Depression Patient Health Questionnaire   Over the last two weeks how often have you been bothered by little interest or pleasure in doing things Not at all   Over the last two weeks how often have you been bothered by feeling down, depressed or hopeless Not at all   PHQ-2 Total Score 0     Has Dementia Dx: No  Has Anxiety Dx: No    Cognitive Function Screening:       No data to display              Cognitive Function Screening Total - Less than 4 = Abnormal,  Greater than or equal to 4 = Normal        MEDICATIONS:  High Risk Medications:  Total Active Medications: 0  This patient does not have an active medication from one of the medication groupers.    WHAT MATTERS MOST:  Advance Care Planning   ACP Status:   Patient has had an ACP conversation  Living Will: Yes  Power of : Yes  POLST: Yes    What is most important right now is to focus on extending life as long as possible, even it it means sacrificing quality    Accordingly, we have decided that the best plan to meet the patient's goals includes continuing with treatment      What matters most to patient today is:                  Social History     Socioeconomic History    Marital status: Single   Tobacco Use    Smoking status: Never    Smokeless tobacco: Never   Substance and Sexual Activity    Alcohol use: No    Drug use: No   Social History Narrative    Lives in property owned by grandmother. Disabled with [employer] caretakers 24hrs to patient. Primary caretaker Sherri takes care of him. Shalonda is the active caretaker, but there is no official legal power of .      "    Mother passed away 12/2013     Social Drivers of Health     Financial Resource Strain: Low Risk  (12/6/2023)    Overall Financial Resource Strain (CARDIA)     Difficulty of Paying Living Expenses: Not hard at all   Food Insecurity: Patient Unable To Answer (12/15/2024)    Received from [facility]    Hunger Vital Sign     Worried About Running Out of Food in the Last Year: Patient unable to answer     Ran Out of Food in the Last Year: Patient unable to answer   Transportation Needs: Patient Unable To Answer (12/15/2024)    Received from [facility]    PRAPARE - Transportation     Lack of Transportation (Medical): Patient unable to answer     Lack of Transportation (Non-Medical): Patient unable to answer   Housing Stability: Patient Unable To Answer (12/15/2024)    Received from [facility]    Housing Stability Vital Sign     Unable to Pay for Housing in the Last Year: Patient unable to answer     Number of Times Moved in the Last Year: 1     Homeless in the Last Year: Patient unable to answer       Review of Systems   Constitutional:  Positive for fatigue. Negative for fever and night sweats.   Respiratory:  Negative for cough, chest tightness and shortness of breath.    Cardiovascular:  Negative for chest pain.   Gastrointestinal:  Positive for diarrhea. Negative for constipation, nausea and vomiting.        Objective:   /72 (BP Location: Right arm, Patient Position: Sitting)   Pulse 77   Temp 98 °F (36.7 °C) (Oral)   Ht 6' 1" (1.854 m)   SpO2 98%   BMI 27.31 kg/m²     Physical Exam  Vitals reviewed.   Constitutional:       General: He is not in acute distress.     Comments: Seen in manual WC   HENT:      Head:      Comments: Rugation of left scalp     Mouth/Throat:      Comments:  extensive gingivitis present  Eyes:      Extraocular Movements: Extraocular movements intact.      Conjunctiva/sclera:      Right eye: Right conjunctiva is injected.      Left eye: Left conjunctiva is injected.      " Comments: Vertical gaze preference, but not fixed.  Dysconjugate gaze noted.  There is erythema of the eyelid margins and some mucoid discharge which has crusted at the medial angle of the eye.  Both sclera injected; L>R.   Neck:      Comments: Vertical scar R neck with underlying cylindrical structure visible under skin; likely part of  shunt  Cardiovascular:      Rate and Rhythm: Normal rate and regular rhythm.      Heart sounds: Normal heart sounds.   Pulmonary:      Effort: Pulmonary effort is normal.      Breath sounds: Normal breath sounds.   Musculoskeletal:      Comments: Contractures noted of multiple joints.  [place]-neck deformity of 2nd-5th digits of L hand.  Hypermobile DIP joints of L hand and often hyperextends digits, bending them backwards with his other hand.   Skin:     General: Skin is warm and dry.   Neurological:      Mental Status: He is alert. Mental status is at baseline.      Comments: Often rotates head laterally back and forth while looking upwards.  Converses largely appropriately.   Psychiatric:         Mood and Affect: Mood normal.              Lab Results   Component Value Date    WBC 6.01 07/18/2024    HGB 15.4 07/18/2024    HCT 46.4 07/18/2024     07/18/2024    CHOL 174 04/17/2023    TRIG 124 04/17/2023    HDL 39 (L) 04/17/2023    ALT 9 12/11/2024    AST 14 12/11/2024     12/16/2024    K 3.6 12/16/2024     07/18/2024    CREATININE 0.55 (L) 12/16/2024    BUN 11.0 12/16/2024    CO2 27 12/16/2024    TSH 3.073 09/29/2022    INR 1.2 06/25/2021    HGBA1C 5.1 07/24/2023       Current Outpatient Medications on File Prior to Visit   Medication Sig Dispense Refill    acetaminophen (TYLENOL) 500 MG tablet Take 1 tablet (500 mg total) by mouth every 6 (six) hours as needed for Pain. 180 tablet 3    celecoxib (CELEBREX) 200 MG capsule Take 1 capsule (200 mg total) by mouth daily as needed for Pain (Take with food). 90 capsule 3    cetirizine (ZYRTEC) 10 MG tablet Take 1 tablet  (10 mg total) by mouth once daily. 90 tablet 3    cholecalciferol, vitamin D3, (VITAMIN D3) 25 mcg (1,000 unit) capsule Take 1 capsule (1,000 Units total) by mouth once daily. 90 capsule 3    divalproex (DEPAKOTE) 500 MG TbEC TAKE 1 TABLET BY MOUTH EVERY TWELVE HOURS 62 tablet PRN    divalproex ER (DEPAKOTE ER) 250 MG 24 hr tablet Take 1 tablet (250 mg total) by mouth every evening. 90 tablet 3    divalproex ER (DEPAKOTE ER) 500 MG Tb24 24 hr tablet Take 1 tablet (500 mg total) by mouth every 12 (twelve) hours. Delayed Release tablets 56 tablet 10    fluticasone propionate (FLONASE) 50 mcg/actuation nasal spray INSTILL 2 SPRAY IN EACH NOSTRIL EVERY DAY   * REQUEST REFILL WHEN NEEDEDINSTILL 2 SPRAY IN EACH NOSTRIL EVERY DAY   * REQUEST REFILL WHEN NEEDED 16 g 3    furosemide (LASIX) 20 MG tablet Take 1 tablet (20 mg total) by mouth daily as needed (edema). 28 tablet 11    melatonin (MELATIN) 3 mg tablet Take 2 tablets (6 mg total) by mouth nightly. 60 tablet 11    terbinafine HCL (LAMISIL) 1 % cream Apply topically 2 (two) times daily. 15 g 1     No current facility-administered medications on file prior to visit.         Assessment:   45 y.o. male with multiple co-morbid illnesses here to follow-up for ongoing chronic disease management and preventive health maintenance.    Plan:     1. Cognitive and behavioral changes  Assessment & Plan:  Timing and description of sx suggests UTI as likely underlying etiology, but caregivers note that the behavioral changes have been progressive over a longer time course.  In context of CP and hydrocephalus hx may need referral for psychiatrist to help with managing behavioral meds, especially as he is already on an AED regimen for seizures.  Other concerns would be shunt malfunction, but every other time this has been evaluated has shown no issues and imaging is very challenging to get with his CP and he would require sedation to hold still for imaging, which has also resulted in  behavioral changes in the past.  Will start with dipstick UA and if suggestive of UTI will send for formal UA and reflex Cx and start on abx.      Orders:  -     POCT urinalysis, dipstick or tablet reag  -     Urinalysis, Reflex to Urine Culture Urine, Clean Catch; Future; Expected date: 07/29/2025  -     Ambulatory referral/consult to Psychiatry; Future; Expected date: 08/05/2025    2. Gums, bleeding  Assessment & Plan:  Exam consistent with gingivitis. Pt's neurologic impairments make oral hygiene more difficult to manage and needs regular dental care.  Advised his care staff that we typically don't refer for dental services and that the best way to get him set up with someone would be to go through his insurance to find someone accessible in-network.          Health Maintenance         Date Due Completion Date    COVID-19 Vaccine (1 - 2024-25 season) Never done ---    Influenza Vaccine (1) 09/01/2025 1/13/2025    Hemoglobin A1c (Diabetic Prevention Screening) 07/24/2026 7/24/2023    Lipid Panel 04/17/2028 4/17/2023    TETANUS VACCINE 09/03/2033 9/3/2023    RSV Vaccine (Age 60+ and Pregnant patients) (1 - 1-dose 75+ series) 03/03/2056 ---            Future Appointments   Date Time Provider Department Center   8/5/2025  8:00 AM Leonid Wood MD [facility] MED [facility] [address] [facility]               Follow up in about 1 week (around 8/5/2025) for audio. Total clinical care time was 30 min.    Leonid Wood MD  [facility]  [facility]  [facility]

## 2025-08-13 ENCOUNTER — TELEPHONE (OUTPATIENT)
Dept: PRIMARY CARE CLINIC | Facility: CLINIC | Age: 45
End: 2025-08-13
Payer: MEDICARE

## 2025-08-13 ENCOUNTER — HOSPITAL ENCOUNTER (EMERGENCY)
Facility: HOSPITAL | Age: 45
Discharge: HOME | End: 2025-08-13
Attending: EMERGENCY MEDICINE
Payer: MEDICARE

## 2025-08-13 VITALS
TEMPERATURE: 98 F | RESPIRATION RATE: 18 BRPM | DIASTOLIC BLOOD PRESSURE: 96 MMHG | OXYGEN SATURATION: 98 % | HEART RATE: 84 BPM | SYSTOLIC BLOOD PRESSURE: 155 MMHG

## 2025-08-13 DIAGNOSIS — K04.7 DENTAL INFECTION: Primary | ICD-10-CM

## 2025-08-13 PROCEDURE — 99283 EMERGENCY DEPT VISIT LOW MDM: CPT | Mod: ER

## 2025-08-13 RX ORDER — AMOXICILLIN 500 MG/1
500 CAPSULE ORAL 2 TIMES DAILY
Qty: 14 CAPSULE | Refills: 0 | Status: SHIPPED | OUTPATIENT
Start: 2025-08-13 | End: 2025-08-20

## 2025-08-19 ENCOUNTER — OFFICE VISIT (OUTPATIENT)
Dept: PRIMARY CARE CLINIC | Facility: CLINIC | Age: 45
End: 2025-08-19
Payer: MEDICARE

## 2025-08-19 VITALS
TEMPERATURE: 98 F | OXYGEN SATURATION: 98 % | DIASTOLIC BLOOD PRESSURE: 66 MMHG | SYSTOLIC BLOOD PRESSURE: 111 MMHG | HEART RATE: 67 BPM | BODY MASS INDEX: 27.31 KG/M2 | HEIGHT: 73 IN

## 2025-08-19 DIAGNOSIS — R41.89 COGNITIVE AND BEHAVIORAL CHANGES: Primary | ICD-10-CM

## 2025-08-19 DIAGNOSIS — M26.609 TMJ (TEMPOROMANDIBULAR JOINT DISORDER): ICD-10-CM

## 2025-08-19 DIAGNOSIS — K06.8 GUMS, BLEEDING: ICD-10-CM

## 2025-08-19 DIAGNOSIS — R46.89 COGNITIVE AND BEHAVIORAL CHANGES: Primary | ICD-10-CM

## 2025-08-19 PROBLEM — J00 ACUTE NASOPHARYNGITIS: Status: RESOLVED | Noted: 2025-04-07 | Resolved: 2025-08-19

## 2025-08-19 PROCEDURE — 3008F BODY MASS INDEX DOCD: CPT | Mod: CPTII,S$GLB,, | Performed by: HOSPITALIST

## 2025-08-19 PROCEDURE — 1159F MED LIST DOCD IN RCRD: CPT | Mod: CPTII,S$GLB,, | Performed by: HOSPITALIST

## 2025-08-19 PROCEDURE — 3074F SYST BP LT 130 MM HG: CPT | Mod: CPTII,S$GLB,, | Performed by: HOSPITALIST

## 2025-08-19 PROCEDURE — 3078F DIAST BP <80 MM HG: CPT | Mod: CPTII,S$GLB,, | Performed by: HOSPITALIST

## 2025-08-19 PROCEDURE — 99214 OFFICE O/P EST MOD 30 MIN: CPT | Mod: S$GLB,,, | Performed by: HOSPITALIST

## 2025-08-27 ENCOUNTER — HOSPITAL ENCOUNTER (EMERGENCY)
Facility: HOSPITAL | Age: 45
Discharge: HOME | End: 2025-08-27
Attending: EMERGENCY MEDICINE
Payer: MEDICARE

## 2025-08-27 VITALS
HEART RATE: 79 BPM | SYSTOLIC BLOOD PRESSURE: 129 MMHG | RESPIRATION RATE: 20 BRPM | BODY MASS INDEX: 27.43 KG/M2 | DIASTOLIC BLOOD PRESSURE: 90 MMHG | WEIGHT: 207 LBS | TEMPERATURE: 98 F | HEIGHT: 73 IN | OXYGEN SATURATION: 99 %

## 2025-08-27 DIAGNOSIS — S80.11XA CONTUSION OF RIGHT LEG: ICD-10-CM

## 2025-08-27 DIAGNOSIS — T14.8XXA HEMATOMA: Primary | ICD-10-CM

## 2025-08-27 PROCEDURE — 99283 EMERGENCY DEPT VISIT LOW MDM: CPT | Mod: 25,ER
